# Patient Record
Sex: MALE | Race: WHITE | NOT HISPANIC OR LATINO | Employment: FULL TIME | URBAN - METROPOLITAN AREA
[De-identification: names, ages, dates, MRNs, and addresses within clinical notes are randomized per-mention and may not be internally consistent; named-entity substitution may affect disease eponyms.]

---

## 2023-11-20 ENCOUNTER — OFFICE VISIT (OUTPATIENT)
Dept: GASTROENTEROLOGY | Facility: HOSPITAL | Age: 58
End: 2023-11-20
Payer: COMMERCIAL

## 2023-11-20 VITALS
RESPIRATION RATE: 18 BRPM | OXYGEN SATURATION: 100 % | WEIGHT: 205 LBS | TEMPERATURE: 96.7 F | BODY MASS INDEX: 24.21 KG/M2 | HEIGHT: 77 IN | DIASTOLIC BLOOD PRESSURE: 79 MMHG | HEART RATE: 59 BPM | SYSTOLIC BLOOD PRESSURE: 125 MMHG

## 2023-11-20 DIAGNOSIS — G93.40 ACUTE ENCEPHALOPATHY: ICD-10-CM

## 2023-11-20 DIAGNOSIS — G62.89 OTHER POLYNEUROPATHY: ICD-10-CM

## 2023-11-20 DIAGNOSIS — E87.8 ELECTROLYTE ABNORMALITY: ICD-10-CM

## 2023-11-20 DIAGNOSIS — K74.60 CIRRHOSIS OF LIVER WITH ASCITES, UNSPECIFIED HEPATIC CIRRHOSIS TYPE (MULTI): Primary | ICD-10-CM

## 2023-11-20 DIAGNOSIS — R18.8 CIRRHOSIS OF LIVER WITH ASCITES, UNSPECIFIED HEPATIC CIRRHOSIS TYPE (MULTI): Primary | ICD-10-CM

## 2023-11-20 PROBLEM — D69.6 THROMBOCYTOPENIA (CMS-HCC): Status: ACTIVE | Noted: 2017-04-02

## 2023-11-20 PROBLEM — D50.0 ANEMIA DUE TO CHRONIC BLOOD LOSS: Status: ACTIVE | Noted: 2021-12-15

## 2023-11-20 PROBLEM — D64.9 ANEMIA: Status: ACTIVE | Noted: 2021-12-28

## 2023-11-20 PROBLEM — K76.6 PORTAL HYPERTENSION WITH ESOPHAGEAL VARICES (MULTI): Status: ACTIVE | Noted: 2021-12-15

## 2023-11-20 PROBLEM — F10.931 ALCOHOL WITHDRAWAL DELIRIUM (MULTI): Status: ACTIVE | Noted: 2017-04-03

## 2023-11-20 PROBLEM — M19.079 ARTHRITIS OF GREAT TOE AT METATARSOPHALANGEAL JOINT: Status: ACTIVE | Noted: 2020-11-17

## 2023-11-20 PROBLEM — R76.8 HELICOBACTER PYLORI AB+: Status: ACTIVE | Noted: 2021-12-17

## 2023-11-20 PROBLEM — E87.20 LACTIC ACIDOSIS: Status: ACTIVE | Noted: 2017-04-02

## 2023-11-20 PROBLEM — I85.10 SECONDARY ESOPHAGEAL VARICES WITHOUT BLEEDING (MULTI): Status: ACTIVE | Noted: 2021-12-17

## 2023-11-20 PROBLEM — I85.00 PORTAL HYPERTENSION WITH ESOPHAGEAL VARICES (MULTI): Status: ACTIVE | Noted: 2021-12-15

## 2023-11-20 PROBLEM — I10 HYPERTENSION: Chronic | Status: ACTIVE | Noted: 2023-11-20

## 2023-11-20 PROBLEM — G62.9 PERIPHERAL NEUROPATHY: Status: ACTIVE | Noted: 2020-11-17

## 2023-11-20 PROBLEM — R74.01 TRANSAMINITIS: Status: ACTIVE | Noted: 2017-04-03

## 2023-11-20 PROBLEM — M79.671 RIGHT FOOT PAIN: Status: ACTIVE | Noted: 2020-11-17

## 2023-11-20 PROBLEM — F07.81 CHRONIC TRAUMATIC ENCEPHALOPATHY: Status: ACTIVE | Noted: 2021-12-18

## 2023-11-20 PROBLEM — G89.29 CHRONIC PAIN: Chronic | Status: ACTIVE | Noted: 2023-11-20

## 2023-11-20 PROBLEM — K70.9 ALCOHOLIC LIVER DISEASE (MULTI): Chronic | Status: ACTIVE | Noted: 2023-11-20

## 2023-11-20 PROBLEM — R56.9: Status: ACTIVE | Noted: 2017-04-02

## 2023-11-20 PROBLEM — S91.109A OPEN WOUND OF TOE: Status: ACTIVE | Noted: 2020-11-17

## 2023-11-20 PROBLEM — E72.20 HYPERAMMONEMIA (MULTI): Status: ACTIVE | Noted: 2017-04-03

## 2023-11-20 PROCEDURE — 99214 OFFICE O/P EST MOD 30 MIN: CPT | Performed by: INTERNAL MEDICINE

## 2023-11-20 PROCEDURE — 3074F SYST BP LT 130 MM HG: CPT | Performed by: INTERNAL MEDICINE

## 2023-11-20 PROCEDURE — 3078F DIAST BP <80 MM HG: CPT | Performed by: INTERNAL MEDICINE

## 2023-11-20 PROCEDURE — 99204 OFFICE O/P NEW MOD 45 MIN: CPT | Performed by: INTERNAL MEDICINE

## 2023-11-20 PROCEDURE — 1036F TOBACCO NON-USER: CPT | Performed by: INTERNAL MEDICINE

## 2023-11-20 RX ORDER — SPIRONOLACTONE 50 MG/1
50 TABLET, FILM COATED ORAL
COMMUNITY
Start: 2023-10-26 | End: 2024-02-21 | Stop reason: SDUPTHER

## 2023-11-20 RX ORDER — FUROSEMIDE 20 MG/1
20 TABLET ORAL
COMMUNITY
Start: 2023-10-26 | End: 2024-02-21 | Stop reason: SDUPTHER

## 2023-11-20 RX ORDER — LACTULOSE 10 G/15ML
SOLUTION ORAL
COMMUNITY
Start: 2023-10-26 | End: 2024-02-27 | Stop reason: SDUPTHER

## 2023-11-20 ASSESSMENT — PAIN SCALES - GENERAL: PAINLEVEL: 0-NO PAIN

## 2023-11-20 NOTE — PATIENT INSTRUCTIONS
If you have any questions or need assistance, please don't hesitate to contact us.    Post: Office [phone] Fax: [phone]  Hepatology Nurse Coordinator: Soheila DUBON [phone]

## 2023-11-20 NOTE — PROGRESS NOTES
Subjective   Patient ID: Vance Silva is a 57 y.o. male who presents for New Patient Visit and Cirrhosis.  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  At that time had esophageal varices that were banded.   Echo in 6/2021 66% EF.  Covid in 6/2021.   Found recently to have ascites and 3.6 liters of fluid removed.   Cytology negative.   Protein in fluid 1.7 albumin 1.2 Serum albumin 4.4.   Labs done 9/15/2023 bili 0.8/0.3 alk phos 89 ast 33 alt 42 total protein 7.5.   CBC normal except for plts of 41,000.   No alcohol since 2018 prior 3 fifths a week.   Has well water and fracking at his property and has been told high arsinic levels in blood.   Now eats healthy diet with many vegetables and some meat.  Does alternative medicines including cryo, electromagnetic stimulations and stem cell injections.   Feels fairly well at this point.   No more ascites.  Minimal edema at times.   Last EGD 9/29/23 Grade 1 varices.     Review of Systems  Some cognitive deficit.   History of seizure disorder none for years.   Multiple bone fractures.      Objective   Physical Exam  Physical Exam  Constitutional:       Appearance: Normal appearance. Somewhat decreased muscle mass.    Eyes:      General: No scleral icterus.  Cardiovascular:      Rate and Rhythm: Normal rate and regular rhythm.      Heart sounds: No murmur heard.     No gallop.   Pulmonary:      Effort: Pulmonary effort is normal.      Breath sounds: Normal breath sounds.   Abdominal:      General: Abdomen is flat. Bowel sounds are normal. There is no distension.      Palpations: Abdomen is soft. There is no fluid wave, hepatomegaly or splenomegaly.      Tenderness: There is no abdominal tenderness.   Musculoskeletal:      Cervical back: Normal range of motion. No tenderness.      Right lower leg: No edema.      Left lower leg: No edema.    Lymphadenopathy:      Cervical: No cervical adenopathy.   Skin:     Coloration: Skin is not jaundiced.   Neurological:      General: No focal deficit present.      Mental Status: She is alert.       Assessment/Plan   Cirrhosis that is likely multifactorial.   Related to pain killers in the past, as well as excess alcohol possibly recent toxic arsenic levels.   Will get labs and an ultrasound and follow up in 1-2 months.   He needs to work on more protein in the diet  grams a day and 2 grams or less of sodium.   He should restart his lactulose and titrate to 2-5 soft stools a day.   May also want to add rifaximin.   Appears to be Anderson Knowles class A- at this point.   Will monitor.   Screen for liver cancer with ultrasound and AFP.   1-3 coffee's a day.

## 2024-01-29 ENCOUNTER — HOSPITAL ENCOUNTER (OUTPATIENT)
Dept: RADIOLOGY | Facility: HOSPITAL | Age: 59
Discharge: HOME | End: 2024-01-29
Payer: COMMERCIAL

## 2024-01-29 ENCOUNTER — APPOINTMENT (OUTPATIENT)
Dept: VASCULAR MEDICINE | Facility: HOSPITAL | Age: 59
End: 2024-01-29
Payer: COMMERCIAL

## 2024-01-29 DIAGNOSIS — K74.60 CIRRHOSIS OF LIVER WITH ASCITES, UNSPECIFIED HEPATIC CIRRHOSIS TYPE (MULTI): ICD-10-CM

## 2024-01-29 DIAGNOSIS — R18.8 CIRRHOSIS OF LIVER WITH ASCITES, UNSPECIFIED HEPATIC CIRRHOSIS TYPE (MULTI): ICD-10-CM

## 2024-01-29 PROCEDURE — 76705 ECHO EXAM OF ABDOMEN: CPT | Performed by: RADIOLOGY

## 2024-01-29 PROCEDURE — 93975 VASCULAR STUDY: CPT

## 2024-01-29 PROCEDURE — 93975 VASCULAR STUDY: CPT | Performed by: RADIOLOGY

## 2024-01-31 ENCOUNTER — OFFICE VISIT (OUTPATIENT)
Dept: GASTROENTEROLOGY | Facility: HOSPITAL | Age: 59
End: 2024-01-31
Payer: COMMERCIAL

## 2024-01-31 ENCOUNTER — LAB (OUTPATIENT)
Dept: LAB | Facility: LAB | Age: 59
End: 2024-01-31
Payer: COMMERCIAL

## 2024-01-31 VITALS
HEART RATE: 116 BPM | TEMPERATURE: 97 F | WEIGHT: 200 LBS | OXYGEN SATURATION: 99 % | RESPIRATION RATE: 18 BRPM | SYSTOLIC BLOOD PRESSURE: 124 MMHG | BODY MASS INDEX: 23.62 KG/M2 | HEIGHT: 77 IN | DIASTOLIC BLOOD PRESSURE: 89 MMHG

## 2024-01-31 DIAGNOSIS — K76.6 PORTAL HYPERTENSION WITH ESOPHAGEAL VARICES (MULTI): ICD-10-CM

## 2024-01-31 DIAGNOSIS — K70.31 ALCOHOLIC CIRRHOSIS OF LIVER WITH ASCITES (MULTI): Primary | ICD-10-CM

## 2024-01-31 DIAGNOSIS — R18.8 CIRRHOSIS OF LIVER WITH ASCITES, UNSPECIFIED HEPATIC CIRRHOSIS TYPE (MULTI): ICD-10-CM

## 2024-01-31 DIAGNOSIS — K76.82 HEPATIC ENCEPHALOPATHY (MULTI): ICD-10-CM

## 2024-01-31 DIAGNOSIS — K92.2 GASTROINTESTINAL HEMORRHAGE, UNSPECIFIED GASTROINTESTINAL HEMORRHAGE TYPE: ICD-10-CM

## 2024-01-31 DIAGNOSIS — I85.00 PORTAL HYPERTENSION WITH ESOPHAGEAL VARICES (MULTI): ICD-10-CM

## 2024-01-31 DIAGNOSIS — I85.00 PORTAL HYPERTENSION WITH ESOPHAGEAL VARICES (MULTI): Primary | ICD-10-CM

## 2024-01-31 DIAGNOSIS — K76.6 PORTAL HYPERTENSION WITH ESOPHAGEAL VARICES (MULTI): Primary | ICD-10-CM

## 2024-01-31 DIAGNOSIS — K74.60 CIRRHOSIS OF LIVER WITH ASCITES, UNSPECIFIED HEPATIC CIRRHOSIS TYPE (MULTI): ICD-10-CM

## 2024-01-31 LAB
AFP SERPL-MCNC: <4 NG/ML (ref 0–9)
ALBUMIN SERPL BCP-MCNC: 3.8 G/DL (ref 3.4–5)
ALP SERPL-CCNC: 97 U/L (ref 33–136)
ALT SERPL W P-5'-P-CCNC: 28 U/L (ref 10–52)
ANION GAP SERPL CALC-SCNC: 15 MMOL/L (ref 10–20)
AST SERPL W P-5'-P-CCNC: 26 U/L (ref 9–39)
BASOPHILS # BLD AUTO: 0.05 X10*3/UL (ref 0–0.1)
BASOPHILS NFR BLD AUTO: 0.3 %
BILIRUB DIRECT SERPL-MCNC: 0.1 MG/DL (ref 0–0.3)
BILIRUB SERPL-MCNC: 0.5 MG/DL (ref 0–1.2)
BUN SERPL-MCNC: 31 MG/DL (ref 6–23)
CALCIUM SERPL-MCNC: 8.9 MG/DL (ref 8.6–10.6)
CHLORIDE SERPL-SCNC: 103 MMOL/L (ref 98–107)
CO2 SERPL-SCNC: 20 MMOL/L (ref 21–32)
CREAT SERPL-MCNC: 1.15 MG/DL (ref 0.5–1.3)
EGFRCR SERPLBLD CKD-EPI 2021: 73 ML/MIN/1.73M*2
EOSINOPHIL # BLD AUTO: 0 X10*3/UL (ref 0–0.7)
EOSINOPHIL NFR BLD AUTO: 0 %
ERYTHROCYTE [DISTWIDTH] IN BLOOD BY AUTOMATED COUNT: 20.7 % (ref 11.5–14.5)
GLUCOSE SERPL-MCNC: 108 MG/DL (ref 74–99)
HAV AB SER QL IA: REACTIVE
HBV CORE AB SER QL: NONREACTIVE
HBV SURFACE AB SER-ACNC: <3.1 MIU/ML
HBV SURFACE AG SERPL QL IA: NONREACTIVE
HCT VFR BLD AUTO: 25 % (ref 41–52)
HCV AB SER QL: NONREACTIVE
HGB BLD-MCNC: 7.2 G/DL (ref 13.5–17.5)
HYPOCHROMIA BLD QL SMEAR: NORMAL
IMM GRANULOCYTES # BLD AUTO: 0.13 X10*3/UL (ref 0–0.7)
IMM GRANULOCYTES NFR BLD AUTO: 0.9 % (ref 0–0.9)
INR PPP: 1.2 (ref 0.9–1.1)
LYMPHOCYTES # BLD AUTO: 0.66 X10*3/UL (ref 1.2–4.8)
LYMPHOCYTES NFR BLD AUTO: 4.5 %
MCH RBC QN AUTO: 20.8 PG (ref 26–34)
MCHC RBC AUTO-ENTMCNC: 28.8 G/DL (ref 32–36)
MCV RBC AUTO: 72 FL (ref 80–100)
MONOCYTES # BLD AUTO: 0.99 X10*3/UL (ref 0.1–1)
MONOCYTES NFR BLD AUTO: 6.8 %
NEUTROPHILS # BLD AUTO: 12.68 X10*3/UL (ref 1.2–7.7)
NEUTROPHILS NFR BLD AUTO: 87.5 %
NRBC BLD-RTO: 0 /100 WBCS (ref 0–0)
OVALOCYTES BLD QL SMEAR: NORMAL
PLATELET # BLD AUTO: 148 X10*3/UL (ref 150–450)
POTASSIUM SERPL-SCNC: 4.3 MMOL/L (ref 3.5–5.3)
PROT SERPL-MCNC: 6.7 G/DL (ref 6.4–8.2)
PROTHROMBIN TIME: 13.6 SECONDS (ref 9.8–12.8)
RBC # BLD AUTO: 3.46 X10*6/UL (ref 4.5–5.9)
RBC MORPH BLD: NORMAL
SODIUM SERPL-SCNC: 134 MMOL/L (ref 136–145)
WBC # BLD AUTO: 14.5 X10*3/UL (ref 4.4–11.3)

## 2024-01-31 PROCEDURE — 99214 OFFICE O/P EST MOD 30 MIN: CPT | Performed by: INTERNAL MEDICINE

## 2024-01-31 PROCEDURE — 80321 ALCOHOLS BIOMARKERS 1OR 2: CPT

## 2024-01-31 PROCEDURE — 82248 BILIRUBIN DIRECT: CPT

## 2024-01-31 PROCEDURE — 82175 ASSAY OF ARSENIC: CPT

## 2024-01-31 PROCEDURE — 86708 HEPATITIS A ANTIBODY: CPT

## 2024-01-31 PROCEDURE — 36415 COLL VENOUS BLD VENIPUNCTURE: CPT

## 2024-01-31 PROCEDURE — 3074F SYST BP LT 130 MM HG: CPT | Performed by: INTERNAL MEDICINE

## 2024-01-31 PROCEDURE — 86706 HEP B SURFACE ANTIBODY: CPT

## 2024-01-31 PROCEDURE — 85610 PROTHROMBIN TIME: CPT

## 2024-01-31 PROCEDURE — 86704 HEP B CORE ANTIBODY TOTAL: CPT

## 2024-01-31 PROCEDURE — 80053 COMPREHEN METABOLIC PANEL: CPT

## 2024-01-31 PROCEDURE — 3079F DIAST BP 80-89 MM HG: CPT | Performed by: INTERNAL MEDICINE

## 2024-01-31 PROCEDURE — 87340 HEPATITIS B SURFACE AG IA: CPT

## 2024-01-31 PROCEDURE — 86803 HEPATITIS C AB TEST: CPT

## 2024-01-31 PROCEDURE — 82105 ALPHA-FETOPROTEIN SERUM: CPT

## 2024-01-31 PROCEDURE — 1036F TOBACCO NON-USER: CPT | Performed by: INTERNAL MEDICINE

## 2024-01-31 PROCEDURE — 85025 COMPLETE CBC W/AUTO DIFF WBC: CPT

## 2024-01-31 RX ORDER — CARVEDILOL 6.25 MG/1
6.25 TABLET ORAL ONCE
Status: DISCONTINUED | OUTPATIENT
Start: 2024-01-31 | End: 2024-02-02

## 2024-01-31 ASSESSMENT — COLUMBIA-SUICIDE SEVERITY RATING SCALE - C-SSRS
1. IN THE PAST MONTH, HAVE YOU WISHED YOU WERE DEAD OR WISHED YOU COULD GO TO SLEEP AND NOT WAKE UP?: NO
6. HAVE YOU EVER DONE ANYTHING, STARTED TO DO ANYTHING, OR PREPARED TO DO ANYTHING TO END YOUR LIFE?: NO
2. HAVE YOU ACTUALLY HAD ANY THOUGHTS OF KILLING YOURSELF?: NO

## 2024-01-31 ASSESSMENT — ENCOUNTER SYMPTOMS
OCCASIONAL FEELINGS OF UNSTEADINESS: 0
LOSS OF SENSATION IN FEET: 0
DEPRESSION: 0

## 2024-01-31 ASSESSMENT — PATIENT HEALTH QUESTIONNAIRE - PHQ9
1. LITTLE INTEREST OR PLEASURE IN DOING THINGS: NOT AT ALL
SUM OF ALL RESPONSES TO PHQ9 QUESTIONS 1 AND 2: 0
2. FEELING DOWN, DEPRESSED OR HOPELESS: NOT AT ALL

## 2024-01-31 ASSESSMENT — PAIN SCALES - GENERAL: PAINLEVEL: 2

## 2024-01-31 NOTE — PROGRESS NOTES
Subjective   Patient ID: Vance Silva is a 58 y.o. male who presents for Follow-up (Pt here today for follow up after testing).  HPI 57 yo man with cirrhosis from multifactorial liver diease has not drank alcohol for many years.   Recent GI bleed from varices status post banding of varices 1/5/2024.   Having paracentesis every few weeks at [facility].   Recent MELD 8.   He is very debilitated with ascites and muscle wasting, severe fatigue.  Has had significant variceal bleeding.   Copper negative.   Ascites without SBP.        Review of Systems  Fatigue, muscle weakness.   Loss of muscle mass.      Objective   Physical Exam  Physical Exam  Constitutional:       Appearance: Normal appearance. Muscle wasting.     Eyes:      General: No scleral icterus.  Cardiovascular:      Rate and Rhythm: Normal rate and regular rhythm.      Heart sounds: No murmur heard.     No gallop.   Pulmonary:      Effort: Pulmonary effort is normal.      Breath sounds: Normal breath sounds.   Abdominal:      General: Abdomen is flat. Bowel sounds are normal. There is no distension.      Palpations: Abdomen is soft. There is a fluid wave, hepatomegaly or splenomegaly.      Tenderness: There is no abdominal tenderness.   Musculoskeletal:      Cervical back: Normal range of motion. No tenderness.      Right lower leg: No edema.      Left lower leg: No edema.   Lymphadenopathy:      Cervical: No cervical adenopathy.   Skin:     Coloration: Skin is not jaundiced.   Neurological:      General: No focal deficit present.      Mental Status: She is alert.       Assessment/Plan        57 yo man with cirrhosis with low MELD but significant morbidity from his liver disease.   Suspect his MELD will rise significantly over the next year.   With HE concerned about placing a TIPS without having transplant as a backup.     Will get labs today, start carvedolol 6.25 mg every day.   Increase lasix and aldactone to 40 mg lasix and 100 mg aldactone bid.    Follow up in transplant clinic after evaluation.       Mu Norris MD 01/31/24 3:16 PM

## 2024-02-01 ENCOUNTER — TELEPHONE (OUTPATIENT)
Dept: TRANSPLANT | Facility: HOSPITAL | Age: 59
End: 2024-02-01
Payer: COMMERCIAL

## 2024-02-02 ENCOUNTER — TELEPHONE (OUTPATIENT)
Dept: TRANSPLANT | Facility: HOSPITAL | Age: 59
End: 2024-02-02
Payer: COMMERCIAL

## 2024-02-02 DIAGNOSIS — I85.00 PORTAL HYPERTENSION WITH ESOPHAGEAL VARICES (MULTI): ICD-10-CM

## 2024-02-02 DIAGNOSIS — K76.6 PORTAL HYPERTENSION WITH ESOPHAGEAL VARICES (MULTI): ICD-10-CM

## 2024-02-02 RX ORDER — CARVEDILOL 6.25 MG/1
6.25 TABLET ORAL NIGHTLY
Qty: 30 TABLET | Refills: 11 | Status: SHIPPED | OUTPATIENT
Start: 2024-02-02 | End: 2025-02-01

## 2024-02-02 NOTE — TELEPHONE ENCOUNTER
How did you hear about [facility]?   MD  Referral Source comments   MD  Have you ever contacted the [facility] before?   NO  Are you currently listed or being evaluated by any other transplant center?   NO  Where are you currently listed or being evaluated for transplant?   NO  Are you also seeking other organ transplant(s)?   NO  Are you a ?   NO  Do you have your own transportation?   YES  Do you have a 's license?   YES  Do you have a working vehicle?   YES  Do you have someone in your support system that can drive you to and from all your appointments as needed?   YES  Are you prepared to drive to [facility] in [place], [place] for all necessary appointments? If not, you may need to speak with a  prior to scheduling your appointments. We are unable to assist with transportation or parking.   YES  Transportation Comments   NO    GENERAL HEALTH STATUS LI  What is the primary cause of your organ disease?   ENVIR  Have you had a heart attack?   NO  When was your heart attack?   N/A  Where was your heart attack treated?   N/A  Have you had a stroke?   NO  When was your stroke?   N/A  Where was your stroke treated?   N/A     GENERAL HEALTH STATUS HT  Have you had any pregnancies?   N/A  If yes, how many pregnancies?   N/A  Have you had any transfusions? YES  If yes, how many transfusions?   3-4  Have you ever been on dialysis?   NO    DIABETES HISTORY LI  What type of diabetes do you have?   NO  Are you taking insulin for your diabetes?   N/A    CANCER HISTORY  Where is cancer located?   NO  When was the diagnosis made?   N/A  Who provided cancer treatment?   N/A     MENTAL HEALTH HISTORY LI  Are you currently or have you previously been seen by a mental health professional?   YES  Who is the treating doctor for your mental health issues?   ?  Did you receive a specific diagnosis?   COGNITIVE REASONS    SUBSTANCE USE HISTORY LI  Are you a current or  former tobacco user?   NO  Describe your tobacco use.   N/A  When did you quit using tobacco products?   N/A  Are you a current or former alcohol user?   FORMER  Describe your alcohol use.   WEEKEND, SOCIAL ENDING UP MOST DAYS  When did you quit drinking alcohol?   6 YEARS AGO  Have you ever received treatment for alcohol abuse?   NO  When and where did you receive treatment for alcohol abuse?   N/A  Are you a current or former marijuana user?   CURRENT, HAS A CARD  Describe your marijuana use.   WATER BASE SPRAY  When did you quit using marijuana?   N/A  Have you ever received treatment for marijuana abuse?   N/A  When and where did you receive treatment for marijuana abuse?   N/A  Are you a current or former user of illegal drugs such as heroin, cocaine, meth, crack, or any other recreational substance?   FORMER  Describe your illegal drug use.   COCAINE IN HIS 20'S  When did you quit using illegal drugs?   IN HIS 20'S  Have you ever received treatment for illegal drug abuse?   NO  When and where did you receive treatment for illegal drug abuse?   N/A    DIAGNOSTIC TESTING LI  Have you had a colonoscopy in the last 5 years?   YES  Where was the colonoscopy performed?   AT [facility]  Have you had a EGD (Endoscopy)?   YES  Where was the EGD(Endoscopy) Performed?   AT [facility]     COMMENTS  NO

## 2024-02-03 LAB
LABORATORY REPORT: NORMAL
PETH INTERPRETATION: NORMAL
PLPETH BLD-MCNC: <10 NG/ML
POPETH BLD-MCNC: <10 NG/ML

## 2024-02-05 ENCOUNTER — TELEPHONE (OUTPATIENT)
Dept: TRANSPLANT | Facility: HOSPITAL | Age: 59
End: 2024-02-05
Payer: COMMERCIAL

## 2024-02-05 ENCOUNTER — DOCUMENTATION (OUTPATIENT)
Dept: TRANSPLANT | Facility: HOSPITAL | Age: 59
End: 2024-02-05
Payer: COMMERCIAL

## 2024-02-05 ENCOUNTER — DOCUMENTATION (OUTPATIENT)
Dept: GASTROENTEROLOGY | Facility: HOSPITAL | Age: 59
End: 2024-02-05
Payer: COMMERCIAL

## 2024-02-05 DIAGNOSIS — Z01.818 ENCOUNTER FOR PRE-TRANSPLANT EVALUATION FOR LIVER TRANSPLANT: ICD-10-CM

## 2024-02-05 DIAGNOSIS — K70.31 ALCOHOLIC CIRRHOSIS OF LIVER WITH ASCITES (MULTI): ICD-10-CM

## 2024-02-05 DIAGNOSIS — D72.829 LEUKOCYTOSIS, UNSPECIFIED TYPE: ICD-10-CM

## 2024-02-05 DIAGNOSIS — R18.8 CIRRHOSIS OF LIVER WITH ASCITES, UNSPECIFIED HEPATIC CIRRHOSIS TYPE (MULTI): ICD-10-CM

## 2024-02-05 DIAGNOSIS — K74.60 CIRRHOSIS OF LIVER WITH ASCITES, UNSPECIFIED HEPATIC CIRRHOSIS TYPE (MULTI): ICD-10-CM

## 2024-02-05 DIAGNOSIS — K72.10 END STAGE LIVER DISEASE (MULTI): ICD-10-CM

## 2024-02-05 DIAGNOSIS — D72.829 LEUKOCYTOSIS, UNSPECIFIED TYPE: Primary | ICD-10-CM

## 2024-02-05 LAB
ARSENIC 24H UR-MRATE: NORMAL UG/D (ref 0–49.9)
ARSENIC UR-MCNC: 18.4 UG/L (ref 0–34.9)
ARSENIC/CREAT UR: 12.1 UG/G CRT (ref 0–29.9)
COLLECT DURATION TIME SPEC: NORMAL HR
CREAT 24H UR-MRATE: NORMAL MG/D (ref 800–2100)
CREAT UR-MCNC: 152 MG/DL
LEAD 24H UR-MRATE: NORMAL UG/D (ref 0–8.1)
LEAD UR-MCNC: <5 UG/L (ref 0–5)
LEAD/CREAT UR: NORMAL UG/G CRT (ref 0–5)
MERCURY 24H UR-MRATE: NORMAL UG/D (ref 0–20)
MERCURY UR-MCNC: <2.5 UG/L (ref 0–5)
MERCURY/CREAT UR: NORMAL UG/G CRT (ref 0–20)
SPECIMEN VOL ?TM UR: NORMAL ML

## 2024-02-05 NOTE — Clinical Note
Per email from [facility] ref# [id number] on file for liver txp eval eff 02/02/24.  Listing approval will be needed.

## 2024-02-05 NOTE — TELEPHONE ENCOUNTER
Client services called.  Patient had blood drawn for heavy metal on 01/31/2024, test was canceled due to either not being preserved or processed corectly

## 2024-02-05 NOTE — TELEPHONE ENCOUNTER
Spoke with Wanda, patient's mother, and requested she notify patient that a prescription for Cipro 500mg twice daily x7 days was sent to [facility] pharmacy in [place], [place] ([address]). Mom will notify patient.

## 2024-02-05 NOTE — TELEPHONE ENCOUNTER
Labs reviewed with  Post - advised repeat labs and blood cultures due to WBC 14.5 and drop in Hgb from 9.6 to 7.2.     Attempted to contact patient but call went to . Coordinator contacted primary support - patient's mother Wanda- states patient is on a flight to [place] and will not return until next week. Mom will contact patient to check if he is having any symptoms - coordinator reviewed concern for fever, chills, vomiting, diarrhea as well as any signs of bleeding (dark tarry stool) and advised if positive for any signs patient should be assessed at ED/urgent care in [place]. Wanda verbalized understanding and agreed with plan.

## 2024-02-05 NOTE — PROGRESS NOTES
Per email from [facility] ref# [id number] on file for liver txp eval eff 02/02/24.  Listing approval will be needed.

## 2024-02-06 ENCOUNTER — TELEPHONE (OUTPATIENT)
Dept: TRANSPLANT | Facility: HOSPITAL | Age: 59
End: 2024-02-06
Payer: COMMERCIAL

## 2024-02-06 NOTE — TELEPHONE ENCOUNTER
"On call coordinator received call from patient's mother, Wanda, who reports that patient forgot to bring all of his medications to [place] with him and he \"sounds horrible\" and c/o increased swelling. Dr. Norris notified and attempted to contact patient directly, but patient did not answer.  Coordinator notified Wanda that if she is able to get patient on the phone that Dr. Norris has advised patient go to ED immediately to be assessed for possible infection, hypervolemia and hepatic encephalopathy.     Plan:    Patient should call Dr. Norris directly if he has any questions.   Coordinator advised discussing hospital options with shimon wick.  Wanda will notify coordinator of patient's choice for hospital.   Coordinator will provide hospital ED attending with transfer center phone number.   Dr. Norris will provide medical history and concerns to ED attending via transfer center.     Wanda is aware and in agreement with the plan above. Wanda is aware of how to contact liver transplant coordinator.   "

## 2024-02-07 ENCOUNTER — DOCUMENTATION (OUTPATIENT)
Dept: TRANSPLANT | Facility: HOSPITAL | Age: 59
End: 2024-02-07
Payer: COMMERCIAL

## 2024-02-07 NOTE — TELEPHONE ENCOUNTER
On call coordinator received follow up call from patient's mother, Wanda, who reports patient is now very confused - instead of going to ED patient called. Wanda report that patient told her that he can't even get up to walk to the bathroom to vomit.     Coordinator advised patient's mom to contact the  again and have someone check on patient, as patient is encephalopathic in [place] without any known contact for mom to call.     Wanda notified coordinator that she spoke with TMAT who went to patient's room and spoke with patient. Patient agreed to go to ED and security is calling EMS.     Coordinator spoke with ED charge nurse, Ramírez, at [facility] in [place] and gave report on patient's current status. Transfer center number given and recommended that attending speak with Dr. Norris.

## 2024-02-07 NOTE — PROGRESS NOTES
Verified patient arrived to [place] ED in [facility]. Charge nurse Cris states patient will be admitted, but is just waiting on a private room. Transfer center number given for MD to MD discussion. Patient is noted to be hemodynamically stable at this time.     Patient's mom, Wanda, states patient told her that he is going to leave the ED in 2 hours if he's not in a room.  Post notified and will call patient directly and call ED attending directly.

## 2024-02-07 NOTE — TELEPHONE ENCOUNTER
Spoke with Ysabel JHAVERI RN at [facility] in [place], patient has fevers and hallucinating. Provided brief history and medications currently prescribed, including recent Rx for Cipro. Infectious Disease doctor was seeing patient at time of conversation and was transferred to Dr. Norris to discuss medical history and plan for treatment in [place].     Coordinator updated patient's mom Wanda.

## 2024-02-09 ENCOUNTER — DOCUMENTATION (OUTPATIENT)
Dept: TRANSPLANT | Facility: HOSPITAL | Age: 59
End: 2024-02-09
Payer: COMMERCIAL

## 2024-02-09 ENCOUNTER — TELEPHONE (OUTPATIENT)
Dept: TRANSPLANT | Facility: HOSPITAL | Age: 59
End: 2024-02-09
Payer: COMMERCIAL

## 2024-02-09 ENCOUNTER — HOSPITAL ENCOUNTER (INPATIENT)
Facility: HOSPITAL | Age: 59
LOS: 4 days | Discharge: HOME | DRG: 378 | End: 2024-02-14
Attending: STUDENT IN AN ORGANIZED HEALTH CARE EDUCATION/TRAINING PROGRAM | Admitting: INTERNAL MEDICINE
Payer: COMMERCIAL

## 2024-02-09 DIAGNOSIS — G89.29 OTHER CHRONIC PAIN: Chronic | ICD-10-CM

## 2024-02-09 DIAGNOSIS — N17.9 ACUTE KIDNEY INJURY (CMS-HCC): ICD-10-CM

## 2024-02-09 DIAGNOSIS — D50.0 ANEMIA DUE TO CHRONIC BLOOD LOSS: ICD-10-CM

## 2024-02-09 DIAGNOSIS — R18.8 OTHER ASCITES: ICD-10-CM

## 2024-02-09 DIAGNOSIS — K74.60 CIRRHOSIS OF LIVER WITH ASCITES, UNSPECIFIED HEPATIC CIRRHOSIS TYPE (MULTI): ICD-10-CM

## 2024-02-09 DIAGNOSIS — R76.8 HELICOBACTER PYLORI AB+: ICD-10-CM

## 2024-02-09 DIAGNOSIS — K65.2 SBP (SPONTANEOUS BACTERIAL PERITONITIS) (MULTI): ICD-10-CM

## 2024-02-09 DIAGNOSIS — R42 LIGHTHEADEDNESS: Primary | ICD-10-CM

## 2024-02-09 DIAGNOSIS — D64.9 ANEMIA, UNSPECIFIED TYPE: ICD-10-CM

## 2024-02-09 DIAGNOSIS — R18.8 CIRRHOSIS OF LIVER WITH ASCITES, UNSPECIFIED HEPATIC CIRRHOSIS TYPE (MULTI): ICD-10-CM

## 2024-02-09 PROCEDURE — 99285 EMERGENCY DEPT VISIT HI MDM: CPT | Performed by: STUDENT IN AN ORGANIZED HEALTH CARE EDUCATION/TRAINING PROGRAM

## 2024-02-09 PROCEDURE — 99285 EMERGENCY DEPT VISIT HI MDM: CPT | Mod: 25 | Performed by: STUDENT IN AN ORGANIZED HEALTH CARE EDUCATION/TRAINING PROGRAM

## 2024-02-09 PROCEDURE — 49083 ABD PARACENTESIS W/IMAGING: CPT | Performed by: STUDENT IN AN ORGANIZED HEALTH CARE EDUCATION/TRAINING PROGRAM

## 2024-02-09 PROCEDURE — 49082 ABD PARACENTESIS: CPT | Performed by: STUDENT IN AN ORGANIZED HEALTH CARE EDUCATION/TRAINING PROGRAM

## 2024-02-09 NOTE — TELEPHONE ENCOUNTER
VM at 11:39 from PT's mother stating PT is trying to get a flight as soon as possible, may not be until tomorrow afternoon. Also checking into possibility of someone he knows from [place] getting out there to help him back home.

## 2024-02-09 NOTE — PROGRESS NOTES
Patient of Dr. Mu Norris in evaluation for liver transplant is expected to arrive at [facility] around 10pm 2/9/24. [facility] has arranged EMS transport to bring patient directly to [facility] ED for assessment and possible admission to the [facility].    Patient is in evaluation for liver transplant and had traveled to [place] on 2/5. Patient forgot all of his home medications and on call transplant coordinator received call from patient's mom stating that patient was very sick and confused. Patient was taken to [facility] ED in [place] for assessment on 2/6/24. In the ER patient was found to have a heart rate of 108 bpm, low-grade fever 99.3, white blood cell count 16.15, hemoglobin 5.5, MCV of 71.4, creatinine of 2.170, BUN of 39, sodium 127. On 2/8/24 patient signed out of hospital AMA.    Per available records in Care Everywhere the [place] ED A/P noted the following:     #Dizziness  #Shortness of breath  #Anemia  #Melena, 2/2 upper GI bleed, considering variceal bleed  -2 units PRBC ordered, transfuse to maintain hemoglobin > 7-H&H  every 6 hours  -PPI twice daily  -Continue to monitor clinically and hemodynamically    #Urinary retention  #UTI, bacterial and fungal  #Sepsis  -Bladder scan every 6 hours, straight catheter more than 300 mL  -Zosyn every 8 hours  -Fluconazole 200 mg daily  -Urine culture sent, follow result  -Blood culture sent, follow result - Culture Blood NATAN Escherichia coli  Susceptibility to follow.    #Abdominal distention  #Liver cirrhosis, decompensated  -Ammonia WNL, lipase elevated  -Lactulose 20 mg 3 times daily  -Ultrasound paracentesis  -Fluid restriction  -IV Lasix 40 mg twice daily  -Continue Zosyn  -Strict RAFFAELE  -Pain meds on board  #Extremity numbness  #Generalized weakness  #Hypervolemic, hyponatremia  #PORFIRIO on CKD?  -Sodium 127  -Trend renal panel    Image  XR Chest 1 View  Frontal 02/06/2024 19:00 by  Travon Banda  FINDINGS: There is  mild  bronchial wall thickening. No  consolidation or effusion. No  pneumothorax . The mediastinal  contour is normal. There is no  hilar or mediastinal  lymphadenopathy. The visualized  thoracic spine and ribs are  normal. IMPRESSION: Mild  bronchial wall thickening  possibly related to  small/reactive airway disease. No  focal consolidation or effusion.    Patient was seen by GI, nephrology, ID as well as psych  Per psych patient and capacity  Patient refused any intervention with GI  Patient had positive cultures and ID was following and he was receiving IV antibiotics  However this morning patient decided to leave A  Electronically Signed By: Zo Conti MD  On: 02.08.2024 11:20 PST

## 2024-02-10 ENCOUNTER — APPOINTMENT (OUTPATIENT)
Dept: RADIOLOGY | Facility: HOSPITAL | Age: 59
DRG: 378 | End: 2024-02-10
Payer: COMMERCIAL

## 2024-02-10 ENCOUNTER — CLINICAL SUPPORT (OUTPATIENT)
Dept: EMERGENCY MEDICINE | Facility: HOSPITAL | Age: 59
DRG: 378 | End: 2024-02-10
Payer: COMMERCIAL

## 2024-02-10 ENCOUNTER — APPOINTMENT (OUTPATIENT)
Dept: CARDIOLOGY | Facility: HOSPITAL | Age: 59
DRG: 378 | End: 2024-02-10
Payer: COMMERCIAL

## 2024-02-10 PROBLEM — R42 LIGHTHEADEDNESS: Status: ACTIVE | Noted: 2024-02-10

## 2024-02-10 LAB
ABO GROUP (TYPE) IN BLOOD: NORMAL
ABO GROUP (TYPE) IN BLOOD: NORMAL
ALBUMIN FLD-MCNC: <0.5 G/DL
ALBUMIN SERPL BCP-MCNC: 3 G/DL (ref 3.4–5)
ALBUMIN SERPL BCP-MCNC: 3.4 G/DL (ref 3.4–5)
ALP SERPL-CCNC: 103 U/L (ref 33–136)
ALP SERPL-CCNC: 121 U/L (ref 33–136)
ALT SERPL W P-5'-P-CCNC: 41 U/L (ref 10–52)
ALT SERPL W P-5'-P-CCNC: 54 U/L (ref 10–52)
AMMONIA PLAS-SCNC: 16 UMOL/L (ref 16–53)
ANION GAP SERPL CALC-SCNC: 15 MMOL/L (ref 10–20)
ANION GAP SERPL CALC-SCNC: 16 MMOL/L (ref 10–20)
ANTIBODY SCREEN: NORMAL
ANTIBODY SCREEN: NORMAL
AORTIC VALVE PEAK VELOCITY: 1.3 M/S
APPEARANCE UR: CLEAR
APTT PPP: 23 SECONDS (ref 27–38)
AST SERPL W P-5'-P-CCNC: 34 U/L (ref 9–39)
AST SERPL W P-5'-P-CCNC: 37 U/L (ref 9–39)
AV PEAK GRADIENT: 6.8 MMHG
AVA (PEAK VEL): 4.28 CM2
BASOPHILS # BLD AUTO: 0.01 X10*3/UL (ref 0–0.1)
BASOPHILS # BLD MANUAL: 0 X10*3/UL (ref 0–0.1)
BASOPHILS NFR BLD AUTO: 0.2 %
BASOPHILS NFR BLD MANUAL: 0 %
BILIRUB DIRECT SERPL-MCNC: 0.2 MG/DL (ref 0–0.3)
BILIRUB SERPL-MCNC: 0.4 MG/DL (ref 0–1.2)
BILIRUB SERPL-MCNC: 0.5 MG/DL (ref 0–1.2)
BILIRUB UR STRIP.AUTO-MCNC: NEGATIVE MG/DL
BNP SERPL-MCNC: 56 PG/ML (ref 0–99)
BUN SERPL-MCNC: 32 MG/DL (ref 6–23)
BUN SERPL-MCNC: 38 MG/DL (ref 6–23)
BURR CELLS BLD QL SMEAR: NORMAL
CALCIUM SERPL-MCNC: 7.9 MG/DL (ref 8.6–10.6)
CALCIUM SERPL-MCNC: 8.7 MG/DL (ref 8.6–10.6)
CARDIAC TROPONIN I PNL SERPL HS: <3 NG/L (ref 0–53)
CHLORIDE SERPL-SCNC: 103 MMOL/L (ref 98–107)
CHLORIDE SERPL-SCNC: 105 MMOL/L (ref 98–107)
CO2 SERPL-SCNC: 15 MMOL/L (ref 21–32)
CO2 SERPL-SCNC: 18 MMOL/L (ref 21–32)
COLOR UR: ABNORMAL
CREAT SERPL-MCNC: 1.53 MG/DL (ref 0.5–1.3)
CREAT SERPL-MCNC: 1.62 MG/DL (ref 0.5–1.3)
EGFRCR SERPLBLD CKD-EPI 2021: 48 ML/MIN/1.73M*2
EGFRCR SERPLBLD CKD-EPI 2021: 52 ML/MIN/1.73M*2
EOSINOPHIL # BLD AUTO: 0.03 X10*3/UL (ref 0–0.7)
EOSINOPHIL # BLD MANUAL: 0 X10*3/UL (ref 0–0.7)
EOSINOPHIL NFR BLD AUTO: 0.5 %
EOSINOPHIL NFR BLD MANUAL: 0 %
ERYTHROCYTE [DISTWIDTH] IN BLOOD BY AUTOMATED COUNT: 20.2 % (ref 11.5–14.5)
ERYTHROCYTE [DISTWIDTH] IN BLOOD BY AUTOMATED COUNT: 21 % (ref 11.5–14.5)
FERRITIN SERPL-MCNC: 41 NG/ML (ref 20–300)
FOLATE SERPL-MCNC: 13.6 NG/ML
GLUCOSE FLD-MCNC: <10 MG/DL
GLUCOSE SERPL-MCNC: 129 MG/DL (ref 74–99)
GLUCOSE SERPL-MCNC: 97 MG/DL (ref 74–99)
GLUCOSE UR STRIP.AUTO-MCNC: NORMAL MG/DL
HAPTOGLOB SERPL-MCNC: 238 MG/DL (ref 37–246)
HCT VFR BLD AUTO: 22.3 % (ref 41–52)
HCT VFR BLD AUTO: 23.8 % (ref 41–52)
HGB BLD-MCNC: 6.2 G/DL (ref 13.5–17.5)
HGB BLD-MCNC: 7.6 G/DL (ref 13.5–17.5)
HGB RETIC QN: 19 PG (ref 28–38)
HOLD SPECIMEN: NORMAL
HYPOCHROMIA BLD QL SMEAR: ABNORMAL
HYPOCHROMIA BLD QL SMEAR: NORMAL
IMM GRANULOCYTES # BLD AUTO: 0.06 X10*3/UL (ref 0–0.7)
IMM GRANULOCYTES # BLD AUTO: 0.08 X10*3/UL (ref 0–0.7)
IMM GRANULOCYTES NFR BLD AUTO: 0.8 % (ref 0–0.9)
IMM GRANULOCYTES NFR BLD AUTO: 1 % (ref 0–0.9)
IMMATURE RETIC FRACTION: 36.9 %
INR PPP: 1.2 (ref 0.9–1.1)
INR PPP: 1.3 (ref 0.9–1.1)
IRON SATN MFR SERPL: 4 % (ref 25–45)
IRON SERPL-MCNC: 13 UG/DL (ref 35–150)
KETONES UR STRIP.AUTO-MCNC: NEGATIVE MG/DL
LACTATE SERPL-SCNC: 1.1 MMOL/L (ref 0.4–2)
LDH SERPL L TO P-CCNC: 110 U/L (ref 84–246)
LEFT ATRIUM VOLUME AREA LENGTH INDEX BSA: 27.9 ML/M2
LEFT VENTRICLE INTERNAL DIMENSION DIASTOLE: 4.7 CM (ref 3.5–6)
LEFT VENTRICULAR OUTFLOW TRACT DIAMETER: 2.3 CM
LEUKOCYTE ESTERASE UR QL STRIP.AUTO: ABNORMAL
LYMPHOCYTES # BLD AUTO: 0.5 X10*3/UL (ref 1.2–4.8)
LYMPHOCYTES # BLD MANUAL: 0.09 X10*3/UL (ref 1.2–4.8)
LYMPHOCYTES NFR BLD AUTO: 8.2 %
LYMPHOCYTES NFR BLD MANUAL: 0.9 %
MAGNESIUM SERPL-MCNC: 2.68 MG/DL (ref 1.6–2.4)
MCH RBC QN AUTO: 21.7 PG (ref 26–34)
MCH RBC QN AUTO: 22.3 PG (ref 26–34)
MCHC RBC AUTO-ENTMCNC: 27.8 G/DL (ref 32–36)
MCHC RBC AUTO-ENTMCNC: 31.9 G/DL (ref 32–36)
MCV RBC AUTO: 70 FL (ref 80–100)
MCV RBC AUTO: 78 FL (ref 80–100)
MITRAL VALVE E/A RATIO: 1.08
MITRAL VALVE E/E' RATIO: 5.91
MONOCYTES # BLD AUTO: 0.45 X10*3/UL (ref 0.1–1)
MONOCYTES # BLD MANUAL: 0.09 X10*3/UL (ref 0.1–1)
MONOCYTES NFR BLD AUTO: 7.3 %
MONOCYTES NFR BLD MANUAL: 0.9 %
MUCOUS THREADS #/AREA URNS AUTO: ABNORMAL /LPF
NEUTROPHILS # BLD AUTO: 5.08 X10*3/UL (ref 1.2–7.7)
NEUTROPHILS NFR BLD AUTO: 82.8 %
NEUTS SEG # BLD MANUAL: 9.33 X10*3/UL (ref 1.2–7)
NEUTS SEG NFR BLD MANUAL: 98.2 %
NITRITE UR QL STRIP.AUTO: NEGATIVE
NRBC BLD-RTO: 0 /100 WBCS (ref 0–0)
NRBC BLD-RTO: 0 /100 WBCS (ref 0–0)
OVALOCYTES BLD QL SMEAR: NORMAL
PH UR STRIP.AUTO: 6 [PH]
PLATELET # BLD AUTO: 120 X10*3/UL (ref 150–450)
PLATELET # BLD AUTO: 142 X10*3/UL (ref 150–450)
POLYCHROMASIA BLD QL SMEAR: NORMAL
POTASSIUM SERPL-SCNC: 4.4 MMOL/L (ref 3.5–5.3)
POTASSIUM SERPL-SCNC: 4.5 MMOL/L (ref 3.5–5.3)
PROT FLD-MCNC: 1.6 G/DL
PROT SERPL-MCNC: 6 G/DL (ref 6.4–8.2)
PROT SERPL-MCNC: 7.2 G/DL (ref 6.4–8.2)
PROT UR STRIP.AUTO-MCNC: ABNORMAL MG/DL
PROTHROMBIN TIME: 13.1 SECONDS (ref 9.8–12.8)
PROTHROMBIN TIME: 15 SECONDS (ref 9.8–12.8)
RBC # BLD AUTO: 2.86 X10*6/UL (ref 4.5–5.9)
RBC # BLD AUTO: 3.41 X10*6/UL (ref 4.5–5.9)
RBC # UR STRIP.AUTO: NEGATIVE /UL
RBC #/AREA URNS AUTO: ABNORMAL /HPF
RBC MORPH BLD: ABNORMAL
RBC MORPH BLD: NORMAL
RETICS #: 0.03 X10*6/UL (ref 0.02–0.12)
RETICS/RBC NFR AUTO: 0.7 % (ref 0.5–2)
RH FACTOR (ANTIGEN D): NORMAL
RH FACTOR (ANTIGEN D): NORMAL
RIGHT VENTRICLE FREE WALL PEAK S': 10.9 CM/S
SODIUM SERPL-SCNC: 131 MMOL/L (ref 136–145)
SODIUM SERPL-SCNC: 132 MMOL/L (ref 136–145)
SP GR UR STRIP.AUTO: 1.01
TIBC SERPL-MCNC: 339 UG/DL (ref 240–445)
TOTAL CELLS COUNTED BLD: 114
TRICUSPID ANNULAR PLANE SYSTOLIC EXCURSION: 2.1 CM
UIBC SERPL-MCNC: 326 UG/DL (ref 110–370)
UROBILINOGEN UR STRIP.AUTO-MCNC: NORMAL MG/DL
VIT B12 SERPL-MCNC: >2000 PG/ML (ref 211–911)
WBC # BLD AUTO: 6.1 X10*3/UL (ref 4.4–11.3)
WBC # BLD AUTO: 9.5 X10*3/UL (ref 4.4–11.3)
WBC #/AREA URNS AUTO: ABNORMAL /HPF

## 2024-02-10 PROCEDURE — 82042 OTHER SOURCE ALBUMIN QUAN EA: CPT | Performed by: STUDENT IN AN ORGANIZED HEALTH CARE EDUCATION/TRAINING PROGRAM

## 2024-02-10 PROCEDURE — 87086 URINE CULTURE/COLONY COUNT: CPT | Performed by: STUDENT IN AN ORGANIZED HEALTH CARE EDUCATION/TRAINING PROGRAM

## 2024-02-10 PROCEDURE — 83010 ASSAY OF HAPTOGLOBIN QUANT: CPT | Mod: WESLAB

## 2024-02-10 PROCEDURE — 83540 ASSAY OF IRON: CPT

## 2024-02-10 PROCEDURE — P9047 ALBUMIN (HUMAN), 25%, 50ML: HCPCS | Mod: JZ

## 2024-02-10 PROCEDURE — 82728 ASSAY OF FERRITIN: CPT

## 2024-02-10 PROCEDURE — 93306 TTE W/DOPPLER COMPLETE: CPT | Performed by: INTERNAL MEDICINE

## 2024-02-10 PROCEDURE — 83735 ASSAY OF MAGNESIUM: CPT | Performed by: STUDENT IN AN ORGANIZED HEALTH CARE EDUCATION/TRAINING PROGRAM

## 2024-02-10 PROCEDURE — 93005 ELECTROCARDIOGRAM TRACING: CPT

## 2024-02-10 PROCEDURE — 85610 PROTHROMBIN TIME: CPT | Performed by: STUDENT IN AN ORGANIZED HEALTH CARE EDUCATION/TRAINING PROGRAM

## 2024-02-10 PROCEDURE — 99233 SBSQ HOSP IP/OBS HIGH 50: CPT

## 2024-02-10 PROCEDURE — 1100000001 HC PRIVATE ROOM DAILY

## 2024-02-10 PROCEDURE — 85007 BL SMEAR W/DIFF WBC COUNT: CPT | Performed by: STUDENT IN AN ORGANIZED HEALTH CARE EDUCATION/TRAINING PROGRAM

## 2024-02-10 PROCEDURE — 83615 LACTATE (LD) (LDH) ENZYME: CPT

## 2024-02-10 PROCEDURE — 0W9G3ZZ DRAINAGE OF PERITONEAL CAVITY, PERCUTANEOUS APPROACH: ICD-10-PCS | Performed by: STUDENT IN AN ORGANIZED HEALTH CARE EDUCATION/TRAINING PROGRAM

## 2024-02-10 PROCEDURE — 2500000001 HC RX 250 WO HCPCS SELF ADMINISTERED DRUGS (ALT 637 FOR MEDICARE OP)

## 2024-02-10 PROCEDURE — 85045 AUTOMATED RETICULOCYTE COUNT: CPT

## 2024-02-10 PROCEDURE — 83880 ASSAY OF NATRIURETIC PEPTIDE: CPT

## 2024-02-10 PROCEDURE — 2500000002 HC RX 250 W HCPCS SELF ADMINISTERED DRUGS (ALT 637 FOR MEDICARE OP, ALT 636 FOR OP/ED)

## 2024-02-10 PROCEDURE — 85025 COMPLETE CBC W/AUTO DIFF WBC: CPT

## 2024-02-10 PROCEDURE — 82140 ASSAY OF AMMONIA: CPT | Performed by: STUDENT IN AN ORGANIZED HEALTH CARE EDUCATION/TRAINING PROGRAM

## 2024-02-10 PROCEDURE — 2500000004 HC RX 250 GENERAL PHARMACY W/ HCPCS (ALT 636 FOR OP/ED): Mod: JZ

## 2024-02-10 PROCEDURE — 36415 COLL VENOUS BLD VENIPUNCTURE: CPT | Performed by: STUDENT IN AN ORGANIZED HEALTH CARE EDUCATION/TRAINING PROGRAM

## 2024-02-10 PROCEDURE — 87040 BLOOD CULTURE FOR BACTERIA: CPT | Performed by: STUDENT IN AN ORGANIZED HEALTH CARE EDUCATION/TRAINING PROGRAM

## 2024-02-10 PROCEDURE — 82436 ASSAY OF URINE CHLORIDE: CPT

## 2024-02-10 PROCEDURE — 99223 1ST HOSP IP/OBS HIGH 75: CPT | Performed by: INTERNAL MEDICINE

## 2024-02-10 PROCEDURE — 2500000005 HC RX 250 GENERAL PHARMACY W/O HCPCS: Performed by: STUDENT IN AN ORGANIZED HEALTH CARE EDUCATION/TRAINING PROGRAM

## 2024-02-10 PROCEDURE — 84157 ASSAY OF PROTEIN OTHER: CPT | Performed by: STUDENT IN AN ORGANIZED HEALTH CARE EDUCATION/TRAINING PROGRAM

## 2024-02-10 PROCEDURE — 81001 URINALYSIS AUTO W/SCOPE: CPT | Performed by: STUDENT IN AN ORGANIZED HEALTH CARE EDUCATION/TRAINING PROGRAM

## 2024-02-10 PROCEDURE — 82746 ASSAY OF FOLIC ACID SERUM: CPT

## 2024-02-10 PROCEDURE — 76770 US EXAM ABDO BACK WALL COMP: CPT | Performed by: STUDENT IN AN ORGANIZED HEALTH CARE EDUCATION/TRAINING PROGRAM

## 2024-02-10 PROCEDURE — 83605 ASSAY OF LACTIC ACID: CPT | Performed by: STUDENT IN AN ORGANIZED HEALTH CARE EDUCATION/TRAINING PROGRAM

## 2024-02-10 PROCEDURE — 86920 COMPATIBILITY TEST SPIN: CPT

## 2024-02-10 PROCEDURE — 2500000004 HC RX 250 GENERAL PHARMACY W/ HCPCS (ALT 636 FOR OP/ED)

## 2024-02-10 PROCEDURE — 85730 THROMBOPLASTIN TIME PARTIAL: CPT

## 2024-02-10 PROCEDURE — 87070 CULTURE OTHR SPECIMN AEROBIC: CPT | Performed by: STUDENT IN AN ORGANIZED HEALTH CARE EDUCATION/TRAINING PROGRAM

## 2024-02-10 PROCEDURE — 2500000004 HC RX 250 GENERAL PHARMACY W/ HCPCS (ALT 636 FOR OP/ED): Performed by: STUDENT IN AN ORGANIZED HEALTH CARE EDUCATION/TRAINING PROGRAM

## 2024-02-10 PROCEDURE — 80053 COMPREHEN METABOLIC PANEL: CPT | Performed by: STUDENT IN AN ORGANIZED HEALTH CARE EDUCATION/TRAINING PROGRAM

## 2024-02-10 PROCEDURE — 85027 COMPLETE CBC AUTOMATED: CPT | Performed by: STUDENT IN AN ORGANIZED HEALTH CARE EDUCATION/TRAINING PROGRAM

## 2024-02-10 PROCEDURE — 86901 BLOOD TYPING SEROLOGIC RH(D): CPT | Performed by: STUDENT IN AN ORGANIZED HEALTH CARE EDUCATION/TRAINING PROGRAM

## 2024-02-10 PROCEDURE — 93306 TTE W/DOPPLER COMPLETE: CPT

## 2024-02-10 PROCEDURE — 76770 US EXAM ABDO BACK WALL COMP: CPT

## 2024-02-10 PROCEDURE — 80053 COMPREHEN METABOLIC PANEL: CPT

## 2024-02-10 PROCEDURE — 82607 VITAMIN B-12: CPT

## 2024-02-10 PROCEDURE — 82945 GLUCOSE OTHER FLUID: CPT | Performed by: STUDENT IN AN ORGANIZED HEALTH CARE EDUCATION/TRAINING PROGRAM

## 2024-02-10 PROCEDURE — 82248 BILIRUBIN DIRECT: CPT | Performed by: STUDENT IN AN ORGANIZED HEALTH CARE EDUCATION/TRAINING PROGRAM

## 2024-02-10 PROCEDURE — 86901 BLOOD TYPING SEROLOGIC RH(D): CPT

## 2024-02-10 PROCEDURE — 84484 ASSAY OF TROPONIN QUANT: CPT | Performed by: STUDENT IN AN ORGANIZED HEALTH CARE EDUCATION/TRAINING PROGRAM

## 2024-02-10 PROCEDURE — P9045 ALBUMIN (HUMAN), 5%, 250 ML: HCPCS | Mod: JZ

## 2024-02-10 RX ORDER — ALBUMIN HUMAN 250 G/1000ML
25 SOLUTION INTRAVENOUS 3 TIMES DAILY
Status: DISCONTINUED | OUTPATIENT
Start: 2024-02-10 | End: 2024-02-14 | Stop reason: HOSPADM

## 2024-02-10 RX ORDER — CIPROFLOXACIN 500 MG/1
500 TABLET ORAL EVERY 12 HOURS SCHEDULED
Status: DISCONTINUED | OUTPATIENT
Start: 2024-02-10 | End: 2024-02-10

## 2024-02-10 RX ORDER — LACTULOSE 10 G/15ML
10 SOLUTION ORAL DAILY
Status: DISCONTINUED | OUTPATIENT
Start: 2024-02-10 | End: 2024-02-11

## 2024-02-10 RX ORDER — PANTOPRAZOLE SODIUM 40 MG/1
40 TABLET, DELAYED RELEASE ORAL
Status: DISCONTINUED | OUTPATIENT
Start: 2024-02-10 | End: 2024-02-14 | Stop reason: HOSPADM

## 2024-02-10 RX ORDER — LIDOCAINE HYDROCHLORIDE AND EPINEPHRINE 10; 10 MG/ML; UG/ML
5 INJECTION, SOLUTION INFILTRATION; PERINEURAL ONCE
Status: COMPLETED | OUTPATIENT
Start: 2024-02-10 | End: 2024-02-10

## 2024-02-10 RX ORDER — CARVEDILOL 3.12 MG/1
3.12 TABLET ORAL 2 TIMES DAILY
Status: DISCONTINUED | OUTPATIENT
Start: 2024-02-10 | End: 2024-02-14 | Stop reason: HOSPADM

## 2024-02-10 RX ORDER — ALBUMIN HUMAN 50 G/1000ML
25 SOLUTION INTRAVENOUS ONCE
Status: COMPLETED | OUTPATIENT
Start: 2024-02-10 | End: 2024-02-10

## 2024-02-10 RX ORDER — TALC
3 POWDER (GRAM) TOPICAL NIGHTLY PRN
Status: DISCONTINUED | OUTPATIENT
Start: 2024-02-10 | End: 2024-02-14 | Stop reason: HOSPADM

## 2024-02-10 RX ADMIN — CARVEDILOL 3.12 MG: 3.12 TABLET, FILM COATED ORAL at 08:26

## 2024-02-10 RX ADMIN — ALBUMIN HUMAN 25 G: 0.05 INJECTION, SOLUTION INTRAVENOUS at 06:23

## 2024-02-10 RX ADMIN — ALBUMIN HUMAN 25 G: 0.25 SOLUTION INTRAVENOUS at 20:44

## 2024-02-10 RX ADMIN — PANTOPRAZOLE SODIUM 40 MG: 40 TABLET, DELAYED RELEASE ORAL at 06:22

## 2024-02-10 RX ADMIN — ALBUMIN HUMAN 25 G: 0.25 SOLUTION INTRAVENOUS at 12:14

## 2024-02-10 RX ADMIN — MELATONIN 3 MG: 3 TAB ORAL at 20:44

## 2024-02-10 RX ADMIN — SODIUM CHLORIDE 500 ML: 9 INJECTION, SOLUTION INTRAVENOUS at 01:55

## 2024-02-10 RX ADMIN — PIPERACILLIN SODIUM AND TAZOBACTAM SODIUM 4.5 G: 4; .5 INJECTION, SOLUTION INTRAVENOUS at 22:24

## 2024-02-10 RX ADMIN — LIDOCAINE HYDROCHLORIDE AND EPINEPHRINE 5 ML: 10; 10 INJECTION, SOLUTION INFILTRATION; PERINEURAL at 02:15

## 2024-02-10 RX ADMIN — LACTULOSE 10 G: 20 SOLUTION ORAL at 08:26

## 2024-02-10 RX ADMIN — CARVEDILOL 3.12 MG: 3.12 TABLET, FILM COATED ORAL at 20:44

## 2024-02-10 RX ADMIN — PERFLUTREN 2 ML OF DILUTION: 6.52 INJECTION, SUSPENSION INTRAVENOUS at 15:25

## 2024-02-10 RX ADMIN — PIPERACILLIN SODIUM AND TAZOBACTAM SODIUM 4.5 G: 4; .5 INJECTION, SOLUTION INTRAVENOUS at 16:04

## 2024-02-10 RX ADMIN — PIPERACILLIN SODIUM AND TAZOBACTAM SODIUM 4.5 G: 4; .5 INJECTION, SOLUTION INTRAVENOUS at 08:27

## 2024-02-10 RX ADMIN — PANTOPRAZOLE SODIUM 40 MG: 40 TABLET, DELAYED RELEASE ORAL at 16:05

## 2024-02-10 ASSESSMENT — ACTIVITIES OF DAILY LIVING (ADL)
HEARING - RIGHT EAR: FUNCTIONAL
BATHING: INDEPENDENT
FEEDING YOURSELF: INDEPENDENT
HEARING - LEFT EAR: FUNCTIONAL
GROOMING: INDEPENDENT
PATIENT'S MEMORY ADEQUATE TO SAFELY COMPLETE DAILY ACTIVITIES?: YES
WALKS IN HOME: INDEPENDENT
TOILETING: INDEPENDENT
DRESSING YOURSELF: INDEPENDENT
ADEQUATE_TO_COMPLETE_ADL: YES
JUDGMENT_ADEQUATE_SAFELY_COMPLETE_DAILY_ACTIVITIES: YES

## 2024-02-10 ASSESSMENT — COGNITIVE AND FUNCTIONAL STATUS - GENERAL
MOBILITY SCORE: 24
DAILY ACTIVITIY SCORE: 24
DAILY ACTIVITIY SCORE: 24
MOBILITY SCORE: 24
PATIENT BASELINE BEDBOUND: NO

## 2024-02-10 ASSESSMENT — COLUMBIA-SUICIDE SEVERITY RATING SCALE - C-SSRS
6. HAVE YOU EVER DONE ANYTHING, STARTED TO DO ANYTHING, OR PREPARED TO DO ANYTHING TO END YOUR LIFE?: NO
1. IN THE PAST MONTH, HAVE YOU WISHED YOU WERE DEAD OR WISHED YOU COULD GO TO SLEEP AND NOT WAKE UP?: NO
2. HAVE YOU ACTUALLY HAD ANY THOUGHTS OF KILLING YOURSELF?: NO

## 2024-02-10 ASSESSMENT — LIFESTYLE VARIABLES
SUBSTANCE_ABUSE_PAST_12_MONTHS: NO
PRESCIPTION_ABUSE_PAST_12_MONTHS: NO

## 2024-02-10 NOTE — PROGRESS NOTES
Vance Silva is a 58 y.o. male on day 0 of admission presenting with Lightheadedness.    Subjective/Overnight Events:   The patient was seen, he looks nontoxic. No more episode of hematomesis or bleeding. He denied any abdominal, nausea or vomiting.     Objective:       24 Hour Vitals  Temp:  [36.6 °C (97.9 °F)-36.8 °C (98.2 °F)] 36.6 °C (97.9 °F)  Heart Rate:  [75-88] 88  Resp:  [16-27] 18  BP: ()/(62-85) 95/62    Temp (24hrs), Av.7 °C (98 °F), Min:36.6 °C (97.9 °F), Max:36.8 °C (98.2 °F)     24 hour Intake/Output    Intake/Output Summary (Last 24 hours) at 2/10/2024 1148  Last data filed at 2/10/2024 1140  Gross per 24 hour   Intake 1460 ml   Output 0 ml   Net 1460 ml        Physical Exam   General: No acute distress.   Cardiovascular: Regular rate/rhythm, no murmurs, no leg edema.  Respiratory: Clear to auscultation bilaterally, no wheezes/rales/rhonchi, normal respiratory effort.  Abdominal: Soft, distended, non-tender to palpation, normal bowel sounds, no masses or organomegaly.  Neurologic: Alert & oriented x 3, no focal deficits. No asterixis.   Psychiatric: Cooperative, appropriate mood and affect.        Labs  CBC  Results from last 72 hours   Lab Units 02/10/24  0013   WBC AUTO x10*3/uL 9.5   HEMOGLOBIN g/dL 7.6*   HEMATOCRIT % 23.8*   PLATELETS AUTO x10*3/uL 142*        BMP  Results from last 72 hours   Lab Units 02/10/24  0957 02/10/24  0013   SODIUM mmol/L 131* 132*   POTASSIUM mmol/L 4.5 4.4   CHLORIDE mmol/L 105 103   BUN mg/dL 32* 38*   CREATININE mg/dL 1.53* 1.62*   MAGNESIUM mg/dL  --  2.68*       Medications   Scheduled Medications  albumin human, 25 g, intravenous, TID  carvedilol, 3.125 mg, oral, BID  lactulose, 10 g, oral, Daily  pantoprazole, 40 mg, oral, BID AC  piperacillin-tazobactam, 4.5 g, intravenous, q6h     Continuous Medications    PRN Medications       Assessment/Plan     Vance Silva is a 58-year-old man with a past medical history of decompensated cirrhosis complicated  by portal hypertension, esophageal varices s/p banding, PHG, ascites, history of GI bleed from duodenal ulcer 11/2020, traumatic head injury, multiple fractures, and arthritis who presented to  2/10 with weakness, lightheadedness, low-grade fevers. He was recently admitted to an OSH with significant anemia 2/2 suspected UGIB and a UTI, and was later found to have E. Coli bacteremia before leaving AMA. At present, he is non-toxic appearing and hemodynamically stable, with WBC WNL, no lactate elevation. Suspect his symptoms are due to dehydration in the setting of an underlying infection that is not completely resolved (also has PORFIRIO supporting this), admitted for possible ongoing GI bleeding for EGD and UTI management.        #Recent UGIB, history of esophageal varices  #Cirrhosis  ::MELD-Na: 18 (6% 3-month mortality)  ::S/p 500 ml NS bolus in ED, receiving 25 g albumin  - PO pantoprazole 40 mg BID  - Will continue carvedilol although at a lower dose (3.125 mg BID) given mild hypotension on admission)  - Will do CLD for now until repeat CBC is drawn this morning to ensure stability. Maintain active T&S.   - Follow-up repeat blood cultures, urine cultures, paracentesis labs for SBP.   - Continue home lactulose, hold further lasix/spironolactone for now.   - Daily MELD labs.   - Ordered ECHO, BNP.   - Start Albumin 25g TID   - EGD on Monday (2/12) NPO on 2/11 midnight      #UTI   #Prior UTI, E.coli bacteremia   ::2/6 blood culture: E. Coli (unknown sensitivities), likely urinary source  ::S/p zosyn 2/6-2/8, left AMA, resumed taking ciprofloxacin  -Continue on Zosyn for now until the sensitivity result is back  -Consider adding tamsulosin for the BPH (might be the cause if his UTI)    #PORFIRIO  - Follow-up urine electrolytes, renal ultrasound for better assessment of PORFIRIO.   - On Zosyn currently       #Anemia  #Thrombocytopenia  ::Hgb 7.6 on admission, MCV 70  ::Patient denies melena/bleeding  - Iron studies, ferritin added  on although unclear if it will be useful given recent blood administration. Follow-up B12, folate.  - Hemolysis labs: LDH, reticulocytes, haptoglobin added on.           F: S/p 500 ml bolus, receiving albumin  E: Replete PRN  N: CLD  A: PIV     DVT: Holding due to anemia/recent bleed (SCD)  Full code (confirmed on admission)  NOK: States his son Jose Guadalupe ([phone]) and daughter Cahnte ([phone]) are his HCPOA       Bowen Jeter MD  Neurology PGY-1

## 2024-02-10 NOTE — H&P
History Of Present Illness  Vance Silva is a 58-year-old man with a past medical history of decompensated cirrhosis complicated by portal hypertension, esophageal varices s/p banding, PHG, ascites, history of GI bleed from duodenal ulcer 11/2020, traumatic head injury, multiple fractures, and arthritis who presented to [facility] with weakness, lightheadedness, low-grade fevers. Per chart review, the patient was recently admitted at [facility] from 1/4 - 1/6 after presenting with chest tightness, dizziness and dark stools. He was found to have a Hgb of 5.6 (baseline ~ 13). He was given 2 units pRBC, started on IV PPI and octreotide, and eventually transferred to the MICU for an emergent EGD. The EGD was notable for large (> 5 mm) esophageal varices with stigmata of recent bleeding and portal hypertensive gastropathy. He was seen by Dr. Norris on 1/31, started carvedilol and increased Lasix and spironolactone, with plans to follow-up in transplant clinic after evaluation.     The patient then travelled to [place] on 2/5 and forgot to bring his medications, and the on-call transplant coordinator received a call from the patient's mom stating that the patient was very sick and confused. He was taken to a nearby hospital on 2/6 and found to have a HR of 108, temperature of 99.3 F, WBC of 16.15, Hgb 5.5, MCV of 71.4, Cr of 2.17, BUN of 39, sodium of 127. He was given 2u PRBC PPI, and treated for a bacterial and fungal UTI with zosyn and fluconazole. Per records blood culture was later positive for E. coli, susceptibility testing was pending. The patient then signed out of the hospital AMA.    On exam, the patient confirms the above history and states he's overall feeling better, although still reports considerable weakness. He states that he may have trouble walking to the bathroom in his room because of this. He denies any fevers, chills, abdominal pain, melena, hematochezia, cough, hemoptysis, shortness of breath, chest  pain, confusion. He endorses compliance with his medications. He does note that he's had some difficulty urinating in the past, although he's not currently experiencing this issue.     In the ED:  - Vitals:   T 36.7, HR 77, /80, RR 16, SpO2 100% on RA  - Labs:   CBC: WBC 9.5, Hgb 7.6, plt 142   BMP: Na 132, K 4.4, Cl 103, HCO3 18, BUN 38, Cr 1.62, glu 97   LFT: Ca 8.7, tprot 7.2, alb 3.4, alkphos 121, AST 37, ALT 54, tbili 0.5   Electrolytes: Mg 2.68   Heme: PT 15, INR 1.3   hs-TnI < 3, lactate 1.1   BCx x2 drawn  - Imaging:     None obtained.     ED COURSE:     In the ED, the above labs were obtained. Paracentesis was performed (awaiting procedure note to determine how much was removed), 500 cc NaCl bolus was given, 25g albumin ordered but not yet given (being given on arrival to the floor).     Most recent EGDs/Colonoscopies:    EGD 1/5/24:  Impression:            - Large (> 5 mm) esophageal varices with stigmata of recent bleeding. Incompletely eradicated. Banded.   - Portal hypertensive gastropathy.   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   - Erosive gastropathy with no stigmata of recent bleeding.   - Erythematous duodenopathy.   - No specimens collected.     EGD 9/29/23:  Impression:              - Z-line regular, 40 cm from the incisors.   - Grade I esophageal varices.   - Portal hypertensive gastropathy.   - Normal examined duodenum.   - No specimens collected.     Flex Sig 1/31/22:  Impression:             - Diverticulosis in the sigmoid colon.   - One 4 mm polyp in the sigmoid colon, removed with a cold biopsy forceps. Resected and retrieved. Clip (MR conditional) was placed.   - Possible rectal varices.     Past Medical History  Past Medical History:   Diagnosis Date    Seizure (CMS/HCC)      Surgical History  No past surgical history on file.     Social History  He reports that he has never smoked. He has never used smokeless tobacco. He reports that he does not currently use alcohol.  "He reports that he does not currently use drugs.    Family History  No family history on file.     Allergies  Patient has no known allergies.    Review of Systems:    12-point review of systems completed and negative except as stated above.     Home Medications:  Carvedilol 6.25 mg BID  Constulose 30 mL BID  Lasix 20 mg daily -> increased to 40 mg per 1/31 Dr. Norris note  Spironolactone 50 mg -> increased to 100 mg BID per 1/31 Dr. Norris note     Physical exam:   General: Appears stated age, normal hygiene, in no acute distress. Ill-appearing.   Head: Normocephalic, atraumatic.  Eyes: No icterus, EOMI.  ENT: No nasal discharge, moist mucus membranes.  Cardiovascular: Regular rate/rhythm, no murmurs, no leg edema.  Respiratory: Clear to auscultation bilaterally, no wheezes/rales/rhonchi, normal respiratory effort.  Abdominal: Soft, distended, non-tender to palpation, normal bowel sounds, no masses or organomegaly.  Skin: Warm and dry, no bruises or rashes noted.  Neurologic: Alert & oriented x 3, no focal deficits. No asterixis.   MSK: Normal ROM, no joint swelling noted.  Psychiatric: Cooperative, appropriate mood and affect.      Last Recorded Vitals  Blood pressure 111/71, pulse 78, temperature 36.8 °C (98.2 °F), temperature source Temporal, resp. rate 17, height 1.956 m (6' 5\"), weight 83.6 kg (184 lb 4.9 oz), SpO2 100 %.    Assessment/Plan   Principal Problem:    Lightheadedness    Vance Sliva is a 58-year-old man with a past medical history of decompensated cirrhosis complicated by portal hypertension, esophageal varices s/p banding, PHG, ascites, history of GI bleed from duodenal ulcer 11/2020, traumatic head injury, multiple fractures, and arthritis who presented to [facility] with weakness, lightheadedness, low-grade fevers. He was recently admitted to an OSH with significant anemia 2/2 suspected UGIB and a UTI, and was later found to have E. Coli bacteremia before leaving [place]. At present, he is non-toxic " appearing and hemodynamically stable, with WBC WNL, no lactate elevation. Suspect his symptoms are due to dehydration in the setting of an underlying infection that is not completely resolved (also has PORFIRIO supporting this), now s/p IVF and receiving albumin. S/p paracentesis in the ED. Other obvious etiology for weakness would be ongoing GI bleed although patient denies melena, Hgb is low but appears to be stable, > 7. Will follow-up infectious workup and assess symptoms following fluids.    #Cirrhosis  #Weakness, lightheadedness  #Prior UTI, E coli bacteremia  #PORFIRIO  ::MELD-Na: 18 (6% 3-month mortality)  ::2/6 blood culture: E. Coli (unknown sensitivities), likely urinary source  ::S/p zosyn 2/6-2/8, left AMA, resumed taking ciprofloxacin  ::S/p 500 ml NS bolus in ED, receiving 25 g albumin  - Could likely continue oral antibiotic treatment for gram negative bacteremia given his apparent improvement from a few days ago, however as we do not have sensitivities feel it is safest to continue IV zosyn for now. Called [facility] but they were unable to provide me with updated sensitivities citing HIPAA, and stated I should call back to speak with the manager in the morning. Would plan for 14 days total of treatment, will defer continuing fluconazole.  - The etiology of his UTI seems consistent with obstruction 2/2 BPH, could consider starting tamsulosin.  - Follow-up repeat blood cultures, urine cultures, paracentesis labs for SBP.   - Follow-up urine electrolytes, renal ultrasound for better assessment of PORFIRIO.   - Continue home lactulose, hold further lasix/spironolactone for now.   - Daily MELD labs.   - Ordered ECHO, BNP.     #Recent UGIB, history of esophageal varices  #Anemia  #Thrombocytopenia  ::Hgb 7.6 on admission, MCV 70  ::Patient denies melena/bleeding  - Iron studies, ferritin added on although unclear if it will be useful given recent blood administration. Follow-up B12, folate.  - Hemolysis  labs: LDH, reticulocytes, haptoglobin added on.  - PO pantoprazole 40 mg BID  - Will continue carvedilol although at a lower dose (3.125 mg BID) given mild hypotension on admission)  - Will do CLD for now until repeat CBC is drawn this morning to ensure stability. Maintain active T&S.     F: S/p 500 ml bolus, receiving albumin  E: Replete PRN  N: CLD  A: PIV    DVT: Holding due to anemia/recent bleed  Full code (confirmed on admission)  NOK: States his son Jose Guadalupe ([phone]) and daughter Chante ([phone]) are his HCPOA    To be formally staffed with attending physician this morning.     Radames Zamorano MD

## 2024-02-10 NOTE — ED PROVIDER NOTES
HPI   Chief Complaint   Patient presents with   • Dizziness       HPI: Patient is a 58-year-old male, he has known liver cirrhosis, this is secondary to multifactorial liver disease, previous GI bleeding from varices status post banding in January 2024, he is having paracenteses every few weeks at this [facility], last paracentesis was 1 week ago, he has been having increasing weakness, fatigue, and is presenting to the emergency department today for concern for dizziness and lightheadedness.  Patient was recently out in [facility], he had been on ciprofloxacin orally and in [facility] was noted to be increasingly weak, fatigued, becoming lightheaded.  At a [facility] he was being transfused blood and was on IV antibiotics, he left this hospital AGAINST MEDICAL ADVICE and flew back to [place] to come to our hospital to be evaluated.  The patient denies any current pain.  He reports that he was subjectively febrile last week without any documented fever but has not had any fevers in the past 24 to 48 hours.  He reports increasing weakness and increased lightheadedness particularly when he stands.  He has been feeling increasing debilitation.  He denies any chest pain or palpitations, denies any shortness of breath, does report some abdominal distention that is nonpainful.  He denies any bloody stools.  Denies any nausea or vomiting.  Denies any focality to his weakness.  Denies any bowel or bladder incontinence or retention.      ROS: Complete 12 point review of systems performed, otherwise negative except as noted in the history of present illness    PMH: Reviewed, documented below in note. Pertinents in HPI  PSH: Reviewed and documented below in note. Pertinents in HPI  SH: Former alcohol use, none current, no current illicit drugs.  Fam: Reviewed, noncontributory to patients current complaint  MEDS: Reviewed and documented below in note. Pertinents in HPI  ALLERGIES: Reviewed and documented below in  "note.        History provided by:  Patient, medical records and EMS personnel   used: No                          Sumner Coma Scale Score: 15                  Patient History   Past Medical History:   Diagnosis Date   • Seizure (CMS/HCC)      No past surgical history on file.  No family history on file.  Social History     Tobacco Use   • Smoking status: Never   • Smokeless tobacco: Never   Vaping Use   • Vaping Use: Never used   Substance Use Topics   • Alcohol use: Not Currently   • Drug use: Not Currently       Physical Exam   Visit Vitals  /80   Pulse 77   Temp 36.7 °C (98 °F)   Resp 16   Ht 1.956 m (6' 5\")   Wt 90.7 kg (200 lb)   SpO2 100%   BMI 23.72 kg/m²   Smoking Status Never   BSA 2.22 m²      Physical Exam  Vitals and nursing note reviewed.   Constitutional:       General: He is not in acute distress.     Appearance: Normal appearance. He is ill-appearing.   HENT:      Head: Normocephalic and atraumatic.      Mouth/Throat:      Mouth: Mucous membranes are moist.   Eyes:      Extraocular Movements: Extraocular movements intact.      Conjunctiva/sclera: Conjunctivae normal.      Pupils: Pupils are equal, round, and reactive to light.   Neck:      Vascular: No carotid bruit.   Cardiovascular:      Rate and Rhythm: Normal rate and regular rhythm.      Pulses: Normal pulses.      Heart sounds: Normal heart sounds.   Pulmonary:      Effort: Pulmonary effort is normal.      Breath sounds: Normal breath sounds. No wheezing or rhonchi.   Abdominal:      General: There is distension.      Palpations: Abdomen is soft.      Tenderness: There is no abdominal tenderness. There is no right CVA tenderness, left CVA tenderness, guarding or rebound.   Musculoskeletal:         General: No tenderness, deformity or signs of injury.      Cervical back: Normal range of motion. No rigidity.   Skin:     General: Skin is warm and dry.      Capillary Refill: Capillary refill takes less than 2 seconds.     "  Coloration: Skin is pale.   Neurological:      General: No focal deficit present.      Mental Status: He is alert and oriented to person, place, and time.      Sensory: No sensory deficit.      Motor: No weakness.   Psychiatric:         Mood and Affect: Mood normal.         Behavior: Behavior normal.     Point of Care Ultrasound    (Results Pending)       Labs Reviewed   BLOOD CULTURE   BLOOD CULTURE   CBC WITH AUTO DIFFERENTIAL   MAGNESIUM   BASIC METABOLIC PANEL   HEPATIC FUNCTION PANEL   LACTATE   AMMONIA   TROPONIN I, HIGH SENSITIVITY   PROTIME-INR   TYPE AND SCREEN         ED Course & MDM   Diagnoses as of 02/12/24 1359   Lightheadedness   Acute kidney injury (CMS/HCC)   Anemia, unspecified type   Other ascites           Medical Decision Making  All mentioned lab results, ECGs, and imaging were independently reviewed by myself  - Patient evaluated. Patient is presenting to the emergency department today for increasing weakness and fatigue.  Differential for the patient is broad at this time and includes but is not limited to decompensation of his liver cirrhosis, dehydration, electrolyte derangements including worsening hyponatremia, worsening infectious etiology although the patient based on his current vital signs does not meet any sepsis criteria, protein malnutrition from his liver disease versus potential hepatic renal syndrome causing nephrotic syndrome also considered, and anemia to name a few.  IV was established and basic labs were obtained on the patient. Orthostatic negative but does feel weak with standing. His labs demonstrate no leukocytosis but he does have a predominant left shift, hemoglobin is 7.6 which appears to be stable from 12 days ago.  Apart from a slightly elevated ALT his liver enzymes are otherwise unremarkable, troponin is undetectable, lactate is normal, he does have a mild hyponatremia of 132, bicarb is 18 and he does have evidence of an acute kidney injury with a creatinine of  1.62.  After his labs have resulted I did call and speak with the hepatologist on-call Dr. Jacobson and discussed the patient's history, examination, and workup thus far.  We will add on urine electrolytes to the patient's workup given his PORFIRIO and mild hyponatremia.  I will also start the patient on 500 cc of IV fluids and at Dr. Jacobson's request we will infuse albumin as well.  Bedside ultrasound does reveal evidence of ascites, mild to moderate volume.  I discussed with Dr. Jacobson pursuing the paracentesis here in the emergency department versus completion of it upon admission.  Based on my history, examination, vital signs, as well as his labs, my suspicion for SBP at this time as well.  After discussion with Dr. Jacobson, we will hold off on antibiotics at this time in the emergency department although when he is admitted they can be considered.  Dr. Jacobson recommends performing the paracentesis in the emergency department. Patient was consented and a paracentesis was performed by myself, total of 1.8 L was taken off of the patient.  This was sent for labs as well as cultures.  The patient was ultimately admitted to the hepatology and GI service management and workup of his acute kidney injury and fatigue in the setting of his known liver cirrhosis.    - Monitored for any changes in stability or symptomatology. Patient remained stable.   - Counseled regarding labs, imaging, diagnosis, and plan. Patient was agreeable. All questions were answered. The patient was receptive and agreeable to the plan of care.   -The patient was instructed to return to the emergency department if any symptoms recurred, worsened, or if there were any additional concerns.    *Disclaimer: This note was dictated by speech recognition. Minor errors in transcription may be present. Please call with questions.    Jose Carlos Escobar MD             Your medication list        ASK your doctor about these medications        Instructions Last Dose  Given Next Dose Due   carvedilol 6.25 mg tablet  Commonly known as: Coreg      Take 1 tablet (6.25 mg) by mouth once daily at bedtime.       Constulose 20 gram/30 mL oral solution  Generic drug: lactulose           furosemide 20 mg tablet  Commonly known as: Lasix           spironolactone 50 mg tablet  Commonly known as: Aldactone                    Procedure  Paracentesis    Performed by: Carlin Escobar MD  Authorized by: Carlin Escobar MD    Consent:     Consent obtained:  Verbal and written    Consent given by:  Patient    Risks, benefits, and alternatives were discussed: yes    Universal protocol:     Patient identity confirmed:  Verbally with patient and arm band  Pre-procedure details:     Procedure purpose:  Diagnostic    Preparation: Patient was prepped and draped in usual sterile fashion    Anesthesia:     Anesthesia method:  Local infiltration    Local anesthetic:  Lidocaine 1% WITH epi  Procedure details:     Needle gauge: 14.    Ultrasound guidance: yes      Puncture site:  R lower quadrant    Fluid removed amount:  1.8L    Fluid appearance:  Yellow    Dressing:  4x4 sterile gauze  Post-procedure details:     Procedure completion:  Tolerated well, no immediate complications       *This report was transcribed using voice recognition software.  Every effort was made to ensure accuracy; however, inadvertent computerized transcription errors may be present.*  Carlin Escobar MD  02/10/24         Carlin Escobar MD  02/12/24 6742

## 2024-02-11 LAB
ALBUMIN SERPL BCP-MCNC: 3.5 G/DL (ref 3.4–5)
ANION GAP SERPL CALC-SCNC: 14 MMOL/L (ref 10–20)
ATRIAL RATE: 83 BPM
BACTERIA UR CULT: NO GROWTH
BASOPHILS # BLD AUTO: 0.01 X10*3/UL (ref 0–0.1)
BASOPHILS NFR BLD AUTO: 0.2 %
BLOOD EXPIRATION DATE: NORMAL
BUN SERPL-MCNC: 25 MG/DL (ref 6–23)
CALCIUM SERPL-MCNC: 8.2 MG/DL (ref 8.6–10.6)
CHLORIDE SERPL-SCNC: 106 MMOL/L (ref 98–107)
CHLORIDE UR-SCNC: <15 MMOL/L
CHLORIDE UR-SCNC: <15 MMOL/L
CHLORIDE/CREATININE (MMOL/G) IN URINE: NORMAL
CHLORIDE/CREATININE (MMOL/G) IN URINE: NORMAL
CO2 SERPL-SCNC: 18 MMOL/L (ref 21–32)
CREAT SERPL-MCNC: 1.69 MG/DL (ref 0.5–1.3)
CREAT UR-MCNC: 75.3 MG/DL (ref 20–370)
CREAT UR-MCNC: 87.4 MG/DL (ref 20–370)
DISPENSE STATUS: NORMAL
EGFRCR SERPLBLD CKD-EPI 2021: 46 ML/MIN/1.73M*2
EOSINOPHIL # BLD AUTO: 0.03 X10*3/UL (ref 0–0.7)
EOSINOPHIL NFR BLD AUTO: 0.6 %
ERYTHROCYTE [DISTWIDTH] IN BLOOD BY AUTOMATED COUNT: 20.3 % (ref 11.5–14.5)
GLUCOSE SERPL-MCNC: 134 MG/DL (ref 74–99)
HCT VFR BLD AUTO: 21.8 % (ref 41–52)
HCT VFR BLD AUTO: 24.3 % (ref 41–52)
HGB BLD-MCNC: 6.6 G/DL (ref 13.5–17.5)
HGB BLD-MCNC: 7.2 G/DL (ref 13.5–17.5)
IMM GRANULOCYTES # BLD AUTO: 0.05 X10*3/UL (ref 0–0.7)
IMM GRANULOCYTES NFR BLD AUTO: 1 % (ref 0–0.9)
LYMPHOCYTES # BLD AUTO: 0.57 X10*3/UL (ref 1.2–4.8)
LYMPHOCYTES NFR BLD AUTO: 11.5 %
MAGNESIUM SERPL-MCNC: 2.44 MG/DL (ref 1.6–2.4)
MAGNESIUM SERPL-MCNC: 2.55 MG/DL (ref 1.6–2.4)
MCH RBC QN AUTO: 22.9 PG (ref 26–34)
MCHC RBC AUTO-ENTMCNC: 29.6 G/DL (ref 32–36)
MCV RBC AUTO: 77 FL (ref 80–100)
MONOCYTES # BLD AUTO: 0.28 X10*3/UL (ref 0.1–1)
MONOCYTES NFR BLD AUTO: 5.7 %
NEUTROPHILS # BLD AUTO: 4 X10*3/UL (ref 1.2–7.7)
NEUTROPHILS NFR BLD AUTO: 81 %
NRBC BLD-RTO: 0 /100 WBCS (ref 0–0)
OVALOCYTES BLD QL SMEAR: NORMAL
P AXIS: 34 DEGREES
P OFFSET: 190 MS
P ONSET: 133 MS
PHOSPHATE SERPL-MCNC: 3.5 MG/DL (ref 2.5–4.9)
PLATELET # BLD AUTO: 127 X10*3/UL (ref 150–450)
POTASSIUM SERPL-SCNC: 4.3 MMOL/L (ref 3.5–5.3)
POTASSIUM UR-SCNC: 19 MMOL/L
POTASSIUM UR-SCNC: 31 MMOL/L
POTASSIUM/CREAT UR-RTO: 25 MMOL/G CREAT
POTASSIUM/CREAT UR-RTO: 35 MMOL/G CREAT
PR INTERVAL: 164 MS
PRODUCT BLOOD TYPE: 9500
PRODUCT CODE: NORMAL
Q ONSET: 215 MS
QRS COUNT: 14 BEATS
QRS DURATION: 84 MS
QT INTERVAL: 372 MS
QTC CALCULATION(BAZETT): 437 MS
QTC FREDERICIA: 414 MS
R AXIS: -7 DEGREES
RBC # BLD AUTO: 3.14 X10*6/UL (ref 4.5–5.9)
RBC MORPH BLD: NORMAL
SODIUM SERPL-SCNC: 134 MMOL/L (ref 136–145)
SODIUM UR-SCNC: 20 MMOL/L
SODIUM UR-SCNC: 28 MMOL/L
SODIUM/CREAT UR-RTO: 27 MMOL/G CREAT
SODIUM/CREAT UR-RTO: 32 MMOL/G CREAT
T AXIS: 27 DEGREES
T OFFSET: 401 MS
UNIT ABO: NORMAL
UNIT NUMBER: NORMAL
UNIT RH: NORMAL
UNIT VOLUME: 282
VENTRICULAR RATE: 83 BPM
WBC # BLD AUTO: 4.9 X10*3/UL (ref 4.4–11.3)
XM INTEP: NORMAL

## 2024-02-11 PROCEDURE — 85014 HEMATOCRIT: CPT

## 2024-02-11 PROCEDURE — 2500000004 HC RX 250 GENERAL PHARMACY W/ HCPCS (ALT 636 FOR OP/ED)

## 2024-02-11 PROCEDURE — P9047 ALBUMIN (HUMAN), 25%, 50ML: HCPCS | Mod: JZ

## 2024-02-11 PROCEDURE — 36415 COLL VENOUS BLD VENIPUNCTURE: CPT

## 2024-02-11 PROCEDURE — 2500000002 HC RX 250 W HCPCS SELF ADMINISTERED DRUGS (ALT 637 FOR MEDICARE OP, ALT 636 FOR OP/ED)

## 2024-02-11 PROCEDURE — P9016 RBC LEUKOCYTES REDUCED: HCPCS

## 2024-02-11 PROCEDURE — 99233 SBSQ HOSP IP/OBS HIGH 50: CPT | Performed by: INTERNAL MEDICINE

## 2024-02-11 PROCEDURE — 80069 RENAL FUNCTION PANEL: CPT

## 2024-02-11 PROCEDURE — 2500000001 HC RX 250 WO HCPCS SELF ADMINISTERED DRUGS (ALT 637 FOR MEDICARE OP)

## 2024-02-11 PROCEDURE — 82436 ASSAY OF URINE CHLORIDE: CPT

## 2024-02-11 PROCEDURE — 83735 ASSAY OF MAGNESIUM: CPT

## 2024-02-11 PROCEDURE — 85025 COMPLETE CBC W/AUTO DIFF WBC: CPT

## 2024-02-11 PROCEDURE — 36430 TRANSFUSION BLD/BLD COMPNT: CPT

## 2024-02-11 PROCEDURE — 2500000004 HC RX 250 GENERAL PHARMACY W/ HCPCS (ALT 636 FOR OP/ED): Mod: JZ

## 2024-02-11 PROCEDURE — 1100000001 HC PRIVATE ROOM DAILY

## 2024-02-11 RX ORDER — LACTULOSE 10 G/15ML
10 SOLUTION ORAL 3 TIMES DAILY
Status: DISCONTINUED | OUTPATIENT
Start: 2024-02-11 | End: 2024-02-14 | Stop reason: HOSPADM

## 2024-02-11 RX ORDER — MEROPENEM 1 G/1
1 INJECTION, POWDER, FOR SOLUTION INTRAVENOUS EVERY 8 HOURS
Status: DISCONTINUED | OUTPATIENT
Start: 2024-02-11 | End: 2024-02-13

## 2024-02-11 RX ADMIN — PANTOPRAZOLE SODIUM 40 MG: 40 TABLET, DELAYED RELEASE ORAL at 06:44

## 2024-02-11 RX ADMIN — ALBUMIN HUMAN 25 G: 0.25 SOLUTION INTRAVENOUS at 21:31

## 2024-02-11 RX ADMIN — ALBUMIN HUMAN 25 G: 0.25 SOLUTION INTRAVENOUS at 15:17

## 2024-02-11 RX ADMIN — CARVEDILOL 3.12 MG: 3.12 TABLET, FILM COATED ORAL at 08:15

## 2024-02-11 RX ADMIN — ALBUMIN HUMAN 25 G: 0.25 SOLUTION INTRAVENOUS at 08:15

## 2024-02-11 RX ADMIN — PANTOPRAZOLE SODIUM 40 MG: 40 TABLET, DELAYED RELEASE ORAL at 15:17

## 2024-02-11 RX ADMIN — LACTULOSE 10 G: 20 SOLUTION ORAL at 08:15

## 2024-02-11 RX ADMIN — PIPERACILLIN SODIUM AND TAZOBACTAM SODIUM 4.5 G: 4; .5 INJECTION, SOLUTION INTRAVENOUS at 05:06

## 2024-02-11 RX ADMIN — Medication 1 G: at 16:51

## 2024-02-11 RX ADMIN — PIPERACILLIN SODIUM AND TAZOBACTAM SODIUM 4.5 G: 4; .5 INJECTION, SOLUTION INTRAVENOUS at 11:47

## 2024-02-11 ASSESSMENT — COGNITIVE AND FUNCTIONAL STATUS - GENERAL
MOBILITY SCORE: 24
DAILY ACTIVITIY SCORE: 24

## 2024-02-11 ASSESSMENT — PAIN - FUNCTIONAL ASSESSMENT: PAIN_FUNCTIONAL_ASSESSMENT: 0-10

## 2024-02-11 ASSESSMENT — PAIN SCALES - GENERAL
PAINLEVEL_OUTOF10: 2
PAINLEVEL_OUTOF10: 0 - NO PAIN

## 2024-02-11 NOTE — SIGNIFICANT EVENT
Talked with [facility] for microbiology data:   - blood culture 2/6: ESBL e coli, ceftriaxone resistant, zosyn susceptible, TMP-SMX susceptible, amikacin susceptible, ertapenem susceptible    They will fax the results.    See faxed culture data below:        Media Information      Document Information    Other: Other   OSH culture details   02/11/2024 17:11   Attached To:   Hospital Encounter on 2/9/24   Source Information    Lisbeth Fay MD  [facility]        Media Information      Document Information    Other: Other   OSH culture sensitivities   02/11/2024 17:11   Attached To:   Hospital Encounter on 2/9/24   Source Information    Lisbeth Fay MD  [facility]

## 2024-02-11 NOTE — PROGRESS NOTES
"Vance Silva is a 58 y.o. male on day 1 of admission presenting with Lightheadedness.    Subjective   Feels a bit stronger today than yesterday, energy mildly improved but still feeling fatigued. Appetite is good.         Objective     Physical Exam  Constitutional:       General: He is not in acute distress.     Appearance: Normal appearance. He is obese.   HENT:      Mouth/Throat:      Mouth: Mucous membranes are moist.   Eyes:      Extraocular Movements: Extraocular movements intact.   Cardiovascular:      Rate and Rhythm: Normal rate and regular rhythm.   Pulmonary:      Effort: No respiratory distress.   Abdominal:      General: There is no distension.      Tenderness: There is no abdominal tenderness.      Comments: + abdominal ascites, no asterixis   Musculoskeletal:         General: No swelling.      Right lower leg: No edema.      Left lower leg: No edema.   Skin:     Findings: No bruising or rash.   Neurological:      General: No focal deficit present.      Mental Status: He is alert. Mental status is at baseline.         Last Recorded Vitals  Blood pressure 107/71, pulse 72, temperature 36.8 °C (98.2 °F), temperature source Temporal, resp. rate 18, height 1.956 m (6' 5\"), weight 83.6 kg (184 lb 4.9 oz), SpO2 98 %.  Intake/Output last 3 Shifts:  I/O last 3 completed shifts:  In: 2073.3 (24.8 mL/kg) [P.O.:360; Blood:1013.3; IV Piggyback:700]  Out: 0 (0 mL/kg)   Weight: 83.6 kg     Relevant Results                       Assessment/Plan   Principal Problem:    Lightheadedness    57yo man hx cirrhosis cb portal htn with esophageal varices sp banding on 1/5, with significant ascites requiring frequent paracentesis, and HE, hx of TBI, OA who was recently admitted in an out of state hospital for weakness, fatigue and found to have UTI with ESBL+ E. Coli bacteremia and significant anemia requiring 2u pRBCs - he left that hospital in order to return to [facility] and is admitted after returning to his home state with " chief complaint of fatigue found to have hgb 6.2 requiring 1u pRBCs overnight on 2/10 though no signs of active bleed but suspected to be 2/2 slow bleeding from esophageal varices which were banded but not completely ablated just yet. Will plan for EGD on 2/12 to re-assess. Also on meropenem for E. Coli (+ESBL) bacteremia 2/2 UTI at OSH - he remains stable from an infection standpoint. Also has an PORFIRIO with prerenal FeNa 0.4% which could be prerenal in the setting of UTI/hypovolemia versus HRS - currently on albumin.     Updates 2/11:   -1u pRBCs transfused overnight 2/10 for hgb 6.2  - talked with OSH in [place] - his blood culture from 2/6 grew ESBL+ e coli  - stopped pip-tazo and started meropenem for ESBL+ bacteremia  - continue albumin for PORFIRIO  - unfortunately not enough sample to add on cell count and diff to paracentesis fluid  - NPO at midnight tonight for EGD in AM   - held home coreg    Plan:   #fatigue: likely multifactorial 2/2 infection and symptomatic anemia  #Hx Variceal bleed sp banding most recent 1/5  #Anemia - suspected upper GI bleed   - infectious management below  - maintain active T&s, consent in chart  - BID H&H  - sp 1u pRBCs on 2/10 overnight for hgb 6.2  - EGD 2/12, NPO at midnight tonight  - pantoprazole 40mg daily    #PORFIROI - prerenal FeNa, urine sodium 28 suggesting 2/2 UTI/hypovolemia, could be HRS  - continue albumin 25g IV TID   - fu repeat renal panel  - holding home coreg iso PORFIRIO   - holding home spironolactone 100mg BID, and lasix 40mg BID    #ESBL+ E coli bacteremia 2/2 UTI  - OSH blood culture 2/6 with ESBL+ E Coli in 3/4 vials, see significant event note from 2/11 for copies of susceptibilities  - Bcx 2/10 NGTD  - Ucx 2/10 NGTD  - pip-tazo 2/6-2/8 at OSH   - pip-tazo 2/10-2/11  -meropenem 2/11- present (switched because of ESBL+ culture data from OSH)  - ID consult in AM    #Cirrhosis cb variceal bleeds, ascites, and HE  - holding home coreg iso PORFIRIO   - holding home spironolactone  100mg BID, and lasix 40mg BID  - continue lactulose 10g TID titrate to 3 soft BM daily  - AM MELD score    F: S/p 500 ml bolus, receiving albumin  E: Replete PRN  N: regular, NPO at midnight for EGD  A: PIV  DVT: Holding due to anemia/recent bleed (SCD)  Full code (confirmed on admission)  NOK: States his son Jose Guadalupe ([phone]) and daughter Chante ([phone]) are his HCPOA           Lisbeth Fay MD

## 2024-02-11 NOTE — CARE PLAN
The patient's goals for the shift include      The clinical goals for the shift include increase hgb       Problem: Safety  Goal: Patient will be injury free during hospitalization  Outcome: Progressing  Goal: I will remain free of falls  Outcome: Progressing     Problem: Daily Care  Goal: Daily care needs are met  Outcome: Progressing     Problem: Psychosocial Needs  Goal: Demonstrates ability to cope with hospitalization/illness  Outcome: Progressing  Goal: Collaborate with me, my family, and caregiver to identify my specific goals  Outcome: Progressing     Problem: Discharge Barriers  Goal: My discharge needs are met  Outcome: Progressing

## 2024-02-12 ENCOUNTER — ANESTHESIA (OUTPATIENT)
Dept: GASTROENTEROLOGY | Facility: HOSPITAL | Age: 59
DRG: 378 | End: 2024-02-12
Payer: COMMERCIAL

## 2024-02-12 ENCOUNTER — TELEPHONE (OUTPATIENT)
Dept: TRANSPLANT | Facility: HOSPITAL | Age: 59
End: 2024-02-12
Payer: COMMERCIAL

## 2024-02-12 ENCOUNTER — APPOINTMENT (OUTPATIENT)
Dept: GASTROENTEROLOGY | Facility: HOSPITAL | Age: 59
DRG: 378 | End: 2024-02-12
Payer: COMMERCIAL

## 2024-02-12 ENCOUNTER — DOCUMENTATION (OUTPATIENT)
Dept: TRANSPLANT | Facility: HOSPITAL | Age: 59
End: 2024-02-12

## 2024-02-12 ENCOUNTER — ANESTHESIA EVENT (OUTPATIENT)
Dept: GASTROENTEROLOGY | Facility: HOSPITAL | Age: 59
DRG: 378 | End: 2024-02-12
Payer: COMMERCIAL

## 2024-02-12 LAB
ALBUMIN SERPL BCP-MCNC: 3.4 G/DL (ref 3.4–5)
ALP SERPL-CCNC: 75 U/L (ref 33–136)
ALT SERPL W P-5'-P-CCNC: 22 U/L (ref 10–52)
ANION GAP SERPL CALC-SCNC: 13 MMOL/L (ref 10–20)
APTT PPP: 28 SECONDS (ref 27–38)
AST SERPL W P-5'-P-CCNC: 13 U/L (ref 9–39)
BASOPHILS # BLD AUTO: 0.02 X10*3/UL (ref 0–0.1)
BASOPHILS NFR BLD AUTO: 0.5 %
BILIRUB DIRECT SERPL-MCNC: 0.2 MG/DL (ref 0–0.3)
BILIRUB SERPL-MCNC: 0.6 MG/DL (ref 0–1.2)
BLOOD EXPIRATION DATE: NORMAL
BUN SERPL-MCNC: 20 MG/DL (ref 6–23)
CALCIUM SERPL-MCNC: 8.4 MG/DL (ref 8.6–10.6)
CHLORIDE SERPL-SCNC: 111 MMOL/L (ref 98–107)
CO2 SERPL-SCNC: 19 MMOL/L (ref 21–32)
CREAT SERPL-MCNC: 1.29 MG/DL (ref 0.5–1.3)
DISPENSE STATUS: NORMAL
EGFRCR SERPLBLD CKD-EPI 2021: 63 ML/MIN/1.73M*2
EOSINOPHIL # BLD AUTO: 0.04 X10*3/UL (ref 0–0.7)
EOSINOPHIL NFR BLD AUTO: 1 %
ERYTHROCYTE [DISTWIDTH] IN BLOOD BY AUTOMATED COUNT: 19.6 % (ref 11.5–14.5)
GLUCOSE BLD MANUAL STRIP-MCNC: 87 MG/DL (ref 74–99)
GLUCOSE SERPL-MCNC: 86 MG/DL (ref 74–99)
HCT VFR BLD AUTO: 24.6 % (ref 41–52)
HGB BLD-MCNC: 7.5 G/DL (ref 13.5–17.5)
IMM GRANULOCYTES # BLD AUTO: 0.06 X10*3/UL (ref 0–0.7)
IMM GRANULOCYTES NFR BLD AUTO: 1.4 % (ref 0–0.9)
INR PPP: 1.2 (ref 0.9–1.1)
LYMPHOCYTES # BLD AUTO: 0.62 X10*3/UL (ref 1.2–4.8)
LYMPHOCYTES NFR BLD AUTO: 14.9 %
MAGNESIUM SERPL-MCNC: 2.51 MG/DL (ref 1.6–2.4)
MCH RBC QN AUTO: 23.4 PG (ref 26–34)
MCHC RBC AUTO-ENTMCNC: 30.5 G/DL (ref 32–36)
MCV RBC AUTO: 77 FL (ref 80–100)
MONOCYTES # BLD AUTO: 0.33 X10*3/UL (ref 0.1–1)
MONOCYTES NFR BLD AUTO: 7.9 %
NEUTROPHILS # BLD AUTO: 3.09 X10*3/UL (ref 1.2–7.7)
NEUTROPHILS NFR BLD AUTO: 74.3 %
NRBC BLD-RTO: 0 /100 WBCS (ref 0–0)
PHOSPHATE SERPL-MCNC: 3.6 MG/DL (ref 2.5–4.9)
PLATELET # BLD AUTO: 119 X10*3/UL (ref 150–450)
POTASSIUM SERPL-SCNC: 4.2 MMOL/L (ref 3.5–5.3)
PRODUCT BLOOD TYPE: 5100
PRODUCT CODE: NORMAL
PROT SERPL-MCNC: 6.2 G/DL (ref 6.4–8.2)
PROTHROMBIN TIME: 13.9 SECONDS (ref 9.8–12.8)
RBC # BLD AUTO: 3.21 X10*6/UL (ref 4.5–5.9)
SODIUM SERPL-SCNC: 139 MMOL/L (ref 136–145)
UNIT ABO: NORMAL
UNIT NUMBER: NORMAL
UNIT RH: NORMAL
UNIT VOLUME: 280
WBC # BLD AUTO: 4.2 X10*3/UL (ref 4.4–11.3)
XM INTEP: NORMAL

## 2024-02-12 PROCEDURE — 3700000001 HC GENERAL ANESTHESIA TIME - INITIAL BASE CHARGE: Performed by: INTERNAL MEDICINE

## 2024-02-12 PROCEDURE — 2500000001 HC RX 250 WO HCPCS SELF ADMINISTERED DRUGS (ALT 637 FOR MEDICARE OP)

## 2024-02-12 PROCEDURE — 82947 ASSAY GLUCOSE BLOOD QUANT: CPT

## 2024-02-12 PROCEDURE — 82248 BILIRUBIN DIRECT: CPT

## 2024-02-12 PROCEDURE — 7100000009 HC PHASE TWO TIME - INITIAL BASE CHARGE: Performed by: INTERNAL MEDICINE

## 2024-02-12 PROCEDURE — 2500000005 HC RX 250 GENERAL PHARMACY W/O HCPCS

## 2024-02-12 PROCEDURE — 84100 ASSAY OF PHOSPHORUS: CPT

## 2024-02-12 PROCEDURE — A43235 PR ESOPHAGOGASTRODUODENOSCOPY TRANSORAL DIAGNOSTIC: Performed by: ANESTHESIOLOGY

## 2024-02-12 PROCEDURE — 2500000004 HC RX 250 GENERAL PHARMACY W/ HCPCS (ALT 636 FOR OP/ED)

## 2024-02-12 PROCEDURE — 36415 COLL VENOUS BLD VENIPUNCTURE: CPT

## 2024-02-12 PROCEDURE — 1200000002 HC GENERAL ROOM WITH TELEMETRY DAILY

## 2024-02-12 PROCEDURE — P9045 ALBUMIN (HUMAN), 5%, 250 ML: HCPCS | Mod: JZ | Performed by: ANESTHESIOLOGY

## 2024-02-12 PROCEDURE — 85025 COMPLETE CBC W/AUTO DIFF WBC: CPT

## 2024-02-12 PROCEDURE — 3700000002 HC GENERAL ANESTHESIA TIME - EACH INCREMENTAL 1 MINUTE: Performed by: INTERNAL MEDICINE

## 2024-02-12 PROCEDURE — 36430 TRANSFUSION BLD/BLD COMPNT: CPT

## 2024-02-12 PROCEDURE — 99232 SBSQ HOSP IP/OBS MODERATE 35: CPT

## 2024-02-12 PROCEDURE — 2500000004 HC RX 250 GENERAL PHARMACY W/ HCPCS (ALT 636 FOR OP/ED): Mod: JZ | Performed by: ANESTHESIOLOGY

## 2024-02-12 PROCEDURE — 83735 ASSAY OF MAGNESIUM: CPT

## 2024-02-12 PROCEDURE — 2500000004 HC RX 250 GENERAL PHARMACY W/ HCPCS (ALT 636 FOR OP/ED): Mod: JZ

## 2024-02-12 PROCEDURE — 43235 EGD DIAGNOSTIC BRUSH WASH: CPT | Performed by: INTERNAL MEDICINE

## 2024-02-12 PROCEDURE — P9047 ALBUMIN (HUMAN), 25%, 50ML: HCPCS | Mod: JZ

## 2024-02-12 PROCEDURE — 7100000010 HC PHASE TWO TIME - EACH INCREMENTAL 1 MINUTE: Performed by: INTERNAL MEDICINE

## 2024-02-12 PROCEDURE — 0DJ08ZZ INSPECTION OF UPPER INTESTINAL TRACT, VIA NATURAL OR ARTIFICIAL OPENING ENDOSCOPIC: ICD-10-PCS | Performed by: INTERNAL MEDICINE

## 2024-02-12 PROCEDURE — 85610 PROTHROMBIN TIME: CPT

## 2024-02-12 RX ORDER — SODIUM CHLORIDE, SODIUM LACTATE, POTASSIUM CHLORIDE, CALCIUM CHLORIDE 600; 310; 30; 20 MG/100ML; MG/100ML; MG/100ML; MG/100ML
INJECTION, SOLUTION INTRAVENOUS CONTINUOUS PRN
Status: DISCONTINUED | OUTPATIENT
Start: 2024-02-12 | End: 2024-02-12

## 2024-02-12 RX ORDER — SODIUM CHLORIDE, SODIUM LACTATE, POTASSIUM CHLORIDE, CALCIUM CHLORIDE 600; 310; 30; 20 MG/100ML; MG/100ML; MG/100ML; MG/100ML
100 INJECTION, SOLUTION INTRAVENOUS CONTINUOUS
Status: CANCELLED | OUTPATIENT
Start: 2024-02-12

## 2024-02-12 RX ORDER — ALBUMIN HUMAN 250 G/1000ML
12.5 SOLUTION INTRAVENOUS ONCE
Status: COMPLETED | OUTPATIENT
Start: 2024-02-13 | End: 2024-02-12

## 2024-02-12 RX ORDER — PROPOFOL 10 MG/ML
INJECTION, EMULSION INTRAVENOUS CONTINUOUS PRN
Status: DISCONTINUED | OUTPATIENT
Start: 2024-02-12 | End: 2024-02-12

## 2024-02-12 RX ORDER — FENTANYL CITRATE 50 UG/ML
INJECTION, SOLUTION INTRAMUSCULAR; INTRAVENOUS AS NEEDED
Status: DISCONTINUED | OUTPATIENT
Start: 2024-02-12 | End: 2024-02-12

## 2024-02-12 RX ORDER — ALBUMIN HUMAN 50 G/1000ML
12.5 SOLUTION INTRAVENOUS ONCE
Status: COMPLETED | OUTPATIENT
Start: 2024-02-12 | End: 2024-02-12

## 2024-02-12 RX ORDER — ONDANSETRON HYDROCHLORIDE 2 MG/ML
4 INJECTION, SOLUTION INTRAVENOUS ONCE AS NEEDED
Status: CANCELLED | OUTPATIENT
Start: 2024-02-12

## 2024-02-12 RX ORDER — NORETHINDRONE AND ETHINYL ESTRADIOL 0.5-0.035
KIT ORAL AS NEEDED
Status: DISCONTINUED | OUTPATIENT
Start: 2024-02-12 | End: 2024-02-12

## 2024-02-12 RX ADMIN — ALBUMIN HUMAN 25 G: 0.25 SOLUTION INTRAVENOUS at 20:39

## 2024-02-12 RX ADMIN — PROPOFOL 200 MCG/KG/MIN: 10 INJECTION, EMULSION INTRAVENOUS at 15:27

## 2024-02-12 RX ADMIN — ALBUMIN HUMAN 25 G: 0.25 SOLUTION INTRAVENOUS at 08:42

## 2024-02-12 RX ADMIN — EPHEDRINE SULFATE 10 MG: 50 INJECTION, SOLUTION INTRAVENOUS at 15:51

## 2024-02-12 RX ADMIN — Medication 1 G: at 07:42

## 2024-02-12 RX ADMIN — ALBUMIN (HUMAN) 12.5 G: 12.5 SOLUTION INTRAVENOUS at 16:15

## 2024-02-12 RX ADMIN — FENTANYL CITRATE 25 MCG: 50 INJECTION, SOLUTION INTRAMUSCULAR; INTRAVENOUS at 15:28

## 2024-02-12 RX ADMIN — SODIUM CHLORIDE, POTASSIUM CHLORIDE, SODIUM LACTATE AND CALCIUM CHLORIDE: 600; 310; 30; 20 INJECTION, SOLUTION INTRAVENOUS at 15:18

## 2024-02-12 RX ADMIN — LACTULOSE 10 G: 20 SOLUTION ORAL at 20:38

## 2024-02-12 RX ADMIN — ALBUMIN HUMAN 12.5 G: 0.25 SOLUTION INTRAVENOUS at 19:26

## 2024-02-12 RX ADMIN — Medication 1 G: at 00:00

## 2024-02-12 RX ADMIN — Medication 1 G: at 17:56

## 2024-02-12 ASSESSMENT — PAIN - FUNCTIONAL ASSESSMENT
PAIN_FUNCTIONAL_ASSESSMENT: 0-10

## 2024-02-12 ASSESSMENT — COGNITIVE AND FUNCTIONAL STATUS - GENERAL
MOBILITY SCORE: 24
DAILY ACTIVITIY SCORE: 24
DAILY ACTIVITIY SCORE: 24
MOBILITY SCORE: 24

## 2024-02-12 ASSESSMENT — PAIN SCALES - GENERAL
PAINLEVEL_OUTOF10: 0 - NO PAIN

## 2024-02-12 ASSESSMENT — ANXIETY QUESTIONNAIRES
7. FEELING AFRAID AS IF SOMETHING AWFUL MIGHT HAPPEN: NEARLY EVERY DAY
6. BECOMING EASILY ANNOYED OR IRRITABLE: NEARLY EVERY DAY
GAD7 TOTAL SCORE: 20
4. TROUBLE RELAXING: NEARLY EVERY DAY
2. NOT BEING ABLE TO STOP OR CONTROL WORRYING: NEARLY EVERY DAY
5. BEING SO RESTLESS THAT IT IS HARD TO SIT STILL: MORE THAN HALF THE DAYS
3. WORRYING TOO MUCH ABOUT DIFFERENT THINGS: NEARLY EVERY DAY
IF YOU CHECKED OFF ANY PROBLEMS ON THIS QUESTIONNAIRE, HOW DIFFICULT HAVE THESE PROBLEMS MADE IT FOR YOU TO DO YOUR WORK, TAKE CARE OF THINGS AT HOME, OR GET ALONG WITH OTHER PEOPLE: EXTREMELY DIFFICULT
1. FEELING NERVOUS, ANXIOUS, OR ON EDGE: NEARLY EVERY DAY

## 2024-02-12 ASSESSMENT — PATIENT HEALTH QUESTIONNAIRE - PHQ9
6. FEELING BAD ABOUT YOURSELF - OR THAT YOU ARE A FAILURE OR HAVE LET YOURSELF OR YOUR FAMILY DOWN: NOT AT ALL
5. POOR APPETITE OR OVEREATING: MORE THAN HALF THE DAYS
10. IF YOU CHECKED OFF ANY PROBLEMS, HOW DIFFICULT HAVE THESE PROBLEMS MADE IT FOR YOU TO DO YOUR WORK, TAKE CARE OF THINGS AT HOME, OR GET ALONG WITH OTHER PEOPLE: EXTREMELY DIFFICULT
SUM OF ALL RESPONSES TO PHQ9 QUESTIONS 1 & 2: 6
1. LITTLE INTEREST OR PLEASURE IN DOING THINGS: NEARLY EVERY DAY
3. TROUBLE FALLING OR STAYING ASLEEP OR SLEEPING TOO MUCH: NEARLY EVERY DAY
7. TROUBLE CONCENTRATING ON THINGS, SUCH AS READING THE NEWSPAPER OR WATCHING TELEVISION: NEARLY EVERY DAY
9. THOUGHTS THAT YOU WOULD BE BETTER OFF DEAD, OR OF HURTING YOURSELF: NOT AT ALL
2. FEELING DOWN, DEPRESSED OR HOPELESS: NEARLY EVERY DAY
4. FEELING TIRED OR HAVING LITTLE ENERGY: NEARLY EVERY DAY
SUM OF ALL RESPONSES TO PHQ QUESTIONS 1-9: 20
8. MOVING OR SPEAKING SO SLOWLY THAT OTHER PEOPLE COULD HAVE NOTICED. OR THE OPPOSITE, BEING SO FIGETY OR RESTLESS THAT YOU HAVE BEEN MOVING AROUND A LOT MORE THAN USUAL: NEARLY EVERY DAY

## 2024-02-12 NOTE — PROGRESS NOTES
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 Pt shared he believes it is due to the environment he lives in and being around an oil fracking leak.     Knowledge of Transplant / VAD:  Yes Patient Is Able To Make An Informed Decision    Patient Understands the Risks of Transplant / VAD  Yes Rejection  Yes Infection Yes Complications  Yes Death  Pt had not yet received education at time of assessment. EPHRAIM reviewed the above risks of transplant with Pt.    Patient Understands Recovery and Follow-Up from Transplant / VAD  Yes Length Of State Yes Appointments  Yes Labs  Yes Rehabilitation  Pt had not yet received education at time of assessment. EPHRAIM reviewed the above recovery information with Pt.    Patient Has Identified Goals of Transplant / VAD Yes  Pt reported a goal of transplant is \"to get my ability to function back.\"     Any Potential Donors?     Overall Compliance  Fair     Pt reported he takes less than 5 medications a day. Pt shared he was taking more medications in the past but stopped after becoming a \"juicer.\"    Compliance With Medications Fair  Pt reported good compliance now but stated it is very hard for him to remember to take all his medications when there is a large amount.   Managed By Patient   Bottles    Understanding Of Medication  Fair  Compliance With Appointments Fair Pt reported he will miss appointments \"depending on what's going on.\" "     How Does Patient Handle Health Problems      Organ Liver    IOP:     Fluid Restriction:    [] Compliant     Medical And Clinic Appointment Compliant     Dialysis:  [] What Dialysis Center  [] Began       [] In Center [] Home Hemo [] Peritoneal       Attendance:  Treatment Attendance  Treatment Time     [] Shortened Treatments [] Rescheduled Treatments [] Missed Treatments       Fluids:     Does Patient Still Urinate  [] Fluid Restriction [] IDWG [] EDW  Achieved Dry Weight        Diet:   Patient is compliant with renal restrictions     Patient is compliant with low sodium diet      Labs:    [] Patient Reported [] Collateral       []  Potassium [] Albumin [] Phosphorus      # of Binders:  [] # of Binders per meal [] Meals per day      []  # of Binders per Snack [] Snacks per day [] Phosphorus     Pancreas:  [] Checks blood sugar      times/day [] A1c   Hypoglycemic Episodes  Unawareness  Outside Interventions    Liver:  Is Lactulose prescribed Yes Dose: Unsure of number but showed amount with fingers Timesper day: 2 times a day  Is patient compliant Yes      SOCIAL HISTORY  No       Education: SW education: Bachelors Degree Finance    Literate Yes   Computer literate Yes  Internet access Yes       Sources of Income: Independent business endorsements   Patient's Current Employment    [x] Full-Time [] Part-Time [] Unemployed [] Retired     []  None [] Not working by choice [] Not working disabled     [] Short Term Leave   [] Other   Employment History     Will patient have paid status from employment during recovery Yes    Spouse / SO Current Employment     Will spouse / SO have paid status from employment during recovery       Other Sources of Income Total Household Income $240,000 yearly      Does patient have financial concerns    Yes     Is patient able to meet current monthly expenses    Yes    Resources:    Patient was provided information on transplant fundraising       Insurance:  Primary Insurance  "Self Medical [employer]    Secondary Insurance     Prescription Coverage Copay cost per month Unsure but \"super low\"    Medicare coverage due to     Medicare Part A  Effective date    Medicare Part B  Effective date    Medicare Part C  Deductable  Out of Pocket Max    Medicare Part D  Extra Help?    Medicaid  Waiver  Redetermination Date    HMO       FAMILY SYSTEM    Single Yes    How long   Describe Relationship    How long   Describe Relationship    When    When  In a Relationship   How Long  Describe Relationship    Spouse / SO Name   Age   Health   Other Caregiver Responsibilities  Spouse / Significant others reaction to donation    Children:  Yes # Biological (3 daughters, 1 son)   # Adopted   No # Step Children        Child #1 Name Harleen  Age 32  Health \"Okay\"    Lives [place] Town [place]  How Much Contact Daily    Child #2 Name Chante  Age 30  Health \"Excellent\"    Lives [place] Town [place]  How Much Contact Daily    Child #3 Name Zoila Age 27 Health \"Very good\"    Lives [place] Town [place]  How Much Contact Daily    Child #4 Name Jose Guadalupe Age 24 Health \"Excellent\"    L[facility]  How Much Contact Daily    Parents:  Raised By Both Biological Parents    Did the patient have contact with the other parent     Mother  No Age   Cause of Death   Father  No Age   Cause of Death    Living Parent #1 Wanda, lives in [place], daily contact  Living Parent #2 Vance, lives in [place], daily contact    Additional Information    Siblings:  [x] # Biological (1 brother, 1 sister) [] # Half Siblings [] # Step Siblings     Sibling #1 Mejia, lives in [place], monthly contact  Sibling #2 Celi, lives in [place], monthly contact    Support & Recovery Plan:  No Adequate  Pt requested time to speak with individuals in his life privately before listing support.    Primary Support:  Name  Phone   Age   Relationship to Patient   If employed, can they " "take time off work    If so, is it paid time off    If not, will this impact your finances    Did they attend education classes    Do they have other caregiver responsibilities (child or eldercare)    Do they have their own conditions which may prevent them from providing care for you   (Medical, psychological, physical limitations)    Are they available on short notice    Are they reliable    Are they responsible    Are they able to understand and process new information    Do they have reliable transportation or will you allow them to use your vehicle    Are they currently involved in your care    Comments    Secondary Support:  Name  Phone  Age  Relationship to Patient   If employed, can they take time off work    If so, is it paid time off    If not, will this impact your finances    Did they attend education classes    Do they have other caregiver responsibilities (child or eldercare)    Do they have their own conditions which may prevent them from providing care for you   (Medical, psychological, physical limitations)    Are they available on short notice    Are they reliable    Are they responsible    Are they able to understand and process new information    Do they have reliable transportation or will you allow them to use your vehicle    Are they currently involved in your care    Comments    Alternate Support  Alternate Support    Housing:  Yes Adequate Owns home  Type of Home House - Farm   Distance to [facility] 45 minutes  Pets 2 dogs, 1 cat  Does Patient Feel Safe in Home Yes      Transportation:  Yes Adequate  # Licensed Drivers in the Home 2  Does Patient Drive Yes If not, why  # Reliable Vehicles \"Quite a few\"  Does Patient use Public Transportation No  Does Patient use Medical Transportation Yes  Comments    MENTAL HEALTH    Cognition:  Reported Impairment  Pt reported his memory is \"absolutely getting worse.\" Pt shared his worsening memory \"amps up my frustration, nervousness, and anxiety.\" Pt " "reported some days it takes him hours to get dressed due to forgetting what's clean or how to cloth himself. Pt denied any history of HE.     The patient reports their mood as \"terrible.\" Pt shared he is frustrated because he feels he cannot articulate, move, or respond quickly.    Reported Mental Health Diagnosis   Anxiety, depression  Family History of Mental Health Concerns   ASD, anxiety, depression  What are patient psychosocial stressors    Pt reported not being fully independent and being a public figure for a healthy lifestyle are current stressors.      Current Medications:  No  Mental Health Meds  Denied  Rx'd by   Sleep Meds  Denied  Rx'd by   Pain Meds  Denied  Rx'd by     OTC Meds   Water-soluble vitamins  Past Medications   Adderall, Vyvanse, Xanax, multiple MH medications (unsure of names)    Counseling Previously  Pt reported he has been in counseling on and off for 15-20 years. Pt shared his most recent counseling was 5 or 6 years ago. Pt reported he \"sometimes went for months, sometimes went for years.\" Pt shared he did not find therapy helpful. Pt declined MH resources at this time.     Has patient ever been hospitalized for mental health reasons No   Was the hospitalization voluntary  Duration   Where    When  Describe situation    Discharge Plan for Follow Up  Was Discharge Plan Completed   Referral to Transplant Psych   Yes  Mental Health Follow Up Required      Suicide Assessment:  History of Suicide Ideation Yes  [] Timeframe [] Frequency   Frequency \"I'm an overthinker.\"  Plan Created No  Intent to Follow Through No  Outcome  Pt reported he experienced SI \"a couple months ago.\" Pt shared this SI lasted a few weeks. Pt reported he is an \"overthinker\" when asked about frequency. Pt denied ever creating a plan or having intent to follow through. Pt identified his children and now having a plan with doctors as protective factors.     History of Suicide Attempt No     History of Suicidal Ideation " "in the past 3 months No   Intensity   Duration     Description of Plan    Plans thought of  Intent to Follow Through  Highest Level of Intent to Follow Through    Current Plan for Safety    Plan for Follow-Up    Patient's Reported Trauma History   Pt reported a history of trauma.    What are patient's coping behaviors   Pt reported he enjoys spending time with his children, hiking, playing on his phone, and watching the news.     Temple / Spirituality   Pt reported he is more spiritual than Yarsanism.     Attitude toward interviewer Cooperative and Appropriate    Eye Contact Patient maintained good eye contact throughout appointment    Appearance The patient was neatly groomed, appropriately dressed and adequately nourished    Affect Appropriate    Thought Process Appropriate    Substance Use /Abuse History:    Current Tobacco User No  Patient uses   Tobacco Frequency   For How Long  Is Patient Required to Quit     Former Tobacco User No  Describe past tobacco use and date quit    Current Alcohol User No  Type of Alcohol Used   Amount  Frequency   Pattern of Alcohol Use    Is Patient Required to Quit   Continued to use the substance despite being told the substance is affecting their health    History of problems at work, school or home due to substance use      Former Alcohol User Yes  Describe past alcohol use and date quit  Pt reported his last alcohol use was 6 years ago. Pt shared he would drink one bottle of vodka a week. Pt reported he would drink 5-7 days of the week depending on the week. Pt shared this pattern of alcohol use lasted from 2000 to 2018. Pt reported he was arrested for a DUI 10-12 years ago, but the charges were dropped. 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     Has patient ever gone to CD treatment Yes  If yes, When, Where and What type of Program See CD treatment information " "below.  Attends AA meetings    Sponsor  Do support people drink alcohol No  If yes, describe support people's use  Is alcohol kept in the home No  Does Patient need to sign a CD contract     Current Illegal / Unprescribed Drug User Yes  Type of Illegal Drug Used Marijuana  Frequency Daily  Pattern of Drug Use  Pt reported he uses water-soluble CBD drops daily. Pt shared he uses \"a few drops\" at night to help him sleep. Pt reported his last use was a few days ago.    Is Patient Required to Quit     Illegal / Unprescribed Drug #2  Type of Illegal Drug Used   Frequency  Pattern of Drug Use      Continue to use the substance despite being told the substance is affecting their health    History of problems at work, school or home due to substance use      Former Illegal / Unprescribed Drug User Yes  Describe past illegal drug use and date quit  Pt reported he last illicit drug use was in January 2018. Pt shared he was prescribed multiple pain and stimulant medications in the 1990s and became addicted. Pt reported he was taking 960 mg of Oxycodone a day at one point. Pt shared he smoked marijuana when he played football. Pt reported he did cocaine in college.     Has patient ever gone to CD treatment Yes  If yes, When, Where and What type of Program Pt reported he completed \"at least four\" CD treatment programs. Pt shared the most recent program was around 2010. Pt reported he found them helpful.   Attends AA/NA  meetings Few Pt reported he attended AA/NA meetings while in treatment programs. Pt shared he has not attended a meeting in 10 years.   Is patient on a Methadone / Suboxone regiment   Do support people use illegal drugs   If yes, describe support people's use  Are illegal drugs kept in the home  Does Patient need to sign a CD contract     Illegal / Unprescribed Drug #2  Type of Illegal Drug Used   Frequency  Pattern of Drug Use    Prescription Drug Abuse:  Yes Has patient experienced feelings of addiction  Yes " "Has patient experienced symptoms of withdrawal  Yes Has patient experienced any side effects? e.g.  hallucinations or delusions    Does Patient Meet the Criteria for Alcohol Use Disorder Yes Diagnosis Alcohol use disorder, severe, in sustained remission  Does Patient Meet OSOTC guidelines Yes  Does Patient Meet the Criteria for Illegal Drug Use Disorder Yes Diagnosis Opioid use disorder, severe, in sustained remission; Stimulant use disorder, unspecified  Does Patient Meet OSOTC guidelines Yes    [facility]OTC Substance Relapse Risk Factors   DSM-5 Severity Factors:     IOP     LEGAL ISSUES  Yes Arrests   Pt reported he was arrested for a DUI 10-12 years ago, but the charges were dropped.  No Currently probation or parole No   shelter No  When   How long   Where       Citizenship:  Yes [place] Citizen   Green Card  Visa    Advance Directives: Yes   Pt reported his son, Jose Guadalupe, is listed.  HCPOA     Guardian:    VIRGILIO  SW met with Pt alone for initial psychosocial assessment. Pt was pleasant and engaged. Pt reported he has [facility] [place] for insurance. Pt had not yet received education at time of assessment. Pt expressed having financial concerns at this time. Pt reported he is unsure of the cause of his liver disease. Pt reported a goal of transplant is \"to get my ability to function back.\" Pt reported fair compliance with medications and appointments. Pt reported good compliance with Lactulose. Pt did not list a primary or secondary support at this time. Pt requested time to speak with individuals in his life privately before listing support. Pt reported his mood as \"terrible.\" Pt reported he has been diagnosed with anxiety and depression. Pt denied any current medications or counseling but reported both in the past. Pt reported significant cognitive impairment. Pt reported he experienced SI \"a couple months ago\" which lasted a few weeks. Pt reported he is an \"overthinker\" when asked about frequency. Pt denied " "ever creating a plan or having intent to follow through. Pt scored a 20 on the PHQ-9, indicating severe clinical depression. Pt scored a 20 on the ROSALBA-7, indicating severe clinical anxiety. EPHRAIM referral to transplant psych. Pt denied any current or past tobacco use. Pt denied any current alcohol use. Pt reported his last alcohol use was 6 years ago. Pt shared he would drink one bottle of vodka a week, drinking 5-7 days of the week depending on the week. Pt shared this pattern of alcohol use lasted from 2000 to 2018. SW diagnosed Pt with Alcohol use disorder, severe, in sustained remission. Pt reported he uses water-soluble CBD drops daily, using \"a few drops\" at night to help him sleep. Pt reported his last use was a few days ago. Pt reported he last illicit drug use was in January 2018. Pt shared he was prescribed multiple pain and stimulant medications in the 1990s and became addicted. Pt shared he smoked marijuana when he played football. Pt reported he did cocaine in college. Pt reported he completed \"at least four\" CD treatment programs, with the most recent one being around 2010. SW diagnosed Pt with Opioid use disorder, severe, in sustained remission and Stimulant use disorder, unspecified. Pt scored a 55 on the SIPAT, indicating Pt is a poor candidate for transplant. Pt's SIPAT score will be within the minimally acceptable candidate range once primary and secondary support people are identified. SW would recommend deferral while Pt's identified risk factors are satisfactorily addressed. Pt's risk factors include Pt not yet receiving transplant education, Pt's fair compliance, Pt's inadequate support system, and Pt's past and current MH.    PLAN  Pt to meet with transplant psych. SW to await recommendations from transplant psych. Pt to receive transplant education. SW to meet with Pt in 1 month to identify primary and secondary support people and monitor MH. SW to meet with Pt every 6 months to monitor " compliance, MH, and substance use.

## 2024-02-12 NOTE — PROGRESS NOTES
Discharge Planning Note:      Vance Silva is a 58 y.o. male on day 2 of admission presenting with Lightheadedness.        Met with the patient at the bedside to confirm all information on the demographics page. Lives with friend for support.  MD  for PCP. [facility] if needed home care. No DME of homecare prior to hospital Has 2 dogs and cat at home. Has transport home when medically ready.          Chelsy Jaramillo RN [facility]

## 2024-02-12 NOTE — ANESTHESIA PREPROCEDURE EVALUATION
Patient: Vance Silva    Procedure Information       Date/Time: 02/12/24 1140    Scheduled providers: Felecia Frost MD; Cammie Nunez RN; Neftali Gould DO    Procedure: EGD    Location: [facility]            Relevant Problems   Cardiovascular   (+) Hypertension      Endocrine (within normal limits)      GI  Esophageal varices      /Renal   (+) Alcoholic liver disease (CMS/HCC)   (+) Cirrhosis of liver with ascites (CMS/HCC)      Neuro/Psych   (+) Alcohol related seizure (CMS/HCC)   (+) Chronic traumatic encephalopathy   (+) Lesion of ulnar nerve   (+) Peripheral neuropathy      GI/Hepatic   (+) Alcoholic liver disease (CMS/HCC)   (+) Cirrhosis of liver with ascites (CMS/HCC)      Hematology  S/p recent PRBC   (+) Anemia   (+) Anemia due to chronic blood loss   (+) Thrombocytopenia (CMS/HCC)      Musculoskeletal   (+) Herniated lumbar intervertebral disc      Other   (+) Acute tonsillitis   (+) Arthritis of great toe at metatarsophalangeal joint     Vitals:    02/12/24 1409   BP: 108/73   Pulse: 60   Resp: 20   Temp: 36.3 °C (97.4 °F)   SpO2: 98%       History reviewed. No pertinent surgical history.  Past Medical History:   Diagnosis Date    Seizure (CMS/HCC)     Sleep apnea        Current Facility-Administered Medications:     albumin human 25 % solution 25 g, 25 g, intravenous, TID, Steffany Franco MD, Stopped at 02/12/24 0942    [Held by provider] carvedilol (Coreg) tablet 3.125 mg, 3.125 mg, oral, BID, Radames Zamorano MD, 3.125 mg at 02/11/24 0815    lactulose 20 gram/30 mL oral solution 10 g, 10 g, oral, TID, Lisbteh Fay MD    melatonin tablet 3 mg, 3 mg, oral, Nightly PRN, Terry Arellano MD, 3 mg at 02/10/24 2044    meropenem (Merrem) in sodium chloride 0.9 % 100 mL IV 1 g, 1 g, intravenous, q8h, Lisbeth Fay MD, Stopped at 02/12/24 0812    pantoprazole (ProtoNix) EC tablet 40 mg, 40 mg, oral, BID AC, Radames Zamorano MD, 40 mg at 02/11/24 1517  Prior to  Admission medications    Medication Sig Start Date End Date Taking? Authorizing Provider   carvedilol (Coreg) 6.25 mg tablet Take 1 tablet (6.25 mg) by mouth once daily at bedtime. 2/2/24 2/1/25  Mu Norris MD   Constulose 10 gram/15 mL oral solution Take 30 mL by mouth two times a day. 10/26/23   Historical Provider, MD   furosemide (Lasix) 20 mg tablet Take 1 tablet (20 mg) by mouth once daily. 10/26/23   Historical Provider, MD   spironolactone (Aldactone) 50 mg tablet Take 1 tablet (50 mg) by mouth once daily. 10/26/23   Historical Provider, MD     No Known Allergies  Social History     Tobacco Use    Smoking status: Never    Smokeless tobacco: Never   Substance Use Topics    Alcohol use: Not Currently         Chemistry    Lab Results   Component Value Date/Time     02/12/2024 0550    K 4.2 02/12/2024 0550     (H) 02/12/2024 0550    CO2 19 (L) 02/12/2024 0550    BUN 20 02/12/2024 0550    CREATININE 1.29 02/12/2024 0550    Lab Results   Component Value Date/Time    CALCIUM 8.4 (L) 02/12/2024 0550    ALKPHOS 75 02/12/2024 0550    AST 13 02/12/2024 0550    ALT 22 02/12/2024 0550    BILITOT 0.6 02/12/2024 0550          Lab Results   Component Value Date/Time    WBC 4.2 (L) 02/12/2024 0550    HGB 7.5 (L) 02/12/2024 0550    HCT 24.6 (L) 02/12/2024 0550     (L) 02/12/2024 0550     Lab Results   Component Value Date/Time    PROTIME 13.9 (H) 02/12/2024 0550    INR 1.2 (H) 02/12/2024 0550     Encounter Date: 02/09/24   ECG 12 lead   Result Value    Ventricular Rate 83    Atrial Rate 83    IL Interval 164    QRS Duration 84    QT Interval 372    QTC Calculation(Bazett) 437    P Axis 34    R Axis -7    T Axis 27    QRS Count 14    Q Onset 215    P Onset 133    P Offset 190    T Offset 401    QTC Fredericia 414    Narrative    Normal sinus rhythm  Low voltage QRS  Possible Anterolateral infarct , age undetermined  Abnormal ECG  No previous ECGs available  See ED provider note for full interpretation  and clinical correlation  Confirmed by Mariella Calhoun ([id number]) on 2/11/2024 4:09:35 AM     Vitals:    02/12/24 1409   BP: 108/73   Pulse: 60   Resp: 20   Temp: 36.3 °C (97.4 °F)   SpO2: 98%       History reviewed. No pertinent surgical history.  Past Medical History:   Diagnosis Date    Seizure (CMS/HCC)     Sleep apnea        Current Facility-Administered Medications:     albumin human 25 % solution 25 g, 25 g, intravenous, TID, Steffany Franco MD, Stopped at 02/12/24 0942    [Held by provider] carvedilol (Coreg) tablet 3.125 mg, 3.125 mg, oral, BID, Radames Zamorano MD, 3.125 mg at 02/11/24 0815    lactulose 20 gram/30 mL oral solution 10 g, 10 g, oral, TID, Lisbeth Fay MD    melatonin tablet 3 mg, 3 mg, oral, Nightly PRN, Terry Arellano MD, 3 mg at 02/10/24 2044    meropenem (Merrem) in sodium chloride 0.9 % 100 mL IV 1 g, 1 g, intravenous, q8h, Lisbeth Fay MD, Stopped at 02/12/24 0812    pantoprazole (ProtoNix) EC tablet 40 mg, 40 mg, oral, BID AC, Radames Zamorano MD, 40 mg at 02/11/24 1517  Prior to Admission medications    Medication Sig Start Date End Date Taking? Authorizing Provider   carvedilol (Coreg) 6.25 mg tablet Take 1 tablet (6.25 mg) by mouth once daily at bedtime. 2/2/24 2/1/25  Mu Norris MD   Constulose 10 gram/15 mL oral solution Take 30 mL by mouth two times a day. 10/26/23   Historical Provider, MD   furosemide (Lasix) 20 mg tablet Take 1 tablet (20 mg) by mouth once daily. 10/26/23   Historical Provider, MD   spironolactone (Aldactone) 50 mg tablet Take 1 tablet (50 mg) by mouth once daily. 10/26/23   Historical Provider, MD     No Known Allergies  Social History     Tobacco Use    Smoking status: Never    Smokeless tobacco: Never   Substance Use Topics    Alcohol use: Not Currently         Chemistry    Lab Results   Component Value Date/Time     02/12/2024 0550    K 4.2 02/12/2024 0550     (H) 02/12/2024 0550    CO2 19 (L) 02/12/2024 0550    BUN 20 02/12/2024  0550    CREATININE 1.29 02/12/2024 0550    Lab Results   Component Value Date/Time    CALCIUM 8.4 (L) 02/12/2024 0550    ALKPHOS 75 02/12/2024 0550    AST 13 02/12/2024 0550    ALT 22 02/12/2024 0550    BILITOT 0.6 02/12/2024 0550          Lab Results   Component Value Date/Time    WBC 4.2 (L) 02/12/2024 0550    HGB 7.5 (L) 02/12/2024 0550    HCT 24.6 (L) 02/12/2024 0550     (L) 02/12/2024 0550     Lab Results   Component Value Date/Time    PROTIME 13.9 (H) 02/12/2024 0550    INR 1.2 (H) 02/12/2024 0550     Encounter Date: 02/09/24   ECG 12 lead   Result Value    Ventricular Rate 83    Atrial Rate 83    OK Interval 164    QRS Duration 84    QT Interval 372    QTC Calculation(Bazett) 437    P Axis 34    R Axis -7    T Axis 27    QRS Count 14    Q Onset 215    P Onset 133    P Offset 190    T Offset 401    QTC Fredericia 414    Narrative    Normal sinus rhythm  Low voltage QRS  Possible Anterolateral infarct , age undetermined  Abnormal ECG  No previous ECGs available  See ED provider note for full interpretation and clinical correlation  Confirmed by Mariella Calhoun ([id number]) on 2/11/2024 4:09:35 AM     Echo 2/10/2024  CONCLUSIONS:   1. Left ventricular systolic function is normal.   2. Poorly visualized anatomical structures due to suboptimal image quality.   3. Technically difficult despite the use of echoconrast. Overall Grossly normal LV systolic function without obvious regional wall motion abnormalities.   4. No prior echocardiogram available for comparison.      Clinical information reviewed:   Tobacco  Allergies  Meds   Med Hx  Surg Hx   Fam Hx  Soc Hx        NPO Detail:  NPO/Void Status  Date of Last Liquid: 02/11/24  Time of Last Liquid: 2200  Date of Last Solid: 02/11/24  Time of Last Solid: 2200         Physical Exam    Airway  Mallampati: III  TM distance: >3 FB  Neck ROM: full     Cardiovascular   Rhythm: regular     Dental    Pulmonary   Breath sounds clear to auscultation     Abdominal             Anesthesia Plan    History of general anesthesia?: yes  History of complications of general anesthesia?: no    ASA 3     MAC     Anesthetic plan and risks discussed with patient.  Use of blood products discussed with patient who consented to blood products.    Plan discussed with CAA.

## 2024-02-12 NOTE — CARE PLAN
The patient's goals for the shift include  to get sedated for paracentesis    The clinical goals for the shift include Patient will remain hemodynamically stable throughout shift.    Over the shift, the patient did not make progress toward the following goals. Barriers to progression include awaiting response from medical team. Recommendations to address these barriers include continue to monitor.    Problem: Safety  Goal: Patient will be injury free during hospitalization  Outcome: Progressing     Problem: Psychosocial Needs  Goal: Demonstrates ability to cope with hospitalization/illness  Outcome: Progressing     Problem: Discharge Barriers  Goal: My discharge needs are met  Outcome: Progressing

## 2024-02-12 NOTE — TELEPHONE ENCOUNTER
Spoke with patient's mom, Wanda, and scheduled education later today bedside at approximately 4pm. Will cancel outpatient education appointment.

## 2024-02-12 NOTE — ANESTHESIA POSTPROCEDURE EVALUATION
Patient: Vance Silva    Procedure Summary       Date: 02/12/24 Room / Location: [facility]    Anesthesia Start: 1520 Anesthesia Stop: 1556    Procedure: EGD Diagnosis:       Anemia, unspecified type      Anemia due to chronic blood loss    Scheduled Providers: Felecia Frost MD; Cammie Nunez RN; Neftali Gould DO Responsible Provider: Neftali Gould DO    Anesthesia Type: MAC ASA Status: 3            Anesthesia Type: MAC    Vitals Value Taken Time   BP 95/70 02/12/24 1610   Temp 36.4 °C (97.5 °F) 02/12/24 1553   Pulse 57 02/12/24 1610   Resp 17 02/12/24 1610   SpO2 97 % 02/12/24 1610       Anesthesia Post Evaluation    Patient location during evaluation: PACU  Patient participation: complete - patient participated  Level of consciousness: awake and alert  Pain management: satisfactory to patient  Airway patency: patent  Cardiovascular status: acceptable and blood pressure returned to baseline  Respiratory status: acceptable  Hydration status: acceptable  Postoperative Nausea and Vomiting: none        There were no known notable events for this encounter.

## 2024-02-12 NOTE — PROGRESS NOTES
"Vance Silva is a 58 y.o. male on day 2 of admission presenting with Lightheadedness.    Subjective   He feels better today, he wanted to have a paracentesis today, he thinks his  abdominal girth is increasing.  No events overnight         Objective     Physical Exam  Constitutional:       General: He is not in acute distress.     Appearance: Normal appearance.   HENT:      Mouth/Throat:      Mouth: Mucous membranes are moist.   Eyes:      Extraocular Movements: Extraocular movements intact.   Cardiovascular:      Rate and Rhythm: Normal rate and regular rhythm.   Pulmonary:      Effort: No respiratory distress.   Abdominal:      General: There is no distension.      Tenderness: There is no abdominal tenderness.      Comments: + abdominal ascites, no asterixis   Musculoskeletal:         General: No swelling.      Right lower leg: No edema.      Left lower leg: No edema.   Skin:     Findings: No bruising or rash.   Neurological:      General: No focal deficit present.      Mental Status: He is alert. Mental status is at baseline.         Last Recorded Vitals  Blood pressure 105/71, pulse 62, temperature 36.5 °C (97.7 °F), temperature source Temporal, resp. rate 18, height 1.956 m (6' 5\"), weight 83.6 kg (184 lb 4.9 oz), SpO2 99 %.  Intake/Output last 3 Shifts:  I/O last 3 completed shifts:  In: 846.8 (10.1 mL/kg) [Blood:846.8]  Out: 301 (3.6 mL/kg) [Urine:300 (0.1 mL/kg/hr); Stool:1]  Weight: 83.6 kg     Relevant Results    Results for orders placed or performed during the hospital encounter of 02/09/24 (from the past 24 hour(s))   Prepare RBC: 1 Units   Result Value Ref Range    PRODUCT CODE [id number]     Unit Number [id number]     Unit ABO O     Unit RH POS     XM INTEP COMP     Dispense Status TR     Blood Expiration Date March 01, 2024 23:59 EST     PRODUCT BLOOD TYPE 5100     UNIT VOLUME 280    Magnesium   Result Value Ref Range    Magnesium 2.51 (H) 1.60 - 2.40 mg/dL   CBC and Auto Differential   Result " Value Ref Range    WBC 4.2 (L) 4.4 - 11.3 x10*3/uL    nRBC 0.0 0.0 - 0.0 /100 WBCs    RBC 3.21 (L) 4.50 - 5.90 x10*6/uL    Hemoglobin 7.5 (L) 13.5 - 17.5 g/dL    Hematocrit 24.6 (L) 41.0 - 52.0 %    MCV 77 (L) 80 - 100 fL    MCH 23.4 (L) 26.0 - 34.0 pg    MCHC 30.5 (L) 32.0 - 36.0 g/dL    RDW 19.6 (H) 11.5 - 14.5 %    Platelets 119 (L) 150 - 450 x10*3/uL    Neutrophils % 74.3 40.0 - 80.0 %    Immature Granulocytes %, Automated 1.4 (H) 0.0 - 0.9 %    Lymphocytes % 14.9 13.0 - 44.0 %    Monocytes % 7.9 2.0 - 10.0 %    Eosinophils % 1.0 0.0 - 6.0 %    Basophils % 0.5 0.0 - 2.0 %    Neutrophils Absolute 3.09 1.20 - 7.70 x10*3/uL    Immature Granulocytes Absolute, Automated 0.06 0.00 - 0.70 x10*3/uL    Lymphocytes Absolute 0.62 (L) 1.20 - 4.80 x10*3/uL    Monocytes Absolute 0.33 0.10 - 1.00 x10*3/uL    Eosinophils Absolute 0.04 0.00 - 0.70 x10*3/uL    Basophils Absolute 0.02 0.00 - 0.10 x10*3/uL   Coagulation Screen   Result Value Ref Range    Protime 13.9 (H) 9.8 - 12.8 seconds    INR 1.2 (H) 0.9 - 1.1    aPTT 28 27 - 38 seconds   Hepatic Function Panel   Result Value Ref Range    Albumin 3.4 3.4 - 5.0 g/dL    Bilirubin, Total 0.6 0.0 - 1.2 mg/dL    Bilirubin, Direct 0.2 0.0 - 0.3 mg/dL    Alkaline Phosphatase 75 33 - 136 U/L    ALT 22 10 - 52 U/L    AST 13 9 - 39 U/L    Total Protein 6.2 (L) 6.4 - 8.2 g/dL   Phosphorus   Result Value Ref Range    Phosphorus 3.6 2.5 - 4.9 mg/dL   Basic Metabolic Panel   Result Value Ref Range    Glucose 86 74 - 99 mg/dL    Sodium 139 136 - 145 mmol/L    Potassium 4.2 3.5 - 5.3 mmol/L    Chloride 111 (H) 98 - 107 mmol/L    Bicarbonate 19 (L) 21 - 32 mmol/L    Anion Gap 13 10 - 20 mmol/L    Urea Nitrogen 20 6 - 23 mg/dL    Creatinine 1.29 0.50 - 1.30 mg/dL    eGFR 63 >60 mL/min/1.73m*2    Calcium 8.4 (L) 8.6 - 10.6 mg/dL   POCT GLUCOSE   Result Value Ref Range    POCT Glucose 87 74 - 99 mg/dL                   Assessment/Plan   Principal Problem:    Lightheadedness    59yo man hx  cirrhosis cb portal htn with esophageal varices sp banding on 1/5, with significant ascites requiring frequent paracentesis, and HE, hx of TBI, OA who was recently admitted in an out of state hospital for weakness, fatigue and found to have UTI with ESBL+ E. Coli bacteremia and significant anemia requiring 2u pRBCs - he left that hospital in order to return to [facility] and is admitted after returning to his home state with chief complaint of fatigue found to have hgb 6.2 requiring 1u pRBCs overnight on 2/10 though no signs of active bleed but suspected to be 2/2 slow bleeding from esophageal varices which were banded but not completely ablated just yet. Will plan for EGD on 2/12 to re-assess. Also on meropenem for E. Coli (+ESBL) bacteremia 2/2 UTI at OSH - he remains stable from an infection standpoint. Also has an PORFIRIO with prerenal FeNa 0.4% which could be prerenal in the setting of UTI/hypovolemia versus HRS - currently on albumin.     Updates 2/12:   -N.p.o. midnight for possible EGD tomorrow 2/13  -Possible paracentesis today sent for cell count with differential.  -Continue on meropenem IV (could change based on the paracentesis analysis)      Plan:   #fatigue: likely multifactorial 2/2 infection and symptomatic anemia  #Hx Variceal bleed sp banding most recent 1/5  #Anemia - suspected upper GI bleed   - infectious management below  - maintain active T&s, consent in chart  - BID H&H  - sp 1u pRBCs on 2/10 overnight for hgb 6.2  - EGD 2/13, NPO at midnight tonight  - pantoprazole 40mg daily    #PORFIRIO - prerenal FeNa, urine sodium 28 suggesting 2/2 UTI/hypovolemia, could be HRS  - continue albumin 25g IV TID   - fu repeat renal panel  - holding home coreg iso PORFIRIO   - holding home spironolactone 100mg BID, and lasix 40mg BID    #ESBL+ E coli bacteremia 2/2 UTI  - OSH blood culture 2/6 with ESBL+ E Coli in 3/4 vials, see significant event note from 2/11 for copies of susceptibilities  - Bcx 2/10 NGTD  - Ucx 2/10  NGTD  - pip-tazo 2/6-2/8 at OSH   - pip-tazo 2/10-2/11  -meropenem 2/11- present (switched because of ESBL+ culture data from OSH)      #Cirrhosis cb variceal bleeds, ascites, and HE  - holding home coreg iso PORFIRIO   - holding home spironolactone 100mg BID, and lasix 40mg BID  - continue lactulose 10g TID titrate to 3 soft BM daily  - AM MELD score    F: S/p 500 ml bolus, receiving albumin  E: Replete PRN  N: regular, NPO at midnight for EGD  A: PIV  DVT: Holding due to anemia/recent bleed (SCD)  Full code (confirmed on admission)  NOK: States his son Jose Guadalupe ([phone]) and daughter Chante ([phone]) are his HCPOA           Steffany Franco MD

## 2024-02-13 ENCOUNTER — APPOINTMENT (OUTPATIENT)
Dept: RADIOLOGY | Facility: HOSPITAL | Age: 59
DRG: 378 | End: 2024-02-13
Payer: COMMERCIAL

## 2024-02-13 ENCOUNTER — COMMITTEE REVIEW (OUTPATIENT)
Dept: TRANSPLANT | Facility: HOSPITAL | Age: 59
End: 2024-02-13

## 2024-02-13 ENCOUNTER — TELEPHONE (OUTPATIENT)
Dept: TRANSPLANT | Facility: HOSPITAL | Age: 59
End: 2024-02-13
Payer: COMMERCIAL

## 2024-02-13 LAB
ALBUMIN FLD-MCNC: 1.2 G/DL
ALBUMIN SERPL BCP-MCNC: 3.8 G/DL (ref 3.4–5)
ALP SERPL-CCNC: 76 U/L (ref 33–136)
ALT SERPL W P-5'-P-CCNC: 20 U/L (ref 10–52)
ANION GAP SERPL CALC-SCNC: 10 MMOL/L (ref 10–20)
APTT PPP: 29 SECONDS (ref 27–38)
AST SERPL W P-5'-P-CCNC: 13 U/L (ref 9–39)
BACTERIA FLD CULT: NORMAL
BASOPHILS # BLD AUTO: 0.02 X10*3/UL (ref 0–0.1)
BASOPHILS NFR BLD AUTO: 0.4 %
BASOPHILS NFR FLD MANUAL: 0 %
BILIRUB DIRECT SERPL-MCNC: 0.2 MG/DL (ref 0–0.3)
BILIRUB SERPL-MCNC: 0.4 MG/DL (ref 0–1.2)
BLASTS NFR FLD MANUAL: 0 %
BUN SERPL-MCNC: 20 MG/DL (ref 6–23)
CALCIUM SERPL-MCNC: 8.8 MG/DL (ref 8.6–10.6)
CHLORIDE SERPL-SCNC: 111 MMOL/L (ref 98–107)
CLARITY FLD: CLEAR
CO2 SERPL-SCNC: 21 MMOL/L (ref 21–32)
COLOR FLD: YELLOW
CREAT SERPL-MCNC: 1.07 MG/DL (ref 0.5–1.3)
EGFRCR SERPLBLD CKD-EPI 2021: 79 ML/MIN/1.73M*2
EOSINOPHIL # BLD AUTO: 0.03 X10*3/UL (ref 0–0.7)
EOSINOPHIL NFR BLD AUTO: 0.6 %
EOSINOPHIL NFR FLD MANUAL: 0 %
ERYTHROCYTE [DISTWIDTH] IN BLOOD BY AUTOMATED COUNT: 20.1 % (ref 11.5–14.5)
GLUCOSE FLD-MCNC: 109 MG/DL
GLUCOSE SERPL-MCNC: 92 MG/DL (ref 74–99)
GRAM STN SPEC: NORMAL
GRAM STN SPEC: NORMAL
HCT VFR BLD AUTO: 24.5 % (ref 41–52)
HCT VFR BLD AUTO: 28.4 % (ref 41–52)
HGB BLD-MCNC: 7.2 G/DL (ref 13.5–17.5)
HGB BLD-MCNC: 8.6 G/DL (ref 13.5–17.5)
HYPOCHROMIA BLD QL SMEAR: NORMAL
IMM GRANULOCYTES # BLD AUTO: 0.06 X10*3/UL (ref 0–0.7)
IMM GRANULOCYTES NFR BLD AUTO: 1.1 % (ref 0–0.9)
IMMATURE GRANULOCYTES IN FLUID: 0 %
INR PPP: 1.1 (ref 0.9–1.1)
LDH FLD L TO P-CCNC: 31 U/L
LYMPHOCYTES # BLD AUTO: 0.46 X10*3/UL (ref 1.2–4.8)
LYMPHOCYTES NFR BLD AUTO: 8.8 %
LYMPHOCYTES NFR FLD MANUAL: 28 %
MAGNESIUM SERPL-MCNC: 2.52 MG/DL (ref 1.6–2.4)
MCH RBC QN AUTO: 24.1 PG (ref 26–34)
MCHC RBC AUTO-ENTMCNC: 30.3 G/DL (ref 32–36)
MCV RBC AUTO: 80 FL (ref 80–100)
MONOCYTES # BLD AUTO: 0.29 X10*3/UL (ref 0.1–1)
MONOCYTES NFR BLD AUTO: 5.6 %
MONOS+MACROS NFR FLD MANUAL: 51 %
NEUTROPHILS # BLD AUTO: 4.36 X10*3/UL (ref 1.2–7.7)
NEUTROPHILS NFR BLD AUTO: 83.5 %
NEUTROPHILS NFR FLD MANUAL: 21 %
NRBC BLD-RTO: 0 /100 WBCS (ref 0–0)
OTHER CELLS NFR FLD MANUAL: 0 %
PHOSPHATE SERPL-MCNC: 2.9 MG/DL (ref 2.5–4.9)
PLASMA CELLS NFR FLD MANUAL: 0 %
PLATELET # BLD AUTO: 142 X10*3/UL (ref 150–450)
POLYCHROMASIA BLD QL SMEAR: NORMAL
POTASSIUM SERPL-SCNC: 4.4 MMOL/L (ref 3.5–5.3)
PROT FLD-MCNC: 1.8 G/DL
PROT SERPL-MCNC: 6.5 G/DL (ref 6.4–8.2)
PROTHROMBIN TIME: 12.6 SECONDS (ref 9.8–12.8)
RBC # BLD AUTO: 3.57 X10*6/UL (ref 4.5–5.9)
RBC # FLD AUTO: 40 /UL
RBC MORPH BLD: NORMAL
SODIUM SERPL-SCNC: 138 MMOL/L (ref 136–145)
TOTAL CELLS COUNTED FLD: 100
WBC # BLD AUTO: 5.2 X10*3/UL (ref 4.4–11.3)
WBC # FLD MANUAL: 108 /UL

## 2024-02-13 PROCEDURE — 82248 BILIRUBIN DIRECT: CPT

## 2024-02-13 PROCEDURE — 36415 COLL VENOUS BLD VENIPUNCTURE: CPT

## 2024-02-13 PROCEDURE — 89051 BODY FLUID CELL COUNT: CPT

## 2024-02-13 PROCEDURE — 2500000001 HC RX 250 WO HCPCS SELF ADMINISTERED DRUGS (ALT 637 FOR MEDICARE OP)

## 2024-02-13 PROCEDURE — 2500000002 HC RX 250 W HCPCS SELF ADMINISTERED DRUGS (ALT 637 FOR MEDICARE OP, ALT 636 FOR OP/ED)

## 2024-02-13 PROCEDURE — 2500000004 HC RX 250 GENERAL PHARMACY W/ HCPCS (ALT 636 FOR OP/ED): Mod: JZ

## 2024-02-13 PROCEDURE — 84157 ASSAY OF PROTEIN OTHER: CPT

## 2024-02-13 PROCEDURE — 85014 HEMATOCRIT: CPT

## 2024-02-13 PROCEDURE — 87070 CULTURE OTHR SPECIMN AEROBIC: CPT

## 2024-02-13 PROCEDURE — 84100 ASSAY OF PHOSPHORUS: CPT

## 2024-02-13 PROCEDURE — 82945 GLUCOSE OTHER FLUID: CPT

## 2024-02-13 PROCEDURE — 83735 ASSAY OF MAGNESIUM: CPT

## 2024-02-13 PROCEDURE — 82042 OTHER SOURCE ALBUMIN QUAN EA: CPT

## 2024-02-13 PROCEDURE — 99232 SBSQ HOSP IP/OBS MODERATE 35: CPT

## 2024-02-13 PROCEDURE — 80053 COMPREHEN METABOLIC PANEL: CPT

## 2024-02-13 PROCEDURE — 83615 LACTATE (LD) (LDH) ENZYME: CPT

## 2024-02-13 PROCEDURE — 49083 ABD PARACENTESIS W/IMAGING: CPT | Performed by: PHYSICIAN ASSISTANT

## 2024-02-13 PROCEDURE — P9047 ALBUMIN (HUMAN), 25%, 50ML: HCPCS | Mod: JZ

## 2024-02-13 PROCEDURE — 49083 ABD PARACENTESIS W/IMAGING: CPT

## 2024-02-13 PROCEDURE — 2500000004 HC RX 250 GENERAL PHARMACY W/ HCPCS (ALT 636 FOR OP/ED)

## 2024-02-13 PROCEDURE — 1200000002 HC GENERAL ROOM WITH TELEMETRY DAILY

## 2024-02-13 PROCEDURE — 85610 PROTHROMBIN TIME: CPT

## 2024-02-13 RX ORDER — SULFAMETHOXAZOLE AND TRIMETHOPRIM 800; 160 MG/1; MG/1
160 TABLET ORAL EVERY 12 HOURS SCHEDULED
Status: DISCONTINUED | OUTPATIENT
Start: 2024-02-13 | End: 2024-02-14 | Stop reason: HOSPADM

## 2024-02-13 RX ORDER — POLYETHYLENE GLYCOL 3350, SODIUM CHLORIDE, SODIUM BICARBONATE, POTASSIUM CHLORIDE 420; 11.2; 5.72; 1.48 G/4L; G/4L; G/4L; G/4L
4000 POWDER, FOR SOLUTION ORAL ONCE
Status: COMPLETED | OUTPATIENT
Start: 2024-02-13 | End: 2024-02-13

## 2024-02-13 RX ADMIN — Medication 1 G: at 00:00

## 2024-02-13 RX ADMIN — ALBUMIN HUMAN 25 G: 0.25 SOLUTION INTRAVENOUS at 21:52

## 2024-02-13 RX ADMIN — POLYETHYLENE GLYCOL 3350, SODIUM SULFATE ANHYDROUS, SODIUM BICARBONATE, SODIUM CHLORIDE, POTASSIUM CHLORIDE 4000 ML: 236; 22.74; 6.74; 5.86; 2.97 POWDER, FOR SOLUTION ORAL at 17:29

## 2024-02-13 RX ADMIN — LACTULOSE 10 G: 20 SOLUTION ORAL at 21:52

## 2024-02-13 RX ADMIN — PANTOPRAZOLE SODIUM 40 MG: 40 TABLET, DELAYED RELEASE ORAL at 06:25

## 2024-02-13 RX ADMIN — LACTULOSE 10 G: 20 SOLUTION ORAL at 14:56

## 2024-02-13 RX ADMIN — SULFAMETHOXAZOLE AND TRIMETHOPRIM 160 MG: 800; 160 TABLET ORAL at 12:43

## 2024-02-13 RX ADMIN — LACTULOSE 10 G: 20 SOLUTION ORAL at 08:43

## 2024-02-13 RX ADMIN — ALBUMIN HUMAN 25 G: 0.25 SOLUTION INTRAVENOUS at 08:40

## 2024-02-13 RX ADMIN — SULFAMETHOXAZOLE AND TRIMETHOPRIM 160 MG: 800; 160 TABLET ORAL at 21:53

## 2024-02-13 RX ADMIN — ALBUMIN HUMAN 25 G: 0.25 SOLUTION INTRAVENOUS at 14:56

## 2024-02-13 RX ADMIN — PANTOPRAZOLE SODIUM 40 MG: 40 TABLET, DELAYED RELEASE ORAL at 16:00

## 2024-02-13 ASSESSMENT — PAIN SCALES - GENERAL: PAINLEVEL_OUTOF10: 0 - NO PAIN

## 2024-02-13 NOTE — LETTER
February 13, 2024    Vance Silva  [address] [address]      Dear Mr. Sivla:    Our multi-disciplinary transplant team completed a review of your medical records on 2/12/2024.  ***    Our transplant program consists of surgeons and medical doctors who provide coverage 365 days a year, 24 hours a day.     If you have any questions or concerns regarding your insurance coverage or billing issues, a  is available to speak with you.     It is important to keep us updated of any major changes in your medical condition, contact information and health insurance coverage.     Please don't hesitate to contact us at Dept: [phone] with any questions or concerns. We look forward to working with you through this process.      Sincerely,      Jazmine Hammond RN          [facility] [email or web address] Toll-free Patient Services Line:  Your Resource for Organ Transplant Information    If you have a question regarding your own medical care, you always should call your transplant hospital first. However, for general organ transplant-related information, you should call the [employer] Organ [employer] (UNOS) toll-free patient services line at 1-330.203.8477.  Anyone, including potential transplant candidates, candidates, recipients, family members, friends, living donors, and donor family members, can call this number to:    Talk about organ donation, living donation, the transplant process, the donation process, and transplant policies.  Get a free patient information kit with helpful booklets, waiting list and transplant information, and a list of all transplant hospitals.  Ask questions about the Organ Procurement and Transplantation Network (OPTN) web site (http://optn.transplant.hrsa.gov/), the UNOS Web site (http://unos.org/), or the UNOS web site for living donors and transplant recipients. (http://www.transplantliving.org/).  Learn how UNOS and the OPTN can help you.  Talk about any concerns that you may have  with a transplant hospital.    [facility] is a not-for-profit organization that provides the administrative services for the national OPTN under federal contract to the Health Resources and Services Administration (HRSA), an agency under the U.S. Department of Health and Human Services (HHS).    [facility] and the OPTN are responsible for:    Providing educational material for patients, the public, and professionals.  Raising awareness of the need for donated organs and tissue.  Writing organ transplant policy with help from transplant professionals, transplant patients, transplant candidates, donor families, living donors, and the public.  Coordinating organ procurement, matching, and placement.  Collecting information about every organ transplant and donation that occurs in the [place].    Remember, you should contact your transplant hospital directly if you have questions or concerns about your own medical care including medical records, work-up progress, and test results.    [facility] is not your transplant hospital, and staff at [facility] will not be able to transfer you to your transplant hospital, so keep your transplant hospital’s phone number handy.    However, while you research your transplant needs and learn as much as you can about transplantation and donation, we welcome your call to our toll-free patient services line at [phone].      [facility] PIL Final Rev 1-

## 2024-02-13 NOTE — POST-PROCEDURE NOTE
INTERVENTIONAL RADIOLOGY ADVANCED PRACTICE PROCEDURE  [facility]    A time out was performed and Left Hemiabdomen was examined with US and appropriate entry point was confirmed and marked.   The patient was prepped and draped in a sterile manner, 1% lidocaine was used to anesthesize the skin and subcutaneous tissue.   A 5F Centesis needle was then introduced through the skin into the peritoneal space, the centesis catheter was then threaded without difficulty.   4800 ml of yellow fluid was removed without difficulty. The catheter was then removed.   No immediate complications were noted during and immediately following the procedure.

## 2024-02-13 NOTE — PROGRESS NOTES
Patient and supports - mom, dad and friend Aleena - received pre liver transplant education bedside on LT9. Patient was alert, oriented and actively engaged in education.  Verbal and printed education materials provided. Patient signed consents for transplant evaluation and authorization for release of medical information. Patient and family are aware they may contact coordinator with any questions or concerns.     PRE-TRANSPLANT EDUCATION  .Patient received education regarding the following topics as part of their pre-transplant evaluation:  .The evaluation process, including:  .Transplant team members and roles     Required consultations and testing  · Selection criteria and suitability for transplant   · Listing process and receiving an organ offer   · Psychosocial and financial considerations for a successful transplant   · Patient responsibilities, including the necessity of adhering to a strict medical regimen   An overview of the surgical procedure   Potential medical, surgical, and psychosocial risks to transplantation, including:   · Wound infection   · Pneumonia   · Blood clot formation   · Organ rejection, failure, and possibility of re-transplantation   · Lifetime immunosuppression therapy and associated risks   · Arrhythmias and cardiovascular collapse   · Multi-organ system failure   · Death   · Depression   · Post-Traumatic Stress Disorder   · Generalized anxiety, issues of dependence, and feelings of guilt   Available alternatives to transplantation   Donor risk factors that could affect the success of the transplant and the health of the patient, including:   · Donor age   · Donor medical and social history   · Condition of the organ   · Risk of andreia cancer, HIV, Hepatitis B, Hepatitis C, or malaria if the infection is not detectable at the time of donation   Patient's right to withdraw consent for transplantation at any time during the process   Transplants not performed in a Medicare-approved  transplant center could affect the patient's ability to have immunosuppression medication paid for under Medicare part B.   Multiple listing options.   Patient was given the opportunity to have questions answered. Patient was provided a copy of the signed informed consent for transplant evaluation.   Signed evaluation informed consent received? LIVER

## 2024-02-13 NOTE — PROGRESS NOTES
"Vance Silva is a 58 y.o. male on day 3 of admission presenting with Lightheadedness.    Subjective   He feels fine with no events overnight. He had a bowel movement this morning which was formed. No nausea or vomiting. He stated having a SOB in the setting of ascites          Objective     Physical Exam  Constitutional:       General: He is not in acute distress.     Appearance: Normal appearance.   HENT:      Mouth/Throat:      Mouth: Mucous membranes are moist.   Eyes:      Extraocular Movements: Extraocular movements intact.   Cardiovascular:      Rate and Rhythm: Normal rate and regular rhythm.   Pulmonary:      Effort: No respiratory distress.   Abdominal:      General: There is no distension.      Tenderness: There is no abdominal tenderness.      Comments: + abdominal ascites, no asterixis   Musculoskeletal:         General: No swelling.      Right lower leg: No edema.      Left lower leg: No edema.   Skin:     Findings: No bruising or rash.   Neurological:      General: No focal deficit present.      Mental Status: He is alert. Mental status is at baseline.       Last Recorded Vitals  Blood pressure 98/64, pulse 65, temperature 36.8 °C (98.2 °F), temperature source Temporal, resp. rate 18, height 1.956 m (6' 5\"), weight 87 kg (191 lb 12.8 oz), SpO2 99 %.  Intake/Output last 3 Shifts:  I/O last 3 completed shifts:  In: 1063.5 (12.7 mL/kg) [P.O.:480; I.V.:150 (1.8 mL/kg); Blood:433.5]  Out: 0 (0 mL/kg)   Weight: 83.5 kg     Relevant Results    Results for orders placed or performed during the hospital encounter of 02/09/24 (from the past 24 hour(s))   CBC and Auto Differential   Result Value Ref Range    WBC 5.2 4.4 - 11.3 x10*3/uL    nRBC 0.0 0.0 - 0.0 /100 WBCs    RBC 3.57 (L) 4.50 - 5.90 x10*6/uL    Hemoglobin 8.6 (L) 13.5 - 17.5 g/dL    Hematocrit 28.4 (L) 41.0 - 52.0 %    MCV 80 80 - 100 fL    MCH 24.1 (L) 26.0 - 34.0 pg    MCHC 30.3 (L) 32.0 - 36.0 g/dL    RDW 20.1 (H) 11.5 - 14.5 %    Platelets 142 " (L) 150 - 450 x10*3/uL    Neutrophils % 83.5 40.0 - 80.0 %    Immature Granulocytes %, Automated 1.1 (H) 0.0 - 0.9 %    Lymphocytes % 8.8 13.0 - 44.0 %    Monocytes % 5.6 2.0 - 10.0 %    Eosinophils % 0.6 0.0 - 6.0 %    Basophils % 0.4 0.0 - 2.0 %    Neutrophils Absolute 4.36 1.20 - 7.70 x10*3/uL    Immature Granulocytes Absolute, Automated 0.06 0.00 - 0.70 x10*3/uL    Lymphocytes Absolute 0.46 (L) 1.20 - 4.80 x10*3/uL    Monocytes Absolute 0.29 0.10 - 1.00 x10*3/uL    Eosinophils Absolute 0.03 0.00 - 0.70 x10*3/uL    Basophils Absolute 0.02 0.00 - 0.10 x10*3/uL   Morphology   Result Value Ref Range    RBC Morphology See Below     Polychromasia Mild     Hypochromia Mild    Magnesium   Result Value Ref Range    Magnesium 2.52 (H) 1.60 - 2.40 mg/dL   Hepatic Function Panel   Result Value Ref Range    Albumin 3.8 3.4 - 5.0 g/dL    Bilirubin, Total 0.4 0.0 - 1.2 mg/dL    Bilirubin, Direct 0.2 0.0 - 0.3 mg/dL    Alkaline Phosphatase 76 33 - 136 U/L    ALT 20 10 - 52 U/L    AST 13 9 - 39 U/L    Total Protein 6.5 6.4 - 8.2 g/dL   Phosphorus   Result Value Ref Range    Phosphorus 2.9 2.5 - 4.9 mg/dL   Basic Metabolic Panel   Result Value Ref Range    Glucose 92 74 - 99 mg/dL    Sodium 138 136 - 145 mmol/L    Potassium 4.4 3.5 - 5.3 mmol/L    Chloride 111 (H) 98 - 107 mmol/L    Bicarbonate 21 21 - 32 mmol/L    Anion Gap 10 10 - 20 mmol/L    Urea Nitrogen 20 6 - 23 mg/dL    Creatinine 1.07 0.50 - 1.30 mg/dL    eGFR 79 >60 mL/min/1.73m*2    Calcium 8.8 8.6 - 10.6 mg/dL   Albumin, Fluid   Result Value Ref Range    Albumin, Fluid 1.2 Not established g/dL   Body Fluid Cell Count   Result Value Ref Range    Color, Fluid Yellow Colorless, Straw, Yellow    Clarity, Fluid Clear Clear    WBC, Fluid 108 See Comment /uL    RBC, Fluid 40 0  /uL /uL   Body Fluid Differential   Result Value Ref Range    Neutrophils %, Manual, Fluid 21 <25 % %    Lymphocytes %, Manual, Fluid 28 <75 % %    Mono/Macrophages %, Manual, Fluid 51 <70 % %     Eosinophils %, Manual, Fluid 0 0 % %    Basophils %, Manual, Fluid 0 0 % %    Immature Granulocytes %, Manual, Fluid 0 0 % %    Blasts %, Manual, Fluid 0 0 % %    Unclassified Cells %, Manual, Fluid 0 0 % %    Plasma Cells %, Manual, Fluid 0 0 % %    Total Cells Counted, Fluid 100    Glucose, Fluid   Result Value Ref Range    Glucose, Fluid 109 Not established mg/dL   Lactate Dehydrogenase, Fluid   Result Value Ref Range    LD, Fluid 31 Not established. U/L   Protein, Total Fluid   Result Value Ref Range    Protein, Total Fluid 1.8 Not established g/dL   Coagulation Screen   Result Value Ref Range    Protime 12.6 9.8 - 12.8 seconds    INR 1.1 0.9 - 1.1    aPTT 29 27 - 38 seconds                   Assessment/Plan   Principal Problem:    Lightheadedness    57yo man hx cirrhosis cb portal htn with esophageal varices sp banding on 1/5, with significant ascites requiring frequent paracentesis, and HE, hx of TBI, OA who was recently admitted in an out of state hospital for weakness, fatigue and found to have UTI with ESBL+ E. Coli bacteremia and significant anemia requiring 2u pRBCs - he left that hospital in order to return to [facility] and is admitted after returning to his home state with chief complaint of fatigue found to have hgb 6.2 requiring 1u pRBCs overnight on 2/10 though no signs of active bleed but suspected to be 2/2 slow bleeding from esophageal varices which were banded but not completely ablated just yet. Will plan for EGD on 2/12 to re-assess. Also on meropenem for E. Coli (+ESBL) bacteremia 2/2 UTI at OSH - he remains stable from an infection standpoint. The EGD was normal with no evidence of bleeding. Paracentesis was done and showed no evidence of SBP. For colonoscopy 2/14.     Updates 2/13:   -Follow the paracentesis workup.   -Start bactrim 800-160 mg per tablet 160 mg  -Colonoscopy AM, NPO midnight.       #fatigue: likely multifactorial 2/2 infection and symptomatic anemia  #Hx Variceal bleed sp  banding most recent 1/5  #Anemia - suspected upper GI bleed   - infectious management below  - maintain active T&s, consent in chart  - BID H&H  - sp 1u pRBCs on 2/10 overnight for hgb 6.2  - EGD 2/12 showed:  Two small grade I varices in the lower third of the esophagus. No treatment necessary.   Moderate to severe portal hypertensive gastropathy. No gastric varices.  The examined part of the duodenum appeared normal.  No findings of active bleeding or stigmata of recent bleeding.   - pantoprazole 40mg daily    #PORFIRIO - prerenal FeNa, urine sodium 28 suggesting 2/2 UTI/hypovolemia, could be HRS  - continue albumin 25g IV TID   - fu repeat renal panel  - holding home coreg iso PORFIRIO   - holding home spironolactone 100mg BID, and lasix 40mg BID    #ESBL+ E coli bacteremia 2/2 UTI  - OSH blood culture 2/6 with ESBL+ E Coli in 3/4 vials, see significant event note from 2/11 for copies of susceptibilities  - Bcx 2/10 NGTD  - Ucx 2/10 NGTD  - pip-tazo 2/6-2/8 at OSH   - pip-tazo 2/10-2/11  -meropenem 2/11- 2/13present (switched because of ESBL+ culture data from OSH)  -Start bactrim 160 mg BID     #Cirrhosis cb variceal bleeds, ascites, and HE  - holding home coreg iso PORFIRIO   - holding home spironolactone 100mg BID, and lasix 40mg BID  - continue lactulose 10g TID titrate to 3 soft BM daily  - AM MELD score    F: S/p 500 ml bolus, receiving albumin  E: Replete PRN  N: regular, NPO at midnight for EGD  A: PIV  DVT: Holding due to anemia/recent bleed (SCD)  Full code (confirmed on admission)  NOK: States his son Jose Guadalupe ([phone]) and daughter Chante ([phone]) are his HCPOA           Steffany Franco MD

## 2024-02-13 NOTE — TELEPHONE ENCOUNTER
Call from Wanda to follow up on plan for patient's evaluation. Discussed the following:    Per rounds - plan is for discharge tomorrow and completion of evaluation testing as outpatient.   Home on oral antibiotics.     Mom's concerned that patient is planning to go out of town again on Friday ([place]) without definitive answer regarding source of bleed. Asking if colonoscopy can be done while patient is in hospital.     Patient should not be driving due to encephalopathy and confusion.    Dr. Norris notified of discussion.

## 2024-02-13 NOTE — LETTER
2024    Mu RAUSCH Post  [address]  Department of Medicine-Gastroenterology  [facility] [address]  Phone: [phone]  Fax: [phone]    RE: Vance Silva  : 1965    Dear Dr. Mu RAUSCH Post:     Our multi-disciplinary transplant team completed a review of your patient, Vance Silva, on 2024.  ***    Our transplant program consists of surgeons and medical doctors who provide coverage 365 days a year, 24 hours a day.      Please don't hesitate to contact us at Dept: [phone] with any questions or concerns.       Sincerely,      Jazmine Hammond RN            The [employer] Toll-free Patient Services Line:  Your Resource for Organ Transplant Information    If you have a question regarding your own medical care, you always should call your transplant hospital first. However, for general organ transplant-related information, you should call the [employer] for Organ Sharing (UNOS) toll-free patient services line at [phone].  Anyone, including potential transplant candidates, candidates, recipients, family members, friends, living donors, and donor family members, can call this number to:    Talk about organ donation, living donation, the transplant process, the donation process, and transplant policies.  Get a free patient information kit with helpful booklets, waiting list and transplant information, and a list of all transplant hospitals.  Ask questions about the Organ Procurement and Transplantation Network (OPTN) web site (http://optn.transplant.hrsa.gov/), the UNOS Web site (http://unos.org/), or the UNOS web site for living donors and transplant recipients. (http://www.transplantliving.org/).  Learn how UNOS and the OPTN can help you.  Talk about any concerns that you may have with a transplant hospital.    UNOS is a not-for-profit organization that provides the administrative services for the national OPTN under federal contract to the Health Resources and Services  Administration (HRSA), an agency under the U.S. Department of Health and Human Services (HHS).    [facility] and the OPTN are responsible for:    Providing educational material for patients, the public, and professionals.  Raising awareness of the need for donated organs and tissue.  Writing organ transplant policy with help from transplant professionals, transplant patients, transplant candidates, donor families, living donors, and the public.  Coordinating organ procurement, matching, and placement.  Collecting information about every organ transplant and donation that occurs in the [place].    Remember, you should contact your transplant hospital directly if you have questions or concerns about your own medical care including medical records, work-up progress, and test results.    [facility] is not your transplant hospital, and staff at [facility] will not be able to transfer you to your transplant hospital, so keep your transplant hospital’s phone number handy.    However, while you research your transplant needs and learn as much as you can about transplantation and donation, we welcome your call to our toll-free patient services line at [phone].      [facility] PIL Final Rev 1-

## 2024-02-14 ENCOUNTER — TELEPHONE (OUTPATIENT)
Dept: TRANSPLANT | Facility: HOSPITAL | Age: 59
End: 2024-02-14

## 2024-02-14 ENCOUNTER — PHARMACY VISIT (OUTPATIENT)
Dept: PHARMACY | Facility: CLINIC | Age: 59
End: 2024-02-14
Payer: COMMERCIAL

## 2024-02-14 ENCOUNTER — APPOINTMENT (OUTPATIENT)
Dept: GASTROENTEROLOGY | Facility: HOSPITAL | Age: 59
DRG: 378 | End: 2024-02-14
Payer: COMMERCIAL

## 2024-02-14 VITALS
DIASTOLIC BLOOD PRESSURE: 86 MMHG | OXYGEN SATURATION: 100 % | TEMPERATURE: 96.4 F | BODY MASS INDEX: 21.37 KG/M2 | HEART RATE: 66 BPM | SYSTOLIC BLOOD PRESSURE: 124 MMHG | WEIGHT: 181 LBS | RESPIRATION RATE: 14 BRPM | HEIGHT: 77 IN

## 2024-02-14 LAB
ALBUMIN SERPL BCP-MCNC: 3.8 G/DL (ref 3.4–5)
ALP SERPL-CCNC: 68 U/L (ref 33–136)
ALT SERPL W P-5'-P-CCNC: 21 U/L (ref 10–52)
ANION GAP SERPL CALC-SCNC: 13 MMOL/L (ref 10–20)
APTT PPP: 30 SECONDS (ref 27–38)
AST SERPL W P-5'-P-CCNC: 16 U/L (ref 9–39)
BACTERIA BLD CULT: NORMAL
BACTERIA BLD CULT: NORMAL
BASOPHILS # BLD AUTO: 0.01 X10*3/UL (ref 0–0.1)
BASOPHILS NFR BLD AUTO: 0.4 %
BILIRUB DIRECT SERPL-MCNC: 0.1 MG/DL (ref 0–0.3)
BILIRUB SERPL-MCNC: 0.4 MG/DL (ref 0–1.2)
BUN SERPL-MCNC: 15 MG/DL (ref 6–23)
CALCIUM SERPL-MCNC: 8.6 MG/DL (ref 8.6–10.6)
CHLORIDE SERPL-SCNC: 111 MMOL/L (ref 98–107)
CO2 SERPL-SCNC: 19 MMOL/L (ref 21–32)
CREAT SERPL-MCNC: 0.99 MG/DL (ref 0.5–1.3)
EGFRCR SERPLBLD CKD-EPI 2021: 87 ML/MIN/1.73M*2
EOSINOPHIL # BLD AUTO: 0.01 X10*3/UL (ref 0–0.7)
EOSINOPHIL NFR BLD AUTO: 0.4 %
ERYTHROCYTE [DISTWIDTH] IN BLOOD BY AUTOMATED COUNT: 20.3 % (ref 11.5–14.5)
GLUCOSE SERPL-MCNC: 81 MG/DL (ref 74–99)
HCT VFR BLD AUTO: 24.1 % (ref 41–52)
HGB BLD-MCNC: 7.2 G/DL (ref 13.5–17.5)
HYPOCHROMIA BLD QL SMEAR: NORMAL
IMM GRANULOCYTES # BLD AUTO: 0.02 X10*3/UL (ref 0–0.7)
IMM GRANULOCYTES NFR BLD AUTO: 0.8 % (ref 0–0.9)
INR PPP: 1.2 (ref 0.9–1.1)
LYMPHOCYTES # BLD AUTO: 0.45 X10*3/UL (ref 1.2–4.8)
LYMPHOCYTES NFR BLD AUTO: 17.6 %
MAGNESIUM SERPL-MCNC: 2.22 MG/DL (ref 1.6–2.4)
MCH RBC QN AUTO: 23.2 PG (ref 26–34)
MCHC RBC AUTO-ENTMCNC: 29.9 G/DL (ref 32–36)
MCV RBC AUTO: 78 FL (ref 80–100)
MONOCYTES # BLD AUTO: 0.15 X10*3/UL (ref 0.1–1)
MONOCYTES NFR BLD AUTO: 5.9 %
NEUTROPHILS # BLD AUTO: 1.91 X10*3/UL (ref 1.2–7.7)
NEUTROPHILS NFR BLD AUTO: 74.9 %
NRBC BLD-RTO: 0 /100 WBCS (ref 0–0)
PHOSPHATE SERPL-MCNC: 2.1 MG/DL (ref 2.5–4.9)
PLATELET # BLD AUTO: 113 X10*3/UL (ref 150–450)
POLYCHROMASIA BLD QL SMEAR: NORMAL
POTASSIUM SERPL-SCNC: 4.1 MMOL/L (ref 3.5–5.3)
PROT SERPL-MCNC: 6.2 G/DL (ref 6.4–8.2)
PROTHROMBIN TIME: 13.8 SECONDS (ref 9.8–12.8)
RBC # BLD AUTO: 3.1 X10*6/UL (ref 4.5–5.9)
RBC MORPH BLD: NORMAL
SODIUM SERPL-SCNC: 139 MMOL/L (ref 136–145)
WBC # BLD AUTO: 2.6 X10*3/UL (ref 4.4–11.3)

## 2024-02-14 PROCEDURE — 88305 TISSUE EXAM BY PATHOLOGIST: CPT | Performed by: PATHOLOGY

## 2024-02-14 PROCEDURE — 0DBM8ZX EXCISION OF DESCENDING COLON, VIA NATURAL OR ARTIFICIAL OPENING ENDOSCOPIC, DIAGNOSTIC: ICD-10-PCS | Performed by: STUDENT IN AN ORGANIZED HEALTH CARE EDUCATION/TRAINING PROGRAM

## 2024-02-14 PROCEDURE — 45380 COLONOSCOPY AND BIOPSY: CPT | Performed by: STUDENT IN AN ORGANIZED HEALTH CARE EDUCATION/TRAINING PROGRAM

## 2024-02-14 PROCEDURE — 88305 TISSUE EXAM BY PATHOLOGIST: CPT | Mod: TC,SUR | Performed by: INTERNAL MEDICINE

## 2024-02-14 PROCEDURE — 2500000002 HC RX 250 W HCPCS SELF ADMINISTERED DRUGS (ALT 637 FOR MEDICARE OP, ALT 636 FOR OP/ED)

## 2024-02-14 PROCEDURE — 99222 1ST HOSP IP/OBS MODERATE 55: CPT | Performed by: SURGERY

## 2024-02-14 PROCEDURE — 7100000010 HC PHASE TWO TIME - EACH INCREMENTAL 1 MINUTE: Performed by: STUDENT IN AN ORGANIZED HEALTH CARE EDUCATION/TRAINING PROGRAM

## 2024-02-14 PROCEDURE — 85610 PROTHROMBIN TIME: CPT

## 2024-02-14 PROCEDURE — 2500000004 HC RX 250 GENERAL PHARMACY W/ HCPCS (ALT 636 FOR OP/ED): Performed by: STUDENT IN AN ORGANIZED HEALTH CARE EDUCATION/TRAINING PROGRAM

## 2024-02-14 PROCEDURE — 36415 COLL VENOUS BLD VENIPUNCTURE: CPT

## 2024-02-14 PROCEDURE — 3700000012 HC SEDATION LEVEL 5+ TIME - INITIAL 15 MINUTES 5/> YEARS: Performed by: STUDENT IN AN ORGANIZED HEALTH CARE EDUCATION/TRAINING PROGRAM

## 2024-02-14 PROCEDURE — 7100000009 HC PHASE TWO TIME - INITIAL BASE CHARGE: Performed by: STUDENT IN AN ORGANIZED HEALTH CARE EDUCATION/TRAINING PROGRAM

## 2024-02-14 PROCEDURE — 3700000013 HC SEDATION LEVEL 5+ TIME - EACH ADDITIONAL 15 MINUTES: Performed by: STUDENT IN AN ORGANIZED HEALTH CARE EDUCATION/TRAINING PROGRAM

## 2024-02-14 PROCEDURE — 80053 COMPREHEN METABOLIC PANEL: CPT

## 2024-02-14 PROCEDURE — 84100 ASSAY OF PHOSPHORUS: CPT

## 2024-02-14 PROCEDURE — RXMED WILLOW AMBULATORY MEDICATION CHARGE

## 2024-02-14 PROCEDURE — 85025 COMPLETE CBC W/AUTO DIFF WBC: CPT

## 2024-02-14 PROCEDURE — 82248 BILIRUBIN DIRECT: CPT

## 2024-02-14 PROCEDURE — 99153 MOD SED SAME PHYS/QHP EA: CPT | Performed by: STUDENT IN AN ORGANIZED HEALTH CARE EDUCATION/TRAINING PROGRAM

## 2024-02-14 PROCEDURE — 83735 ASSAY OF MAGNESIUM: CPT

## 2024-02-14 PROCEDURE — 99152 MOD SED SAME PHYS/QHP 5/>YRS: CPT | Performed by: STUDENT IN AN ORGANIZED HEALTH CARE EDUCATION/TRAINING PROGRAM

## 2024-02-14 PROCEDURE — 99238 HOSP IP/OBS DSCHRG MGMT 30/<: CPT

## 2024-02-14 RX ORDER — SULFAMETHOXAZOLE AND TRIMETHOPRIM 800; 160 MG/1; MG/1
1 TABLET ORAL 2 TIMES DAILY
Qty: 18 TABLET | Refills: 0 | Status: SHIPPED | OUTPATIENT
Start: 2024-02-14 | End: 2024-02-23

## 2024-02-14 RX ORDER — MIDAZOLAM HYDROCHLORIDE 1 MG/ML
INJECTION, SOLUTION INTRAMUSCULAR; INTRAVENOUS AS NEEDED
Status: COMPLETED | OUTPATIENT
Start: 2024-02-14 | End: 2024-02-14

## 2024-02-14 RX ORDER — FENTANYL CITRATE 50 UG/ML
INJECTION, SOLUTION INTRAMUSCULAR; INTRAVENOUS AS NEEDED
Status: COMPLETED | OUTPATIENT
Start: 2024-02-14 | End: 2024-02-14

## 2024-02-14 RX ORDER — PANTOPRAZOLE SODIUM 40 MG/1
40 TABLET, DELAYED RELEASE ORAL
Qty: 60 TABLET | Refills: 0 | Status: SHIPPED | OUTPATIENT
Start: 2024-02-14 | End: 2024-02-14 | Stop reason: SDUPTHER

## 2024-02-14 RX ORDER — PANTOPRAZOLE SODIUM 40 MG/1
40 TABLET, DELAYED RELEASE ORAL
Qty: 30 TABLET | Refills: 3 | Status: SHIPPED | OUTPATIENT
Start: 2024-02-14 | End: 2024-02-27 | Stop reason: SDUPTHER

## 2024-02-14 RX ADMIN — MIDAZOLAM 1 MG: 1 INJECTION INTRAMUSCULAR; INTRAVENOUS at 13:08

## 2024-02-14 RX ADMIN — MIDAZOLAM 1 MG: 1 INJECTION INTRAMUSCULAR; INTRAVENOUS at 13:05

## 2024-02-14 RX ADMIN — FENTANYL CITRATE 50 MCG: 50 INJECTION, SOLUTION INTRAMUSCULAR; INTRAVENOUS at 12:52

## 2024-02-14 RX ADMIN — SULFAMETHOXAZOLE AND TRIMETHOPRIM 160 MG: 800; 160 TABLET ORAL at 09:07

## 2024-02-14 RX ADMIN — FENTANYL CITRATE 25 MCG: 50 INJECTION, SOLUTION INTRAMUSCULAR; INTRAVENOUS at 13:08

## 2024-02-14 RX ADMIN — FENTANYL CITRATE 25 MCG: 50 INJECTION, SOLUTION INTRAMUSCULAR; INTRAVENOUS at 13:05

## 2024-02-14 RX ADMIN — MIDAZOLAM 2 MG: 1 INJECTION INTRAMUSCULAR; INTRAVENOUS at 12:52

## 2024-02-14 SDOH — SOCIAL STABILITY: SOCIAL INSECURITY: ARE YOU OR HAVE YOU BEEN THREATENED OR ABUSED PHYSICALLY, EMOTIONALLY, OR SEXUALLY BY ANYONE?: NO

## 2024-02-14 SDOH — SOCIAL STABILITY: SOCIAL INSECURITY: DO YOU FEEL UNSAFE GOING BACK TO THE PLACE WHERE YOU ARE LIVING?: NO

## 2024-02-14 SDOH — SOCIAL STABILITY: SOCIAL INSECURITY: ABUSE: ADULT

## 2024-02-14 SDOH — SOCIAL STABILITY: SOCIAL INSECURITY: ARE THERE ANY APPARENT SIGNS OF INJURIES/BEHAVIORS THAT COULD BE RELATED TO ABUSE/NEGLECT?: NO

## 2024-02-14 SDOH — SOCIAL STABILITY: SOCIAL INSECURITY: HAS ANYONE EVER THREATENED TO HURT YOUR FAMILY OR YOUR PETS?: NO

## 2024-02-14 SDOH — SOCIAL STABILITY: SOCIAL INSECURITY: DO YOU FEEL ANYONE HAS EXPLOITED OR TAKEN ADVANTAGE OF YOU FINANCIALLY OR OF YOUR PERSONAL PROPERTY?: NO

## 2024-02-14 SDOH — SOCIAL STABILITY: SOCIAL INSECURITY: HAVE YOU HAD THOUGHTS OF HARMING ANYONE ELSE?: NO

## 2024-02-14 SDOH — SOCIAL STABILITY: SOCIAL INSECURITY: WERE YOU ABLE TO COMPLETE ALL THE BEHAVIORAL HEALTH SCREENINGS?: YES

## 2024-02-14 SDOH — SOCIAL STABILITY: SOCIAL INSECURITY: DOES ANYONE TRY TO KEEP YOU FROM HAVING/CONTACTING OTHER FRIENDS OR DOING THINGS OUTSIDE YOUR HOME?: NO

## 2024-02-14 ASSESSMENT — ACTIVITIES OF DAILY LIVING (ADL)
LACK_OF_TRANSPORTATION: PATIENT DECLINED
LACK_OF_TRANSPORTATION: PATIENT DECLINED
FEEDING YOURSELF: INDEPENDENT
HEARING - LEFT EAR: FUNCTIONAL
JUDGMENT_ADEQUATE_SAFELY_COMPLETE_DAILY_ACTIVITIES: YES
HEARING - RIGHT EAR: FUNCTIONAL
WALKS IN HOME: INDEPENDENT
BATHING: INDEPENDENT
GROOMING: INDEPENDENT
ADEQUATE_TO_COMPLETE_ADL: YES
PATIENT'S MEMORY ADEQUATE TO SAFELY COMPLETE DAILY ACTIVITIES?: YES
TOILETING: INDEPENDENT
DRESSING YOURSELF: INDEPENDENT

## 2024-02-14 ASSESSMENT — LIFESTYLE VARIABLES
HOW OFTEN DO YOU HAVE 6 OR MORE DRINKS ON ONE OCCASION: PATIENT DECLINED
SUBSTANCE_ABUSE_PAST_12_MONTHS: NO
HOW MANY STANDARD DRINKS CONTAINING ALCOHOL DO YOU HAVE ON A TYPICAL DAY: PATIENT DECLINED
AUDIT-C TOTAL SCORE: -1
AUDIT-C TOTAL SCORE: -1
HOW OFTEN DO YOU HAVE A DRINK CONTAINING ALCOHOL: PATIENT DECLINED
SKIP TO QUESTIONS 9-10: 0
PRESCIPTION_ABUSE_PAST_12_MONTHS: NO

## 2024-02-14 ASSESSMENT — PAIN SCALES - GENERAL
PAINLEVEL_OUTOF10: 0 - NO PAIN

## 2024-02-14 ASSESSMENT — COGNITIVE AND FUNCTIONAL STATUS - GENERAL
MOBILITY SCORE: 24
DAILY ACTIVITIY SCORE: 24

## 2024-02-14 ASSESSMENT — PAIN - FUNCTIONAL ASSESSMENT
PAIN_FUNCTIONAL_ASSESSMENT: 0-10
PAIN_FUNCTIONAL_ASSESSMENT: 0-10
PAIN_FUNCTIONAL_ASSESSMENT: UNABLE TO SELF-REPORT
PAIN_FUNCTIONAL_ASSESSMENT: 0-10
PAIN_FUNCTIONAL_ASSESSMENT: UNABLE TO SELF-REPORT
PAIN_FUNCTIONAL_ASSESSMENT: UNABLE TO SELF-REPORT
PAIN_FUNCTIONAL_ASSESSMENT: 0-10
PAIN_FUNCTIONAL_ASSESSMENT: UNABLE TO SELF-REPORT

## 2024-02-14 ASSESSMENT — PATIENT HEALTH QUESTIONNAIRE - PHQ9
SUM OF ALL RESPONSES TO PHQ9 QUESTIONS 1 & 2: 0
2. FEELING DOWN, DEPRESSED OR HOPELESS: NOT AT ALL
1. LITTLE INTEREST OR PLEASURE IN DOING THINGS: NOT AT ALL

## 2024-02-14 NOTE — PROGRESS NOTES
Occupational Therapy  Communication Note    Missed Visit: Yes  Missed Visit Reason: Patient refused      02/14/24 at 3:03 PM   Lisbeth Mortensen, OT   Rehab Office: [phone]

## 2024-02-14 NOTE — COMMITTEE REVIEW
Patient Discussion Note     Organ being evaluated for: Liver    Transplant Coordinator: Jazmine Hammond  Transplant Surgeon:       Referring Physician: Mu Norris    Committee Review Members:  Anesthesiology Phuc Riley MD   Dietitian COLBY LANE, JASMEETN, LD   Finance Radha Peace   Pharmacy Ysabel Mckinley, ShawnD    IAN LEAL, [facility]   Transplant Natalya Roca, JAGDISH, Jazlyn Watson, RN, Jazmine Hammond, RN, Anitha Baptiste, RN, Kuldeep Marie MD, Erika Sandoval RN   Transplant Hepatology Mu Norris MD, Stephen Thompson MD, Chip Lora MD, Hesham Jacobson MD, Alexis Sears MD   Transplant Surgery Rina Mosley MD       Additional Discussion Notes and Findings:   Discussed new referral patient at [facility] and updated committee on inpatient status.

## 2024-02-14 NOTE — DISCHARGE SUMMARY
Discharge Diagnosis  Lightheadedness    Issues Requiring Follow-Up  - Repeat colonoscopy in 5 years   - Hepatology- Please follow on colonoscopy biopsy result, resolution of SBP/Fluid Cx currently on antibiotics and the rest of his transplant work-up  - PCP-Please follow up with chronic medical issues after hospitalization    Test Results Pending At Discharge  Pending Labs       Order Current Status    Surgical Pathology Exam Collected (02/14/24 1314)    Sterile Fluid Culture/Smear Preliminary result            Hospital Course  Vance Silva is a 58-year-old man with a past medical history of decompensated cirrhosis complicated by portal hypertension, esophageal varices s/p banding, PHG, ascites, history of GI bleed from duodenal ulcer 11/2020, traumatic head injury, multiple fractures, and arthritis who presented to [facility] with weakness, lightheadedness, low-grade fevers. Per chart review, the patient was recently admitted at [facility] from 1/4 - 1/6 after presenting with chest tightness, dizziness and dark stools. He was found to have a Hgb of 5.6 (baseline ~ 13). He was given 2 units pRBC, started on IV PPI and octreotide, and eventually transferred to the MICU for an emergent EGD. The EGD was notable for large (> 5 mm) esophageal varices with stigmata of recent bleeding and portal hypertensive gastropathy. He was seen by Dr. Norris on 1/31, started carvedilol and increased Lasix and spironolactone, with plans to follow-up in transplant clinic after evaluation.     The patient Hgb was 7.6 in the ED and paracentesis was performed which was negative for SBP. However that could be also partially treated SBP. EGD on 2/12/24 showed Two small grade I varices (no red shyam sign) in the lower third of the esophagus; no bleeding was identified. Scarring noted from previous banding. Moderate to severe portal hypertensive gastropathy in the cardia, body of the stomach and pylorus; no bleeding was identified. The exam of the  stomach was otherwise normal. There were no gastric varices, ulcers or bleeding. Of note, no signs of GAVE at this time. The duodenal bulb, 1st part of the duodenum and 2nd part of the duodenum appeared normal.    Since there was no clear explanation of bleeding a colonoscopy was done and The perianal exam was normal. The terminal ileum appeared normal. One 3 mm sessile polyp in the descending colon; performed cold forceps biopsy with complete removal. Jar 1 Multiple small and medium, scattered diverticula of moderate severity in the ascending colon, descending colon and sigmoid colon. The exam was otherwise normal.    The patient is discharged on Bactrim for 9 more days (2/13 - 2/22) and PPI once daily.     Follow up:   - Repeat colonoscopy in 5 years   - Follow the colonoscopy biopsy result.   - Follow up with primary care doctor   - Follow up with Dr. Norris (hepatology follow up)     Pertinent Physical Exam At Time of Discharge  Physical Exam  Constitutional:       General: He is not in acute distress.     Appearance: He is normal weight. He is not ill-appearing.   Cardiovascular:      Rate and Rhythm: Normal rate and regular rhythm.   Pulmonary:      Effort: Pulmonary effort is normal.   Abdominal:      General: Bowel sounds are normal. There is distension.   Musculoskeletal:         General: Normal range of motion.   Skin:     General: Skin is warm.   Neurological:      General: No focal deficit present.      Mental Status: He is alert.   Psychiatric:         Mood and Affect: Mood normal.         Home Medications     Medication List      START taking these medications     pantoprazole 40 mg EC tablet; Commonly known as: ProtoNix; Take 1 tablet   (40 mg) by mouth once daily in the morning. Take before meals. Do not   crush, chew, or split.   sulfamethoxazole-trimethoprim 800-160 mg tablet; Commonly known as:   Bactrim DS; Take 1 tablet by mouth 2 times a day for 9 days.     CONTINUE taking these medications      carvedilol 6.25 mg tablet; Commonly known as: Coreg; Take 1 tablet (6.25   mg) by mouth once daily at bedtime.   Constulose 20 gram/30 mL oral solution; Generic drug: lactulose   furosemide 20 mg tablet; Commonly known as: Lasix   spironolactone 50 mg tablet; Commonly known as: Aldactone       Outpatient Follow-Up  Future Appointments   Date Time Provider Department Center   2/20/2024  9:00 AM Aleena Dudley LCSW [id number]   2/20/2024 10:00 AM TXP ABDOMINAL SURGEON [id number]       Steffany Franco MD

## 2024-02-14 NOTE — H&P
UPDATED HISTORY AND PHYSICAL EXAMINATION    SERVICE DATE:  2/14/2024     PHYSICAL EXAM MUST BE COMPLETED ON ADMISSION    The History and Physical (completed in the past 30 days) has been reviewed and the patient has been examined. The contents accurately reflect the patient's condition with the following additions or revisions since the H&P was completed.    Examination indicates no changes.  This H&P can be found in the Electronic Medical Record dated 2/10.    SIGNATURE: Natalie Ugalde MD PATIENT NAME: Vance Silva   DATE: February 14, 2024 MRN: [id number]   TIME: 12:29 PM CONTACT: [phone]

## 2024-02-14 NOTE — PERIOPERATIVE NURSING NOTE
"Patient in endoscopy for colonoscopy procedure. Patient was next to go back for procedure and nurse kept patient updated on when he would go back for procedure. Patient stated \"I'm suicidal, I hope I die on the table,\" and then stated \"get me on the table or I'll kill myself.\" RN notified charge nurse who spoke with patient and also notified MD Hdez. MD Hdez came and spoke with patient and determined no suicidal ideation.   "

## 2024-02-14 NOTE — PROGRESS NOTES
Physical Therapy                 Therapy Communication Note    Patient Name: Vance Silva  MRN: [id number]  Today's Date: 2/14/2024     Discipline: Physical Therapy    Missed Visit Reason: Missed Visit Reason:  (Pt off division; will reattempt as appropriate.)    Missed Time: Attempt 0950      02/14/24 at 10:05 AM - Caren Bettencourt, PT

## 2024-02-14 NOTE — DISCHARGE INSTRUCTIONS
Dear Mr. Silva ,     You were initially admitted to the hospital due to concern for an abdominal infection as well as blood stream infection. We did repeat blood work that showed your blood stream infection is improving. Your blood level was low and you were given blood and then your blood level improved. A paracentesis was done to rule out infections of your abdominal fluid. The results showed that your abdominal infection is improving. You underwent and endoscopy which did not show active bleeding, 2 small esophageal varices. A colonoscopy was done which showed 1 polyp in your descending colon that was biopsied and some outpouching's in your colon called diverticula which are fairly common in many individuals. Your next colonoscopy will be in 5 years,    Please follow up with your Hepatologist (Liver Doctor) and Primary Care Doctor    Please continue taking  Bactrim twice daily for 10 days (through 2/22) and pantoprazole once daily along with all of your other medications.

## 2024-02-14 NOTE — PROGRESS NOTES
Physical Therapy                 Therapy Communication Note    Patient Name: Vance Silva  MRN: [id number]  Today's Date: 2/14/2024     Discipline: Physical Therapy    Missed Visit Reason: Missed Visit Reason:  (Pt declining therapy at this time; will reattempt next date)    Missed Time: Attempt 1431 02/14/24 at 2:48 PM - Caren Bettencourt, PT

## 2024-02-14 NOTE — PROGRESS NOTES
Discharge Planning Note:      Vance Silva is a 58 y.o. male on day 4 of admission presenting with Lightheadedness.     Patient discharging home today with no needs. Called and patient in agreement.             Chelsy Jaramillo RN [facility]

## 2024-02-14 NOTE — TELEPHONE ENCOUNTER
VM left for Wanda to follow up on message concerning patient's discharge. Notified Wanda of discussion with Dr. Norris regarding source of bleed based on colonoscopy reports and plan for follow up,  will call to schedule additional testing as outpatient. Advised call back with any questions or concerns.

## 2024-02-14 NOTE — HOSPITAL COURSE
Vance Silva is a 58-year-old man with a past medical history of decompensated cirrhosis complicated by portal hypertension, esophageal varices s/p banding, PHG, ascites, history of GI bleed from duodenal ulcer 11/2020, traumatic head injury, multiple fractures, and arthritis who presented to [facility] with weakness, lightheadedness, low-grade fevers. Per chart review, the [facility] after presenting with chest tightness, dizziness and dark stools. He was found to have a Hgb of 5.6 (baseline ~ 13). He was given 2 units pRBC, started on IV PPI and octreotide, and eventually transferred to the MICU for an emergent EGD. The EGD was notable for large (> 5 mm) esophageal varices with stigmata of recent bleeding and portal hypertensive gastropathy. He was seen by Dr. Norris on 1/31, started carvedilol and increased Lasix and spironolactone, with plans to follow-up in transplant clinic after evaluation.     The patient Hgb was 7.6 in the ED and paracentesis was performed which was negative for SBP. However that could be also partially treated SBP. EGD on 2/12/24 showed Two small grade I varices (no red shyam sign) in the lower third of the esophagus; no bleeding was identified. Scarring noted from previous banding. Moderate to severe portal hypertensive gastropathy in the cardia, body of the stomach and pylorus; no bleeding was identified. The exam of the stomach was otherwise normal. There were no gastric varices, ulcers or bleeding. Of note, no signs of GAVE at this time. The duodenal bulb, 1st part of the duodenum and 2nd part of the duodenum appeared normal.    Since there was no clear explanation of bleeding a colonoscopy was done and The perianal exam was normal. The terminal ileum appeared normal. One 3 mm sessile polyp in the descending colon; performed cold forceps biopsy with complete removal. Jar 1 Multiple small and medium, scattered diverticula of moderate severity in  the ascending colon, descending colon and sigmoid colon. The exam was otherwise normal.    The patient is discharged on Bactrim for 9 more days (2/13 - 2/22) and PPI once daily.     Follow up:   - Repeat colonoscopy in 5 years   - Follow the colonoscopy biopsy result.   - Follow up with primary care doctor   - Follow up with Dr. Norris (hepatology follow up)

## 2024-02-15 ENCOUNTER — TELEPHONE (OUTPATIENT)
Dept: TRANSPLANT | Facility: HOSPITAL | Age: 59
End: 2024-02-15
Payer: COMMERCIAL

## 2024-02-15 NOTE — TELEPHONE ENCOUNTER
PT's mom called asking for Jazmine to call her back - she says the med list Jazmine gave her doesn't match what Dr. Rigo Mosley told them. [phone]. Dr. Johnathan Mosley told him not to take the Constelos, and there's another med that she'snot sure about.

## 2024-02-15 NOTE — TELEPHONE ENCOUNTER
Spoke with patient's mom, Wanda, to follow up on questions after patient's discharge. Reviewed medications - patient should continue lactulose and other medications per discharge summary. Samira will call Wanda to schedule patient's remaining evaluation testing.     Coordinator reviewed testing plan with Dr. Johnathan Mosley and Dr. Norris.

## 2024-02-16 LAB
BACTERIA FLD CULT: NORMAL
GRAM STN SPEC: NORMAL
GRAM STN SPEC: NORMAL

## 2024-02-19 DIAGNOSIS — K76.82 HEPATIC ENCEPHALOPATHY (MULTI): ICD-10-CM

## 2024-02-19 DIAGNOSIS — R18.8 CIRRHOSIS OF LIVER WITH ASCITES, UNSPECIFIED HEPATIC CIRRHOSIS TYPE (MULTI): ICD-10-CM

## 2024-02-19 DIAGNOSIS — Z01.818 ENCOUNTER FOR PRE-TRANSPLANT EVALUATION FOR LIVER TRANSPLANT: ICD-10-CM

## 2024-02-19 DIAGNOSIS — Z01.818 ENCOUNTER FOR PRE-TRANSPLANT EVALUATION FOR LIVER TRANSPLANT: Primary | ICD-10-CM

## 2024-02-19 DIAGNOSIS — K74.60 CIRRHOSIS OF LIVER WITH ASCITES, UNSPECIFIED HEPATIC CIRRHOSIS TYPE (MULTI): ICD-10-CM

## 2024-02-19 DIAGNOSIS — I85.01 BLEEDING ESOPHAGEAL VARICES, UNSPECIFIED ESOPHAGEAL VARICES TYPE (MULTI): ICD-10-CM

## 2024-02-19 NOTE — PROGRESS NOTES
Reason for Nutrition Visit:  Pt is a 58 y.o. male referred for initial MNT. Pt is being evaluated for liver transplant.     Past Medical Hx:  Patient Active Problem List   Diagnosis    Acute encephalopathy    Transaminitis    Thrombocytopenia (CMS/HCC)    Secondary esophageal varices without bleeding (CMS/HCC)    Portal hypertension with esophageal varices (CMS/HCC)    Peripheral neuropathy    Hypertension    Hyperammonemia (CMS/HCC)    Herniated lumbar intervertebral disc    Helicobacter pylori ab+    Cirrhosis of liver with ascites (CMS/HCC)    Chronic traumatic encephalopathy    Electrolyte abnormality    Chronic pain    Back pain    Lactic acidosis    Lesion of ulnar nerve    Open wound of toe    Right foot pain    Arthritis of great toe at metatarsophalangeal joint    Anemia due to chronic blood loss    Anemia    Alcoholic liver disease (CMS/HCC)    Alcohol withdrawal delirium (CMS/HCC)    Alcohol related seizure (CMS/HCC)    Acute tonsillitis    Lightheadedness        Food and Nutrition Hx:  Pt reports good appetite. He reports he tries to follow intermittent fasting. He likes to juice. He reports he tries to eat lots of fruit and vegetables. 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     24 Diet Recall:  Meal 1: 8:30 black coffee, celery carrot derek smoothie   Meal 2: 10:00 am avocado with fruit, gluten free toast   Meal 3: 1:00pm,  he is usually out and about for lunch, vegan bean based lentils, spinach, brussels sprouts, peanut butter and banana sandwich, cups of fruit, chicken sandwich or burger  Meal: 4: chicken, ground beef,  broccoli, asparagus, salad, vegetables, quinoa rice  Snacks: peanut butter and banana sandwich, fruit   Beverages: water, coffee, ice tea    WEIGHT HISTORY  CW: 181# (82.1 kg), Pt reports his normal weight prior to getting sick  in November was between 205-210#. Pt reports holding onto to fluid and when he gets tapped about 5L come off.  Currently fluctuates between 181-203#.   BMI: 21.46 kg/m   Weight change:  Yes due to holding onto fluid     Lab Results   Component Value Date    HGBA1C 4.9 12/15/2021    BUN 15 02/14/2024    CREATININEU Not Applicable 01/31/2024    PHOS 2.1 (L) 02/14/2024    K 4.1 02/14/2024        Food Preparation: Partner/Spouse  Cooking Skills/Barriers: None reported  Grocery Shopping: Partner/Spouse      Allergies: None, avoids milk/lactose/eggs   Intolerance: None  Appetite: Good  Intake: 50-75%  GI Symptoms : diarrhea and bloating   Swallowing Difficulty: can be an issue if socializing with friends/family, feels he needs to focus on eating.   Dentition : own    Eating Out Type: Denies/Unknown   Convenience Foods: Denies     Types of Activities: Mostly Sedentary    Sleep duration/quality : Insomnia and cramping, back hurts due to fluid issues in his belly    Supplements: black coffee with vitamin d, chewable vitamin c, digestive enzymes  daily      Nutrition Focused Physical Exam:    Performed/Deferred: Deferred per pt request    Muscle Wasting: per pt has lost a lot of muscle and fat, ascites belly    Malnutrition present: unable to determine due to not being able to perform assessment but more then likely yes.     Estimated Energy Needs:    Weight Gain Needs: 30 kcal/kg/day and 1.2-1.5 g/pro/kg/day    Estimated Needs: 2463 protein and  kcal for weight gain    Nutrition Diagnosis:    Diagnosis Statement 1:  Diagnosis Status: New  Diagnosis : Food and nutrition related knowledge deficit related to lack of or limited prior nutrition-related education as evidenced by  diet recall, need for high protein diet    Diagnosis Statement 2:  Diagnosis Status: New  Diagnosis : Inadequate protein-energy intake  related to  increased metabolic rate with current dz state  as evidenced by  diet recall, pt stating he has lost weight and ascites belly, need for high protein diet       Nutrition Interventions:  We discussed the  importance of increased calories and protein with current disease state.    Plan to eat 3 meals and 3 snacks daily.  Hornick with timing meals to find out when you have a larger appetite.  Appetite may be greatest in the morning after not eating all night so you may prefer to eat your larger meals and snacksin the morning and at lunch.  Breakfast-type foods are often better tolerated so eat foods such as eggs, pancakes, waffles and cereal for any meal or snack.  Carry snacks with you so you are prepared to eat every 2 to 3 hours.  Determine what works best for you if your body's cues for feeling hungry or full are not working.  Eat a small meal or snack even if you don't feel hungry.  Set a timer to remind you when it is time to eat.  Take a walk before you eat (with health care provider's approval).  Light or moderate physical activity can help you maintain muscle and increase your appetite.  Adding calories  Drink milk, chocolate milk, soy milk, or smoothies instead of low-calorie beverages such as diet drinks or water.  Cook with milk or soy milk instead of water when making dishes such as hot cereal, cocoa, or pudding.  Add jelly, jam, honey, butter or margarine to bread and crackers. Add jam or fruit to ice cream and as a topping over cake.  Mix dried fruit, nuts, granola, honey, or dry cereal with yogurt or hot cereals.  Enjoy snacks such as milkshakes, smoothies, pudding, ice cream, or custard.  Blend a fruit smoothie of a banana, frozen berries, milk or soy milk, and 1 tablespoon nonfat powdered milk or protein powder.    Adding Protein  Add ¼ cup nonfat dry milk powder or protein powder to make a high-protein milk to drink or to use in recipes that call for milk.  Vanilla or peppermint extract or unsweetened cocoa powder could help to boost the flavor.  Add hard-cooked eggs, leftover meat, grated cheese, canned beans or tofu to noodles, rice, salads, sandwiches, soups,  casseroles, pasta, tuna and other  mixed dishes.  Add powdered milk or protein powder to hot cereals, meatloaf, casseroles, scrambled eggs, sauces, cream soups, and shakes.  Add beans and lentils to salads, soups, casseroles, and vegetable dishes.  Eat cottage cheese or yogurt, especially Greek yogurt, with fruit as a snack or dessert.   We reviewed the importance and compliance with a 2 gm sodium diet. Recommend decreasing high sodium foods such as soups, gravies, regular deli meats, condiments, prepackaged foods, crackers, chips.  One teaspoon of salt contain more than 2000 mg of sodium.  When reviewing a food label, choose foods than have less than 20% of the daily value of sodium     Nutrition Goals:  Nutrition Goals: Consistent meal/snack pattern  Maintain stable weight  Meat/Protein Foods: Increase  Compliance with 2 gm sodium   Eat 3 meals consistently  Eat breakfast consistently    Nutrition Recommendations:  1) None at this time    Educational Handouts: High protein/High calorie, Two richard sodium, Nutrition in cirrhosis

## 2024-02-20 ENCOUNTER — APPOINTMENT (OUTPATIENT)
Dept: TRANSPLANT | Facility: HOSPITAL | Age: 59
End: 2024-02-20
Payer: COMMERCIAL

## 2024-02-21 ENCOUNTER — TELEPHONE (OUTPATIENT)
Dept: TRANSPLANT | Facility: HOSPITAL | Age: 59
End: 2024-02-21
Payer: COMMERCIAL

## 2024-02-21 DIAGNOSIS — R18.8 REFRACTORY ASCITES: Primary | ICD-10-CM

## 2024-02-21 DIAGNOSIS — Z01.818 ENCOUNTER FOR PRE-TRANSPLANT EVALUATION FOR LIVER TRANSPLANT: ICD-10-CM

## 2024-02-21 DIAGNOSIS — Z76.82 PRE-LIVER TRANSPLANT, LISTED: ICD-10-CM

## 2024-02-21 DIAGNOSIS — Z94.4 LIVER REPLACED BY TRANSPLANT (MULTI): ICD-10-CM

## 2024-02-21 NOTE — TELEPHONE ENCOUNTER
Patient's mother called.  Karyn thinks he is bleedings, is experiencing fatigue and feeling generally unwell.  No signs of blood in stool, no hematemesis.  Patient had doubled diuretics on own before she had spoken to Jazmine, though was unsure when he made that change.  Explained to mother that he could increase his lactulose which might help with fatigue.  Will put in lab orders tomorrow since he had already increased his diuretics, explained to mother it could harm kidneys if he is on higher doses unmonitored.  Explained that if he really thinks he is bleeding or having blood in his stool he is to report to the ED.

## 2024-02-21 NOTE — TELEPHONE ENCOUNTER
Call received from patient's mom, Wanda, with concerns about patient gaining approximately 20lbs (per patient report to his dad) since discharge on 2/14. Dr. Norris notified - advised increasing diuretics to 40mg Furosemide daily and 100mg Spironolactone daily. Lab check on 2/27 when patient is here for testing. Wanda notified and states she will notify patient.

## 2024-02-22 ENCOUNTER — HOSPITAL ENCOUNTER (OUTPATIENT)
Dept: RADIOLOGY | Facility: CLINIC | Age: 59
Discharge: HOME | End: 2024-02-22
Payer: COMMERCIAL

## 2024-02-22 DIAGNOSIS — I85.01 BLEEDING ESOPHAGEAL VARICES, UNSPECIFIED ESOPHAGEAL VARICES TYPE (MULTI): ICD-10-CM

## 2024-02-22 DIAGNOSIS — R18.8 CIRRHOSIS OF LIVER WITH ASCITES, UNSPECIFIED HEPATIC CIRRHOSIS TYPE (MULTI): ICD-10-CM

## 2024-02-22 DIAGNOSIS — K76.82 HEPATIC ENCEPHALOPATHY (MULTI): ICD-10-CM

## 2024-02-22 DIAGNOSIS — K74.60 CIRRHOSIS OF LIVER WITH ASCITES, UNSPECIFIED HEPATIC CIRRHOSIS TYPE (MULTI): ICD-10-CM

## 2024-02-22 DIAGNOSIS — Z01.818 ENCOUNTER FOR PRE-TRANSPLANT EVALUATION FOR LIVER TRANSPLANT: ICD-10-CM

## 2024-02-22 PROCEDURE — 75571 CT HRT W/O DYE W/CA TEST: CPT

## 2024-02-22 RX ORDER — SPIRONOLACTONE 50 MG/1
100 TABLET, FILM COATED ORAL
Qty: 60 TABLET | Refills: 11 | Status: SHIPPED | OUTPATIENT
Start: 2024-02-22 | End: 2024-03-08 | Stop reason: SDUPTHER

## 2024-02-22 RX ORDER — FUROSEMIDE 20 MG/1
40 TABLET ORAL
Qty: 60 TABLET | Refills: 11 | Status: SHIPPED | OUTPATIENT
Start: 2024-02-22 | End: 2024-03-08 | Stop reason: SDUPTHER

## 2024-02-23 ENCOUNTER — LAB (OUTPATIENT)
Dept: LAB | Facility: LAB | Age: 59
End: 2024-02-23
Payer: COMMERCIAL

## 2024-02-23 ENCOUNTER — TELEPHONE (OUTPATIENT)
Dept: TRANSPLANT | Facility: HOSPITAL | Age: 59
End: 2024-02-23

## 2024-02-23 DIAGNOSIS — Z01.818 ENCOUNTER FOR PRE-TRANSPLANT EVALUATION FOR LIVER TRANSPLANT: ICD-10-CM

## 2024-02-23 DIAGNOSIS — R18.8 CIRRHOSIS OF LIVER WITH ASCITES, UNSPECIFIED HEPATIC CIRRHOSIS TYPE (MULTI): ICD-10-CM

## 2024-02-23 DIAGNOSIS — K74.60 CIRRHOSIS OF LIVER WITH ASCITES, UNSPECIFIED HEPATIC CIRRHOSIS TYPE (MULTI): ICD-10-CM

## 2024-02-23 LAB
ABO GROUP (TYPE) IN BLOOD: NORMAL
AFP SERPL-MCNC: <4 NG/ML (ref 0–9)
ALBUMIN SERPL BCP-MCNC: 4.4 G/DL (ref 3.4–5)
ALP SERPL-CCNC: 117 U/L (ref 33–136)
ALT SERPL W P-5'-P-CCNC: 27 U/L (ref 10–52)
AMPHETAMINES UR QL SCN: ABNORMAL
ANION GAP SERPL CALC-SCNC: 14 MMOL/L (ref 10–20)
ANTIBODY SCREEN: NORMAL
AST SERPL W P-5'-P-CCNC: 21 U/L (ref 9–39)
BARBITURATES UR QL SCN: ABNORMAL
BASOPHILS # BLD MANUAL: 0.06 X10*3/UL (ref 0–0.1)
BASOPHILS NFR BLD MANUAL: 0.9 %
BENZODIAZ UR QL SCN: ABNORMAL
BILIRUB DIRECT SERPL-MCNC: 0.1 MG/DL (ref 0–0.3)
BILIRUB SERPL-MCNC: 0.5 MG/DL (ref 0–1.2)
BUN SERPL-MCNC: 19 MG/DL (ref 6–23)
BURR CELLS BLD QL SMEAR: ABNORMAL
BZE UR QL SCN: ABNORMAL
CALCIUM SERPL-MCNC: 9.7 MG/DL (ref 8.6–10.6)
CANCER AG19-9 SERPL-ACNC: <4 U/ML
CANNABINOIDS UR QL SCN: ABNORMAL
CEA SERPL-MCNC: 3.5 UG/L
CENTROMERE B AB SER-ACNC: <0.2 AI
CERULOPLASMIN SERPL-MCNC: 35.1 MG/DL (ref 20–60)
CHLORIDE SERPL-SCNC: 108 MMOL/L (ref 98–107)
CHROMATIN AB SERPL-ACNC: <0.2 AI
CMV IGG AVIDITY SERPL IA-RTO: NONREACTIVE %
CO2 SERPL-SCNC: 22 MMOL/L (ref 21–32)
CREAT SERPL-MCNC: 1.23 MG/DL (ref 0.5–1.3)
DSDNA AB SER-ACNC: 1 IU/ML
EBV EA IGG SER QL: NEGATIVE
EBV NA AB SER QL: POSITIVE
EBV VCA IGG SER IA-ACNC: POSITIVE
EBV VCA IGM SER IA-ACNC: ABNORMAL
EGFRCR SERPLBLD CKD-EPI 2021: 67 ML/MIN/1.73M*2
ENA JO1 AB SER QL IA: <0.2 AI
ENA RNP AB SER IA-ACNC: 0.3 AI
ENA SCL70 AB SER QL IA: 0.2 AI
ENA SM AB SER IA-ACNC: <0.2 AI
ENA SM+RNP AB SER QL IA: <0.2 AI
ENA SS-A AB SER IA-ACNC: <0.2 AI
ENA SS-B AB SER IA-ACNC: <0.2 AI
EOSINOPHIL # BLD MANUAL: 0 X10*3/UL (ref 0–0.7)
EOSINOPHIL NFR BLD MANUAL: 0 %
ERYTHROCYTE [DISTWIDTH] IN BLOOD BY AUTOMATED COUNT: 21.9 % (ref 11.5–14.5)
EST. AVERAGE GLUCOSE BLD GHB EST-MCNC: 100 MG/DL
ETHANOL ?TM UR-MCNC: <10 MG/DL
FENTANYL+NORFENTANYL UR QL SCN: ABNORMAL
GLUCOSE SERPL-MCNC: 95 MG/DL (ref 74–99)
HAV AB SER QL IA: REACTIVE
HBA1C MFR BLD: 5.1 %
HBV CORE AB SER QL: NONREACTIVE
HBV CORE IGM SER QL: NONREACTIVE
HCT VFR BLD AUTO: 34.8 % (ref 41–52)
HERPES SIMPLEX VIRUS 1 IGG: 5.5 INDEX
HERPES SIMPLEX VIRUS 2 IGG: <0.2 INDEX
HGB BLD-MCNC: 10.1 G/DL (ref 13.5–17.5)
HIV 1+2 AB+HIV1 P24 AG SERPL QL IA: NONREACTIVE
HYPOCHROMIA BLD QL SMEAR: ABNORMAL
IMM GRANULOCYTES # BLD AUTO: 0.02 X10*3/UL (ref 0–0.7)
IMM GRANULOCYTES NFR BLD AUTO: 0.3 % (ref 0–0.9)
INR PPP: 1.1 (ref 0.9–1.1)
IRON SATN MFR SERPL: 7 % (ref 25–45)
IRON SERPL-MCNC: 33 UG/DL (ref 35–150)
LABORATORY COMMENT REPORT: NORMAL
LYMPHOCYTES # BLD MANUAL: 0.48 X10*3/UL (ref 1.2–4.8)
LYMPHOCYTES NFR BLD MANUAL: 7.8 %
MCH RBC QN AUTO: 22.9 PG (ref 26–34)
MCHC RBC AUTO-ENTMCNC: 29 G/DL (ref 32–36)
MCV RBC AUTO: 79 FL (ref 80–100)
MONOCYTES # BLD MANUAL: 0.11 X10*3/UL (ref 0.1–1)
MONOCYTES NFR BLD MANUAL: 1.7 %
NEUTS SEG # BLD MANUAL: 5.56 X10*3/UL (ref 1.2–7)
NEUTS SEG NFR BLD MANUAL: 89.6 %
NRBC BLD-RTO: 0 /100 WBCS (ref 0–0)
OPIATES UR QL SCN: ABNORMAL
OVALOCYTES BLD QL SMEAR: ABNORMAL
OXYCODONE+OXYMORPHONE UR QL SCN: ABNORMAL
PATH REPORT.FINAL DX SPEC: NORMAL
PATH REPORT.GROSS SPEC: NORMAL
PATH REPORT.TOTAL CANCER: NORMAL
PCP UR QL SCN: ABNORMAL
PLATELET # BLD AUTO: 91 X10*3/UL (ref 150–450)
POTASSIUM SERPL-SCNC: 5 MMOL/L (ref 3.5–5.3)
PROT SERPL-MCNC: 7.4 G/DL (ref 6.4–8.2)
PROTHROMBIN TIME: 12.9 SECONDS (ref 9.8–12.8)
RBC # BLD AUTO: 4.42 X10*6/UL (ref 4.5–5.9)
RBC MORPH BLD: ABNORMAL
RH FACTOR (ANTIGEN D): NORMAL
RIBOSOMAL P AB SER-ACNC: <0.2 AI
SODIUM SERPL-SCNC: 139 MMOL/L (ref 136–145)
TIBC SERPL-MCNC: 456 UG/DL (ref 240–445)
TOTAL CELLS COUNTED BLD: 115
TREPONEMA PALLIDUM IGG+IGM AB [PRESENCE] IN SERUM OR PLASMA BY IMMUNOASSAY: NONREACTIVE
TSH SERPL-ACNC: 3.45 MIU/L (ref 0.44–3.98)
UIBC SERPL-MCNC: 423 UG/DL (ref 110–370)
VARICELLA ZOSTER IGG INDEX: 7.4 IA
VZV IGG SER QL IA: POSITIVE
WBC # BLD AUTO: 6.2 X10*3/UL (ref 4.4–11.3)

## 2024-02-23 PROCEDURE — 86787 VARICELLA-ZOSTER ANTIBODY: CPT

## 2024-02-23 PROCEDURE — 86663 EPSTEIN-BARR ANTIBODY: CPT

## 2024-02-23 PROCEDURE — 82390 ASSAY OF CERULOPLASMIN: CPT

## 2024-02-23 PROCEDURE — 86665 EPSTEIN-BARR CAPSID VCA: CPT

## 2024-02-23 PROCEDURE — 80053 COMPREHEN METABOLIC PANEL: CPT

## 2024-02-23 PROCEDURE — 86695 HERPES SIMPLEX TYPE 1 TEST: CPT

## 2024-02-23 PROCEDURE — 80365 DRUG SCREENING OXYCODONE: CPT

## 2024-02-23 PROCEDURE — 86235 NUCLEAR ANTIGEN ANTIBODY: CPT

## 2024-02-23 PROCEDURE — 82378 CARCINOEMBRYONIC ANTIGEN: CPT

## 2024-02-23 PROCEDURE — 82104 ALPHA-1-ANTITRYPSIN PHENO: CPT

## 2024-02-23 PROCEDURE — 86301 IMMUNOASSAY TUMOR CA 19-9: CPT

## 2024-02-23 PROCEDURE — 86225 DNA ANTIBODY NATIVE: CPT

## 2024-02-23 PROCEDURE — 83540 ASSAY OF IRON: CPT

## 2024-02-23 PROCEDURE — 86664 EPSTEIN-BARR NUCLEAR ANTIGEN: CPT

## 2024-02-23 PROCEDURE — 80307 DRUG TEST PRSMV CHEM ANLYZR: CPT

## 2024-02-23 PROCEDURE — 86704 HEP B CORE ANTIBODY TOTAL: CPT

## 2024-02-23 PROCEDURE — 86900 BLOOD TYPING SEROLOGIC ABO: CPT

## 2024-02-23 PROCEDURE — 85610 PROTHROMBIN TIME: CPT

## 2024-02-23 PROCEDURE — 84154 ASSAY OF PSA FREE: CPT

## 2024-02-23 PROCEDURE — 86376 MICROSOMAL ANTIBODY EACH: CPT

## 2024-02-23 PROCEDURE — 84153 ASSAY OF PSA TOTAL: CPT

## 2024-02-23 PROCEDURE — 86038 ANTINUCLEAR ANTIBODIES: CPT

## 2024-02-23 PROCEDURE — 80349 CANNABINOIDS NATURAL: CPT

## 2024-02-23 PROCEDURE — 84443 ASSAY THYROID STIM HORMONE: CPT

## 2024-02-23 PROCEDURE — 86705 HEP B CORE ANTIBODY IGM: CPT

## 2024-02-23 PROCEDURE — 83550 IRON BINDING TEST: CPT

## 2024-02-23 PROCEDURE — 85027 COMPLETE CBC AUTOMATED: CPT

## 2024-02-23 PROCEDURE — 86696 HERPES SIMPLEX TYPE 2 TEST: CPT

## 2024-02-23 PROCEDURE — 87389 HIV-1 AG W/HIV-1&-2 AB AG IA: CPT

## 2024-02-23 PROCEDURE — 36415 COLL VENOUS BLD VENIPUNCTURE: CPT

## 2024-02-23 PROCEDURE — 86901 BLOOD TYPING SEROLOGIC RH(D): CPT

## 2024-02-23 PROCEDURE — 86780 TREPONEMA PALLIDUM: CPT

## 2024-02-23 PROCEDURE — 86381 MITOCHONDRIAL ANTIBODY EACH: CPT

## 2024-02-23 PROCEDURE — 80361 OPIATES 1 OR MORE: CPT

## 2024-02-23 PROCEDURE — 86850 RBC ANTIBODY SCREEN: CPT

## 2024-02-23 PROCEDURE — 86644 CMV ANTIBODY: CPT

## 2024-02-23 PROCEDURE — 83036 HEMOGLOBIN GLYCOSYLATED A1C: CPT

## 2024-02-23 PROCEDURE — 86708 HEPATITIS A ANTIBODY: CPT

## 2024-02-23 PROCEDURE — 82248 BILIRUBIN DIRECT: CPT

## 2024-02-23 PROCEDURE — 87350 HEPATITIS BE AG IA: CPT

## 2024-02-23 PROCEDURE — 86645 CMV ANTIBODY IGM: CPT

## 2024-02-23 PROCEDURE — 80321 ALCOHOLS BIOMARKERS 1OR 2: CPT

## 2024-02-23 PROCEDURE — 86481 TB AG RESPONSE T-CELL SUSP: CPT

## 2024-02-23 PROCEDURE — 82105 ALPHA-FETOPROTEIN SERUM: CPT

## 2024-02-23 PROCEDURE — 85007 BL SMEAR W/DIFF WBC COUNT: CPT

## 2024-02-23 PROCEDURE — 86707 HEPATITIS BE ANTIBODY: CPT

## 2024-02-23 PROCEDURE — 82652 VIT D 1 25-DIHYDROXY: CPT

## 2024-02-23 NOTE — TELEPHONE ENCOUNTER
Spoke with Wanda who called to follow up on diuretics and notify team that she spent time with patient and Aleena reviewing all meds currently taking and when he should take them. Discussed need for additional education regarding medications with patient and support team. Plan for 30 minute nurse visit prior to SW visit on 2/27/24 to review tips on medication management, what each medication is prescribed for and appropriate administration.     Per Wanda, Karyn attempted to get labs the other but arrived after lab closed. Advised Wanda that Karyn should go today so we can assess hemoglobin and electrolytes. Reviewed options for labs in their area. Wanda will notify patient and Aleena.     Wanda is aware that she can contact coordinator with any additional questions or concerns and has contact information for on call coordinator in case of emergency.

## 2024-02-24 ENCOUNTER — EPISODE CHANGES (OUTPATIENT)
Dept: TRANSPLANT | Facility: HOSPITAL | Age: 59
End: 2024-02-24

## 2024-02-24 DIAGNOSIS — Z01.818 ENCOUNTER FOR PRE-TRANSPLANT EVALUATION FOR LIVER TRANSPLANT: Primary | ICD-10-CM

## 2024-02-24 DIAGNOSIS — D50.0 ANEMIA DUE TO CHRONIC BLOOD LOSS: ICD-10-CM

## 2024-02-24 DIAGNOSIS — K76.6 PORTAL HYPERTENSION WITH ESOPHAGEAL VARICES (MULTI): ICD-10-CM

## 2024-02-24 DIAGNOSIS — I71.21 ANEURYSM OF ASCENDING AORTA WITHOUT RUPTURE (CMS-HCC): ICD-10-CM

## 2024-02-24 DIAGNOSIS — I85.00 PORTAL HYPERTENSION WITH ESOPHAGEAL VARICES (MULTI): ICD-10-CM

## 2024-02-25 LAB
1,25(OH)2D3 SERPL-MCNC: 40.1 PG/ML (ref 19.9–79.3)
CMV IGM SERPL-ACNC: <8 AU/ML
EBV VCA IGM SER-ACNC: <10 U/ML (ref 0–43.9)
ETHYL GLUCURONIDE UR QL SCN: NEGATIVE NG/ML
HBV E AB SERPL QL IA: NEGATIVE
HBV E AG SERPL QL IA: NEGATIVE
LKM AB TITR SER IF: NORMAL {TITER}
NIL(NEG) CONTROL SPOT COUNT: NORMAL
PANEL A SPOT COUNT: 0
PANEL B SPOT COUNT: 1
POS CONTROL SPOT COUNT: NORMAL
PSA FREE MFR SERPL: 15 %
PSA FREE SERPL-MCNC: 1 NG/ML
PSA SERPL IA-MCNC: 6.5 NG/ML (ref 0–4)
T-SPOT. TB INTERPRETATION: NEGATIVE

## 2024-02-26 LAB
LABORATORY REPORT: NORMAL
MITOCHONDRIA AB SER QL IF: NEGATIVE
PETH INTERPRETATION: NORMAL
PLPETH BLD-MCNC: <10 NG/ML
POPETH BLD-MCNC: <10 NG/ML

## 2024-02-27 ENCOUNTER — NURSE ONLY (OUTPATIENT)
Dept: TRANSPLANT | Facility: HOSPITAL | Age: 59
End: 2024-02-27
Payer: COMMERCIAL

## 2024-02-27 ENCOUNTER — NUTRITION (OUTPATIENT)
Dept: TRANSPLANT | Facility: HOSPITAL | Age: 59
End: 2024-02-27
Payer: COMMERCIAL

## 2024-02-27 ENCOUNTER — HOSPITAL ENCOUNTER (OUTPATIENT)
Dept: RESPIRATORY THERAPY | Facility: HOSPITAL | Age: 59
Discharge: HOME | End: 2024-02-27
Payer: COMMERCIAL

## 2024-02-27 ENCOUNTER — SOCIAL WORK (OUTPATIENT)
Dept: TRANSPLANT | Facility: HOSPITAL | Age: 59
End: 2024-02-27
Payer: COMMERCIAL

## 2024-02-27 VITALS — HEIGHT: 77 IN | WEIGHT: 181 LBS | BODY MASS INDEX: 21.37 KG/M2

## 2024-02-27 DIAGNOSIS — R18.8 CIRRHOSIS OF LIVER WITH ASCITES, UNSPECIFIED HEPATIC CIRRHOSIS TYPE (MULTI): ICD-10-CM

## 2024-02-27 DIAGNOSIS — Z01.818 ENCOUNTER FOR PRE-TRANSPLANT EVALUATION FOR LIVER TRANSPLANT: ICD-10-CM

## 2024-02-27 DIAGNOSIS — K74.60 CIRRHOSIS OF LIVER WITH ASCITES, UNSPECIFIED HEPATIC CIRRHOSIS TYPE (MULTI): ICD-10-CM

## 2024-02-27 DIAGNOSIS — K76.82 HEPATIC ENCEPHALOPATHY (MULTI): ICD-10-CM

## 2024-02-27 DIAGNOSIS — I85.01 BLEEDING ESOPHAGEAL VARICES, UNSPECIFIED ESOPHAGEAL VARICES TYPE (MULTI): ICD-10-CM

## 2024-02-27 DIAGNOSIS — Z01.818 ENCOUNTER FOR PRE-TRANSPLANT EVALUATION FOR LIVER TRANSPLANT: Primary | ICD-10-CM

## 2024-02-27 DIAGNOSIS — R97.20 ELEVATED PSA, LESS THAN 10 NG/ML: ICD-10-CM

## 2024-02-27 DIAGNOSIS — K21.00 GASTROESOPHAGEAL REFLUX DISEASE WITH ESOPHAGITIS WITHOUT HEMORRHAGE: ICD-10-CM

## 2024-02-27 LAB
ANA PATTERN: ABNORMAL
ANA SER QL HEP2 SUBST: POSITIVE
ANA TITR SER IF: ABNORMAL {TITER}
BASE EXCESS BLDA CALC-SCNC: -5.8 MMOL/L (ref -2–3)
BODY TEMPERATURE: 37 DEGREES CELSIUS
COHGB MFR BLDA: 1.3 %
DO-HGB MFR BLDA: 0.7 % (ref 0–5)
HCO3 BLDA-SCNC: 16.7 MMOL/L (ref 22–26)
HGB BLDA-MCNC: 9 G/DL (ref 13.5–17.5)
METHGB MFR BLDA: 0.5 % (ref 0–1.5)
MGC ASCENT PFT - FEV1 - PRE: 4.09
MGC ASCENT PFT - FEV1 - PREDICTED: 4.17
MGC ASCENT PFT - FVC - PRE: 5.78
MGC ASCENT PFT - FVC - PREDICTED: 5.49
OXYHGB MFR BLDA: 97.4 % (ref 94–98)
PCO2 BLDA: 23 MM HG (ref 38–42)
PH BLDA: 7.47 PH (ref 7.38–7.42)
PO2 BLDA: 109 MM HG (ref 85–95)
SAO2 % BLDA: 99 % (ref 94–100)

## 2024-02-27 PROCEDURE — 36600 WITHDRAWAL OF ARTERIAL BLOOD: CPT

## 2024-02-27 PROCEDURE — 94729 DIFFUSING CAPACITY: CPT | Performed by: INTERNAL MEDICINE

## 2024-02-27 PROCEDURE — 94726 PLETHYSMOGRAPHY LUNG VOLUMES: CPT

## 2024-02-27 PROCEDURE — 94010 BREATHING CAPACITY TEST: CPT | Performed by: INTERNAL MEDICINE

## 2024-02-27 PROCEDURE — 94726 PLETHYSMOGRAPHY LUNG VOLUMES: CPT | Performed by: INTERNAL MEDICINE

## 2024-02-27 PROCEDURE — 82375 ASSAY CARBOXYHB QUANT: CPT

## 2024-02-27 RX ORDER — LACTULOSE 10 G/15ML
10 SOLUTION ORAL 2 TIMES DAILY
Qty: 2700 ML | Refills: 3 | Status: SHIPPED | OUTPATIENT
Start: 2024-02-27 | End: 2024-03-08 | Stop reason: SDUPTHER

## 2024-02-27 RX ORDER — PANTOPRAZOLE SODIUM 40 MG/1
40 TABLET, DELAYED RELEASE ORAL
Qty: 30 TABLET | Refills: 3 | Status: SHIPPED | OUTPATIENT
Start: 2024-02-27 | End: 2024-05-10 | Stop reason: SDUPTHER

## 2024-02-27 ASSESSMENT — PATIENT HEALTH QUESTIONNAIRE - PHQ9
5. POOR APPETITE OR OVEREATING: MORE THAN HALF THE DAYS
9. THOUGHTS THAT YOU WOULD BE BETTER OFF DEAD, OR OF HURTING YOURSELF: SEVERAL DAYS
3. TROUBLE FALLING OR STAYING ASLEEP OR SLEEPING TOO MUCH: NEARLY EVERY DAY
2. FEELING DOWN, DEPRESSED OR HOPELESS: SEVERAL DAYS
4. FEELING TIRED OR HAVING LITTLE ENERGY: NEARLY EVERY DAY
1. LITTLE INTEREST OR PLEASURE IN DOING THINGS: NEARLY EVERY DAY
7. TROUBLE CONCENTRATING ON THINGS, SUCH AS READING THE NEWSPAPER OR WATCHING TELEVISION: MORE THAN HALF THE DAYS
8. MOVING OR SPEAKING SO SLOWLY THAT OTHER PEOPLE COULD HAVE NOTICED. OR THE OPPOSITE, BEING SO FIGETY OR RESTLESS THAT YOU HAVE BEEN MOVING AROUND A LOT MORE THAN USUAL: MORE THAN HALF THE DAYS
SUM OF ALL RESPONSES TO PHQ9 QUESTIONS 1 & 2: 4
SUM OF ALL RESPONSES TO PHQ QUESTIONS 1-9: 18
6. FEELING BAD ABOUT YOURSELF - OR THAT YOU ARE A FAILURE OR HAVE LET YOURSELF OR YOUR FAMILY DOWN: SEVERAL DAYS

## 2024-02-27 ASSESSMENT — ANXIETY QUESTIONNAIRES
GAD7 TOTAL SCORE: 13
6. BECOMING EASILY ANNOYED OR IRRITABLE: MORE THAN HALF THE DAYS
4. TROUBLE RELAXING: NEARLY EVERY DAY
2. NOT BEING ABLE TO STOP OR CONTROL WORRYING: SEVERAL DAYS
3. WORRYING TOO MUCH ABOUT DIFFERENT THINGS: MORE THAN HALF THE DAYS
IF YOU CHECKED OFF ANY PROBLEMS ON THIS QUESTIONNAIRE, HOW DIFFICULT HAVE THESE PROBLEMS MADE IT FOR YOU TO DO YOUR WORK, TAKE CARE OF THINGS AT HOME, OR GET ALONG WITH OTHER PEOPLE: EXTREMELY DIFFICULT
1. FEELING NERVOUS, ANXIOUS, OR ON EDGE: MORE THAN HALF THE DAYS
5. BEING SO RESTLESS THAT IT IS HARD TO SIT STILL: MORE THAN HALF THE DAYS
7. FEELING AFRAID AS IF SOMETHING AWFUL MIGHT HAPPEN: SEVERAL DAYS

## 2024-02-27 NOTE — PROGRESS NOTES
"SW met with Pt, Pt's girlfriend, Aleena, Pt's mother, Wanda, and Pt's father, Karyn, for psychosocial update. Pt was pleasant and engaged. Pt has [facility] for insurance. Pt denied any recent hospitalizations or emergency department visits. Pt reported fair compliance with medications and good compliance with appointments. Pt shared \"the better I feel the better it is\" when asked about medication compliance. Pt reported his mother helps assist him with his medications but stated he can manage them on his own if needed. Pt listed his mother, Wanda, as primary support, and his girlfriend, Aleena, as secondary support. Pt also listed his father, Karyn, as an alternate support. Pt's primary and secondary supports reported they are working and would be able to take time off. Pt's girlfriend shared she works from home. Pt's supports denied any other caregiver responsibilities. Pt's supports denied any medical, psychological, or physical limitations. Pt's mother shared she cannot do any heavy lifting. Pt's supports reported they drive and have a working vehicle. SW reviewed and signed the Transplant Care Agreement with Pt and Pt's mother. Pt described his mood as \"terrible.\" Pt expressed frustration about being dependent on others for help with daily tasks, such as getting dressed. Pt shared he has anxiety about coming to appointments due to needing help and expressed he \"does not want to come to meetings just to talk.\" EPHRAIM explained the importance of SW visits and informed Pt we will not have visits unless necessary. Pt denied having an interest in MH counseling at this time. EPHRAIM explained MH counseling is Pt's choice and provided Pt with MH resources in case Pt wants to explore them at a later time. Pt scored an 18 on the PHQ-9, indicating severe clinical depression. Pt scored a 1 on the question \"Thoughts that you would be better off dead or of hurting yourself in some way.\" EPHRAIM inquired further about this, and Pt stated " "when he was in [place] he felt hopeless and \"wouldn't mind\" if a doctor made a mistake and he . Pt denied any feelings like this since returning home but again stated \"if you made a mistake I wouldn't hold it against you.\" Pt denied creating a plan or having intent to follow through. Pt reported he is feeling more hopeful now. Pt stated \"I'm not just saying this either.\" SW and Pt discussed Pt reaching out to on-call liver coordinator or visiting the ED if he has thoughts of harming himself in the future. Pt was agreeable. Pt scored a 13 on the ROSALBA-7, indicating moderate clinical anxiety. Pt denied any recent tobacco or alcohol use. Pt reported he is currently using CBD drops daily. Pt denied any other illicit drug use. Pt is high psychosocial risk due to compliance and MH.     Plan: Pt to meet with transplant psych. SW to await recommendations from transplant psych. SW will follow up with Pt in 3-6 months to monitor compliance, MH, and substance use.    "

## 2024-02-27 NOTE — PATIENT INSTRUCTIONS
Please get a blood pressure cuff for home use. If you are experiencing dizziness, shortness of breath, increased fatigue or weakness, please check your blood pressure and notify your transplant coordinator.     If your systolic blood pressure (top number) is less than 100 please contact your transplant coordinator.     Refills on your Lactulose and Protonix will be sent to your pharmacy once Dr. Norris signs the order.     If you have any questions or concerns please contact your coordinator via [facility] [employer] message, or by calling the liver transplant office at [phone].

## 2024-02-27 NOTE — PROGRESS NOTES
Patient and supports, mom, dad and Aleena are in clinic for education on medication management.  Reviewed each medication and why the medication is prescribed, when to take, potential side effects to be aware of when on medications.     Patient provided pill box, printouts of patient education for each prescribed medication, and updated medication list.     Patient requested refill on Lactulose and Protonix. Rx sent to Dr. Norris for signature.     Patient advised to get a blood pressure cuff for home use in order to check when feeling dizzy, sob, or experiencing worsening fatigue.     Patient and family are aware they may contact me or the liver transplant office with questions and concerns.

## 2024-02-28 ENCOUNTER — HOSPITAL ENCOUNTER (OUTPATIENT)
Dept: RADIOLOGY | Facility: HOSPITAL | Age: 59
Discharge: HOME | End: 2024-02-28
Payer: COMMERCIAL

## 2024-02-28 LAB
A1AT PHENOTYP SERPL-IMP: NORMAL
A1AT SERPL-MCNC: 196 MG/DL (ref 90–200)
CARBOXYTHC UR-MCNC: 121 NG/ML

## 2024-02-29 ENCOUNTER — OFFICE VISIT (OUTPATIENT)
Dept: UROLOGY | Facility: HOSPITAL | Age: 59
End: 2024-02-29
Payer: COMMERCIAL

## 2024-02-29 DIAGNOSIS — Z01.818 ENCOUNTER FOR PRE-TRANSPLANT EVALUATION FOR LIVER TRANSPLANT: ICD-10-CM

## 2024-02-29 DIAGNOSIS — R97.20 ELEVATED PSA, LESS THAN 10 NG/ML: Primary | ICD-10-CM

## 2024-02-29 LAB
6MAM UR CFM-MCNC: <25 NG/ML
CODEINE UR CFM-MCNC: <50 NG/ML
HYDROCODONE CTO UR CFM-MCNC: <25 NG/ML
HYDROMORPHONE UR CFM-MCNC: <25 NG/ML
MORPHINE UR CFM-MCNC: >2500 NG/ML
NORHYDROCODONE UR CFM-MCNC: <25 NG/ML
NOROXYCODONE UR CFM-MCNC: <25 NG/ML
OXYCODONE UR CFM-MCNC: <25 NG/ML
OXYMORPHONE UR CFM-MCNC: <25 NG/ML
POC APPEARANCE, URINE: CLEAR
POC BILIRUBIN, URINE: NEGATIVE
POC BLOOD, URINE: NEGATIVE
POC COLOR, URINE: YELLOW
POC GLUCOSE, URINE: NEGATIVE MG/DL
POC KETONES, URINE: NEGATIVE MG/DL
POC LEUKOCYTES, URINE: NEGATIVE
POC NITRITE,URINE: NEGATIVE
POC PH, URINE: 6 PH
POC PROTEIN, URINE: NEGATIVE MG/DL
POC SPECIFIC GRAVITY, URINE: 1.01
POC UROBILINOGEN, URINE: 0.2 EU/DL

## 2024-02-29 PROCEDURE — 99214 OFFICE O/P EST MOD 30 MIN: CPT | Performed by: UROLOGY

## 2024-02-29 PROCEDURE — 81003 URINALYSIS AUTO W/O SCOPE: CPT | Performed by: UROLOGY

## 2024-02-29 PROCEDURE — 99204 OFFICE O/P NEW MOD 45 MIN: CPT | Performed by: UROLOGY

## 2024-02-29 PROCEDURE — 1036F TOBACCO NON-USER: CPT | Performed by: UROLOGY

## 2024-02-29 ASSESSMENT — PAIN SCALES - GENERAL: PAINLEVEL: 0-NO PAIN

## 2024-02-29 NOTE — H&P (VIEW-ONLY)
NAME:Vance Silva  DATE: 2024               Subjective:   Chief complaint: Elevated PSA    HPI:  58 y.o. male presenting for evaluation of elevated PSA     Pt with abdominal distension, significant ascites.      No urinary straining, no dysuria or hematuria.      ANTONY on 2/10/24 - no hydro, significant ascites  PSA 6.5 (15%) free  UA no signs of blood or infection    Recently dealing with Liver failure.  Working on getting transplant.      Past Medical History:   Diagnosis Date    Acute kidney injury (CMS/HCC)     Anemia     Ascites     Chronic kidney disease     Cirrhosis (CMS/HCC)     Hepatitis C     Liver disease     Seizure (CMS/HCC)     Sleep apnea     Traumatic brain injury (CMS/HCC)      No past surgical history on file.  Social History     Tobacco Use    Smoking status: Never    Smokeless tobacco: Never   Substance Use Topics    Alcohol use: Not Currently     No family history on file.    Current Outpatient Medications on File Prior to Visit   Medication Sig Dispense Refill    carvedilol (Coreg) 6.25 mg tablet Take 1 tablet (6.25 mg) by mouth once daily at bedtime. 30 tablet 11    Constulose 10 gram/15 mL oral solution Take 15 mL (10 g) by mouth 2 times a day. Titrate to achieve goal 2-3 bowel movements daily 2700 mL 3    furosemide (Lasix) 20 mg tablet Take 2 tablets (40 mg) by mouth once daily. 60 tablet 11    pantoprazole (ProtoNix) 40 mg EC tablet Take 1 tablet (40 mg) by mouth once daily in the morning. Take before meals. Do not crush, chew, or split. 30 tablet 3    spironolactone (Aldactone) 50 mg tablet Take 2 tablets (100 mg) by mouth once daily. 60 tablet 11    [] sulfamethoxazole-trimethoprim (Bactrim DS) 800-160 mg tablet Take 1 tablet by mouth 2 times a day for 9 days. 18 tablet 0    [DISCONTINUED] Constulose 10 gram/15 mL oral solution Take 30 mL by mouth two times a day.      [DISCONTINUED] pantoprazole (ProtoNix) 40 mg EC tablet Take 1 tablet (40 mg) by mouth once daily in the  morning. Take before meals. Do not crush, chew, or split. 30 tablet 3     No current facility-administered medications on file prior to visit.     Vance has No Known Allergies.     Review of Systems    14 point ROS reviewed and discussed with the patient. Pertinent positives/negatives discussed in the History of Present Illness (HPI).    FH:  Prostate CA: No  Bladder CA: No  Kidney CA: No    Objective:     Physical Exam   1. Constitutional: NAD, Well-developed, Well-nourished  2. Respiratory: Unlabored breathing, no audible wheezes, no use of accessory muscles     Labs  Lab Results   Component Value Date    PSA 6.5 (H) 02/23/2024     Lab Results   Component Value Date    CREATININE 1.23 02/23/2024     Lab Results   Component Value Date    HGBA1C 5.1 02/23/2024     Lab Results   Component Value Date    HCT 34.8 (L) 02/23/2024       Imaging    Procedures:     PVR: >1200    Assessment/Plan:   Vance Silva presents with     Elevated PSA  Patient presents today for the evaluation of elevated PSA (Prostate Specific Antigen).  We discussed Prostate cancer screening, and that it is recommended for men aged 55-69, but can also start early in high risk patients ( and patients with family history of prostate cancer) and go longer in active, healthy men.  We discussed the screening process involves a digital rectal exam (STEPHANIE) and blood test (PSA).      We discussed the role of trans-rectal ultrasound guided prostate biopsy.  I highlighted that it is an office based procedure.  He will be given a dose of antibiotics prior to the procedure.  He was also instructed to give himself an enema the morning of the biopsy.  Risks of the biopsy including pain, blood in the urine, stool and ejaculate which can last a few weeks.  The risk of post prostate biopsy sepsis was discussed and that if signs of infection, such as fevers, chills, lethargy require a prompt evaluation.      I highlighted the role of MRI Prostate in  identifying high-risk prostate cancer and images can be used to target those areas with the biopsy.      Plan to proceed with MRI prostate     No bothersome urinary complaints, Bladder scan not accurate in setting of ascites    Discussed with transplant coordinators - Samira [phone]

## 2024-02-29 NOTE — PROGRESS NOTES
NAME:Vance Silva  DATE: 2024               Subjective:   Chief complaint: Elevated PSA    HPI:  58 y.o. male presenting for evaluation of elevated PSA     Pt with abdominal distension, significant ascites.      No urinary straining, no dysuria or hematuria.      ANTONY on 2/10/24 - no hydro, significant ascites  PSA 6.5 (15%) free  UA no signs of blood or infection    Recently dealing with Liver failure.  Working on getting transplant.      Past Medical History:   Diagnosis Date    Acute kidney injury (CMS/HCC)     Anemia     Ascites     Chronic kidney disease     Cirrhosis (CMS/HCC)     Hepatitis C     Liver disease     Seizure (CMS/HCC)     Sleep apnea     Traumatic brain injury (CMS/HCC)      No past surgical history on file.  Social History     Tobacco Use    Smoking status: Never    Smokeless tobacco: Never   Substance Use Topics    Alcohol use: Not Currently     No family history on file.    Current Outpatient Medications on File Prior to Visit   Medication Sig Dispense Refill    carvedilol (Coreg) 6.25 mg tablet Take 1 tablet (6.25 mg) by mouth once daily at bedtime. 30 tablet 11    Constulose 10 gram/15 mL oral solution Take 15 mL (10 g) by mouth 2 times a day. Titrate to achieve goal 2-3 bowel movements daily 2700 mL 3    furosemide (Lasix) 20 mg tablet Take 2 tablets (40 mg) by mouth once daily. 60 tablet 11    pantoprazole (ProtoNix) 40 mg EC tablet Take 1 tablet (40 mg) by mouth once daily in the morning. Take before meals. Do not crush, chew, or split. 30 tablet 3    spironolactone (Aldactone) 50 mg tablet Take 2 tablets (100 mg) by mouth once daily. 60 tablet 11    [] sulfamethoxazole-trimethoprim (Bactrim DS) 800-160 mg tablet Take 1 tablet by mouth 2 times a day for 9 days. 18 tablet 0    [DISCONTINUED] Constulose 10 gram/15 mL oral solution Take 30 mL by mouth two times a day.      [DISCONTINUED] pantoprazole (ProtoNix) 40 mg EC tablet Take 1 tablet (40 mg) by mouth once daily in the  morning. Take before meals. Do not crush, chew, or split. 30 tablet 3     No current facility-administered medications on file prior to visit.     Vance has No Known Allergies.     Review of Systems    14 point ROS reviewed and discussed with the patient. Pertinent positives/negatives discussed in the History of Present Illness (HPI).    FH:  Prostate CA: No  Bladder CA: No  Kidney CA: No    Objective:     Physical Exam   1. Constitutional: NAD, Well-developed, Well-nourished  2. Respiratory: Unlabored breathing, no audible wheezes, no use of accessory muscles     Labs  Lab Results   Component Value Date    PSA 6.5 (H) 02/23/2024     Lab Results   Component Value Date    CREATININE 1.23 02/23/2024     Lab Results   Component Value Date    HGBA1C 5.1 02/23/2024     Lab Results   Component Value Date    HCT 34.8 (L) 02/23/2024       Imaging    Procedures:     PVR: >1200    Assessment/Plan:   Vance Silva presents with     Elevated PSA  Patient presents today for the evaluation of elevated PSA (Prostate Specific Antigen).  We discussed Prostate cancer screening, and that it is recommended for men aged 55-69, but can also start early in high risk patients ( and patients with family history of prostate cancer) and go longer in active, healthy men.  We discussed the screening process involves a digital rectal exam (STEPHANIE) and blood test (PSA).      We discussed the role of trans-rectal ultrasound guided prostate biopsy.  I highlighted that it is an office based procedure.  He will be given a dose of antibiotics prior to the procedure.  He was also instructed to give himself an enema the morning of the biopsy.  Risks of the biopsy including pain, blood in the urine, stool and ejaculate which can last a few weeks.  The risk of post prostate biopsy sepsis was discussed and that if signs of infection, such as fevers, chills, lethargy require a prompt evaluation.      I highlighted the role of MRI Prostate in  identifying high-risk prostate cancer and images can be used to target those areas with the biopsy.      Plan to proceed with MRI prostate     No bothersome urinary complaints, Bladder scan not accurate in setting of ascites    Discussed with transplant coordinators - Samira [phone]

## 2024-03-01 ENCOUNTER — OFFICE VISIT (OUTPATIENT)
Dept: CARDIAC SURGERY | Facility: HOSPITAL | Age: 59
End: 2024-03-01
Payer: COMMERCIAL

## 2024-03-01 ENCOUNTER — TELEMEDICINE (OUTPATIENT)
Dept: TRANSPLANT | Facility: HOSPITAL | Age: 59
End: 2024-03-01
Payer: COMMERCIAL

## 2024-03-01 VITALS
HEART RATE: 89 BPM | HEIGHT: 77 IN | BODY MASS INDEX: 21.42 KG/M2 | OXYGEN SATURATION: 100 % | SYSTOLIC BLOOD PRESSURE: 100 MMHG | DIASTOLIC BLOOD PRESSURE: 62 MMHG | WEIGHT: 181.4 LBS

## 2024-03-01 DIAGNOSIS — F10.21 ALCOHOL USE DISORDER, SEVERE, IN SUSTAINED REMISSION (MULTI): ICD-10-CM

## 2024-03-01 DIAGNOSIS — F41.9 ANXIETY AND DEPRESSION: Primary | ICD-10-CM

## 2024-03-01 DIAGNOSIS — Z01.818 ENCOUNTER FOR PRE-TRANSPLANT EVALUATION FOR LIVER TRANSPLANT: ICD-10-CM

## 2024-03-01 DIAGNOSIS — F32.A ANXIETY AND DEPRESSION: Primary | ICD-10-CM

## 2024-03-01 DIAGNOSIS — F11.21 OPIOID USE DISORDER, SEVERE, IN SUSTAINED REMISSION (MULTI): ICD-10-CM

## 2024-03-01 DIAGNOSIS — I71.21 ANEURYSM OF ASCENDING AORTA WITHOUT RUPTURE (CMS-HCC): Primary | ICD-10-CM

## 2024-03-01 PROCEDURE — 90791 PSYCH DIAGNOSTIC EVALUATION: CPT | Mod: 95 | Performed by: PSYCHOLOGIST

## 2024-03-01 PROCEDURE — 3074F SYST BP LT 130 MM HG: CPT | Performed by: THORACIC SURGERY (CARDIOTHORACIC VASCULAR SURGERY)

## 2024-03-01 PROCEDURE — 99204 OFFICE O/P NEW MOD 45 MIN: CPT | Performed by: THORACIC SURGERY (CARDIOTHORACIC VASCULAR SURGERY)

## 2024-03-01 PROCEDURE — 1036F TOBACCO NON-USER: CPT | Performed by: THORACIC SURGERY (CARDIOTHORACIC VASCULAR SURGERY)

## 2024-03-01 PROCEDURE — 99214 OFFICE O/P EST MOD 30 MIN: CPT | Performed by: THORACIC SURGERY (CARDIOTHORACIC VASCULAR SURGERY)

## 2024-03-01 PROCEDURE — 1036F TOBACCO NON-USER: CPT | Performed by: PSYCHOLOGIST

## 2024-03-01 PROCEDURE — 90791 PSYCH DIAGNOSTIC EVALUATION: CPT | Performed by: PSYCHOLOGIST

## 2024-03-01 PROCEDURE — 3078F DIAST BP <80 MM HG: CPT | Performed by: THORACIC SURGERY (CARDIOTHORACIC VASCULAR SURGERY)

## 2024-03-01 ASSESSMENT — PAIN SCALES - GENERAL: PAINLEVEL: 0-NO PAIN

## 2024-03-01 NOTE — PROGRESS NOTES
"Pre-Transplant Psychological Evaluation    Start Time: 1400  End Time: 1500    Reason for Referral  Vance Silva \"Christine" is a 58 y.o. male with a past medical history of ESLD secondary to cirrhosis complicated by variceal bleeds, ascites, and hepatic encephalopathy.  He is referred for a pre-transplant psychological evaluation and is seen virtually today alone.     Knowledge of Surgery  Will complete at next encounter    Medical History  Past Medical History:   Diagnosis Date    Acute kidney injury (CMS/HCC)     Anemia     Ascites     Chronic kidney disease     Cirrhosis (CMS/HCC)     Hepatitis C     Liver disease     Seizure (CMS/HCC)     Sleep apnea     Traumatic brain injury (CMS/HCC)        Surgical History  No past surgical history on file.    Current Medications  Current Outpatient Medications   Medication Instructions    carvedilol (COREG) 6.25 mg, oral, Nightly    Constulose 10 g, oral, 2 times daily, Titrate to achieve goal 2-3 bowel movements daily    furosemide (LASIX) 40 mg, oral, Daily RT    pantoprazole (PROTONIX) 40 mg, oral, Daily before breakfast, Do not crush, chew, or split.    spironolactone (ALDACTONE) 100 mg, oral, Daily RT        Medical Adherence   Will complete at next encounter    Psychosocial History  Will complete at next encounter    Support System for Surgery  Will complete at next encounter    Psychiatry History  Previous diagnoses: anxiety, depression, ADHD, TBI  Previous psychiatric treatment and medication trials: Ritalin, Vyvanse, Xanax, others he doesn't remember   Previous psychotherapy: off and on for 20 years, last time 10-20 years ago. Some of this was part of TBI rehab in [place], [place]. Pt not currently amenable to engaging in psychotherapy given the amount of upcoming appointments he has. He is amenable to future psychotherapy.   Previous self-injurious, non suicidal behavior: no   Previous psychiatric hospitalizations: no  Previous suicide attempts: no  Cognitive: 14-15 " "seizures, hx of TBI, \"hundreds of concussions\"    Substance Use History  Reports heavy substance use for many years (alcohol, opiates, benzos, stimulants). Reports a hx of multiple inpatient drug and alcohol treatment programs. States he quit all substances in 2018.     Current Psychiatric Symptoms   Anxiety: yes, reports anxiety related to self-image/insecurity given apparent cognitive decline, especially in the context of his role as a public/  Depression: yes, reports depressive sx currently. ADAMANTLY denies any active SI, though reports some recent passive SI without plan or intent. Reports protective factors include his kids and thinking about former teammates who have  by suicide.   Psychosis: negative  Tenisha: states there have been times he has gone several days without sleep, was just up thinking through \"great ideas.\" Unclear whether or not these sx occurred in the context of ongoing drug use, which would then exclude a diagnosis of bipolar disorder  Sleep problems: Since getting sober, pt states he went about 7 years sleeping well with use of CBD. Reports last 7 months, sleep has been terrible (maybe an hour at a time) related to frequent bathroom trips and ascites.   Pain: reports pain is fairly managed now with diet and supplements.     Stressors: health, financial, work  Coping strategies: being outside when he's able     Mental Status Exam  General Appearance: well groomed, appropriate eye contact  Attitude/Behavior: generally cooperative, though guarded at times  Motor: no psychomotor agitation or retardation, no tremor or other abnormal movements  Speech: normal rate, volume, prosody  Gait/Station:  not observed  Mood: anxious   Affect: constricted  Thought Process: quite tangential   Thought Associations: no loosening of associations  Thought Content:  no SI, appropriate to situation  Perception: no perceptual abnormalities noted  Sensorium: alert and oriented to person, " "place, time and situation  Insight: fair  Judgment: limited  Cognition:  impaired      Assessment  Vance Silva is a 58 y.o. male with a past medical history of ESLD secondary to cirrhosis complicated by variceal bleeds, ascites, and hepatic encephalopathy. He is referred for a pre-transplant psychological evaluation.     Evaluation to be completed at next encounter.      Impression  Anxiety  Depression  Cognitive Disorder  Alcohol Use Disorder, in sustained remission  Opiate Use Disorder, in sustained remission  Stimulant Use Disorder, in sustained remission    Plan  Pt to return to complete evaluation.         ______________________________________________________  Zuleyma Hernandez, PhD  ______________________________________________________  An interactive audio and video telecommunication system which permits real time communications between the patient (at the originating site) and provider (at the distant site) was utilized to provide this telehealth service.      Verbal consent was requested and obtained from Vance Silva \"Karyn\" on this date, 3/1/2024, for a telehealth visit.     I have confirmed the patient's identity via the following (minimum of three) acceptable identifiers as per  Policy PH-9: address, , N.         "

## 2024-03-02 ENCOUNTER — HOSPITAL ENCOUNTER (OUTPATIENT)
Dept: RADIOLOGY | Facility: HOSPITAL | Age: 59
Discharge: HOME | End: 2024-03-02
Payer: COMMERCIAL

## 2024-03-02 DIAGNOSIS — R97.20 ELEVATED PSA, LESS THAN 10 NG/ML: ICD-10-CM

## 2024-03-02 PROCEDURE — 6100000002 MR PROSTATE SCREENING SELF PAY EXAM

## 2024-03-04 PROBLEM — I71.21 ANEURYSM OF ASCENDING AORTA WITHOUT RUPTURE (CMS-HCC): Status: ACTIVE | Noted: 2024-03-04

## 2024-03-04 NOTE — PROGRESS NOTES
Patient was referred by Liver Tx team ( Dr. Norris and Dr. Rebolledo) for evaluation of the incidentally found ascending aortic aneurysm. He underwent calcium scoring CT and was found to have an ascending aortic aneurysm of 4.7 cm. No previous imaging available. There is some calcification of the LAD with  calcium score of ~250.  TTE shows preserved LV function and no valvular abnormalities.    A/P: Patient has a dilated ascending aorta - 4.7 cm in largest diameter. Patient is quite tall, so aortic Index is 8.84 ( 10 being an indicator for a higher risk of complications such dissection and rupture). I do believe that at this time the patient is safe to proceed with completion of his evaluation for Liver Tx. We will order a  Chest CT scan in 6 mo to follow up on the ascending aortic aneurysm. Patient will have to continue close BP monitoring to prevent HTN. I would also recommend cardiology consult to evaluate for coronary angiography.  I had a long and detailed discussion with the patient and his family, they all appear to understand  and willing to proceed with this plan. I have also discussed the plan with Dr. Norris and Dr. Johnathan Glass.

## 2024-03-06 ENCOUNTER — TELEMEDICINE (OUTPATIENT)
Dept: TRANSPLANT | Facility: HOSPITAL | Age: 59
End: 2024-03-06
Payer: COMMERCIAL

## 2024-03-06 DIAGNOSIS — F10.21 ALCOHOL USE DISORDER, SEVERE, IN SUSTAINED REMISSION (MULTI): ICD-10-CM

## 2024-03-06 DIAGNOSIS — F11.21 OPIOID USE DISORDER, SEVERE, IN SUSTAINED REMISSION (MULTI): ICD-10-CM

## 2024-03-06 DIAGNOSIS — F32.A ANXIETY AND DEPRESSION: Primary | ICD-10-CM

## 2024-03-06 DIAGNOSIS — F09 COGNITIVE DISORDER: ICD-10-CM

## 2024-03-06 DIAGNOSIS — Z01.818 ENCOUNTER FOR PRE-TRANSPLANT EVALUATION FOR LIVER TRANSPLANT: ICD-10-CM

## 2024-03-06 DIAGNOSIS — F41.9 ANXIETY AND DEPRESSION: Primary | ICD-10-CM

## 2024-03-06 PROCEDURE — 90837 PSYTX W PT 60 MINUTES: CPT | Mod: 95 | Performed by: PSYCHOLOGIST

## 2024-03-06 PROCEDURE — 1036F TOBACCO NON-USER: CPT | Performed by: PSYCHOLOGIST

## 2024-03-06 PROCEDURE — 90837 PSYTX W PT 60 MINUTES: CPT | Performed by: PSYCHOLOGIST
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
## 2024-03-08 ENCOUNTER — DOCUMENTATION (OUTPATIENT)
Dept: TRANSPLANT | Facility: HOSPITAL | Age: 59
End: 2024-03-08
Payer: COMMERCIAL

## 2024-03-08 ENCOUNTER — TELEPHONE (OUTPATIENT)
Dept: TRANSPLANT | Facility: HOSPITAL | Age: 59
End: 2024-03-08
Payer: COMMERCIAL

## 2024-03-08 DIAGNOSIS — R18.8 CIRRHOSIS OF LIVER WITH ASCITES, UNSPECIFIED HEPATIC CIRRHOSIS TYPE (MULTI): ICD-10-CM

## 2024-03-08 DIAGNOSIS — R18.8 REFRACTORY ASCITES: ICD-10-CM

## 2024-03-08 DIAGNOSIS — K74.60 CIRRHOSIS OF LIVER WITH ASCITES, UNSPECIFIED HEPATIC CIRRHOSIS TYPE (MULTI): ICD-10-CM

## 2024-03-08 DIAGNOSIS — D64.9 ANEMIA, UNSPECIFIED TYPE: ICD-10-CM

## 2024-03-08 DIAGNOSIS — Z01.818 ENCOUNTER FOR PRE-TRANSPLANT EVALUATION FOR LIVER TRANSPLANT: ICD-10-CM

## 2024-03-08 DIAGNOSIS — K76.82 HEPATIC ENCEPHALOPATHY (MULTI): ICD-10-CM

## 2024-03-08 DIAGNOSIS — N17.9 AKI (ACUTE KIDNEY INJURY) (CMS-HCC): ICD-10-CM

## 2024-03-08 PROBLEM — R41.3 MEMORY CHANGES: Status: ACTIVE | Noted: 2024-03-08

## 2024-03-08 PROBLEM — K92.2 UGIB (UPPER GASTROINTESTINAL BLEED): Status: ACTIVE | Noted: 2024-01-05

## 2024-03-08 PROBLEM — K92.1 GASTROINTESTINAL HEMORRHAGE WITH MELENA: Status: ACTIVE | Noted: 2024-01-05

## 2024-03-08 PROBLEM — R97.20 HIGH PROSTATE SPECIFIC ANTIGEN (PSA): Status: ACTIVE | Noted: 2024-03-08

## 2024-03-08 PROBLEM — K65.9: Status: ACTIVE | Noted: 2024-02-10

## 2024-03-08 RX ORDER — CIPROFLOXACIN 500 MG/1
500 TABLET ORAL 2 TIMES DAILY
COMMUNITY
Start: 2024-02-05 | End: 2024-02-12

## 2024-03-08 RX ORDER — SPIRONOLACTONE 50 MG/1
150 TABLET, FILM COATED ORAL
Qty: 90 TABLET | Refills: 11 | Status: SHIPPED | OUTPATIENT
Start: 2024-03-08 | End: 2025-03-08

## 2024-03-08 RX ORDER — FUROSEMIDE 20 MG/1
40 TABLET ORAL 2 TIMES DAILY
Qty: 120 TABLET | Refills: 11 | Status: SHIPPED | OUTPATIENT
Start: 2024-03-08 | End: 2025-03-08

## 2024-03-08 RX ORDER — TALC
POWDER (GRAM) TOPICAL DAILY PRN
COMMUNITY
Start: 2024-02-10 | End: 2024-03-11 | Stop reason: ALTCHOICE

## 2024-03-08 RX ORDER — LACTULOSE 10 G/15ML
20 SOLUTION ORAL 3 TIMES DAILY
Qty: 8100 ML | Refills: 3 | Status: SHIPPED | OUTPATIENT
Start: 2024-03-08 | End: 2024-03-11 | Stop reason: SDUPTHER

## 2024-03-08 NOTE — PROGRESS NOTES
Received request for letter stating patient is currently in evaluation for liver transplant. Letter sent to requesting party and scanned to media.

## 2024-03-08 NOTE — TELEPHONE ENCOUNTER
Spoke with Aleena, support/friend, and reviewed patient's medications and provided education on what each medication is used for and reviewed time of day to be taken. Aleena requested refill on Lactulose and said that patient is somewhat constipated. Aleena advised to give patient additional dose (up to 4/day) to reach goal number of bowel movements.     Also discussed need for letter for [employer] stating patient is in evaluation for liver transplant - Aleena will email coordinator with information on where to send letter.     Patient is scheduled for fuv with Dr. Norris on 3/13. He's scheduled for paracentesis on 3/11 prior to seeing cardiology. Email sent to Dr. Norris to see if diuretics can be increased due to edema in lower extremities and reported worsening abdominal distention. Awaiting response.

## 2024-03-10 DIAGNOSIS — F07.81 CHRONIC TRAUMATIC ENCEPHALOPATHY: ICD-10-CM

## 2024-03-10 DIAGNOSIS — K70.31 ALCOHOLIC CIRRHOSIS OF LIVER WITH ASCITES (MULTI): ICD-10-CM

## 2024-03-10 DIAGNOSIS — K76.82 HEPATIC ENCEPHALOPATHY (MULTI): ICD-10-CM

## 2024-03-10 DIAGNOSIS — R41.3 MEMORY CHANGES: ICD-10-CM

## 2024-03-10 DIAGNOSIS — G20.A1 PARKINSON'S DISEASE WITHOUT DYSKINESIA OR FLUCTUATING MANIFESTATIONS (MULTI): ICD-10-CM

## 2024-03-10 DIAGNOSIS — Z01.818 ENCOUNTER FOR PRE-TRANSPLANT EVALUATION FOR LIVER TRANSPLANT: Primary | ICD-10-CM

## 2024-03-11 ENCOUNTER — HOSPITAL ENCOUNTER (OUTPATIENT)
Dept: RADIOLOGY | Facility: HOSPITAL | Age: 59
Discharge: HOME | End: 2024-03-11
Payer: COMMERCIAL

## 2024-03-11 ENCOUNTER — HOSPITAL ENCOUNTER (OUTPATIENT)
Dept: RADIOLOGY | Facility: HOSPITAL | Age: 59
Discharge: HOME | DRG: 812 | End: 2024-03-11
Payer: COMMERCIAL

## 2024-03-11 ENCOUNTER — TELEPHONE (OUTPATIENT)
Dept: TRANSPLANT | Facility: HOSPITAL | Age: 59
End: 2024-03-11
Payer: COMMERCIAL

## 2024-03-11 ENCOUNTER — TELEPHONE (OUTPATIENT)
Dept: TRANSPLANT | Facility: HOSPITAL | Age: 59
End: 2024-03-11

## 2024-03-11 ENCOUNTER — HOSPITAL ENCOUNTER (OUTPATIENT)
Dept: RADIOLOGY | Facility: HOSPITAL | Age: 59
End: 2024-03-11

## 2024-03-11 ENCOUNTER — APPOINTMENT (OUTPATIENT)
Dept: CARDIOLOGY | Facility: HOSPITAL | Age: 59
DRG: 812 | End: 2024-03-11
Payer: COMMERCIAL

## 2024-03-11 ENCOUNTER — CONSULT (OUTPATIENT)
Dept: CARDIOLOGY | Facility: CLINIC | Age: 59
End: 2024-03-11
Payer: COMMERCIAL

## 2024-03-11 ENCOUNTER — LAB (OUTPATIENT)
Dept: LAB | Facility: LAB | Age: 59
End: 2024-03-11
Payer: COMMERCIAL

## 2024-03-11 ENCOUNTER — HOSPITAL ENCOUNTER (INPATIENT)
Facility: HOSPITAL | Age: 59
LOS: 1 days | Discharge: ANOTHER HOSPITAL | DRG: 812 | End: 2024-03-12
Attending: STUDENT IN AN ORGANIZED HEALTH CARE EDUCATION/TRAINING PROGRAM | Admitting: INTERNAL MEDICINE
Payer: COMMERCIAL

## 2024-03-11 VITALS
OXYGEN SATURATION: 100 % | DIASTOLIC BLOOD PRESSURE: 59 MMHG | SYSTOLIC BLOOD PRESSURE: 106 MMHG | HEART RATE: 90 BPM | TEMPERATURE: 98 F | RESPIRATION RATE: 16 BRPM

## 2024-03-11 VITALS
SYSTOLIC BLOOD PRESSURE: 101 MMHG | OXYGEN SATURATION: 96 % | HEIGHT: 77 IN | BODY MASS INDEX: 23.7 KG/M2 | DIASTOLIC BLOOD PRESSURE: 64 MMHG | HEART RATE: 94 BPM | WEIGHT: 200.7 LBS

## 2024-03-11 DIAGNOSIS — K74.60 CIRRHOSIS OF LIVER WITH ASCITES, UNSPECIFIED HEPATIC CIRRHOSIS TYPE (MULTI): ICD-10-CM

## 2024-03-11 DIAGNOSIS — Z01.818 ENCOUNTER FOR PRE-TRANSPLANT EVALUATION FOR LIVER TRANSPLANT: ICD-10-CM

## 2024-03-11 DIAGNOSIS — D64.9 ANEMIA, UNSPECIFIED TYPE: ICD-10-CM

## 2024-03-11 DIAGNOSIS — K76.82 HEPATIC ENCEPHALOPATHY (MULTI): ICD-10-CM

## 2024-03-11 DIAGNOSIS — K92.2 GASTROINTESTINAL HEMORRHAGE, UNSPECIFIED GASTROINTESTINAL HEMORRHAGE TYPE: ICD-10-CM

## 2024-03-11 DIAGNOSIS — R18.8 CIRRHOSIS OF LIVER WITH ASCITES, UNSPECIFIED HEPATIC CIRRHOSIS TYPE (MULTI): ICD-10-CM

## 2024-03-11 DIAGNOSIS — R60.0 EDEMA OF BOTH LOWER EXTREMITIES: ICD-10-CM

## 2024-03-11 DIAGNOSIS — R18.8 REFRACTORY ASCITES: ICD-10-CM

## 2024-03-11 DIAGNOSIS — I25.84 CORONARY ARTERY CALCIFICATION OF NATIVE ARTERY: Primary | ICD-10-CM

## 2024-03-11 DIAGNOSIS — K74.69 OTHER CIRRHOSIS OF LIVER (MULTI): ICD-10-CM

## 2024-03-11 DIAGNOSIS — I25.10 CORONARY ARTERY CALCIFICATION OF NATIVE ARTERY: Primary | ICD-10-CM

## 2024-03-11 DIAGNOSIS — I85.01 BLEEDING ESOPHAGEAL VARICES, UNSPECIFIED ESOPHAGEAL VARICES TYPE (MULTI): ICD-10-CM

## 2024-03-11 DIAGNOSIS — D64.9 ANEMIA, UNSPECIFIED TYPE: Primary | ICD-10-CM

## 2024-03-11 DIAGNOSIS — I77.810 ASCENDING AORTA DILATION (CMS-HCC): ICD-10-CM

## 2024-03-11 DIAGNOSIS — N17.9 AKI (ACUTE KIDNEY INJURY) (CMS-HCC): ICD-10-CM

## 2024-03-11 LAB
ABO GROUP (TYPE) IN BLOOD: NORMAL
ABO GROUP (TYPE) IN BLOOD: NORMAL
ALBUMIN SERPL BCP-MCNC: 4.4 G/DL (ref 3.4–5)
ALP SERPL-CCNC: 74 U/L (ref 33–136)
ALT SERPL W P-5'-P-CCNC: 11 U/L (ref 10–52)
ANION GAP SERPL CALC-SCNC: 13 MMOL/L (ref 10–20)
ANTIBODY SCREEN: NORMAL
AST SERPL W P-5'-P-CCNC: 12 U/L (ref 9–39)
BASOPHILS # BLD AUTO: 0.03 X10*3/UL (ref 0–0.1)
BASOPHILS NFR BLD AUTO: 0.5 %
BASOPHILS NFR FLD MANUAL: 0 %
BILIRUB SERPL-MCNC: 0.4 MG/DL (ref 0–1.2)
BLASTS NFR FLD MANUAL: 0 %
BUN SERPL-MCNC: 18 MG/DL (ref 6–23)
CALCIUM SERPL-MCNC: 8.2 MG/DL (ref 8.6–10.3)
CHLORIDE SERPL-SCNC: 107 MMOL/L (ref 98–107)
CLARITY FLD: CLEAR
CO2 SERPL-SCNC: 23 MMOL/L (ref 21–32)
COLOR FLD: YELLOW
CREAT SERPL-MCNC: 1.33 MG/DL (ref 0.5–1.3)
EGFRCR SERPLBLD CKD-EPI 2021: 61 ML/MIN/1.73M*2
EOSINOPHIL # BLD AUTO: 0.01 X10*3/UL (ref 0–0.7)
EOSINOPHIL NFR BLD AUTO: 0.2 %
EOSINOPHIL NFR FLD MANUAL: 0 %
ERYTHROCYTE [DISTWIDTH] IN BLOOD BY AUTOMATED COUNT: 22.7 % (ref 11.5–14.5)
GLUCOSE SERPL-MCNC: 96 MG/DL (ref 74–99)
HCT VFR BLD AUTO: 17 % (ref 41–52)
HGB BLD-MCNC: 4.8 G/DL (ref 13.5–17.5)
HYPOCHROMIA BLD QL SMEAR: NORMAL
IMM GRANULOCYTES # BLD AUTO: 0.04 X10*3/UL (ref 0–0.7)
IMM GRANULOCYTES NFR BLD AUTO: 0.7 % (ref 0–0.9)
IMMATURE GRANULOCYTES IN FLUID: 0 %
INR PPP: 1.3 (ref 0.9–1.1)
LYMPHOCYTES # BLD AUTO: 0.41 X10*3/UL (ref 1.2–4.8)
LYMPHOCYTES NFR BLD AUTO: 7.3 %
LYMPHOCYTES NFR FLD MANUAL: 26 %
MCH RBC QN AUTO: 22.4 PG (ref 26–34)
MCHC RBC AUTO-ENTMCNC: 28.2 G/DL (ref 32–36)
MCV RBC AUTO: 79 FL (ref 80–100)
MONOCYTES # BLD AUTO: 0.42 X10*3/UL (ref 0.1–1)
MONOCYTES NFR BLD AUTO: 7.4 %
MONOS+MACROS NFR FLD MANUAL: 55 %
NEUTROPHILS # BLD AUTO: 4.74 X10*3/UL (ref 1.2–7.7)
NEUTROPHILS NFR BLD AUTO: 83.9 %
NEUTROPHILS NFR FLD MANUAL: 19 %
NRBC BLD-RTO: 0 /100 WBCS (ref 0–0)
OTHER CELLS NFR FLD MANUAL: 0 %
OVALOCYTES BLD QL SMEAR: NORMAL
PLASMA CELLS NFR FLD MANUAL: 0 %
PLATELET # BLD AUTO: 130 X10*3/UL (ref 150–450)
POLYCHROMASIA BLD QL SMEAR: NORMAL
POTASSIUM SERPL-SCNC: 4.2 MMOL/L (ref 3.5–5.3)
PROT SERPL-MCNC: 6.7 G/DL (ref 6.4–8.2)
PROTHROMBIN TIME: 15.1 SECONDS (ref 9.8–12.8)
RBC # BLD AUTO: 2.14 X10*6/UL (ref 4.5–5.9)
RBC # FLD AUTO: 118 /UL
RBC MORPH BLD: NORMAL
RH FACTOR (ANTIGEN D): NORMAL
RH FACTOR (ANTIGEN D): NORMAL
SODIUM SERPL-SCNC: 139 MMOL/L (ref 136–145)
TOTAL CELLS COUNTED FLD: 100
WBC # BLD AUTO: 5.7 X10*3/UL (ref 4.4–11.3)
WBC # FLD AUTO: 104 /UL

## 2024-03-11 PROCEDURE — 2720000007 HC OR 272 NO HCPCS

## 2024-03-11 PROCEDURE — 99285 EMERGENCY DEPT VISIT HI MDM: CPT | Mod: 25

## 2024-03-11 PROCEDURE — 49083 ABD PARACENTESIS W/IMAGING: CPT

## 2024-03-11 PROCEDURE — 80321 ALCOHOLS BIOMARKERS 1OR 2: CPT

## 2024-03-11 PROCEDURE — 99222 1ST HOSP IP/OBS MODERATE 55: CPT | Performed by: INTERNAL MEDICINE

## 2024-03-11 PROCEDURE — P9047 ALBUMIN (HUMAN), 25%, 50ML: HCPCS

## 2024-03-11 PROCEDURE — 86901 BLOOD TYPING SEROLOGIC RH(D): CPT | Performed by: STUDENT IN AN ORGANIZED HEALTH CARE EDUCATION/TRAINING PROGRAM

## 2024-03-11 PROCEDURE — 89051 BODY FLUID CELL COUNT: CPT | Performed by: INTERNAL MEDICINE

## 2024-03-11 PROCEDURE — 2500000004 HC RX 250 GENERAL PHARMACY W/ HCPCS (ALT 636 FOR OP/ED): Performed by: STUDENT IN AN ORGANIZED HEALTH CARE EDUCATION/TRAINING PROGRAM

## 2024-03-11 PROCEDURE — C1729 CATH, DRAINAGE: HCPCS

## 2024-03-11 PROCEDURE — P9016 RBC LEUKOCYTES REDUCED: HCPCS

## 2024-03-11 PROCEDURE — 2500000005 HC RX 250 GENERAL PHARMACY W/O HCPCS

## 2024-03-11 PROCEDURE — C9113 INJ PANTOPRAZOLE SODIUM, VIA: HCPCS | Performed by: STUDENT IN AN ORGANIZED HEALTH CARE EDUCATION/TRAINING PROGRAM

## 2024-03-11 PROCEDURE — 36415 COLL VENOUS BLD VENIPUNCTURE: CPT | Performed by: STUDENT IN AN ORGANIZED HEALTH CARE EDUCATION/TRAINING PROGRAM

## 2024-03-11 PROCEDURE — 99215 OFFICE O/P EST HI 40 MIN: CPT | Performed by: STUDENT IN AN ORGANIZED HEALTH CARE EDUCATION/TRAINING PROGRAM

## 2024-03-11 PROCEDURE — 2500000001 HC RX 250 WO HCPCS SELF ADMINISTERED DRUGS (ALT 637 FOR MEDICARE OP): Performed by: INTERNAL MEDICINE

## 2024-03-11 PROCEDURE — 74183 MRI ABD W/O CNTR FLWD CNTR: CPT

## 2024-03-11 PROCEDURE — 93005 ELECTROCARDIOGRAM TRACING: CPT

## 2024-03-11 PROCEDURE — 86920 COMPATIBILITY TEST SPIN: CPT

## 2024-03-11 PROCEDURE — A9575 INJ GADOTERATE MEGLUMI 0.1ML: HCPCS | Performed by: INTERNAL MEDICINE

## 2024-03-11 PROCEDURE — 80053 COMPREHEN METABOLIC PANEL: CPT

## 2024-03-11 PROCEDURE — 2500000004 HC RX 250 GENERAL PHARMACY W/ HCPCS (ALT 636 FOR OP/ED): Performed by: INTERNAL MEDICINE

## 2024-03-11 PROCEDURE — 85610 PROTHROMBIN TIME: CPT

## 2024-03-11 PROCEDURE — 85025 COMPLETE CBC W/AUTO DIFF WBC: CPT

## 2024-03-11 PROCEDURE — 36430 TRANSFUSION BLD/BLD COMPNT: CPT

## 2024-03-11 PROCEDURE — 49083 ABD PARACENTESIS W/IMAGING: CPT | Mod: ZK

## 2024-03-11 PROCEDURE — 2550000001 HC RX 255 CONTRASTS: Performed by: INTERNAL MEDICINE

## 2024-03-11 PROCEDURE — 2500000004 HC RX 250 GENERAL PHARMACY W/ HCPCS (ALT 636 FOR OP/ED)

## 2024-03-11 PROCEDURE — 1200000002 HC GENERAL ROOM WITH TELEMETRY DAILY

## 2024-03-11 RX ORDER — ONDANSETRON 4 MG/1
4 TABLET, FILM COATED ORAL EVERY 8 HOURS PRN
Status: DISCONTINUED | OUTPATIENT
Start: 2024-03-11 | End: 2024-03-12 | Stop reason: HOSPADM

## 2024-03-11 RX ORDER — ACETAMINOPHEN 160 MG/5ML
650 SOLUTION ORAL EVERY 4 HOURS PRN
Status: DISCONTINUED | OUTPATIENT
Start: 2024-03-11 | End: 2024-03-12 | Stop reason: HOSPADM

## 2024-03-11 RX ORDER — LACTULOSE 10 G/15ML
20 SOLUTION ORAL 3 TIMES DAILY
Qty: 8100 ML | Refills: 3 | Status: SHIPPED | OUTPATIENT
Start: 2024-03-11 | End: 2025-03-11

## 2024-03-11 RX ORDER — ACETAMINOPHEN 650 MG/1
650 SUPPOSITORY RECTAL EVERY 4 HOURS PRN
Status: DISCONTINUED | OUTPATIENT
Start: 2024-03-11 | End: 2024-03-12 | Stop reason: HOSPADM

## 2024-03-11 RX ORDER — PANTOPRAZOLE SODIUM 40 MG/1
40 TABLET, DELAYED RELEASE ORAL
Status: DISCONTINUED | OUTPATIENT
Start: 2024-03-12 | End: 2024-03-11

## 2024-03-11 RX ORDER — SPIRONOLACTONE 25 MG/1
150 TABLET ORAL DAILY
Status: DISCONTINUED | OUTPATIENT
Start: 2024-03-12 | End: 2024-03-12 | Stop reason: HOSPADM

## 2024-03-11 RX ORDER — PANTOPRAZOLE SODIUM 40 MG/10ML
80 INJECTION, POWDER, LYOPHILIZED, FOR SOLUTION INTRAVENOUS ONCE
Status: COMPLETED | OUTPATIENT
Start: 2024-03-11 | End: 2024-03-11

## 2024-03-11 RX ORDER — FUROSEMIDE 10 MG/ML
40 INJECTION INTRAMUSCULAR; INTRAVENOUS 2 TIMES DAILY
Status: DISCONTINUED | OUTPATIENT
Start: 2024-03-11 | End: 2024-03-12 | Stop reason: HOSPADM

## 2024-03-11 RX ORDER — LIDOCAINE HYDROCHLORIDE 10 MG/ML
INJECTION, SOLUTION EPIDURAL; INFILTRATION; INTRACAUDAL; PERINEURAL
Status: COMPLETED | OUTPATIENT
Start: 2024-03-11 | End: 2024-03-11

## 2024-03-11 RX ORDER — ONDANSETRON HYDROCHLORIDE 2 MG/ML
4 INJECTION, SOLUTION INTRAVENOUS EVERY 8 HOURS PRN
Status: DISCONTINUED | OUTPATIENT
Start: 2024-03-11 | End: 2024-03-12 | Stop reason: HOSPADM

## 2024-03-11 RX ORDER — CARVEDILOL 6.25 MG/1
6.25 TABLET ORAL NIGHTLY
Status: DISCONTINUED | OUTPATIENT
Start: 2024-03-11 | End: 2024-03-12 | Stop reason: HOSPADM

## 2024-03-11 RX ORDER — ACETAMINOPHEN 325 MG/1
650 TABLET ORAL EVERY 4 HOURS PRN
Status: DISCONTINUED | OUTPATIENT
Start: 2024-03-11 | End: 2024-03-12 | Stop reason: HOSPADM

## 2024-03-11 RX ORDER — GADOTERATE MEGLUMINE 376.9 MG/ML
18 INJECTION INTRAVENOUS
Status: COMPLETED | OUTPATIENT
Start: 2024-03-11 | End: 2024-03-11

## 2024-03-11 RX ORDER — PANTOPRAZOLE SODIUM 40 MG/10ML
40 INJECTION, POWDER, LYOPHILIZED, FOR SOLUTION INTRAVENOUS DAILY
Status: DISCONTINUED | OUTPATIENT
Start: 2024-03-11 | End: 2024-03-11

## 2024-03-11 RX ORDER — ALBUMIN HUMAN 250 G/1000ML
SOLUTION INTRAVENOUS CONTINUOUS PRN
Status: COMPLETED | OUTPATIENT
Start: 2024-03-11 | End: 2024-03-11

## 2024-03-11 RX ORDER — DOCUSATE SODIUM 50 MG/5ML
100 LIQUID ORAL 2 TIMES DAILY
Status: DISCONTINUED | OUTPATIENT
Start: 2024-03-11 | End: 2024-03-12 | Stop reason: HOSPADM

## 2024-03-11 RX ORDER — PANTOPRAZOLE SODIUM 40 MG/10ML
40 INJECTION, POWDER, LYOPHILIZED, FOR SOLUTION INTRAVENOUS DAILY
Status: DISCONTINUED | OUTPATIENT
Start: 2024-03-12 | End: 2024-03-12 | Stop reason: HOSPADM

## 2024-03-11 RX ORDER — LACTULOSE 10 G/15ML
20 SOLUTION ORAL 3 TIMES DAILY
Status: DISCONTINUED | OUTPATIENT
Start: 2024-03-11 | End: 2024-03-12 | Stop reason: HOSPADM

## 2024-03-11 RX ADMIN — ALBUMIN (HUMAN) 12.5 G: 25 SOLUTION INTRAVENOUS at 12:15

## 2024-03-11 RX ADMIN — LIDOCAINE HYDROCHLORIDE 10 ML: 10 INJECTION, SOLUTION EPIDURAL; INFILTRATION; INTRACAUDAL; PERINEURAL at 11:25

## 2024-03-11 RX ADMIN — GADOTERATE MEGLUMINE 18 ML: 376.9 INJECTION INTRAVENOUS at 15:29

## 2024-03-11 RX ADMIN — ALBUMIN (HUMAN) 25 G: 25 SOLUTION INTRAVENOUS at 11:47

## 2024-03-11 RX ADMIN — CARVEDILOL 6.25 MG: 6.25 TABLET, FILM COATED ORAL at 23:57

## 2024-03-11 RX ADMIN — ALBUMIN (HUMAN) 25 G: 25 SOLUTION INTRAVENOUS at 12:01

## 2024-03-11 RX ADMIN — PANTOPRAZOLE SODIUM 80 MG: 40 INJECTION, POWDER, FOR SOLUTION INTRAVENOUS at 16:05

## 2024-03-11 RX ADMIN — FUROSEMIDE 40 MG: 10 INJECTION, SOLUTION INTRAVENOUS at 23:57

## 2024-03-11 ASSESSMENT — ENCOUNTER SYMPTOMS
LOSS OF SENSATION IN FEET: 1
OCCASIONAL FEELINGS OF UNSTEADINESS: 1

## 2024-03-11 ASSESSMENT — COLUMBIA-SUICIDE SEVERITY RATING SCALE - C-SSRS
2. HAVE YOU ACTUALLY HAD ANY THOUGHTS OF KILLING YOURSELF?: NO
6. HAVE YOU EVER DONE ANYTHING, STARTED TO DO ANYTHING, OR PREPARED TO DO ANYTHING TO END YOUR LIFE?: NO
6. HAVE YOU EVER DONE ANYTHING, STARTED TO DO ANYTHING, OR PREPARED TO DO ANYTHING TO END YOUR LIFE?: NO
2. HAVE YOU ACTUALLY HAD ANY THOUGHTS OF KILLING YOURSELF?: NO
1. IN THE PAST MONTH, HAVE YOU WISHED YOU WERE DEAD OR WISHED YOU COULD GO TO SLEEP AND NOT WAKE UP?: NO
1. IN THE PAST MONTH, HAVE YOU WISHED YOU WERE DEAD OR WISHED YOU COULD GO TO SLEEP AND NOT WAKE UP?: NO

## 2024-03-11 ASSESSMENT — PAIN - FUNCTIONAL ASSESSMENT
PAIN_FUNCTIONAL_ASSESSMENT: 0-10

## 2024-03-11 ASSESSMENT — PAIN SCALES - GENERAL
PAINLEVEL: 6
PAINLEVEL_OUTOF10: 0 - NO PAIN
PAINLEVEL_OUTOF10: 5 - MODERATE PAIN
PAINLEVEL_OUTOF10: 0 - NO PAIN
PAINLEVEL_OUTOF10: 0 - NO PAIN

## 2024-03-11 NOTE — POST-PROCEDURE NOTE
Interventional Radiology Brief Postprocedure Note    Attending: Seven Ewing CNP    Assistant: none    Diagnosis: ascites, cirrhosis    Description of procedure: Ultrasound guided paracentesis was preformed in the RUQ. A total of 6.7 L of straw-yellow was drained. 62.5gm of albumin administered.     Anesthesia:  Local    Complications: None    Estimated Blood Loss: none    Medications (Filter: Administrations occurring from 1111 to 1232 on 03/11/24) As of 03/11/24 1232      albumin human 25 % solution (mL/hr) Total volume:  Not documented*   *Total volume has not been documented. View each administration to see the amount administered.     Date/Time Rate/Dose/Volume Action       03/11/24  1147 25 g - 400 mL/hr New Bag      1201 25 g - 400 mL/hr New Bag      1215 12.5 g - 400 mL/hr New Bag               lidocaine PF (Xylocaine) 10 mg/mL (1 %) injection (mL) Total volume:  10 mL      Date/Time Rate/Dose/Volume Action       03/11/24  1125 10 mL Given                   Specimens collected      See detailed result report with images in PACS.    The patient tolerated the procedure well without incident or complication and is in stable condition.

## 2024-03-11 NOTE — TELEPHONE ENCOUNTER
RADHA from PT's mom - is there any way the MRI can be scheduled earlier in the day when he gets paracentesis? Also, he told her that he needs an appointment to get labs, Please call her at [phone]

## 2024-03-11 NOTE — PROGRESS NOTES
"[place] [facility] [place] [address] [place] [address]   Type of Visit: New    Chief Complaint:  Patient was referred to Cardiology by Abdominal Transplant Team for pretransplant evaluation.    History Of Present Illness:    Vance Silva \"Christine" is a 58 y.o. 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  He reports dyspnea on exertion, orthopnea, lower extremity edema, and nocturia.   Patient denies chest pain, palpitations, dizziness, lightheadedness, syncope, or claudication.    Blood pressure today: 101/64 mmHg    Last ECG from 2/9/2024 shows sinus rhythm at 83 bpm, normal AV conduction, possible low voltage, and normal ventricular repolarization.    Contrast allergy: no    Calcium Score:  259 Agatston Units, Date 2/22/2024, with also moderate dilation of the ascending aorta    Stress MRI: pending.    Transthoracic echocardiogram 02/10/2024: Technically difficult despite the use of echo contrast, LVEF appears normal, RV appears at least mildly dilated, function not well visualized but probably normal. RVSP measurement was not available. Mild AV thickening and calcification, bicuspid anatomy cannot be ruled out.    Past Medical History:  He has a past medical history of Acute kidney injury (CMS/HCC), HTN, anemia, thrombocytopenia, end stage liver disease (MELD) due to alcohol cirrhosis MELD 8, portal hypertension, esophageal varices, duodenal ulcers related to Hp+, SBP, BRANDT, traumatic brain injury.    Past Surgical History:  He has no past surgical history on file.    Social History:  He reports that he has never smoked. He has never used smokeless tobacco. He reports that he does not currently use alcohol. He reports that he does not " "currently use drugs.    Family History:  Father severe AS (tricuspid) with moderate dilation of ascending aorta, post op 2020 at [facility].    Allergies:  Patient has no known allergies.    Outpatient Medications:  Current Outpatient Medications   Medication Instructions    carvedilol (COREG) 6.25 mg, oral, Nightly    Constulose 20 g, oral, 3 times daily, Titrate to achieve goal 2-3 bowel movements daily    furosemide (LASIX) 40 mg, oral, 2 times daily    melatonin 3 mg tablet oral, Daily PRN    pantoprazole (PROTONIX) 40 mg, oral, Daily before breakfast, Do not crush, chew, or split.    spironolactone (ALDACTONE) 150 mg, oral, Daily RT     Last Recorded Vitals:  There were no vitals filed for this visit.    Physical Exam:      3/11/2024    12:24 PM 3/11/2024    12:08 PM 3/11/2024    11:53 AM 3/11/2024    11:30 AM 3/11/2024    11:10 AM 3/1/2024    10:07 AM   Vitals   Systolic 106 158 130 116 123 100   Diastolic 59 81 77 83 96 62   Heart Rate 90 96 100 94 100 89   Temp     36.7 °C (98 °F)    Resp 16 16 16 18 18    Height (in)      1.956 m (6' 5\")   Weight (lb)      181.4   BMI      21.51 kg/m2   BSA (m2)      2.11 m2   Visit Report      Report     Wt Readings from Last 5 Encounters:   03/01/24 82.3 kg (181 lb 6.4 oz)   02/27/24 82.1 kg (181 lb)   02/14/24 82.1 kg (181 lb)   01/31/24 90.7 kg (200 lb)   11/20/23 93 kg (205 lb)     General: Sitting up comfortably in chair; in no apparent distress. Pale.  HEENT: Normocephalic; atraumatic. Well hydrated.  Eyes: Anicteric sclera. Extraocular movement intact.  Neck: Supple; no thyromegaly; normal jugular venous pressure, no bruits.  Respiratory: Decreased air entry at base of left lung. No wheezing.  Cardiovascular: Normal S1, S2; no murmurs auscultated.  Abdomen: Distended (sitting); nontender. (+) bowel sounds.  Extremities: 2+ peripheral edema present. Pulses 2+ diffusely.  Neurological: Oriented to time, place, and person; nonfocal.  Psychiatric: Normal affect.     Last Labs " Reviewed:  CBC -  Recent Labs     02/23/24  1302 02/14/24  0509 02/13/24  1735 02/12/24  2047 02/12/24  0550 02/11/24  1712 02/11/24  0538   WBC 6.2 2.6*  --  5.2 4.2*  --  4.9   HGB 10.1* 7.2* 7.2* 8.6* 7.5*   < > 7.2*   HCT 34.8* 24.1* 24.5* 28.4* 24.6*   < > 24.3*   PLT 91* 113*  --  142* 119*  --  127*   MCV 79* 78*  --  80 77*  --  77*    < > = values in this interval not displayed.     CMP -  Recent Labs     02/23/24  1302 02/14/24  0509 02/13/24  0625 02/12/24  0550 02/11/24  1004 02/11/24  0538    139 138 139  --  134*   K 5.0 4.1 4.4 4.2  --  4.3   * 111* 111* 111*  --  106   CO2 22 19* 21 19*  --  18*   ANIONGAP 14 13 10 13  --  14   BUN 19 15 20 20  --  25*   CREATININE 1.23 0.99 1.07 1.29  --  1.69*   EGFR 67 87 79 63  --  46*   MG  --  2.22 2.52* 2.51* 2.55* 2.44*     Recent Labs     02/23/24  1302 02/14/24  0509 02/13/24  0625 02/12/24  0550 02/11/24  0538 02/10/24  0957   ALBUMIN 4.4 3.8 3.8 3.4 3.5 3.0*   ALKPHOS 117 68 76 75  --  103   ALT 27 21 20 22  --  41   AST 21 16 13 13  --  34   BILITOT 0.5 0.4 0.4 0.6  --  0.4     COAGULATION PANEL -  Recent Labs     02/23/24  1302 02/14/24  0510 02/13/24  1252 02/10/24  0957 02/10/24  0013   INR 1.1 1.2* 1.1   < > 1.3*   HAPTOGLOBIN  --   --   --   --  238    < > = values in this interval not displayed.     HEME/ENDO -  Recent Labs     02/23/24  1302 02/10/24  0013 12/15/21  2222 11/16/20  1613   FERRITIN  --  41  --   --    IRONSAT 7* 4*  --   --    TSH 3.45  --   --   --    HGBA1C 5.1  --  4.9 5.3      CARDIOVASCULAR  Recent Labs     02/10/24  0013      TROPHS <3   BNP 56     Last Cardiology/Imaging Tests Personally Reviewed (if images available) and Interpreted:  ECG:  ECG 12 lead 02/10/2024  Sinus rhythm at 83 bpm, normal AV conduction, possible low voltage, and normal ventricular repolarization.    Echocardiogram:  Transthoracic Echo (TTE) Complete 02/10/2024  Technically difficult despite the use of echo contrast.   LVEF appears  normal.  RV appears at least mildly dilated, function not well visualized but probably normal.  RVSP measurement was not available.   Mild AV thickening and calcification, bicuspid anatomy cannot be ruled out.    Cath:  No results found.    Stress Test:  No results found.    Cardiac CT/MRI:  CT cardiac scoring wo IV contrast 02/22/2024  FINDINGS:  The score and distribution of calcium in the coronary arteries is as follows:    LM 0,  .09,  LCx 0,  RCA 8.85,  Total 258.94    The visualized mid/lower ascending thoracic aorta measures 4.7 cm in diameter. The heart is normal in size. No pericardial effusion is present.  No gross evidence of mediastinal or hilar lymphadenopathy or masses is identified. The visualized segments of the lungs are normally expanded.  Limited evaluation of the upper abdomen demonstrates cirrhotic liver. Ascites present.    Impression  1. Coronary artery calcium score of 258.94 AU.  2. Aneurysmal dilation of the ascending aorta at 4.7 cm    CV RISK FACTORS:   # Hypertension: Last BP:  .  # Hyperlipidemia: Last Tchol No results found for requested labs within last 365 days. / LDL No results found for requested labs within last 365 days. / HDL No results found for requested labs within last 365 days. / TRIG No results found for requested labs within last 365 days. (No results in last year.).  # Type II Diabetes Mellitus: Last A1c 5.1 (2/23/2024:  1:02 PM).  # Obesity: Last BMI:  .  # CKD: Last BUN/Cr (GFR): 19/1.23 (67), 2/23/2024:  1:02 PM.    ASCV RISK:  The ASCVD Risk score (Fermín DK, et al., 2019) failed to calculate for the following reasons:    Cannot find a previous HDL lab    Cannot find a previous total cholesterol lab    Assessment/Plan   58 y.o. 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 He has significant coronary artery calcifications and decreased exercise capacity. Hi RV size and function is unclear but has significant lower extremity edema with normal albumin. Also, CT showed a moderate ascending aorta dilation and it is unclear from the transthoracic echocardiogram if the AV is tricuspid.    Recommendations:  - Pre transplant evaluation: Cardiac stress MRI to assess RV size and function, ischemia, and possibly anatomy of AV  - Lower extremity edema: his spironolactone dose was recently increased  - I will contact the Transplant team once the results are available to define if the cardiac clearance is completed or patient will need a follow-up appointment.  - Hepatic encephalopathy: his Constulose order was renewed  - I spent 100 minutes assessing the case between pre-charting, face-to-face patient interaction, and documentation    Luis F Walton MD

## 2024-03-11 NOTE — TELEPHONE ENCOUNTER
Dr. Norris spoke with patient after receiving critical lab alert for Hgb 4.8 and advised he go directly to [facility] ED since he was completing testing in [place]. Dr. Norris called [facility] ED and notified attending of patient's hx and concern for GI bleed. If necessary, patient should be [place] [facility] [place] team. Dr. Norris is on hospital service and will continue to monitor patient's status and need for transfer.

## 2024-03-11 NOTE — ED PROVIDER NOTES
EMERGENCY MEDICINE EVALUATION NOTE    History of Present Illness     Chief Complaint:   Chief Complaint   Patient presents with    Abrnomal Lab       HPI: Vance Silva is a 58 y.o. male with past medical history of decompensated cirrhosis complicated by portal hypertension, esophageal varices status post banding, ascites, history of GI bleed with duodenal ulcer in 11/20, traumatic injury, arthritis who presents with complaint of abnormal lab value.  Patient states earlier today he did receive a paracentesis with a removed around 6 L of fluid.  He states he had outpatient lab work and was told that his hemoglobin was 4.8 and to come to the emergency room for blood transfusion.  He does admit some generalized abdominal pain and discomfort although states this is normal post paracentesis.  Does admit generalized weakness and fatigue as well as some shortness of breath which has been occurring since several months now.  He states when he did have the duodenal ulcers he did have some bright red and dark stool although states during this episode now he does not have any change in his stool color and denies any current active bleeding.  Denies any dysuria hematuria.  Denies any chest pain, fever, chills, anticoagulant surge.  He is currently on a transplant list for his liver.  He did also have recent admission in February of this year and was noted to have esophageal varices with stigmata of recent bleeding. His hepatologist Dr. Norris sent him here.    Previous History     Past Medical History:   Diagnosis Date    Acute kidney injury (CMS/HCC)     Anemia     Ascites     Chronic kidney disease     Cirrhosis (CMS/HCC)     Hepatitis C     Liver disease     Seizure (CMS/HCC)     Sleep apnea     Traumatic brain injury (CMS/HCC)      No past surgical history on file.  Social History     Tobacco Use    Smoking status: Never    Smokeless tobacco: Never   Vaping Use    Vaping Use: Never used   Substance Use Topics    Alcohol use:  Not Currently    Drug use: Not Currently     No family history on file.  No Known Allergies  Current Outpatient Medications   Medication Instructions    carvedilol (COREG) 6.25 mg, oral, Nightly    Constulose 20 g, oral, 3 times daily, Titrate to achieve goal 2-3 bowel movements daily    furosemide (LASIX) 40 mg, oral, 2 times daily    pantoprazole (PROTONIX) 40 mg, oral, Daily before breakfast, Do not crush, chew, or split.    spironolactone (ALDACTONE) 150 mg, oral, Daily RT       Physical Exam     Appearance: Alert, oriented , cooperative,  in acute distress. Well nourished & well hydrated.     Skin: Intact,  dry skin, no lesions, rash, petechiae or purpura.  Jaundiced throughout.     Eyes: PERRLA, EOMs intact,  Conjunctiva pink with no redness or exudates. Cornea & anterior chamber are clear, Eyelids without lesions.  Bilateral scleral icterus noted.     ENT: Hearing grossly intact. External auditory canals patent, tympanic membranes intact with visible landmarks. Nares patent, mucus membranes moist. Dentition without lesions. Pharynx clear, uvula midline.      Neck: Supple, without meningismus. Thyroid not palpable. Trachea at midline. No lymphadenopathy.     Pulmonary: Clear bilaterally with good chest wall excursion. No rales, rhonchi or wheezing. No accessory muscle use or stridor.     Cardiac: Normal S1, S2 without murmur, rub, gallop or extrasystole. No JVD, Carotids without bruits.     Abdomen: Soft, moderate generalized abdominal tenderness and distention with positive fluid wave, active bowel sounds.  No palpable organomegaly.  No rebound or guarding.  No CVA tenderness.     Genitourinary: Exam deferred.     Musculoskeletal: Full range of motion. no pain, edema, or deformity. Pulses full and equal. No cyanosis or clubbing.      Neurological:  Cranial nerves II through XII are grossly intact, finger-nose touch is normal, normal sensation, no weakness, no focal findings identified.     Psychiatric:  "Appropriate mood and affect.      Results     Labs Reviewed   TYPE AND SCREEN   ABORH   PREPARE RBC     No orders to display         ED Course & Medical Decision Making     Medications   pantoprazole (ProtoNix) injection 80 mg (80 mg intravenous Given 3/11/24 1605)     Diagnoses as of 03/11/24 1748   Anemia, unspecified type   Other cirrhosis of liver (CMS/HCC)     Heart Rate:  []   Temperature:  [36.7 °C (98 °F)-36.9 °C (98.4 °F)]   Respirations:  [14-18]   BP: ()/()   Height:  [195.6 cm (6' 5\")]   Weight:  [89.4 kg (197 lb)-91 kg (200 lb 11.2 oz)]   Pulse Ox:  [96 %-100 %]      Vance Silva is a 58 y.o. male with past medical history of decompensated cirrhosis complicated by portal hypertension, esophageal varices status post banding, ascites, history of GI bleed with duodenal ulcer in 11/20, traumatic injury, arthritis who presents with complaint of abnormal lab value.  Patient was initially well-appearing.  He is able to ambulate any difficulty.  He is hemodynamically stable and afebrile.  Patient does have extensive history of esophageal varices and did receive banding in 11/20 and did have a recent admission from 1 month ago due to anemia at that time with esophageal bleeding that was reported although no banding at that time.  Patient most likely is having gastrointestinal hemorrhage likely from esophageal varices although no active reported bleeding from the patient at this time.  He did also receive recent paracentesis.  No significant abdominal pain or concern for SBP although this was considered.  EKG with a normal sinus rhythm rate of 87 bpm with low voltage QRS and no other signs of acute ischemic change or arrhythmia.  Patient was sent here as his hemoglobin was 4.7 as an outpatient.  He also did have mildly elevated creatinine 1.33.  Does appear to be somewhat near his baseline.  Patient's prior hemoglobin was 10.1 around 3 weeks ago.  No leukocytosis noted.  LFTs normal.  Bilirubin " normal.  I did order a type and screen and patient was informed these findings.  I did order 3 units of packed red blood cells as well as IV Protonix for initial treatment.  Gastroenterology was consulted and will see the patient while he is admitted to the hospital for further evaluation and possible need for EGD.  Patient himself agreeable and will be admitted to the hospital for further blood transfusion and GI consultation.    Procedures   Procedures    Diagnosis     1. Anemia, unspecified type    2. Other cirrhosis of liver (CMS/HCC)        Disposition     Admitted to hospitalist team, discussed differential and findings with patient as well as any family members at bedside.      ED Prescriptions    None            Jose Yan DO  03/11/24 1740

## 2024-03-12 ENCOUNTER — HOSPITAL ENCOUNTER (OUTPATIENT)
Facility: HOSPITAL | Age: 59
Setting detail: OBSERVATION
LOS: 1 days | Discharge: HOME | End: 2024-03-15
Attending: INTERNAL MEDICINE | Admitting: INTERNAL MEDICINE
Payer: COMMERCIAL

## 2024-03-12 ENCOUNTER — ANESTHESIA EVENT (OUTPATIENT)
Dept: GASTROENTEROLOGY | Facility: HOSPITAL | Age: 59
DRG: 812 | End: 2024-03-12
Payer: COMMERCIAL

## 2024-03-12 ENCOUNTER — ANESTHESIA (OUTPATIENT)
Dept: GASTROENTEROLOGY | Facility: HOSPITAL | Age: 59
DRG: 812 | End: 2024-03-12
Payer: COMMERCIAL

## 2024-03-12 ENCOUNTER — APPOINTMENT (OUTPATIENT)
Dept: GASTROENTEROLOGY | Facility: HOSPITAL | Age: 59
DRG: 812 | End: 2024-03-12
Payer: COMMERCIAL

## 2024-03-12 VITALS
HEART RATE: 92 BPM | DIASTOLIC BLOOD PRESSURE: 93 MMHG | TEMPERATURE: 98.6 F | RESPIRATION RATE: 18 BRPM | WEIGHT: 197.09 LBS | OXYGEN SATURATION: 98 % | SYSTOLIC BLOOD PRESSURE: 126 MMHG | BODY MASS INDEX: 23.27 KG/M2 | HEIGHT: 77 IN

## 2024-03-12 DIAGNOSIS — K72.90 DECOMPENSATED HEPATIC CIRRHOSIS (MULTI): Primary | ICD-10-CM

## 2024-03-12 DIAGNOSIS — K74.60 DECOMPENSATED HEPATIC CIRRHOSIS (MULTI): Primary | ICD-10-CM

## 2024-03-12 LAB
ABO GROUP (TYPE) IN BLOOD: NORMAL
ALBUMIN SERPL BCP-MCNC: 3.7 G/DL (ref 3.4–5)
ALBUMIN SERPL BCP-MCNC: 4 G/DL (ref 3.4–5)
ALP SERPL-CCNC: 59 U/L (ref 33–136)
ALP SERPL-CCNC: 68 U/L (ref 33–136)
ALT SERPL W P-5'-P-CCNC: 11 U/L (ref 10–52)
ALT SERPL W P-5'-P-CCNC: 9 U/L (ref 10–52)
ANION GAP SERPL CALC-SCNC: 12 MMOL/L (ref 10–20)
ANION GAP SERPL CALC-SCNC: 14 MMOL/L (ref 10–20)
ANTIBODY SCREEN: NORMAL
AST SERPL W P-5'-P-CCNC: 11 U/L (ref 9–39)
AST SERPL W P-5'-P-CCNC: 13 U/L (ref 9–39)
ATRIAL RATE: 87 BPM
BILIRUB SERPL-MCNC: 0.4 MG/DL (ref 0–1.2)
BILIRUB SERPL-MCNC: 0.6 MG/DL (ref 0–1.2)
BLOOD EXPIRATION DATE: NORMAL
BUN SERPL-MCNC: 19 MG/DL (ref 6–23)
BUN SERPL-MCNC: 20 MG/DL (ref 6–23)
CA-I BLD-SCNC: 1.15 MMOL/L (ref 1.1–1.33)
CALCIUM SERPL-MCNC: 7.8 MG/DL (ref 8.6–10.3)
CALCIUM SERPL-MCNC: 7.9 MG/DL (ref 8.6–10.3)
CHLORIDE SERPL-SCNC: 105 MMOL/L (ref 98–107)
CHLORIDE SERPL-SCNC: 106 MMOL/L (ref 98–107)
CO2 SERPL-SCNC: 22 MMOL/L (ref 21–32)
CO2 SERPL-SCNC: 23 MMOL/L (ref 21–32)
CREAT SERPL-MCNC: 1.32 MG/DL (ref 0.5–1.3)
CREAT SERPL-MCNC: 1.45 MG/DL (ref 0.5–1.3)
DISPENSE STATUS: NORMAL
EGFRCR SERPLBLD CKD-EPI 2021: 55 ML/MIN/1.73M*2
EGFRCR SERPLBLD CKD-EPI 2021: 62 ML/MIN/1.73M*2
ERYTHROCYTE [DISTWIDTH] IN BLOOD BY AUTOMATED COUNT: 20.4 % (ref 11.5–14.5)
GLUCOSE SERPL-MCNC: 100 MG/DL (ref 74–99)
GLUCOSE SERPL-MCNC: 85 MG/DL (ref 74–99)
HCT VFR BLD AUTO: 22.8 % (ref 41–52)
HGB BLD-MCNC: 6.7 G/DL (ref 13.5–17.5)
MAGNESIUM SERPL-MCNC: 1.9 MG/DL (ref 1.6–2.4)
MCH RBC QN AUTO: 23.9 PG (ref 26–34)
MCHC RBC AUTO-ENTMCNC: 29.4 G/DL (ref 32–36)
MCV RBC AUTO: 81 FL (ref 80–100)
NRBC BLD-RTO: 0 /100 WBCS (ref 0–0)
P AXIS: 54 DEGREES
P OFFSET: 188 MS
P ONSET: 128 MS
PLATELET # BLD AUTO: 97 X10*3/UL (ref 150–450)
POTASSIUM SERPL-SCNC: 3.9 MMOL/L (ref 3.5–5.3)
POTASSIUM SERPL-SCNC: 4.3 MMOL/L (ref 3.5–5.3)
PR INTERVAL: 168 MS
PRODUCT BLOOD TYPE: 5100
PRODUCT CODE: NORMAL
PROT SERPL-MCNC: 6.1 G/DL (ref 6.4–8.2)
PROT SERPL-MCNC: 6.5 G/DL (ref 6.4–8.2)
Q ONSET: 212 MS
QRS COUNT: 14 BEATS
QRS DURATION: 88 MS
QT INTERVAL: 380 MS
QTC CALCULATION(BAZETT): 457 MS
QTC FREDERICIA: 430 MS
R AXIS: 12 DEGREES
RBC # BLD AUTO: 2.8 X10*6/UL (ref 4.5–5.9)
RH FACTOR (ANTIGEN D): NORMAL
SODIUM SERPL-SCNC: 137 MMOL/L (ref 136–145)
SODIUM SERPL-SCNC: 137 MMOL/L (ref 136–145)
T AXIS: 34 DEGREES
T OFFSET: 402 MS
UNIT ABO: NORMAL
UNIT NUMBER: NORMAL
UNIT RH: NORMAL
UNIT VOLUME: 280
UNIT VOLUME: 350
UNIT VOLUME: 350
VENTRICULAR RATE: 87 BPM
WBC # BLD AUTO: 2.9 X10*3/UL (ref 4.4–11.3)
XM INTEP: NORMAL

## 2024-03-12 PROCEDURE — 2500000004 HC RX 250 GENERAL PHARMACY W/ HCPCS (ALT 636 FOR OP/ED): Performed by: ANESTHESIOLOGY

## 2024-03-12 PROCEDURE — 99222 1ST HOSP IP/OBS MODERATE 55: CPT

## 2024-03-12 PROCEDURE — 2500000001 HC RX 250 WO HCPCS SELF ADMINISTERED DRUGS (ALT 637 FOR MEDICARE OP): Performed by: HOSPITALIST

## 2024-03-12 PROCEDURE — 2500000004 HC RX 250 GENERAL PHARMACY W/ HCPCS (ALT 636 FOR OP/ED)

## 2024-03-12 PROCEDURE — 86920 COMPATIBILITY TEST SPIN: CPT

## 2024-03-12 PROCEDURE — 7100000010 HC PHASE TWO TIME - EACH INCREMENTAL 1 MINUTE

## 2024-03-12 PROCEDURE — 36415 COLL VENOUS BLD VENIPUNCTURE: CPT | Performed by: INTERNAL MEDICINE

## 2024-03-12 PROCEDURE — 3700000002 HC GENERAL ANESTHESIA TIME - EACH INCREMENTAL 1 MINUTE

## 2024-03-12 PROCEDURE — 99222 1ST HOSP IP/OBS MODERATE 55: CPT | Performed by: INTERNAL MEDICINE

## 2024-03-12 PROCEDURE — 1100000001 HC PRIVATE ROOM DAILY

## 2024-03-12 PROCEDURE — 2500000004 HC RX 250 GENERAL PHARMACY W/ HCPCS (ALT 636 FOR OP/ED): Performed by: NURSE ANESTHETIST, CERTIFIED REGISTERED

## 2024-03-12 PROCEDURE — 82330 ASSAY OF CALCIUM: CPT | Performed by: HOSPITALIST

## 2024-03-12 PROCEDURE — P9016 RBC LEUKOCYTES REDUCED: HCPCS

## 2024-03-12 PROCEDURE — 44360 SMALL BOWEL ENDOSCOPY: CPT

## 2024-03-12 PROCEDURE — 7100000009 HC PHASE TWO TIME - INITIAL BASE CHARGE

## 2024-03-12 PROCEDURE — 2500000002 HC RX 250 W HCPCS SELF ADMINISTERED DRUGS (ALT 637 FOR MEDICARE OP, ALT 636 FOR OP/ED): Performed by: INTERNAL MEDICINE

## 2024-03-12 PROCEDURE — 99239 HOSP IP/OBS DSCHRG MGMT >30: CPT | Performed by: INTERNAL MEDICINE

## 2024-03-12 PROCEDURE — 2500000001 HC RX 250 WO HCPCS SELF ADMINISTERED DRUGS (ALT 637 FOR MEDICARE OP): Performed by: INTERNAL MEDICINE

## 2024-03-12 PROCEDURE — C9113 INJ PANTOPRAZOLE SODIUM, VIA: HCPCS

## 2024-03-12 PROCEDURE — 36430 TRANSFUSION BLD/BLD COMPNT: CPT

## 2024-03-12 PROCEDURE — A44360 PR SMALL BOWEL ENDOSCOPY,PAST 2ND DUOD: Performed by: NURSE ANESTHETIST, CERTIFIED REGISTERED

## 2024-03-12 PROCEDURE — 86901 BLOOD TYPING SEROLOGIC RH(D): CPT

## 2024-03-12 PROCEDURE — 83735 ASSAY OF MAGNESIUM: CPT | Performed by: HOSPITALIST

## 2024-03-12 PROCEDURE — 2500000001 HC RX 250 WO HCPCS SELF ADMINISTERED DRUGS (ALT 637 FOR MEDICARE OP)

## 2024-03-12 PROCEDURE — 0DJ08ZZ INSPECTION OF UPPER INTESTINAL TRACT, VIA NATURAL OR ARTIFICIAL OPENING ENDOSCOPIC: ICD-10-PCS | Performed by: INTERNAL MEDICINE

## 2024-03-12 PROCEDURE — 80053 COMPREHEN METABOLIC PANEL: CPT | Performed by: INTERNAL MEDICINE

## 2024-03-12 PROCEDURE — 44360 SMALL BOWEL ENDOSCOPY: CPT | Performed by: INTERNAL MEDICINE

## 2024-03-12 PROCEDURE — 2500000004 HC RX 250 GENERAL PHARMACY W/ HCPCS (ALT 636 FOR OP/ED): Performed by: INTERNAL MEDICINE

## 2024-03-12 PROCEDURE — C9113 INJ PANTOPRAZOLE SODIUM, VIA: HCPCS | Performed by: INTERNAL MEDICINE

## 2024-03-12 PROCEDURE — 85027 COMPLETE CBC AUTOMATED: CPT | Performed by: INTERNAL MEDICINE

## 2024-03-12 PROCEDURE — A44360 PR SMALL BOWEL ENDOSCOPY,PAST 2ND DUOD: Performed by: ANESTHESIOLOGY

## 2024-03-12 PROCEDURE — 3700000001 HC GENERAL ANESTHESIA TIME - INITIAL BASE CHARGE

## 2024-03-12 RX ORDER — ONDANSETRON HYDROCHLORIDE 2 MG/ML
4 INJECTION, SOLUTION INTRAVENOUS EVERY 8 HOURS PRN
Status: DISCONTINUED | OUTPATIENT
Start: 2024-03-12 | End: 2024-03-15 | Stop reason: HOSPADM

## 2024-03-12 RX ORDER — SODIUM CHLORIDE, SODIUM LACTATE, POTASSIUM CHLORIDE, CALCIUM CHLORIDE 600; 310; 30; 20 MG/100ML; MG/100ML; MG/100ML; MG/100ML
INJECTION, SOLUTION INTRAVENOUS CONTINUOUS PRN
Status: DISCONTINUED | OUTPATIENT
Start: 2024-03-12 | End: 2024-03-12

## 2024-03-12 RX ORDER — MIDAZOLAM HYDROCHLORIDE 1 MG/ML
INJECTION, SOLUTION INTRAMUSCULAR; INTRAVENOUS AS NEEDED
Status: DISCONTINUED | OUTPATIENT
Start: 2024-03-12 | End: 2024-03-12

## 2024-03-12 RX ORDER — DOCUSATE SODIUM 50 MG/5ML
100 LIQUID ORAL 2 TIMES DAILY
Status: DISCONTINUED | OUTPATIENT
Start: 2024-03-12 | End: 2024-03-15 | Stop reason: HOSPADM

## 2024-03-12 RX ORDER — PHENYLEPHRINE HYDROCHLORIDE 10 MG/ML
INJECTION INTRAVENOUS AS NEEDED
Status: DISCONTINUED | OUTPATIENT
Start: 2024-03-12 | End: 2024-03-12

## 2024-03-12 RX ORDER — ONDANSETRON HYDROCHLORIDE 2 MG/ML
4 INJECTION, SOLUTION INTRAVENOUS ONCE AS NEEDED
Status: DISCONTINUED | OUTPATIENT
Start: 2024-03-12 | End: 2024-03-12 | Stop reason: HOSPADM

## 2024-03-12 RX ORDER — SODIUM CHLORIDE, SODIUM LACTATE, POTASSIUM CHLORIDE, CALCIUM CHLORIDE 600; 310; 30; 20 MG/100ML; MG/100ML; MG/100ML; MG/100ML
100 INJECTION, SOLUTION INTRAVENOUS CONTINUOUS
Status: DISCONTINUED | OUTPATIENT
Start: 2024-03-12 | End: 2024-03-12 | Stop reason: HOSPADM

## 2024-03-12 RX ORDER — PROPOFOL 10 MG/ML
INJECTION, EMULSION INTRAVENOUS CONTINUOUS PRN
Status: DISCONTINUED | OUTPATIENT
Start: 2024-03-12 | End: 2024-03-12

## 2024-03-12 RX ORDER — CARVEDILOL 12.5 MG/1
6.25 TABLET ORAL NIGHTLY
Status: DISCONTINUED | OUTPATIENT
Start: 2024-03-12 | End: 2024-03-15 | Stop reason: HOSPADM

## 2024-03-12 RX ORDER — CEFTRIAXONE 1 G/50ML
1 INJECTION, SOLUTION INTRAVENOUS EVERY 24 HOURS
Status: DISCONTINUED | OUTPATIENT
Start: 2024-03-12 | End: 2024-03-13

## 2024-03-12 RX ORDER — PANTOPRAZOLE SODIUM 40 MG/10ML
40 INJECTION, POWDER, LYOPHILIZED, FOR SOLUTION INTRAVENOUS 2 TIMES DAILY
Status: DISCONTINUED | OUTPATIENT
Start: 2024-03-12 | End: 2024-03-13

## 2024-03-12 RX ORDER — FENTANYL CITRATE 50 UG/ML
INJECTION, SOLUTION INTRAMUSCULAR; INTRAVENOUS AS NEEDED
Status: DISCONTINUED | OUTPATIENT
Start: 2024-03-12 | End: 2024-03-12

## 2024-03-12 RX ORDER — ONDANSETRON 4 MG/1
4 TABLET, FILM COATED ORAL EVERY 8 HOURS PRN
Status: DISCONTINUED | OUTPATIENT
Start: 2024-03-12 | End: 2024-03-15 | Stop reason: HOSPADM

## 2024-03-12 RX ORDER — LORAZEPAM 1 MG/1
1 TABLET ORAL ONCE
Status: COMPLETED | OUTPATIENT
Start: 2024-03-12 | End: 2024-03-12

## 2024-03-12 RX ORDER — LACTULOSE 10 G/15ML
20 SOLUTION ORAL 3 TIMES DAILY
Status: DISCONTINUED | OUTPATIENT
Start: 2024-03-12 | End: 2024-03-15 | Stop reason: HOSPADM

## 2024-03-12 RX ORDER — ZOLPIDEM TARTRATE 5 MG/1
5 TABLET ORAL ONCE
Status: DISCONTINUED | OUTPATIENT
Start: 2024-03-12 | End: 2024-03-12

## 2024-03-12 RX ORDER — PROPOFOL 10 MG/ML
INJECTION, EMULSION INTRAVENOUS AS NEEDED
Status: DISCONTINUED | OUTPATIENT
Start: 2024-03-12 | End: 2024-03-12

## 2024-03-12 RX ORDER — TRAZODONE HYDROCHLORIDE 50 MG/1
25 TABLET ORAL ONCE
Status: COMPLETED | OUTPATIENT
Start: 2024-03-12 | End: 2024-03-12

## 2024-03-12 RX ADMIN — CARVEDILOL 6.25 MG: 12.5 TABLET, FILM COATED ORAL at 20:23

## 2024-03-12 RX ADMIN — SODIUM CHLORIDE, POTASSIUM CHLORIDE, SODIUM LACTATE AND CALCIUM CHLORIDE 100 ML/HR: 600; 310; 30; 20 INJECTION, SOLUTION INTRAVENOUS at 10:50

## 2024-03-12 RX ADMIN — PHENYLEPHRINE HYDROCHLORIDE 40 MCG: 10 INJECTION, SOLUTION INTRAMUSCULAR; INTRAVENOUS; SUBCUTANEOUS at 09:39

## 2024-03-12 RX ADMIN — PROPOFOL 20 MG: 10 INJECTION, EMULSION INTRAVENOUS at 09:42

## 2024-03-12 RX ADMIN — LORAZEPAM 1 MG: 1 TABLET ORAL at 01:53

## 2024-03-12 RX ADMIN — TRAZODONE HYDROCHLORIDE 25 MG: 50 TABLET ORAL at 21:53

## 2024-03-12 RX ADMIN — PROPOFOL 40 MG: 10 INJECTION, EMULSION INTRAVENOUS at 09:34

## 2024-03-12 RX ADMIN — LACTULOSE 20 G: 20 SOLUTION ORAL at 10:50

## 2024-03-12 RX ADMIN — IRON SUCROSE 100 MG: 20 INJECTION, SOLUTION INTRAVENOUS at 11:05

## 2024-03-12 RX ADMIN — LACTULOSE 20 G: 10 SOLUTION ORAL at 17:45

## 2024-03-12 RX ADMIN — FENTANYL CITRATE 50 MCG: 50 INJECTION, SOLUTION INTRAMUSCULAR; INTRAVENOUS at 09:34

## 2024-03-12 RX ADMIN — PANTOPRAZOLE SODIUM 40 MG: 40 INJECTION, POWDER, FOR SOLUTION INTRAVENOUS at 11:04

## 2024-03-12 RX ADMIN — LACTULOSE 20 G: 10 SOLUTION ORAL at 20:23

## 2024-03-12 RX ADMIN — DOCUSATE SODIUM LIQUID 100 MG: 100 LIQUID ORAL at 10:50

## 2024-03-12 RX ADMIN — FENTANYL CITRATE 50 MCG: 50 INJECTION, SOLUTION INTRAMUSCULAR; INTRAVENOUS at 09:27

## 2024-03-12 RX ADMIN — CEFTRIAXONE SODIUM 1 G: 1 INJECTION, SOLUTION INTRAVENOUS at 21:53

## 2024-03-12 RX ADMIN — FUROSEMIDE 40 MG: 10 INJECTION, SOLUTION INTRAVENOUS at 11:05

## 2024-03-12 RX ADMIN — SPIRONOLACTONE 150 MG: 25 TABLET ORAL at 10:50

## 2024-03-12 RX ADMIN — SODIUM CHLORIDE, SODIUM LACTATE, POTASSIUM CHLORIDE, AND CALCIUM CHLORIDE: .6; .31; .03; .02 INJECTION, SOLUTION INTRAVENOUS at 09:26

## 2024-03-12 RX ADMIN — MIDAZOLAM HYDROCHLORIDE 2 MG: 1 INJECTION INTRAMUSCULAR; INTRAVENOUS at 09:27

## 2024-03-12 RX ADMIN — PANTOPRAZOLE SODIUM 40 MG: 40 INJECTION, POWDER, FOR SOLUTION INTRAVENOUS at 20:23

## 2024-03-12 RX ADMIN — PROPOFOL 100 MCG/KG/MIN: 10 INJECTION, EMULSION INTRAVENOUS at 09:34

## 2024-03-12 SDOH — SOCIAL STABILITY: SOCIAL INSECURITY: DOES ANYONE TRY TO KEEP YOU FROM HAVING/CONTACTING OTHER FRIENDS OR DOING THINGS OUTSIDE YOUR HOME?: NO

## 2024-03-12 SDOH — SOCIAL STABILITY: SOCIAL INSECURITY: ABUSE: ADULT

## 2024-03-12 SDOH — SOCIAL STABILITY: SOCIAL INSECURITY: HAVE YOU HAD THOUGHTS OF HARMING ANYONE ELSE?: NO

## 2024-03-12 SDOH — SOCIAL STABILITY: SOCIAL INSECURITY: DO YOU FEEL ANYONE HAS EXPLOITED OR TAKEN ADVANTAGE OF YOU FINANCIALLY OR OF YOUR PERSONAL PROPERTY?: NO

## 2024-03-12 SDOH — SOCIAL STABILITY: SOCIAL INSECURITY: DO YOU FEEL UNSAFE GOING BACK TO THE PLACE WHERE YOU ARE LIVING?: NO

## 2024-03-12 SDOH — SOCIAL STABILITY: SOCIAL INSECURITY: HAS ANYONE EVER THREATENED TO HURT YOUR FAMILY OR YOUR PETS?: NO

## 2024-03-12 SDOH — SOCIAL STABILITY: SOCIAL INSECURITY: WERE YOU ABLE TO COMPLETE ALL THE BEHAVIORAL HEALTH SCREENINGS?: YES

## 2024-03-12 SDOH — SOCIAL STABILITY: SOCIAL INSECURITY: ARE THERE ANY APPARENT SIGNS OF INJURIES/BEHAVIORS THAT COULD BE RELATED TO ABUSE/NEGLECT?: NO

## 2024-03-12 SDOH — SOCIAL STABILITY: SOCIAL INSECURITY: ARE YOU OR HAVE YOU BEEN THREATENED OR ABUSED PHYSICALLY, EMOTIONALLY, OR SEXUALLY BY ANYONE?: NO

## 2024-03-12 ASSESSMENT — COGNITIVE AND FUNCTIONAL STATUS - GENERAL
DAILY ACTIVITIY SCORE: 23
PATIENT BASELINE BEDBOUND: YES
MOBILITY SCORE: 22
WALKING IN HOSPITAL ROOM: A LITTLE
PATIENT BASELINE BEDBOUND: NO
DRESSING REGULAR LOWER BODY CLOTHING: A LITTLE
TOILETING: A LITTLE
CLIMB 3 TO 5 STEPS WITH RAILING: A LITTLE
MOBILITY SCORE: 24
DAILY ACTIVITIY SCORE: 22
DAILY ACTIVITIY SCORE: 23
PATIENT BASELINE BEDBOUND: NO
MOBILITY SCORE: 24

## 2024-03-12 ASSESSMENT — ACTIVITIES OF DAILY LIVING (ADL)
TOILETING: INDEPENDENT
TOILETING: INDEPENDENT
FEEDING YOURSELF: INDEPENDENT
HEARING - RIGHT EAR: FUNCTIONAL
JUDGMENT_ADEQUATE_SAFELY_COMPLETE_DAILY_ACTIVITIES: YES
DRESSING YOURSELF: INDEPENDENT
WALKS IN HOME: INDEPENDENT
BATHING: INDEPENDENT
FEEDING YOURSELF: INDEPENDENT
DRESSING YOURSELF: INDEPENDENT
HEARING - RIGHT EAR: FUNCTIONAL
PATIENT'S MEMORY ADEQUATE TO SAFELY COMPLETE DAILY ACTIVITIES?: YES
FEEDING YOURSELF: INDEPENDENT
DRESSING YOURSELF: INDEPENDENT
WALKS IN HOME: INDEPENDENT
GROOMING: INDEPENDENT
ADEQUATE_TO_COMPLETE_ADL: YES
BATHING: INDEPENDENT
GROOMING: INDEPENDENT
JUDGMENT_ADEQUATE_SAFELY_COMPLETE_DAILY_ACTIVITIES: YES
ADEQUATE_TO_COMPLETE_ADL: YES
HEARING - LEFT EAR: FUNCTIONAL
HEARING - LEFT EAR: FUNCTIONAL
WALKS IN HOME: INDEPENDENT
PATIENT'S MEMORY ADEQUATE TO SAFELY COMPLETE DAILY ACTIVITIES?: YES
HEARING - RIGHT EAR: FUNCTIONAL
GROOMING: INDEPENDENT
JUDGMENT_ADEQUATE_SAFELY_COMPLETE_DAILY_ACTIVITIES: YES
BATHING: INDEPENDENT
PATIENT'S MEMORY ADEQUATE TO SAFELY COMPLETE DAILY ACTIVITIES?: YES
HEARING - LEFT EAR: FUNCTIONAL
LACK_OF_TRANSPORTATION: NO
ADEQUATE_TO_COMPLETE_ADL: YES
LACK_OF_TRANSPORTATION: NO
TOILETING: INDEPENDENT

## 2024-03-12 ASSESSMENT — COLUMBIA-SUICIDE SEVERITY RATING SCALE - C-SSRS
6. HAVE YOU EVER DONE ANYTHING, STARTED TO DO ANYTHING, OR PREPARED TO DO ANYTHING TO END YOUR LIFE?: NO
6. HAVE YOU EVER DONE ANYTHING, STARTED TO DO ANYTHING, OR PREPARED TO DO ANYTHING TO END YOUR LIFE?: NO
2. HAVE YOU ACTUALLY HAD ANY THOUGHTS OF KILLING YOURSELF?: NO
1. IN THE PAST MONTH, HAVE YOU WISHED YOU WERE DEAD OR WISHED YOU COULD GO TO SLEEP AND NOT WAKE UP?: NO
1. IN THE PAST MONTH, HAVE YOU WISHED YOU WERE DEAD OR WISHED YOU COULD GO TO SLEEP AND NOT WAKE UP?: NO
2. HAVE YOU ACTUALLY HAD ANY THOUGHTS OF KILLING YOURSELF?: NO

## 2024-03-12 ASSESSMENT — PAIN - FUNCTIONAL ASSESSMENT
PAIN_FUNCTIONAL_ASSESSMENT: 0-10

## 2024-03-12 ASSESSMENT — LIFESTYLE VARIABLES
AUDIT-C TOTAL SCORE: 0
HOW OFTEN DO YOU HAVE 6 OR MORE DRINKS ON ONE OCCASION: NEVER
HOW OFTEN DO YOU HAVE A DRINK CONTAINING ALCOHOL: NEVER
HOW OFTEN DO YOU HAVE A DRINK CONTAINING ALCOHOL: NEVER
AUDIT-C TOTAL SCORE: 0
HOW OFTEN DO YOU HAVE 6 OR MORE DRINKS ON ONE OCCASION: NEVER
HOW MANY STANDARD DRINKS CONTAINING ALCOHOL DO YOU HAVE ON A TYPICAL DAY: PATIENT DOES NOT DRINK
SKIP TO QUESTIONS 9-10: 1
HOW MANY STANDARD DRINKS CONTAINING ALCOHOL DO YOU HAVE ON A TYPICAL DAY: PATIENT DOES NOT DRINK
AUDIT-C TOTAL SCORE: 0
SKIP TO QUESTIONS 9-10: 1
AUDIT-C TOTAL SCORE: 0

## 2024-03-12 ASSESSMENT — PATIENT HEALTH QUESTIONNAIRE - PHQ9
2. FEELING DOWN, DEPRESSED OR HOPELESS: SEVERAL DAYS
SUM OF ALL RESPONSES TO PHQ9 QUESTIONS 1 & 2: 2
1. LITTLE INTEREST OR PLEASURE IN DOING THINGS: SEVERAL DAYS
SUM OF ALL RESPONSES TO PHQ9 QUESTIONS 1 & 2: 2
2. FEELING DOWN, DEPRESSED OR HOPELESS: SEVERAL DAYS
1. LITTLE INTEREST OR PLEASURE IN DOING THINGS: SEVERAL DAYS

## 2024-03-12 ASSESSMENT — PAIN SCALES - GENERAL
PAINLEVEL_OUTOF10: 0 - NO PAIN

## 2024-03-12 NOTE — H&P
"    HPI:  Vance Silva \"Christine" is a 58 y.o. male with PMHx of cirrhosis c/b portal htn with esophageal varices sp banding on 1/5, with significant ascites requiring frequent paracentesis, hepatic encephalopathy, hx of TBI, OA transferred from [facility] for acute anemia with Hgb 4.8. Patient presented for paracentesis and MRI of the liver 3/11 at [facility], and as a routine procedure blood work was done and revealed extremely low hemoglobin level, prompting recommendation to go to the ED. Patient was admitted for further management, he received 2 units of packed RBC transfusion ordered in the ED, with resulted with increment of his H&H to 6.7/22.8. Gastroenterology service was consulted, and patient underwent EGD this morning which was negative for acute bleeding.  Upon consulting with his hepatologist at [facility], gastroenterology services agreed to transfer patient to [facility] for further management in light of workup for liver transplant.     Most recently in February while traveling to [facility] in [place], he became ill on flight, and was directly transferred to hospital where he underwent blood transfusion, and was told that he was bacteremic and started on IV antibiotic before transitioning to Bactrim p.o.  Patient stated that he was transferred back to [place] and admitted at [facility] for further management and additional blood transfusion.  Stated that he undergo routine EGDs for his esophageal varices and denies hematemesis, blood per rectum, melena, and hemoptysis.  Reports some shortness of breath with exertion that has somewhat improved following paracentesis yesterday.    GI history:  Enteroscopy 3/12/2024:  Two small and minimal grade I varices in the esophagus  Mild abnormal mucosa in the stomach  The duodenum, major papilla and proximal jejunum appeared normal.    EGD 2/12/2024:  Grade B esophagitis in the GE junction  Two small grade I varices in the lower third of the esophagus. No treatment " necessary.   Moderate to severe portal hypertensive gastropathy. No gastric varices.  The examined part of the duodenum appeared normal.  No findings of active bleeding or stigmata of recent bleeding.     Colonoscopy 2/14/2024:  The perianal exam was normal.  The terminal ileum appeared normal.  One 3 mm sessile polyp in the descending colon; performed cold forceps biopsy with complete removal. Jar 1  Multiple small and medium, scattered diverticula of moderate severity in the ascending colon, descending colon and sigmoid colon  The exam was otherwise normal.    Found to have cirrhosis in 11/2020 when he presented with GI bleeding from duodenal ulcers. Cirrhosis believed to be multifactorial potentially related to painkiller/supplement use, has not drank alcohol in many years. At some point had high arsenic levels in blood.    Medications prior to admission:  Medication Documentation Review Audit       Reviewed by Mireya Zhang RN (Registered Nurse) on 03/12/24 at 0217      Medication Order Taking? Sig Documenting Provider Last Dose Status   carvedilol (Coreg) 6.25 mg tablet [id number] No Take 1 tablet (6.25 mg) by mouth once daily at bedtime. Mu Norris MD 3/11/2024 2350 Active   Constulose 10 gram/15 mL oral solution [id number] No Take 30 mL (20 g) by mouth 3 times a day. Titrate to achieve goal 2-3 bowel movements daily Luis F Walton MD 3/11/2024 0800 Active   furosemide (Lasix) 20 mg tablet [id number] No Take 2 tablets (40 mg) by mouth 2 times a day. Mu Norris MD 3/11/2024 0800 Active   pantoprazole (ProtoNix) 40 mg EC tablet [id number] No Take 1 tablet (40 mg) by mouth once daily in the morning. Take before meals. Do not crush, chew, or split. Mu Norris MD 3/11/2024 0800 Active   spironolactone (Aldactone) 50 mg tablet [id number] No Take 3 tablets (150 mg) by mouth once daily. Mu Norris MD 0800 Active                    Allergies:  No Known Allergies    Past medical history:  Past Medical  History:   Diagnosis Date    Acute kidney injury (CMS/HCC)     Anemia     Ascites     Chronic kidney disease     Cirrhosis (CMS/HCC)     Hepatitis C     Liver disease     Parkinson's disease     dx 2/16/2024    Seizure (CMS/HCC)     Sleep apnea     Traumatic brain injury (CMS/HCC)        Surgical history:  Past Surgical History:   Procedure Laterality Date    ESOPHAGOGASTRODUODENOSCOPY      OTHER SURGICAL HISTORY      Multiple orthopedic surgeries    UMBILICAL HERNIA REPAIR  2021       Family history:  No family history on file.    Social history:   reports that he has never smoked. He has never used smokeless tobacco. He reports that he does not currently use alcohol. He reports that he does not currently use drugs.    Review of systems:  As per HPI, otherwise negative    Vitals:  There were no vitals filed for this visit.    Physical exam:  Constitutional: Well-developed male in no acute distress. Sitting on couch in room.  HEENT: Normocephalic, atraumatic. EOMI. No scleral icterus.  Respiratory: CTA bilaterally. No wheezes, rales, or rhonchi. Normal respiratory effort.  Cardiovascular: RRR. No murmurs, gallops, or rubs.   Abdominal: Soft, distended, nontender to palpation. Bowel sounds present.  Neuro: Alert and oriented to person, place, and time. No focal deficit  MSK: Mild LE edema bilaterally. No UE edema.  Skin: Warm, dry. No rashes or wounds.  Psych: Appropriate mood and affect.    Labs:  Results for orders placed or performed during the hospital encounter of 03/11/24 (from the past 24 hour(s))   Prepare RBC: 3 Units   Result Value Ref Range    PRODUCT CODE E7012T86     Unit Number [id number]     Unit ABO O     Unit RH POS     XM INTEP COMP     Dispense Status TR     Blood Expiration Date April 03, 2024 23:59 EDT     PRODUCT BLOOD TYPE 5100     UNIT VOLUME 280     PRODUCT CODE E3980M76     Unit Number [id number]     Unit ABO O     Unit RH POS     XM INTEP COMP     Dispense Status TR     Blood  Expiration Date April 03, 2024 23:59 EDT     PRODUCT BLOOD TYPE 5100     UNIT VOLUME 350     PRODUCT CODE E4156E56     Unit Number [id number]     Unit ABO O     Unit RH POS     XM INTEP COMP     Dispense Status TR     Blood Expiration Date April 03, 2024 23:59 EDT     PRODUCT BLOOD TYPE 5100     UNIT VOLUME 350    Type and Screen   Result Value Ref Range    ABO TYPE O     Rh TYPE POS     ANTIBODY SCREEN NEG    ABO/Rh   Result Value Ref Range    ABO TYPE O     Rh TYPE POS    ECG 12 lead   Result Value Ref Range    Ventricular Rate 87 BPM    Atrial Rate 87 BPM    NV Interval 168 ms    QRS Duration 88 ms    QT Interval 380 ms    QTC Calculation(Bazett) 457 ms    P Axis 54 degrees    R Axis 12 degrees    T Axis 34 degrees    QRS Count 14 beats    Q Onset 212 ms    P Onset 128 ms    P Offset 188 ms    T Offset 402 ms    QTC Fredericia 430 ms   Comprehensive metabolic panel   Result Value Ref Range    Glucose 100 (H) 74 - 99 mg/dL    Sodium 137 136 - 145 mmol/L    Potassium 4.3 3.5 - 5.3 mmol/L    Chloride 105 98 - 107 mmol/L    Bicarbonate 22 21 - 32 mmol/L    Anion Gap 14 10 - 20 mmol/L    Urea Nitrogen 19 6 - 23 mg/dL    Creatinine 1.45 (H) 0.50 - 1.30 mg/dL    eGFR 55 (L) >60 mL/min/1.73m*2    Calcium 7.8 (L) 8.6 - 10.3 mg/dL    Albumin 4.0 3.4 - 5.0 g/dL    Alkaline Phosphatase 68 33 - 136 U/L    Total Protein 6.5 6.4 - 8.2 g/dL    AST 13 9 - 39 U/L    Bilirubin, Total 0.4 0.0 - 1.2 mg/dL    ALT 11 10 - 52 U/L   Magnesium   Result Value Ref Range    Magnesium 1.90 1.60 - 2.40 mg/dL   CBC   Result Value Ref Range    WBC 2.9 (L) 4.4 - 11.3 x10*3/uL    nRBC 0.0 0.0 - 0.0 /100 WBCs    RBC 2.80 (L) 4.50 - 5.90 x10*6/uL    Hemoglobin 6.7 (L) 13.5 - 17.5 g/dL    Hematocrit 22.8 (L) 41.0 - 52.0 %    MCV 81 80 - 100 fL    MCH 23.9 (L) 26.0 - 34.0 pg    MCHC 29.4 (L) 32.0 - 36.0 g/dL    RDW 20.4 (H) 11.5 - 14.5 %    Platelets 97 (L) 150 - 450 x10*3/uL   Comprehensive metabolic panel   Result Value Ref Range    Glucose 85  74 - 99 mg/dL    Sodium 137 136 - 145 mmol/L    Potassium 3.9 3.5 - 5.3 mmol/L    Chloride 106 98 - 107 mmol/L    Bicarbonate 23 21 - 32 mmol/L    Anion Gap 12 10 - 20 mmol/L    Urea Nitrogen 20 6 - 23 mg/dL    Creatinine 1.32 (H) 0.50 - 1.30 mg/dL    eGFR 62 >60 mL/min/1.73m*2    Calcium 7.9 (L) 8.6 - 10.3 mg/dL    Albumin 3.7 3.4 - 5.0 g/dL    Alkaline Phosphatase 59 33 - 136 U/L    Total Protein 6.1 (L) 6.4 - 8.2 g/dL    AST 11 9 - 39 U/L    Bilirubin, Total 0.6 0.0 - 1.2 mg/dL    ALT 9 (L) 10 - 52 U/L   Calcium, ionized   Result Value Ref Range    POCT Calcium, Ionized 1.15 1.1 - 1.33 mmol/L   Prepare RBC: 2 Units   Result Value Ref Range    PRODUCT CODE K0730R13     Unit Number [id number]     Unit ABO O     Unit RH POS     XM INTEP COMP     Dispense Status XM     Blood Expiration Date March 16, 2024 23:59 EDT     PRODUCT BLOOD TYPE 5100     UNIT VOLUME 350     PRODUCT CODE N8060J67     Unit Number [id number]     Unit ABO O     Unit RH POS     XM INTEP COMP     Dispense Status XM     Blood Expiration Date April 03, 2024 23:59 EDT     PRODUCT BLOOD TYPE 5100     UNIT VOLUME 286        Imaging:  ECG 12 lead    Result Date: 3/12/2024  Normal sinus rhythm Low voltage QRS Borderline ECG When compared with ECG of 09-FEB-2024 23:57, No significant change was found See ED provider note for full interpretation and clinical correlation Confirmed by Leonard Alamo ([id number]) on 3/12/2024 9:55:07 AM    Enteroscopy    Result Date: 3/12/2024  Table formatting from the original result was not included. Impression Two small and minimal grade I varices in the esophagus Mild abnormal mucosa in the stomach The duodenum, major papilla and proximal jejunum appeared normal. Findings Two small and minimal grade I varices (no red shyam sign) in the esophagus; no bleeding was identified. Extend from 30 -40 cm Mild, generalized abnormal mucosa in the stomach. Portal hypertensive gastropathy and no gastric varices or bleeding The  duodenum, major papilla and proximal jejunum appeared normal. Recommendation  Follow up with primary gastroenterologist  Follow up with Ernie Norris, hepatology Continue meds Can go home today  Indication Other cirrhosis of liver (CMS/HCC), Anemia, unspecified type, Gastrointestinal hemorrhage, unspecified gastrointestinal hemorrhage type Staff Staff Role Herve Samson MD Proceduralist Medications See Anesthesia Record. Preprocedure A history and physical has been performed, and patient medication allergies have been reviewed. The patient's tolerance of previous anesthesia has been reviewed. The risks and benefits of the procedure and the sedation options and risks were discussed with the patient. All questions were answered and informed consent obtained. Details of the Procedure The patient underwent monitored anesthesia care, which was administered by an anesthesia professional. The patient's blood pressure, heart rate, level of consciousness, oxygen, respirations, ECG and ETCO2 were monitored throughout the procedure. The scope was introduced through the mouth and advanced to the proximal part of the jejunum. Tattoo was not placed to renard the distal extent of the exam. The procedure was performed without an overtube. Fluoroscopy was not used. Retroflexion was performed in the cardia. The patient experienced no blood loss. The procedure was not difficult. The patient tolerated the procedure well. There were no apparent adverse events. Events Procedure Events Event Event Time ENDO SCOPE IN TIME 3/12/2024  9:35 AM ENDO SCOPE OUT TIME 3/12/2024  9:50 AM Specimens No specimens collected Procedure Location [facility] [address] [facility] [address] [phone] Referring Provider No referring provider defined for this encounter. Procedure Provider No name on file      liver w and wo IV contrast    Result Date: 3/11/2024  Interpreted By:  Yuan Hauser, STUDY:   LIVER W AND WO IV CONTRAST;  3/11/2024 3:26 pm   INDICATION: Signs/Symptoms:PRE LIVER TRANSPLANT EVALUATION.   COMPARISON: None.   ACCESSION NUMBER(S): [id number]   ORDERING CLINICIAN: GABY GILLIAM   TECHNIQUE: MRI LIVER; Multiplanar magnetic resonance images of the abdomen were obtained including the following sequences; T2-weighted SSFSE with and without fat saturation, T1-weighted GRE in/opposed phase, DWI, fat saturated 3D-T1w GRE pre and dynamically post contrast.  18 ml of Gadolinium contrast agent Dotarem were administered intravenously without immediate complication.   FINDINGS: Exam is limited due to motion artifact and dielectric effect relating to ascites.   LIVER: No signal changes of steatosis. Cirrhotic including diffuse surface nodularity with marked hypertrophy of the left lateral segment and caudate lobe. No focal lesions identified. Please note evaluation is slightly limited as the 1st post-contrast scan is in the portal venous phase. No true arterial phase images were obtained.   BILE DUCTS: No dilatation.   GALLBLADDER: Mild wall thickening which could be reactive to ascites/liver disease.   PANCREAS: Grossly unremarkable. No main ductal dilatation or mass identified.   SPLEEN: Markedly enlarged measuring nearly 24 cm in length.   ADRENAL GLANDS: Unremarkable.   KIDNEYS: Small cyst in the left kidney.  Bilateral striated nephrograms are noted. Otherwise unremarkable kidneys without hydronephrosis.   LYMPH NODES: No lymphadenopathy identified.   ABDOMINAL VESSELS: Abdominal aorta is patent without aneurysm. Major visceral arterial branches are patent. IVC and visualized major branches are patent. Major portal venous branches are patent. There is a recanalized umbilical vein as well as tiny paraesophageal varices which indicate portal hypertension.   BOWEL: No dilated bowel is visualized.   PERITONEUM/RETROPERITONEUM: Partially imaged moderate to large volume ascites.   BONES AND LOWER THORAX: No  focal abnormal marrow signal. Mild bibasilar atelectasis.         1.  Findings of cirrhosis and portal hypertension. No focal hepatic lesions identified. Please note the study lacks a true arterial phase post-contrast scan. 2. Moderate to large volume ascites. 3. Nonspecific striated nephrograms bilaterally. Diagnostic considerations may include pyelonephritis, acute tubular necrosis, or hypotension.     MACRO: None   Signed by: Yuan Hauser 3/11/2024 3:55 PM Dictation workstation:   [id number]    US guided abdominal paracentesis    Result Date: 3/11/2024  Interpreted By:  Seven Ewing, STUDY: US GUIDED ABDOMINAL PARACENTESIS; 3/11/946626:38 pm   INDICATION: Signs/Symptoms:refractory ascites - pre liver txp evaluation patient. Ascites, Cirrhosis   COMPARISON: US guided paracentesis 2/13/24   ACCESSION NUMBER(S): [id number]   ORDERING CLINICIAN: GABY GILLIAM   TECHNIQUE: INTERVENTIONALIST(S): Seven Ewing   CONSENT: The patient/patient's POA/next of kin was informed of the nature of the proposed procedure. The purposes, alternatives, risks, and benefits were explained and discussed. All questions were answered and consent was obtained.   SEDATION: None   MEDICATION/CONTRAST: 1% lidocaine was used to anesthetize subcutaneously 62.5 Grams albumin IV was ordered following procedure.   TIME OUT: A time out was performed immediately prior to procedure start with the interventional team, correctly identifying the patient name, date of birth, MRN, procedure, anatomy (including marking of site and side), patient position, procedure consent form, relevant laboratory and imaging test results, antibiotic administration, safety precautions, and procedure-specific equipment needs.   FINDINGS: The patient was placed in the supine position.   The abdominal space was examined with grey scale ultrasound, and the most accessible fluid identified and marked for paracentesis.   The skin was prepped and draped in usual manner. Local  "anesthesia with Lidocaine was administered and a right-sided paracentesis was performed.  A 5 [place] One-Step paracentesis needle/catheter was then placed where marked. Approximately 6.7 L of yellowish colored fluid was removed.  The needle/catheter was then withdrawn.   The patient tolerated the procedure well and there were no immediate complications. Specimen(s) sent to the laboratory for further evaluation, per the requesting team.       Uneventful paracentesis, as detailed above. Right Hemiabdomen, 6.7 L   I personally performed and/or directly supervised this study and was present for the entire procedure.   Performed and dictated at [facility].   Signed by: Seven Ewing 3/11/2024 12:40 PM Dictation workstation:   [id number]       Assessment and Plan:  Vance Silva \"Christine" is a 58 y.o. male with PMHx of cirrhosis c/b portal htn with esophageal varices sp banding on 1/5, with significant ascites requiring frequent paracentesis, hepatic encephalopathy, hx of TBI, OA transferred from [facility] for acute anemia with Hgb 4.8.    #Cirrhosis cb variceal bleeds, ascites, and HE  #Hx Variceal bleed sp banding most recent 1/5  #Anemia  - cirrhosis etiology multifactorial including previous painkiller, heavy EtOH, and supplement use  - Acute anemia with Hgb 4.8 from 10 two weeks prior to presentation  - iron studies on 2/27 c/w DEANA, received 100 mg IV iron 3/12 AM at [facility]  - no observed bleeding by patient, push enteroscopy at [facility] negative  - sp 2u pRBCs on 3/11  - 3/11 paracentesis negative for SBP  - Hepatitis stamp w/up was negative on 2/23/24  MELD 3.0: 11 at 3/12/2024  6:24 AM  MELD-Na: 12 at 3/12/2024  6:24 AM  PLAN:  - CTX 1 g daily for SBP prophylaxis iso GIB  - pantoprazole 40 mg BID  - continue aldactone 150 mg daily, holding lasix  - continue lactulose 20g TID titrate to 3 soft BM daily  - maintain active T&s, consent in chart  - BID H&H  - NPO at MN for potential capsule " endoscopy  - 1 unit PRBCs upon arrival to [facility]    #PORFIRIO   - Cr 1.32; peaked at 1.45; baseline 0.9-1.1  - likely prerenal iso hypovolemia  - will hold lasix tonight   - trend Cr in AM      E: Replete PRN  N: sodium restricted diet, otherwise NPO after MN  A: PIV  DVT: Holding due to anemia/recent bleed (SCD)  Full code (confirmed on admission)  NOK: States his son Jose Guadalupe ([phone]) and daughter Chante ([phone]) are his HCPOA    Patient and plan discussed with senior resident, will be formally staffed with attending in AM.    Eri Jenkins MD  PGY-1 Internal Medicine

## 2024-03-12 NOTE — SIGNIFICANT EVENT
SEE EXCELLENT INTERN H&P FOR FURTHER DETAILS    Chief complaint: anemia     History Of Present Illness  Karyn Silva is a 58 y.o. male with past medical history significant for decompensated cirrhosis (currently on liver transplant list) c/b Grade I esophageal varices (3/12/2024) s/p banding (1/5/2024) PHG; hypertension, hyperlipidemia, who presents as a transfer from [facility] ED with concerns for anemia.     Initially presented for paracentesis and MRI of the liver on 3/11 at [facility], and as a routine procedure blood work was done and revealed extremely low hemoglobin level of 4.8 from 10.1 two weeks prior. Prompted recommendation to go to the ED for further management. Patient was admitted for further management, he received 2 units of packed RBC transfusion ordered in the ED, with resulted with increment of his H&H to 6.7/22.8.  Gastroenterology service was consulted, and patient underwent enteroscopy this morning which was negative for acute bleeding, showed grade I EVs. Upon consulting with his hepatologist at [facility], gastroenterology services agreed to transfer patient to [facility] for further management in light of workup for liver transplant.    Of note, has hx of anemia with initial admission for Hg of 2.8 at [facility] 18 months ago.  Pt undergoes routine EGDs for his esophageal varices. Pt was last admitted on the [facility] service from 2/10-2/14 for weakness; Hg 7.6 - EGD shows 2 grade 1 EVs in lower third of the esophagis, no bleeding seen, moderate to severe PHG. Colonoscopy also negative. Discharged on Bactrim for SBP and PPI daily. Also admitted on 01/2024 for weakness and anemia, found to have a Hg of 5.6 - given 2 units at the time and transferred to MICU for emergent EGD - found large EV with stigmata of bleeding and PHG; was started in carvedilol.     12 point ROS unless stated above.    On arrival to [facility]:   T: 986  BP: 126/93  HR: 92  RR: 18  SPO2: 98% on RA     Labs:  Results for  orders placed or performed during the hospital encounter of 03/11/24 (from the past 24 hour(s))   Type and Screen   Result Value Ref Range    ABO TYPE O     Rh TYPE POS     ANTIBODY SCREEN NEG    ABO/Rh   Result Value Ref Range    ABO TYPE O     Rh TYPE POS    ECG 12 lead   Result Value Ref Range    Ventricular Rate 87 BPM    Atrial Rate 87 BPM    RI Interval 168 ms    QRS Duration 88 ms    QT Interval 380 ms    QTC Calculation(Bazett) 457 ms    P Axis 54 degrees    R Axis 12 degrees    T Axis 34 degrees    QRS Count 14 beats    Q Onset 212 ms    P Onset 128 ms    P Offset 188 ms    T Offset 402 ms    QTC Fredericia 430 ms   Comprehensive metabolic panel   Result Value Ref Range    Glucose 100 (H) 74 - 99 mg/dL    Sodium 137 136 - 145 mmol/L    Potassium 4.3 3.5 - 5.3 mmol/L    Chloride 105 98 - 107 mmol/L    Bicarbonate 22 21 - 32 mmol/L    Anion Gap 14 10 - 20 mmol/L    Urea Nitrogen 19 6 - 23 mg/dL    Creatinine 1.45 (H) 0.50 - 1.30 mg/dL    eGFR 55 (L) >60 mL/min/1.73m*2    Calcium 7.8 (L) 8.6 - 10.3 mg/dL    Albumin 4.0 3.4 - 5.0 g/dL    Alkaline Phosphatase 68 33 - 136 U/L    Total Protein 6.5 6.4 - 8.2 g/dL    AST 13 9 - 39 U/L    Bilirubin, Total 0.4 0.0 - 1.2 mg/dL    ALT 11 10 - 52 U/L   Magnesium   Result Value Ref Range    Magnesium 1.90 1.60 - 2.40 mg/dL   CBC   Result Value Ref Range    WBC 2.9 (L) 4.4 - 11.3 x10*3/uL    nRBC 0.0 0.0 - 0.0 /100 WBCs    RBC 2.80 (L) 4.50 - 5.90 x10*6/uL    Hemoglobin 6.7 (L) 13.5 - 17.5 g/dL    Hematocrit 22.8 (L) 41.0 - 52.0 %    MCV 81 80 - 100 fL    MCH 23.9 (L) 26.0 - 34.0 pg    MCHC 29.4 (L) 32.0 - 36.0 g/dL    RDW 20.4 (H) 11.5 - 14.5 %    Platelets 97 (L) 150 - 450 x10*3/uL   Comprehensive metabolic panel   Result Value Ref Range    Glucose 85 74 - 99 mg/dL    Sodium 137 136 - 145 mmol/L    Potassium 3.9 3.5 - 5.3 mmol/L    Chloride 106 98 - 107 mmol/L    Bicarbonate 23 21 - 32 mmol/L    Anion Gap 12 10 - 20 mmol/L    Urea Nitrogen 20 6 - 23 mg/dL    Creatinine  1.32 (H) 0.50 - 1.30 mg/dL    eGFR 62 >60 mL/min/1.73m*2    Calcium 7.9 (L) 8.6 - 10.3 mg/dL    Albumin 3.7 3.4 - 5.0 g/dL    Alkaline Phosphatase 59 33 - 136 U/L    Total Protein 6.1 (L) 6.4 - 8.2 g/dL    AST 11 9 - 39 U/L    Bilirubin, Total 0.6 0.0 - 1.2 mg/dL    ALT 9 (L) 10 - 52 U/L   Calcium, ionized   Result Value Ref Range    POCT Calcium, Ionized 1.15 1.1 - 1.33 mmol/L   Prepare RBC: 2 Units   Result Value Ref Range    PRODUCT CODE M9735G17     Unit Number [id number]     Unit ABO O     Unit RH POS     XM INTEP COMP     Dispense Status XM     Blood Expiration Date March 16, 2024 23:59 EDT     PRODUCT BLOOD TYPE 5100     UNIT VOLUME 350     PRODUCT CODE G4874E75     Unit Number [id number]     Unit ABO O     Unit RH POS     XM INTEP COMP     Dispense Status XM     Blood Expiration Date April 03, 2024 23:59 EDT     PRODUCT BLOOD TYPE 5100     UNIT VOLUME 286     }   GI history:   Enteroscopy (3/12/24):   -Two small and minimal grade I varices in the esophagus  -Mild abnormal mucosa in the stomach  -The duodenum, major papilla and proximal jejunum appeared normal.    Colonoscopy (2/14/24):  -The perianal exam was normal.  -The terminal ileum appeared normal.  -One 3 mm sessile polyp in the descending colon; performed cold forceps biopsy with complete removal. Jar 1  -Multiple small and medium, scattered diverticula of moderate severity in the ascending colon, descending colon and sigmoid colon  -The exam was otherwise normal.    Colonoscopy (2/12/2024):   -Grade B esophagitis in the GE junction  -Two small grade I varices in the lower third of the esophagus. No treatment necessary.   -Moderate to severe portal hypertensive gastropathy. No gastric varices.  -The examined part of the duodenum appeared normal.  -No findings of active bleeding or stigmata of recent bleeding       Physical Exam  General: awake, alert, conversant, NAD, no asterixis   Eyes: EOMI, no scleral icterus or conjunctival  pallor  Cardiac: RRR, normal S1, S2, no M/R/G  Pulm: CTAB, normal respiratory effort, no inc WOB  Abdomen: soft, distended, NT, no involuntary guarding or rebound tenderness  EXT: 1+ pitting edema bilaterally in LE, no asymmetry noted  MSK: no focal joint swelling noted  Neuro: AOx3, able to follow commands, no gross FND  Psych: coherent thought process, appropriate mood and affect     Assessment and Plan:   Assessment/Plan   Active Problems:  There are no active Hospital Problems.    Assessment and Plan:   Karyn Silva is a 58 y.o. male with past medical history significant for decompensated cirrhosis (currently on liver transplant list) c/b Grade I esophageal varices (3/12/2024) s/p banding (1/5/2024) PHG; hypertension, hyperlipidemia, who presents as a transfer from [facility] ED with concerns for anemia. Unsure at this time possible source of the bleed given negative recent EGD and colonoscopy, given recurrent anemia with no overt signs of bleeding, will plan for capsule endoscopy in AM for further evaluation. Workup negative for VB12/folate deficiency and hemolytic anemia.     #Anemia   #DEANA   ::Hg 4.8 on admission from 10.2 two weeks earlier; baseline 7-8   ::2 units of pRBC with incrementation to 6.7   ::s/p enteroscopy 3/12: which showed grade I EVs  ::iron studies consistent with DEANA   ::s/p iron sucrose 100 mg x1 on 3/12   ::colonoscopy 2/2024: 3mm polyp, scattered diverticula, no evidence of bleding   ::haptoglobin wnl, LDH wnl, normal bili - hemolysis less likely   ::B12 and folate wnl  -will order 1 unit of pRBC for goal Hg >7   -active T+S   -CTX 1g daily from SBP ppx   -PPI 40 BID   -NPO at [facility] for capsule endoscopy in AM     #Decompensated Cirrhosis  ::MELD 3.0 - 11  ::likely multifactorial in etiology - suspected to be from NSAIDS, arsenic and excess EtOH per hepatology notes   ::currently on liver transplant list   ::hepatologist:  Post    ::s/p para 3/12 - 6L removed; negative for  SBP  -c/w lactulose 20 g TID   -c/w coreg 6.25 daily  -c/w aldactone 150 daily   -hold lasix given PORFIRIO, will reevaluate in the AM     #PORFIRIO   ::Cr 1.31; peaked at 1.45; baseline 0.9-1.1  -will hold lasix tonight   -trend Cr in AM     F: PRN   E: PRN  N: 2g Na, 2L fluid restriction   GI ppx: none   DVT ppx: none     Code status: Full code   NOK: Chante Silva (daughter)- [phone]    Sparkle Torrse MD

## 2024-03-12 NOTE — H&P
History Of Present Illness  Karyn Silva is a 58 y.o. male with past medical history of liver disease, esophageal varices hypertension, hyperlipidemia, who presented to [facility] ED with with complaint of low hemoglobin level from recent lab.  Patient states that he is on a transplant list for liver transplant due to advanced liver disease, and has been doing pretty transplant workup since late last year with coordination of Dr. Norris, who is his hepatologist.  Stated that he presented for paracentesis and MRI of the liver today at [facility], and as a routine procedure blood work was done and revealed extremely low hemoglobin level, prompting recommendation to go to the ED for further management.  Stated that he had a history of low hemoglobin count, first episode was 18 months ago with a hemoglobin of 2.8, was admitted at [facility] and underwent blood transfusion.  Patient stated that since then he must have gotten about okay for admission resulting with transfusion of multiple blood units.  Most recently in February while traveling to [facility] in [place], he became ill on flight, and was directly transferred to hospital where he underwent blood transfusion, and was told that he was bacteremic and started on IV antibiotic before transitioning to Bactrim p.o.  Patient stated that he was transferred back to [place] and admitted at [facility] for further management and additional blood transfusion.  Stated that he undergo routine EGDs for his esophageal varices, and find any reason for current lower level of hemoglobin, as he denies hematemesis, blood per rectum, melena, and hemoptysis.    Review of systems, patient admits to lightheadedness, exertional shortness of breath, and denied nausea, vomiting, abdominal pain, blood per rectum melena or any other symptoms at this time.     Past Medical History  He has a past medical history of Acute kidney injury (CMS/HCC), Anemia, Ascites, Chronic kidney  disease, Cirrhosis (CMS/HCC), Hepatitis C, Liver disease, Seizure (CMS/HCC), Sleep apnea, and Traumatic brain injury (CMS/HCC).    Surgical History  He has no past surgical history on file.     Social History  He reports that he has never smoked. He has never used smokeless tobacco. He reports that he does not currently use alcohol. He reports that he does not currently use drugs.    Family History  No family history on file.     Allergies  Patient has no known allergies.    Review of Systems  A 10 points review of systems was conducted, with patient admitting to symptoms as described in HPI above and denied having any other symptoms at this time.  Physical Exam   Constitutional: Very pleasant gentleman, alert active, cooperative not in acute distress  Eyes: PERRLA, clear sclera  ENMT: Moist mucosal membranes, no exudate  Head / Neck: Atraumatic, normocephalic, supple neck, JVP not visualized  Lungs: Patent airways, CTABL  Heart: RRR, S1S2, no murmurs appreciated, palpable pulses in all extremities  GI: Soft, NT, moderately distended and tympanic, bowel sounds present in all quadrants  MSK: Moves all extremities freely, no restriction  of ROM, no joint edema  Extremities: Bilateral lower extremity +2 peripheral edema  : No Cohen catheter inserted  Breast: Deferred  Neurological: AAO x 3 to person, place and date, facial muscles symmetrical, sensation intact, strength 4/4, no acute focal neurological deficits appreciated  Psychological: Appropriate mood and behavior    Last Recorded Vitals  /74   Pulse 87   Temp 36.3 °C (97.3 °F) (Temporal)   Resp 20   Wt 89.4 kg (197 lb)   SpO2 100%     Relevant Results      Scheduled medications  carvedilol, 6.25 mg, oral, Nightly  docusate sodium, 100 mg, oral, BID  furosemide, 40 mg, intravenous, BID  lactulose, 20 g, oral, TID  [START ON 3/12/2024] pantoprazole, 40 mg, intravenous, Daily  [START ON 3/12/2024] spironolactone, 150 mg, oral, Daily      Continuous  medications     PRN medications  PRN medications: acetaminophen **OR** acetaminophen **OR** acetaminophen, ondansetron **OR** ondansetron  MR liver w and wo IV contrast    Result Date: 3/11/2024  Interpreted By:  Yuan Hauser, STUDY: MR LIVER W AND WO IV CONTRAST;  3/11/2024 3:26 pm   INDICATION: Signs/Symptoms:PRE LIVER TRANSPLANT EVALUATION.   COMPARISON: None.   ACCESSION NUMBER(S): [id number]   ORDERING CLINICIAN: GABY GILLIAM   TECHNIQUE: MRI LIVER; Multiplanar magnetic resonance images of the abdomen were obtained including the following sequences; T2-weighted SSFSE with and without fat saturation, T1-weighted GRE in/opposed phase, DWI, fat saturated 3D-T1w GRE pre and dynamically post contrast.  18 ml of Gadolinium contrast agent Dotarem were administered intravenously without immediate complication.   FINDINGS: Exam is limited due to motion artifact and dielectric effect relating to ascites.   LIVER: No signal changes of steatosis. Cirrhotic including diffuse surface nodularity with marked hypertrophy of the left lateral segment and caudate lobe. No focal lesions identified. Please note evaluation is slightly limited as the 1st post-contrast scan is in the portal venous phase. No true arterial phase images were obtained.   BILE DUCTS: No dilatation.   GALLBLADDER: Mild wall thickening which could be reactive to ascites/liver disease.   PANCREAS: Grossly unremarkable. No main ductal dilatation or mass identified.   SPLEEN: Markedly enlarged measuring nearly 24 cm in length.   ADRENAL GLANDS: Unremarkable.   KIDNEYS: Small cyst in the left kidney.  Bilateral striated nephrograms are noted. Otherwise unremarkable kidneys without hydronephrosis.   LYMPH NODES: No lymphadenopathy identified.   ABDOMINAL VESSELS: Abdominal aorta is patent without aneurysm. Major visceral arterial branches are patent. IVC and visualized major branches are patent. Major portal venous branches are patent. There is a recanalized  umbilical vein as well as tiny paraesophageal varices which indicate portal hypertension.   BOWEL: No dilated bowel is visualized.   PERITONEUM/RETROPERITONEUM: Partially imaged moderate to large volume ascites.   BONES AND LOWER THORAX: No focal abnormal marrow signal. Mild bibasilar atelectasis.         1.  Findings of cirrhosis and portal hypertension. No focal hepatic lesions identified. Please note the study lacks a true arterial phase post-contrast scan. 2. Moderate to large volume ascites. 3. Nonspecific striated nephrograms bilaterally. Diagnostic considerations may include pyelonephritis, acute tubular necrosis, or hypotension.     MACRO: None   Signed by: Yuan Hauser 3/11/2024 3:55 PM Dictation workstation:   [id number]    US guided abdominal paracentesis    Result Date: 3/11/2024  Interpreted By:  Seven Ewing, STUDY: US GUIDED ABDOMINAL PARACENTESIS; 3/11/545495:38 pm   INDICATION: Signs/Symptoms:refractory ascites - pre liver txp evaluation patient. Ascites, Cirrhosis   COMPARISON: US guided paracentesis 2/13/24   ACCESSION NUMBER(S): [id number]   ORDERING CLINICIAN: GABY GILLIAM   TECHNIQUE: INTERVENTIONALIST(S): Seven Ewing   CONSENT: The patient/patient's POA/next of kin was informed of the nature of the proposed procedure. The purposes, alternatives, risks, and benefits were explained and discussed. All questions were answered and consent was obtained.   SEDATION: None   MEDICATION/CONTRAST: 1% lidocaine was used to anesthetize subcutaneously 62.5 Grams albumin IV was ordered following procedure.   TIME OUT: A time out was performed immediately prior to procedure start with the interventional team, correctly identifying the patient name, date of birth, MRN, procedure, anatomy (including marking of site and side), patient position, procedure consent form, relevant laboratory and imaging test results, antibiotic administration, safety precautions, and procedure-specific equipment needs.    FINDINGS: The patient was placed in the supine position.   The abdominal space was examined with grey scale ultrasound, and the most accessible fluid identified and marked for paracentesis.   The skin was prepped and draped in usual manner. Local anesthesia with Lidocaine was administered and a right-sided paracentesis was performed.  A 5 [place]kinan One-Step paracentesis needle/catheter was then placed where marked. Approximately 6.7 L of yellowish colored fluid was removed.  The needle/catheter was then withdrawn.   The patient tolerated the procedure well and there were no immediate complications. Specimen(s) sent to the laboratory for further evaluation, per the requesting team.       Uneventful paracentesis, as detailed above. Right Hemiabdomen, 6.7 L   I personally performed and/or directly supervised this study and was present for the entire procedure.   Performed and dictated at [facility].   Signed by: Seven Ewing 3/11/2024 12:40 PM Dictation workstation:   [id number]    MR prostate screening self pay exam    Result Date: 3/3/2024  Interpreted By:  Ollie Bhakta, STUDY: MR PROSTATE SCREENING SELF PAY EXAM;  3/2/2024 9:01 am   INDICATION: Signs/Symptoms:elevated psa. PSA is 6.5 on 02/23/2024   COMPARISON: None.   ACCESSION NUMBER(S): [id number]   ORDERING CLINICIAN: REGINA MILLARD   TECHNIQUE: Multiplanar MRI of the pelvis was obtained focused on the prostate gland and following a screening protocol including axial, sagittal and coronal T2 weighted SSFSE, axial T2 FSE and axial DWI. No intravenous contrast was administered.   FINDINGS: PROSTATE VOLUME: The prostate measures 4.8 cm x 3.2 cm  x 3.6 cm in right-to-left, anterior-posterior and craniocaudal dimension.   PROSTATE PARENCHYMA: There is heterogeneous enlargement of the transition zone, consistent with benign prostatic hyperplasia. There is an indeterminate ill-defined T2 slightly hypointense area extending from the  anterior transition zone of the apex of the prostate to the midgland, measuring up to 2.3 cm. This area shows slightly hypointense signal on the ADC and hyperintense signal on the DWI.   EXTRACAPSULAR EXTENSION: None.   SEMINAL VESICLES: Within normal limits.   PELVIC LYMPH NODES: No abnormally enlarged pelvic lymph nodes are identified.   PERITONEUM: Partially visualized large abdominopelvic ascites.   OTHER ORGANS: Within normal limits.   BONES: No focal lesions are noted in the bone.   Exam Quality: Is T2WI weighted imaging of diagnostic quality:  Yes. T2WI assessment:  Adequate. Is DWI of diagnostic quality:  No. DWI assessment:  Inadequate. Is DCE of diagnostic quality:  N/A. DCE assessment:  N/A. PI-QUAL score:  At least two sequences taken together are of diagnostic quality Comments:  Large abdominopelvic ascites.       1. PI-RADS 3 lesion in the transition zone of the apex of the prostate extending to the midgland. No extraprostatic extension. 2. No pelvic lymphadenopathy. 3. Partially visualized large abdominopelvic ascites.   3D post-processing was performed using [employer] on an independent workstation, for the purpose of enabling fusion with ultrasound, and provided for review.   I personally reviewed the images/study and I agree with the findings as stated. This study was interpreted at [place], [place].   MACRO: None   Signed by: Ollie Bhakta 3/3/2024 7:04 PM Dictation workstation:   [id number]    Complete Pulmonary Function Test (Spirometry/DLCO/Lung Volumes)    Result Date: 2/27/2024  Normal spirometry. Lung volumes are normal. The DLCO corrected for hemoglobin is normal.    CT cardiac scoring wo IV contrast    Result Date: 2/23/2024  Interpreted By:  Antony Love, STUDY: CT CARDIAC SCORING WO IV CONTRAST;  2/22/2024 4:02 pm   INDICATION: Signs/Symptoms:PRE LIVER TXP EVALUATION. Signs/Symptoms:PRE LIVER TXP EVALUATION. d   COMPARISON: None.   ACCESSION  "NUMBER(S): [id number]   ORDERING CLINICIAN: GABY GILLIAM   TECHNIQUE: Using prospective ECG gating, CT scan of the coronary arteries was performed without intravenous contrast. Coronary calcium scoring  was performed according to the method of Agatston.   FINDINGS: The score and distribution of calcium in the coronary arteries is as follows:   LM 0, .09, LCx 0, RCA 8.85,   Total 258.94   The visualized mid/lower ascending thoracic aorta measures 4.7 cm in diameter. The heart is normal in size. No pericardial effusion is present.   No gross evidence of mediastinal or hilar lymphadenopathy or masses is identified. The visualized segments of the lungs are normally expanded.   Limited evaluation of the upper abdomen demonstrates cirrhotic liver. Ascites present.       1. Coronary artery calcium score of 258.94*. 2. Aneurysmal dilation of the ascending aorta at 4.7 cm. Recommend baseline elective thoracic surgery evaluation.   *Coronary artery calcium scoring may be helpful in predicting the risk for future coronary heart disease events.  According to the American College of Cardiology Foundation Clinical Expert Consensus Task Force, such testing provides important prognostic information in patients with more than one coronary heart disease risk factor. The coronary artery calcium score correlates with the annual risk of a non-fatal myocardial infarction or coronary heart disease death.   Coronary artery score            Annual Risk   0-99                             0.4% 100-399                        1.3% >400                            2.4%   These three \"breakpoints\" correspond to lower, intermediate and high risk states for future coronary events.  Such information should be used, along with appropriate clinical judgment, to make decisions regarding the intensity of risk factor management strategies to treat blood lipids and to modify other non-lipid coronary risk factors.   Reference: Belle HILL et al. " Circulation.  2007; 115:402-426   MACRO: None   Signed by: Antony Love 2/23/2024 7:04 AM Dictation workstation:   [id number]    Colonoscopy Diagnostic    Result Date: 2/14/2024  Table formatting from the original result was not included. Impression The perianal exam was normal. The terminal ileum appeared normal. One 3 mm sessile polyp in the descending colon; performed cold forceps biopsy with complete removal. Jar 1 Multiple small and medium, scattered diverticula of moderate severity in the ascending colon, descending colon and sigmoid colon The exam was otherwise normal. Findings The perianal exam was normal. The terminal ileum appeared normal. One 3 mm sessile polyp in the descending colon; performed cold forceps biopsy with complete removal. Jar 1 Multiple small and medium, scattered diverticula of moderate severity in the ascending colon, descending colon and sigmoid colon The exam was otherwise normal. Recommendation  Await pathology results  Repeat colonoscopy in 5 years  Follow up with primary [facility] team Cachorro Norris and Ketty  - Return to floor. - Resume previous diet. - Continue present medications. - The findings and recommendations were discussed with the patient. Indication Anemia, unspecified type Last colonoscopy 2020 notable for two polyps, tubular adenomas. Has also has two flexible sigmoidoscopes in 2021 and 2022 respectively.  Staff Staff Role No Staff Documented Medications fentaNYL PF (Sublimaze) injection 100 mcg midazolam (Versed) injection 4 mg (Totals for administrations occurring from 1245 to 1326 on 02/14/24) Preprocedure A history and physical has been performed, and patient medication allergies have been reviewed. The patient's tolerance of previous anesthesia has been reviewed. The risks and benefits of the procedure and the sedation options and risks were discussed with the patient. All questions were answered and informed consent obtained. Details of the Procedure The patient  underwent moderate sedation, which was administered by the procedural nurse. The patient's blood pressure, ECG, ETCO2, heart rate, level of consciousness, oxygen and respirations were monitored throughout the procedure. A digital rectal exam was performed. A perianal exam was performed. The scope was introduced through the anus and advanced to the terminal ileum. Retroflexion was performed in the rectum. The quality of bowel preparation was evaluated using the [place] Bowel Preparation Scale with scores of: right colon = 2, transverse colon = 3, left colon = 3. The total BBPS score was 8. Bowel prep was adequate. The patient experienced no blood loss. The procedure was not difficult. The patient tolerated the procedure well. There were no apparent adverse events. Events Procedure Events Event Event Time ENDO CECUM REACHED 2/14/2024  1:01 PM ENDO SCOPE OUT TIME 2/14/2024  1:24 PM Specimens ID Type Source Tests Collected by Time 1 : Cold Bx polyp Tissue COLON - DESCENDING POLYP SURGICAL PATHOLOGY EXAM Natalie Ugalde MD 2/14/2024 1314 Procedure Location [facility] [address] [address] [facility] [address] [phone] Referring Provider No referring provider defined for this encounter. Procedure Provider No name on file     US guided abdominal paracentesis    Result Date: 2/13/2024  Interpreted By:  Adriana Darling, STUDY: US GUIDED ABDOMINAL PARACENTESIS;  2/13/2024 12:40 pm   INDICATION: Signs/Symptoms:For paracentesis.   COMPARISON: None.   ACCESSION NUMBER(S): [id number]   ORDERING CLINICIAN: MATTHEW NGO   TECHNIQUE: INTERVENTIONALIST(S): Adriana Darling PA-C   CONSENT: The patient/patient's POA/next of kin was informed of the nature of the proposed procedure. The purposes, alternatives, risks, and benefits were explained and discussed. All questions were answered and consent was obtained.   SEDATION: None   MEDICATION/CONTRAST: No additional    TIME OUT: A time out was performed immediately prior to procedure start with the interventional team, correctly identifying the patient name, date of birth, MRN, procedure, anatomy (including marking of site and side), patient position, procedure consent form, relevant laboratory and imaging test results, antibiotic administration, safety precautions, and procedure-specific equipment needs.   COMPLICATIONS: No immediate adverse events identified.   FINDINGS: The patient was placed in the supine position.   Screening ultrasound of the abdomen demonstrated a  large amount of free fluid.  The skin was marked in the region of the  left hemiabdomen.  Next, the skin was prepped and draped in the usual sterile fashion.  Following this, one percent lidocaine was used to anesthetize the skin and subcutaneous tissues.  Finally, a 5 German Yueh catheter was placed into the peritoneal cavity without difficulty. Approximately 4800 mL of yellowish colored fluid was collected.  The catheter was removed.   Patient tolerated the procedure with no immediate complications. Specimen(s) sent to the laboratory and pathology for further evaluation, per the requesting team.       Uneventful ultrasound guided paracentesis, as detailed above.   I was present for and/or performed the critical portions of the procedure and immediately available throughout the entire procedure.   I personally reviewed the image(s) / study and interpretation. I agree with the findings as stated.   Performed and dictated at [facility].   MACRO: None   Signed by: Adriana Darling 2/13/2024 3:03 PM Dictation workstation:   [id number]    EGD w Banding    Result Date: 2/12/2024  Table formatting from the original result was not included. Impression Grade B esophagitis in the GE junction Two small grade I varices in the lower third of the esophagus. No treatment necessary. Moderate to severe portal hypertensive gastropathy. No  gastric varices. The examined part of the duodenum appeared normal. No findings of active bleeding or stigmata of recent bleeding. Findings Hypopharynx appeared normal. Grade B esophagitis with mucosal breaks measuring 5 mm or more not continuous between folds, covering less than 75% of the circumference in the GE junction Two small grade I varices (no red shyam sign) in the lower third of the esophagus; no bleeding was identified. Scarring noted from previous banding. The esophagus was otherwise normal. Regular Z-line 38 cm from the incisors Moderate to severe portal hypertensive gastropathy in the cardia, body of the stomach and pylorus; no bleeding was identified The exam of the stomach was otherwise normal. There were no gastric varices, ulcers or bleeding. Of note, no signs of GAVE at this time. The duodenal bulb, 1st part of the duodenum and 2nd part of the duodenum appeared normal. Recommendation  Follow up with primary gastroenterologist ---------------------------- - The patient will be observed post-procedure, until all discharge criteria are met. - [facility] to [facility] for [facility] care. - Defer resumption of diet and medications to the patient's primary team. No restrictions from a post-procedural standpoint. - Observe patient's clinical course. No findings to definitely explain a GI source for his anemia, other than known PHG. - The findings and recommendations were discussed with the referring physicians. - The signs and symptoms of potential delayed complications were discussed with the patient/team. - Patient has a contact number available for emergencies. - Follow up with Dr. Norris and Dr. Hdez for ongoing inpatient Hepatology consultation. Indication Anemia due to chronic blood loss, Anemia, unspecified type --------------- Vance Silva is a 58 y.o. M with hx cirrhosis c/b portal htn with esophageal varices s/p banding on 1/5/24, with significant ascites requiring frequent paracentesis,  and HE, hx of TBI, and OA who was recently admitted at an OSH for weakness, fatigue and found to have UTI with ESBL+ E. Coli bacteremia and significant anemia requiring 2u pRBCs. He left that hospital in order to return to [facility] and is admitted with chief complaint of fatigue with hgb 6.2. Plan for EGD. Staff Staff Role No Staff Documented Medications See Anesthesia Record. Preprocedure A history and physical has been performed, and patient medication allergies have been reviewed. The patient's tolerance of previous anesthesia has been reviewed. The risks and benefits of the procedure and the sedation options and risks were discussed with the patient. All questions were answered and informed consent obtained. Details of the Procedure The patient underwent monitored anesthesia care, which was administered by an anesthesia professional. The patient's blood pressure, ECG, ETCO2, heart rate, level of consciousness, oxygen and respirations were monitored throughout the procedure. The scope was introduced through the mouth and advanced to the second part of the duodenum. Retroflexion was performed in the cardia. The patient experienced no blood loss. The procedure was not difficult. The patient tolerated the procedure well. There were no apparent adverse events. Events Procedure Events Event Event Time ENDO SCOPE IN TIME 2/12/2024  3:29 PM ENDO SCOPE OUT TIME 2/12/2024  3:33 PM Specimens No specimens collected Procedure [facility] [address] [facility] [address] [phone] [place] [address] [address] Department Of Medicine-[place] [address] MD Valentin Amin MD (fellow) Procedure Provider MD Valentin Zeng MD (fellow)         Assessment/Plan   Principal Problem:    Anemia, unspecified type      58 y.o. male with past medical history of liver disease, esophageal varices hypertension,  hyperlipidemia, who presented to [facility] ED with with complaint of low hemoglobin level from recent lab    Acute on chronic anemia: Possibly undiagnosed GI losses  -H&H on presentation 4.8/17.0, history of multiple admission for transfusion, last 1 in February  -History of esophageal varices, but no reported hemoptysis, melena or hematochezia  -3 units packed RBC ordered in the ED for transfusion  -Pantoprazole 40 mg IV push daily (received 80 mg IV push x 1 in the ED)  -N.p.o. after midnight  -GI consulted: Appreciate management commendations    Thrombocytopenia  -Improved platelet count  -No sign of spontaneous bleeding noted    Cirrhosis of the liver  -Continue lactulose regimen  -Furosemide 40 mg IV push twice daily for lower extremity edema  -Spironolactone 150 mg daily    Hypertension: Stable  -Carvedilol 6.25 mg at bedtime     Diet  -Low-salt  -N.p.o. after midnight    DVT prophylaxis  -Omission of heparin anticoagulation due to severe anemia    Disposition: Presenting with severe anemia without overt GI losses, need further management, discharge pending clinical improvement    Colton Ladd DO

## 2024-03-12 NOTE — CONSULTS
Reason For Consult  Anemia  History of cirrhosis and portal hypertension  History of ascites    History Of Present Illness  Karyn Silva is a 58 y.o. male presenting with more recent diagnosis of cirrhosis secondary to possibly alcohol versus other toxin who presents with significant anemia.  He has a history of portal hypertension, history of esophageal varices, per the chart duodenal ulcer, and recent 6 L paracentesis.  He has had extensive evaluation by MRI, Doppler to rule out a portal vein thrombosis and Budd-Chiari, colonoscopy in February 2024 showing diverticulosis and a small polyp, and upper endoscopy showing grade 1 esophageal varices with portal hypertensive gastropathy.  He states that he is very diligent in a low-salt diet and is on diuretics.  He is also on beta-blocker and lactulose.  He has a history of CTE, seizures, anxiety, depression and sleep apnea.  He is seen in the emergency room and given 3 units of blood and is now here for further evaluation.    His case is discussed by myself and the hepatologist last evening and we agreed to pursue an enteroscopy and potential cautery of any bleeding lesions.  Also looking for other etiologies such as GAVE.    This morning he has no complaints and no abdominal pain, confusion or bleeding.  He firmly denies any bright red blood per rectum or black stool.    His family history, social history, and medications are all reviewed by myself..     Past Medical History  He has a past medical history of Acute kidney injury (CMS/HCC), Anemia, Ascites, Chronic kidney disease, Cirrhosis (CMS/HCC), Hepatitis C, Liver disease, Seizure (CMS/HCC), Sleep apnea, and Traumatic brain injury (CMS/HCC).    Surgical History  He has no past surgical history on file.     Social History  He reports that he has never smoked. He has never used smokeless tobacco. He reports that he does not currently use alcohol. He reports that he does not currently use drugs.    Family History  No  "family history on file.     Allergies  Patient has no known allergies.    Review of Systems  Otherwise unremarkable other than the history of present illness     Physical Exam  Pleasant man in no apparent distress  No jaundice  Mental status appears normal  No asterixis  Lungs clear  Heart regular rate and rhythm  Abdomen is somewhat distended with definite fluid in the abdomen consistent with his ascites.     Last Recorded Vitals  Blood pressure 126/77, pulse 69, temperature 36.9 °C (98.5 °F), temperature source Temporal, resp. rate 16, height 1.956 m (6' 5\"), weight 89.4 kg (197 lb), SpO2 100 %.    Relevant Results  Results for orders placed or performed during the hospital encounter of 03/11/24 (from the past 24 hour(s))   Prepare RBC: 3 Units   Result Value Ref Range    PRODUCT CODE G8758P18     Unit Number [id number]     Unit ABO O     Unit RH POS     XM INTEP COMP     Dispense Status TR     Blood Expiration Date April 03, 2024 23:59 EDT     PRODUCT BLOOD TYPE 5100     UNIT VOLUME 280     PRODUCT CODE F3714M01     Unit Number [id number]     Unit ABO O     Unit RH POS     XM INTEP COMP     Dispense Status TR     Blood Expiration Date April 03, 2024 23:59 EDT     PRODUCT BLOOD TYPE 5100     UNIT VOLUME 350     PRODUCT CODE G6303G23     Unit Number [id number]     Unit ABO O     Unit RH POS     XM INTEP COMP     Dispense Status TR     Blood Expiration Date April 03, 2024 23:59 EDT     PRODUCT BLOOD TYPE 5100     UNIT VOLUME 350    Type and Screen   Result Value Ref Range    ABO TYPE O     Rh TYPE POS     ANTIBODY SCREEN NEG    ABO/Rh   Result Value Ref Range    ABO TYPE O     Rh TYPE POS    ECG 12 lead   Result Value Ref Range    Ventricular Rate 87 BPM    Atrial Rate 87 BPM    IA Interval 168 ms    QRS Duration 88 ms    QT Interval 380 ms    QTC Calculation(Bazett) 457 ms    P Axis 54 degrees    R Axis 12 degrees    T Axis 34 degrees    QRS Count 14 beats    Q Onset 212 ms    P Onset 128 ms    P " Offset 188 ms    T Offset 402 ms    QTC Fredericia 430 ms   Comprehensive metabolic panel   Result Value Ref Range    Glucose 100 (H) 74 - 99 mg/dL    Sodium 137 136 - 145 mmol/L    Potassium 4.3 3.5 - 5.3 mmol/L    Chloride 105 98 - 107 mmol/L    Bicarbonate 22 21 - 32 mmol/L    Anion Gap 14 10 - 20 mmol/L    Urea Nitrogen 19 6 - 23 mg/dL    Creatinine 1.45 (H) 0.50 - 1.30 mg/dL    eGFR 55 (L) >60 mL/min/1.73m*2    Calcium 7.8 (L) 8.6 - 10.3 mg/dL    Albumin 4.0 3.4 - 5.0 g/dL    Alkaline Phosphatase 68 33 - 136 U/L    Total Protein 6.5 6.4 - 8.2 g/dL    AST 13 9 - 39 U/L    Bilirubin, Total 0.4 0.0 - 1.2 mg/dL    ALT 11 10 - 52 U/L   Magnesium   Result Value Ref Range    Magnesium 1.90 1.60 - 2.40 mg/dL   Comprehensive metabolic panel   Result Value Ref Range    Glucose 85 74 - 99 mg/dL    Sodium 137 136 - 145 mmol/L    Potassium 3.9 3.5 - 5.3 mmol/L    Chloride 106 98 - 107 mmol/L    Bicarbonate 23 21 - 32 mmol/L    Anion Gap 12 10 - 20 mmol/L    Urea Nitrogen 20 6 - 23 mg/dL    Creatinine 1.32 (H) 0.50 - 1.30 mg/dL    eGFR 62 >60 mL/min/1.73m*2    Calcium 7.9 (L) 8.6 - 10.3 mg/dL    Albumin 3.7 3.4 - 5.0 g/dL    Alkaline Phosphatase 59 33 - 136 U/L    Total Protein 6.1 (L) 6.4 - 8.2 g/dL    AST 11 9 - 39 U/L    Bilirubin, Total 0.6 0.0 - 1.2 mg/dL    ALT 9 (L) 10 - 52 U/L   Calcium, ionized   Result Value Ref Range    POCT Calcium, Ionized 1.15 1.1 - 1.33 mmol/L     ECG 12 lead    Result Date: 3/11/2024  Normal sinus rhythm Low voltage QRS Borderline ECG When compared with ECG of 09-FEB-2024 23:57, No significant change was found    MR liver w and wo IV contrast    Result Date: 3/11/2024  Interpreted By:  Yuan Hauser, STUDY: MR LIVER W AND WO IV CONTRAST;  3/11/2024 3:26 pm   INDICATION: Signs/Symptoms:PRE LIVER TRANSPLANT EVALUATION.   COMPARISON: None.   ACCESSION NUMBER(S): [id number]   ORDERING CLINICIAN: GABY GILLIAM   TECHNIQUE: MRI LIVER; Multiplanar magnetic resonance images of the abdomen were  obtained including the following sequences; T2-weighted SSFSE with and without fat saturation, T1-weighted GRE in/opposed phase, DWI, fat saturated 3D-T1w GRE pre and dynamically post contrast.  18 ml of Gadolinium contrast agent Dotarem were administered intravenously without immediate complication.   FINDINGS: Exam is limited due to motion artifact and dielectric effect relating to ascites.   LIVER: No signal changes of steatosis. Cirrhotic including diffuse surface nodularity with marked hypertrophy of the left lateral segment and caudate lobe. No focal lesions identified. Please note evaluation is slightly limited as the 1st post-contrast scan is in the portal venous phase. No true arterial phase images were obtained.   BILE DUCTS: No dilatation.   GALLBLADDER: Mild wall thickening which could be reactive to ascites/liver disease.   PANCREAS: Grossly unremarkable. No main ductal dilatation or mass identified.   SPLEEN: Markedly enlarged measuring nearly 24 cm in length.   ADRENAL GLANDS: Unremarkable.   KIDNEYS: Small cyst in the left kidney.  Bilateral striated nephrograms are noted. Otherwise unremarkable kidneys without hydronephrosis.   LYMPH NODES: No lymphadenopathy identified.   ABDOMINAL VESSELS: Abdominal aorta is patent without aneurysm. Major visceral arterial branches are patent. IVC and visualized major branches are patent. Major portal venous branches are patent. There is a recanalized umbilical vein as well as tiny paraesophageal varices which indicate portal hypertension.   BOWEL: No dilated bowel is visualized.   PERITONEUM/RETROPERITONEUM: Partially imaged moderate to large volume ascites.   BONES AND LOWER THORAX: No focal abnormal marrow signal. Mild bibasilar atelectasis.         1.  Findings of cirrhosis and portal hypertension. No focal hepatic lesions identified. Please note the study lacks a true arterial phase post-contrast scan. 2. Moderate to large volume ascites. 3. Nonspecific  striated nephrograms bilaterally. Diagnostic considerations may include pyelonephritis, acute tubular necrosis, or hypotension.     MACRO: None   Signed by: Yuan Hauser 3/11/2024 3:55 PM Dictation workstation:   [id number]    US guided abdominal paracentesis    Result Date: 3/11/2024  Interpreted By:  Seven Ewing, STUDY: US GUIDED ABDOMINAL PARACENTESIS; 3/11/660177:38 pm   INDICATION: Signs/Symptoms:refractory ascites - pre liver txp evaluation patient. Ascites, Cirrhosis   COMPARISON: US guided paracentesis 2/13/24   ACCESSION NUMBER(S): [id number]   ORDERING CLINICIAN: GABY GILLIAM   TECHNIQUE: INTERVENTIONALIST(S): Seven Ewing   CONSENT: The patient/patient's POA/next of kin was informed of the nature of the proposed procedure. The purposes, alternatives, risks, and benefits were explained and discussed. All questions were answered and consent was obtained.   SEDATION: None   MEDICATION/CONTRAST: 1% lidocaine was used to anesthetize subcutaneously 62.5 Grams albumin IV was ordered following procedure.   TIME OUT: A time out was performed immediately prior to procedure start with the interventional team, correctly identifying the patient name, date of birth, MRN, procedure, anatomy (including marking of site and side), patient position, procedure consent form, relevant laboratory and imaging test results, antibiotic administration, safety precautions, and procedure-specific equipment needs.   FINDINGS: The patient was placed in the supine position.   The abdominal space was examined with grey scale ultrasound, and the most accessible fluid identified and marked for paracentesis.   The skin was prepped and draped in usual manner. Local anesthesia with Lidocaine was administered and a right-sided paracentesis was performed.  A 5 Micronesian One-Step paracentesis needle/catheter was then placed where marked. Approximately 6.7 L of yellowish colored fluid was removed.  The needle/catheter was then withdrawn.   The  patient tolerated the procedure well and there were no immediate complications. Specimen(s) sent to the laboratory for further evaluation, per the requesting team.       Uneventful paracentesis, as detailed above. Right Hemiabdomen, 6.7 L   I personally performed and/or directly supervised this study and was present for the entire procedure.   Performed and dictated at [facility].   Signed by: Seven Ewing 3/11/2024 12:40 PM Dictation workstation:   [id number]    MR prostate screening self pay exam    Result Date: 3/3/2024  Interpreted By:  Ollie Bhakta, STUDY: MR PROSTATE SCREENING SELF PAY EXAM;  3/2/2024 9:01 am   INDICATION: Signs/Symptoms:elevated psa. PSA is 6.5 on 02/23/2024   COMPARISON: None.   ACCESSION NUMBER(S): [id number]   ORDERING CLINICIAN: REGINA MILLARD   TECHNIQUE: Multiplanar MRI of the pelvis was obtained focused on the prostate gland and following a screening protocol including axial, sagittal and coronal T2 weighted SSFSE, axial T2 FSE and axial DWI. No intravenous contrast was administered.   FINDINGS: PROSTATE VOLUME: The prostate measures 4.8 cm x 3.2 cm  x 3.6 cm in right-to-left, anterior-posterior and craniocaudal dimension.   PROSTATE PARENCHYMA: There is heterogeneous enlargement of the transition zone, consistent with benign prostatic hyperplasia. There is an indeterminate ill-defined T2 slightly hypointense area extending from the anterior transition zone of the apex of the prostate to the midgland, measuring up to 2.3 cm. This area shows slightly hypointense signal on the ADC and hyperintense signal on the DWI.   EXTRACAPSULAR EXTENSION: None.   SEMINAL VESICLES: Within normal limits.   PELVIC LYMPH NODES: No abnormally enlarged pelvic lymph nodes are identified.   PERITONEUM: Partially visualized large abdominopelvic ascites.   OTHER ORGANS: Within normal limits.   BONES: No focal lesions are noted in the bone.   Exam Quality: Is T2WI weighted  imaging of diagnostic quality:  Yes. T2WI assessment:  Adequate. Is DWI of diagnostic quality:  No. DWI assessment:  Inadequate. Is DCE of diagnostic quality:  N/A. DCE assessment:  N/A. PI-QUAL score:  At least two sequences taken together are of diagnostic quality Comments:  Large abdominopelvic ascites.       1. PI-RADS 3 lesion in the transition zone of the apex of the prostate extending to the midgland. No extraprostatic extension. 2. No pelvic lymphadenopathy. 3. Partially visualized large abdominopelvic ascites.   3D post-processing was performed using AddressReport on an independent workstation, for the purpose of enabling fusion with ultrasound, and provided for review.   I personally reviewed the images/study and I agree with the findings as stated. This study was interpreted at [place], [place].   MACRO: None   Signed by: Ollie Bhakta 3/3/2024 7:04 PM Dictation workstation:   [id number]    Complete Pulmonary Function Test (Spirometry/DLCO/Lung Volumes)    Result Date: 2/27/2024  Normal spirometry. Lung volumes are normal. The DLCO corrected for hemoglobin is normal.    CT cardiac scoring wo IV contrast    Result Date: 2/23/2024  Interpreted By:  Antony Love, STUDY: CT CARDIAC SCORING WO IV CONTRAST;  2/22/2024 4:02 pm   INDICATION: Signs/Symptoms:PRE LIVER TXP EVALUATION. Signs/Symptoms:PRE LIVER TXP EVALUATION. d   COMPARISON: None.   ACCESSION NUMBER(S): [id number]   ORDERING CLINICIAN: GABY GILLIAM   TECHNIQUE: Using prospective ECG gating, CT scan of the coronary arteries was performed without intravenous contrast. Coronary calcium scoring  was performed according to the method of Agatston.   FINDINGS: The score and distribution of calcium in the coronary arteries is as follows:   LM 0, .09, LCx 0, RCA 8.85,   Total 258.94   The visualized mid/lower ascending thoracic aorta measures 4.7 cm in diameter. The heart is normal in size. No pericardial  "effusion is present.   No gross evidence of mediastinal or hilar lymphadenopathy or masses is identified. The visualized segments of the lungs are normally expanded.   Limited evaluation of the upper abdomen demonstrates cirrhotic liver. Ascites present.       1. Coronary artery calcium score of 258.94*. 2. Aneurysmal dilation of the ascending aorta at 4.7 cm. Recommend baseline elective thoracic surgery evaluation.   *Coronary artery calcium scoring may be helpful in predicting the risk for future coronary heart disease events.  According to the American College of Cardiology Foundation Clinical Expert Consensus Task Force, such testing provides important prognostic information in patients with more than one coronary heart disease risk factor. The coronary artery calcium score correlates with the annual risk of a non-fatal myocardial infarction or coronary heart disease death.   Coronary artery score            Annual Risk   0-99                             0.4% 100-399                        1.3% >400                            2.4%   These three \"breakpoints\" correspond to lower, intermediate and high risk states for future coronary events.  Such information should be used, along with appropriate clinical judgment, to make decisions regarding the intensity of risk factor management strategies to treat blood lipids and to modify other non-lipid coronary risk factors.   Reference: [place] P et al. Circulation.  2007; 115:402-426   MACRO: None   Signed by: Antony Love 2/23/2024 7:04 AM Dictation workstation:   [id number]    Colonoscopy Diagnostic    Result Date: 2/14/2024  Table formatting from the original result was not included. Impression The perianal exam was normal. The terminal ileum appeared normal. One 3 mm sessile polyp in the descending colon; performed cold forceps biopsy with complete removal. Jar 1 Multiple small and medium, scattered diverticula of moderate severity in the ascending colon, " descending colon and sigmoid colon The exam was otherwise normal. Findings The perianal exam was normal. The terminal ileum appeared normal. One 3 mm sessile polyp in the descending colon; performed cold forceps biopsy with complete removal. Jar 1 Multiple small and medium, scattered diverticula of moderate severity in the ascending colon, descending colon and sigmoid colon The exam was otherwise normal. Recommendation  Await pathology results  Repeat colonoscopy in 5 years  Follow up with primary [facility] team Cachorro Norris and Ketty  - Return to floor. - Resume previous diet. - Continue present medications. - The findings and recommendations were discussed with the patient. Indication Anemia, unspecified type Last colonoscopy 2020 notable for two polyps, tubular adenomas. Has also has two flexible sigmoidoscopes in 2021 and 2022 respectively.  Staff Staff Role No Staff Documented Medications fentaNYL PF (Sublimaze) injection 100 mcg midazolam (Versed) injection 4 mg (Totals for administrations occurring from 1245 to 1326 on 02/14/24) Preprocedure A history and physical has been performed, and patient medication allergies have been reviewed. The patient's tolerance of previous anesthesia has been reviewed. The risks and benefits of the procedure and the sedation options and risks were discussed with the patient. All questions were answered and informed consent obtained. Details of the Procedure The patient underwent moderate sedation, which was administered by the procedural nurse. The patient's blood pressure, ECG, ETCO2, heart rate, level of consciousness, oxygen and respirations were monitored throughout the procedure. A digital rectal exam was performed. A perianal exam was performed. The scope was introduced through the anus and advanced to the terminal ileum. Retroflexion was performed in the rectum. The quality of bowel preparation was evaluated using the [place] Bowel Preparation Scale with scores of: right colon  = 2, transverse colon = 3, left colon = 3. The total BBPS score was 8. Bowel prep was adequate. The patient experienced no blood loss. The procedure was not difficult. The patient tolerated the procedure well. There were no apparent adverse events. Events Procedure Events Event Event Time ENDO CECUM REACHED 2/14/2024  1:01 PM ENDO SCOPE OUT TIME 2/14/2024  1:24 PM Specimens ID Type Source Tests Collected by Time 1 : Cold Bx polyp Tissue COLON - DESCENDING POLYP SURGICAL PATHOLOGY EXAM Natalie Ugalde MD 2/14/2024 1314 Procedure Location [facility] [address] [id number] [facility] [address] [phone] Referring Provider No referring provider defined for this encounter. Procedure Provider No name on file     US guided abdominal paracentesis    Result Date: 2/13/2024  Interpreted By:  Adriana Darling, STUDY: US GUIDED ABDOMINAL PARACENTESIS;  2/13/2024 12:40 pm   INDICATION: Signs/Symptoms:For paracentesis.   COMPARISON: None.   ACCESSION NUMBER(S): [id number]   ORDERING CLINICIAN: MATTHEW NGO   TECHNIQUE: INTERVENTIONALIST(S): Adriana Darling PA-C   CONSENT: The patient/patient's POA/next of kin was informed of the nature of the proposed procedure. The purposes, alternatives, risks, and benefits were explained and discussed. All questions were answered and consent was obtained.   SEDATION: None   MEDICATION/CONTRAST: No additional   TIME OUT: A time out was performed immediately prior to procedure start with the interventional team, correctly identifying the patient name, date of birth, MRN, procedure, anatomy (including marking of site and side), patient position, procedure consent form, relevant laboratory and imaging test results, antibiotic administration, safety precautions, and procedure-specific equipment needs.   COMPLICATIONS: No immediate adverse events identified.   FINDINGS: The patient was placed in the supine position.   Screening  ultrasound of the abdomen demonstrated a  large amount of free fluid.  The skin was marked in the region of the  left hemiabdomen.  Next, the skin was prepped and draped in the usual sterile fashion.  Following this, one percent lidocaine was used to anesthetize the skin and subcutaneous tissues.  Finally, a 5 Malay Yueh catheter was placed into the peritoneal cavity without difficulty. Approximately 4800 mL of yellowish colored fluid was collected.  The catheter was removed.   Patient tolerated the procedure with no immediate complications. Specimen(s) sent to the laboratory and pathology for further evaluation, per the requesting team.       Uneventful ultrasound guided paracentesis, as detailed above.   I was present for and/or performed the critical portions of the procedure and immediately available throughout the entire procedure.   I personally reviewed the image(s) / study and interpretation. I agree with the findings as stated.   Performed and dictated at [facility].   MACRO: None   Signed by: Adriana Darling 2/13/2024 3:03 PM Dictation workstation:   [id number]    EGD w Banding    Result Date: 2/12/2024  Table formatting from the original result was not included. Impression Grade B esophagitis in the GE junction Two small grade I varices in the lower third of the esophagus. No treatment necessary. Moderate to severe portal hypertensive gastropathy. No gastric varices. The examined part of the duodenum appeared normal. No findings of active bleeding or stigmata of recent bleeding. Findings Hypopharynx appeared normal. Grade B esophagitis with mucosal breaks measuring 5 mm or more not continuous between folds, covering less than 75% of the circumference in the GE junction Two small grade I varices (no red shyam sign) in the lower third of the esophagus; no bleeding was identified. Scarring noted from previous banding. The esophagus was otherwise normal. Regular Z-line 38  cm from the incisors Moderate to severe portal hypertensive gastropathy in the cardia, body of the stomach and pylorus; no bleeding was identified The exam of the stomach was otherwise normal. There were no gastric varices, ulcers or bleeding. Of note, no signs of GAVE at this time. The duodenal bulb, 1st part of the duodenum and 2nd part of the duodenum appeared normal. Recommendation  Follow up with primary gastroenterologist ---------------------------- - The patient will be observed post-procedure, until all discharge criteria are met. - Return patient to [facility] for [facility] care. - Defer resumption of diet and medications to the patient's primary team. No restrictions from a post-procedural standpoint. - Observe patient's clinical course. No findings to definitely explain a GI source for his anemia, other than known PHG. - The findings and recommendations were discussed with the referring physicians. - The signs and symptoms of potential delayed complications were discussed with the patient/team. - Patient has a contact number available for emergencies. - Follow up with Dr. Norris and Dr. Hdez for ongoing inpatient Hepatology consultation. Indication Anemia due to chronic blood loss, Anemia, unspecified type --------------- Vance Silva is a 58 y.o. M with hx cirrhosis c/b portal htn with esophageal varices s/p banding on 1/5/24, with significant ascites requiring frequent paracentesis, and HE, hx of TBI, and OA who was recently admitted at an OSH for weakness, fatigue and found to have UTI with ESBL+ E. Coli bacteremia and significant anemia requiring 2u pRBCs. He left that hospital in order to return to [facility] and is admitted with chief complaint of fatigue with hgb 6.2. Plan for EGD. Staff Staff Role No Staff Documented Medications See Anesthesia Record. Preprocedure A history and physical has been performed, and patient medication allergies have been reviewed. The patient's tolerance of previous  anesthesia has been reviewed. The risks and benefits of the procedure and the sedation options and risks were discussed with the patient. All questions were answered and informed consent obtained. Details of the Procedure The patient underwent monitored anesthesia care, which was administered by an anesthesia professional. The patient's blood pressure, ECG, ETCO2, heart rate, level of consciousness, oxygen and respirations were monitored throughout the procedure. The scope was introduced through the mouth and advanced to the second part of the duodenum. Retroflexion was performed in the cardia. The patient experienced no blood loss. The procedure was not difficult. The patient tolerated the procedure well. There were no apparent adverse events. Events Procedure Events Event Event Time ENDO SCOPE IN TIME 2/12/2024  3:29 PM ENDO SCOPE OUT TIME 2/12/2024  3:33 PM Specimens No specimens collected Procedure [facility] [address] [place] [facility] [address] [phone] Referring Provider Hesham Jacobson MD [address] [place] [address] Department Of Medicine-[place] [address] MD Valentin Amin MD (fellow) Procedure Provider MD Valentin Zeng MD (fellow)         Assessment/Plan     Patient is a 58-year-old with cirrhosis of the liver, portal hypertension, and significant anemia without obvious evidence of GI blood loss.  He has been transfused 3 units of blood at this point we will pursue enteroscopy and APC therapy if needed.  We will assess for any vascular malformations to treat or GAVE.  Following this he ultimately will be able to be discharged home to follow-up with his hepatologist and his ascites.  A capsule endoscopy may be considered.  All his questions were answered and he is agreeable to pursue this this morning.  I would continue all his medications at this point as well.    I spent 45 minutes in the professional and overall care  of this patient.      Herve Samson MD

## 2024-03-12 NOTE — POST-PROCEDURE NOTE
Patient tolerated enteroscopy to the proximal jejunum.  There was no signs of any bleeding.  There was evidence of portal hypertensive gastropathy and grade 1 esophageal varices.    I recommend he continue his current medications, follow-up with his hepatologist, and he can be discharged to home today.    Further discussion with his primary hepatologist consisted of plans to transfer to the main hospital to expedite his liver transplant evaluation.

## 2024-03-12 NOTE — PERIOPERATIVE NURSING NOTE
957: Phase 2 care begins  1015: Dr. Samson bedside speaking to pt  1020: Report sent to room 607 RN, all questions answered at this time  1034: Pt transported to room 607

## 2024-03-12 NOTE — ANESTHESIA PREPROCEDURE EVALUATION
"Patient: Vance Silva \"Karyn\"    Procedure Information       Date/Time: 03/12/24 0755    Procedure: ENTEROSCOPY    Location: [facility]            Relevant Problems   Cardiovascular   (+) Hypertension      GI   (+) Gastrointestinal hemorrhage with melena   (+) UGIB (upper gastrointestinal bleed)      /Renal   (+) Acute kidney injury (CMS/HCC)   (+) Alcoholic liver disease (CMS/HCC)   (+) Cirrhosis of liver with ascites (CMS/HCC)      Neuro/Psych   (+) Alcohol related seizure (CMS/HCC)   (+) Chronic traumatic encephalopathy   (+) Lesion of ulnar nerve   (+) Peripheral neuropathy      GI/Hepatic   (+) Alcoholic liver disease (CMS/HCC)   (+) Cirrhosis of liver with ascites (CMS/HCC)      Hematology   (+) Anemia   (+) Anemia due to chronic blood loss   (+) Anemia, unspecified type   (+) Thrombocytopenia (CMS/HCC)      Musculoskeletal   (+) Herniated lumbar intervertebral disc      Infectious Disease   (+) Primary bacterial peritonitis (CMS/HCC)      Other   (+) Acute tonsillitis   (+) Arthritis of great toe at metatarsophalangeal joint       Clinical information reviewed:   Tobacco  Allergies  Meds   Med Hx  Surg Hx   Fam Hx  Soc Hx         Past Medical History:   Diagnosis Date    Acute kidney injury (CMS/HCC)     Anemia     Ascites     Chronic kidney disease     Cirrhosis (CMS/HCC)     Hepatitis C     Liver disease     Parkinson's disease     dx 2/16/2024    Seizure (CMS/HCC)     Sleep apnea     Traumatic brain injury (CMS/HCC)       Past Surgical History:   Procedure Laterality Date    ESOPHAGOGASTRODUODENOSCOPY      OTHER SURGICAL HISTORY      Multiple orthopedic surgeries    UMBILICAL HERNIA REPAIR  2021     Social History     Tobacco Use    Smoking status: Never    Smokeless tobacco: Never   Vaping Use    Vaping Use: Never used   Substance Use Topics    Alcohol use: Not Currently    Drug use: Not Currently      Current Outpatient Medications   Medication Instructions    carvedilol (COREG) " "6.25 mg, oral, Nightly    Constulose 20 g, oral, 3 times daily, Titrate to achieve goal 2-3 bowel movements daily    furosemide (LASIX) 40 mg, oral, 2 times daily    pantoprazole (PROTONIX) 40 mg, oral, Daily before breakfast, Do not crush, chew, or split.    spironolactone (ALDACTONE) 150 mg, oral, Daily RT      No Known Allergies     Chemistry    Lab Results   Component Value Date/Time     03/12/2024 0624    K 3.9 03/12/2024 0624     03/12/2024 0624    CO2 23 03/12/2024 0624    BUN 20 03/12/2024 0624    CREATININE 1.32 (H) 03/12/2024 0624    Lab Results   Component Value Date/Time    CALCIUM 7.9 (L) 03/12/2024 0624    ALKPHOS 59 03/12/2024 0624    AST 11 03/12/2024 0624    ALT 9 (L) 03/12/2024 0624    BILITOT 0.6 03/12/2024 0624          Lab Results   Component Value Date    HGBA1C 5.1 02/23/2024     Lab Results   Component Value Date/Time    WBC 2.9 (L) 03/12/2024 0624    HGB 6.7 (L) 03/12/2024 0624    HCT 22.8 (L) 03/12/2024 0624    PLT 97 (L) 03/12/2024 0624     Lab Results   Component Value Date/Time    PROTIME 15.1 (H) 03/11/2024 1239    INR 1.3 (H) 03/11/2024 1239     No results found for: \"ABORH\"  Encounter Date: 03/11/24   ECG 12 lead   Result Value    Ventricular Rate 87    Atrial Rate 87    TN Interval 168    QRS Duration 88    QT Interval 380    QTC Calculation(Bazett) 457    P Axis 54    R Axis 12    T Axis 34    QRS Count 14    Q Onset 212    P Onset 128    P Offset 188    T Offset 402    QTC Fredericia 430    Narrative    Normal sinus rhythm  Low voltage QRS  Borderline ECG  When compared with ECG of 09-FEB-2024 23:57,  No significant change was found     No results found for this or any previous visit from the past 1095 days.   Echo 2/10/2024:  Left Ventricle: The left ventricular systolic function is normal. There are no regional wall motion abnormalities. The left ventricular cavity size is normal. Spectral Doppler shows a normal pattern of left ventricular diastolic filling. " "Technically difficult despite the use of echoconrast. Overall Grossly normal LV systolic function without obvious regional wall motion abnormalities.  Left Atrium: The left atrium is normal in size.  Right Ventricle: The right ventricle was not well visualized. Unable to determine right ventricular systolic function.  Right Atrium: The right atrium was not well visualized.  Aortic Valve: The aortic valve was not well visualized. There is minimal aortic valve cusp calcification. There is no evidence of aortic valve regurgitation. The peak instantaneous gradient of the aortic valve is 6.8 mmHg.  Mitral Valve: The mitral valve is normal in structure. There is mild mitral annular calcification. There is mild mitral valve regurgitation.  Tricuspid Valve: The tricuspid valve was not well visualized. There is trace tricuspid regurgitation. The right ventricular systolic pressure is unable to be estimated.  Pulmonic Valve: The pulmonic valve is not well visualized. The pulmonic valve regurgitation was not well visualized.  Pericardium: There is a trivial pericardial effusion.  Aorta: The aortic root is abnormal. There is mild dilatation of the aortic root.  Systemic Veins: The inferior vena cava size appears small. There is IVC inspiratory collapse greater than 50%.  In comparison to the previous echocardiogram(s): There are no prior studies on this patient for comparison purposes. No prior echocardiogram available for comparison.        CONCLUSIONS:   1. Left ventricular systolic function is normal.   2. Poorly visualized anatomical structures due to suboptimal image quality.   3. Technically difficult despite the use of echoconrast. Overall Grossly normal LV systolic function without obvious regional wall motion abnormalities.   4. No prior echocardiogram available for comparison.    Visit Vitals  /84   Pulse 69   Temp 36.6 °C (97.9 °F) (Temporal)   Resp 18   Ht 1.956 m (6' 5\")   Wt 89.4 kg (197 lb 1.5 oz)   SpO2 " 100%   BMI 23.37 kg/m²   Smoking Status Never   BSA 2.2 m²     NPO/Void Status  Date of Last Liquid: 03/11/24  Time of Last Liquid: 2300  Date of Last Solid: 03/11/24  Time of Last Solid: 2300  Last Intake Type: Clear fluids         Physical Exam    Airway  Mallampati: III  TM distance: >3 FB  Neck ROM: full     Cardiovascular   Rhythm: regular  Rate: normal     Dental - normal exam     Pulmonary   Breath sounds clear to auscultation     Abdominal - normal exam              Anesthesia Plan    History of general anesthesia?: yes  History of complications of general anesthesia?: no    ASA 3     MAC   (2 units PRBCs prepared, possible ETT)  intravenous induction   Anesthetic plan and risks discussed with patient.    Plan discussed with CRNA.

## 2024-03-12 NOTE — ANESTHESIA POSTPROCEDURE EVALUATION
"Patient: Vance Silva \"Karyn\"    Procedure Summary       Date: 03/12/24 Room / Location: [facility]    Anesthesia Start: 0930 Anesthesia Stop: 1000    Procedure: ENTEROSCOPY Diagnosis:       Anemia, unspecified type      Other cirrhosis of liver (CMS/HCC)      Gastrointestinal hemorrhage, unspecified gastrointestinal hemorrhage type    Scheduled Providers:  Responsible Provider: Dedrick Omalley MD    Anesthesia Type: MAC ASA Status: 3            Anesthesia Type: MAC    Vitals Value Taken Time   /77 03/12/24 1027   Temp 36.5 °C (97.7 °F) 03/12/24 0957   Pulse 70 03/12/24 1027   Resp 16 03/12/24 1027   SpO2 98 % 03/12/24 1012       Anesthesia Post Evaluation    Patient location during evaluation: PACU  Patient participation: complete - patient participated  Level of consciousness: awake and alert  Pain management: adequate  Airway patency: patent  Cardiovascular status: acceptable and hemodynamically stable  Respiratory status: acceptable, spontaneous ventilation and nonlabored ventilation  Hydration status: acceptable  Postoperative Nausea and Vomiting: none        No notable events documented.    "

## 2024-03-12 NOTE — DISCHARGE SUMMARY
Discharge Diagnosis  Anemia, unspecified type    Issues Requiring Follow-Up  Follow-up with PCP and hepatologist    Discharge Meds     Your medication list        ASK your doctor about these medications        Instructions Last Dose Given Next Dose Due   carvedilol 6.25 mg tablet  Commonly known as: Coreg      Take 1 tablet (6.25 mg) by mouth once daily at bedtime.       Constulose 20 gram/30 mL oral solution  Generic drug: lactulose      Take 30 mL (20 g) by mouth 3 times a day. Titrate to achieve goal 2-3 bowel movements daily       furosemide 20 mg tablet  Commonly known as: Lasix      Take 2 tablets (40 mg) by mouth 2 times a day.       pantoprazole 40 mg EC tablet  Commonly known as: ProtoNix      Take 1 tablet (40 mg) by mouth once daily in the morning. Take before meals. Do not crush, chew, or split.       spironolactone 50 mg tablet  Commonly known as: Aldactone      Take 3 tablets (150 mg) by mouth once daily.                Test Results Pending At Discharge  Pending Labs       No current pending labs.            Hospital Course   Karyn Silva is a 58 y.o. male with past medical history of liver disease, esophageal varices hypertension, hyperlipidemia, who presented to [facility] ED with with complaint of low hemoglobin level from recent lab.  Patient states that he is on a transplant list for liver transplant due to advanced liver disease, and has been doing pretty transplant workup since late last year with coordination of Dr. Norris, who is his hepatologist.  Stated that he presented for paracentesis and MRI of the liver today at [facility], and as a routine procedure blood work was done and revealed extremely low hemoglobin level, prompting recommendation to go to the ED for further management.  Stated that he had a history of low hemoglobin count, first episode was 18 months ago with a hemoglobin of 2.8, was admitted at [facility] and underwent blood transfusion.  Patient stated that  since then he must have gotten about okay for admission resulting with transfusion of multiple blood units.  Most recently in February while traveling to [facility] in [place], he became ill on flight, and was directly transferred to hospital where he underwent blood transfusion, and was told that he was bacteremic and started on IV antibiotic before transitioning to Bactrim p.o.  Patient stated that he was transferred back to [place] and admitted at [facility] for further management and additional blood transfusion.  Stated that he undergo routine EGDs for his esophageal varices, and find any reason for current lower level of hemoglobin, as he denies hematemesis, blood per rectum, melena, and hemoptysis.    Review of systems, patient admits to lightheadedness, exertional shortness of breath, and denied nausea, vomiting, abdominal pain, blood per rectum melena or any other symptoms at this time.    Patient was admitted for further management, he received 2 units of packed RBC transfusion ordered in the ED, with resulted with increment of his H&H to 6.7/22.8.  Gastroenterology service was consulted, and patient underwent EGD this morning which was negative for acute bleeding.  Upon consulting with his hepatologist at [facility], gastroenterology services agreed to transfer patient to [facility] for further management in light of workup for liver transplant.  Patient had uneventful hospital stay, with stable vitals, a.m. labs shows improvement in renal function, and he is now being discharged to [facility] under gastroenterology service at the request of Dr. Mu Norris for continued care.  ADT7 transfer request placed, and patient to be transferred to [facility] via BLS as his vitals are stable and in no acute blood loss reported at this time.    Pertinent Physical Exam At Time of Discharge  Physical Exam  Constitutional: Very pleasant gentleman, alert active, cooperative not in acute distress  Eyes: PERRLA, clear sclera  ENMT: Moist mucosal  membranes, no exudate  Head / Neck: Atraumatic, normocephalic, supple neck, JVP not visualized  Lungs: Patent airways, CTABL  Heart: RRR, S1S2, no murmurs appreciated, palpable pulses in all extremities  GI: Soft, NT, moderately distended and tympanic, bowel sounds present in all quadrants  MSK: Moves all extremities freely, no restriction  of ROM, no joint edema  Extremities: Bilateral lower extremity +2 peripheral edema  : No Cohen catheter inserted  Breast: Deferred  Neurological: AAO x 3 to person, place and date, facial muscles symmetrical, sensation intact, strength 4/4, no acute focal neurological deficits appreciated  Psychological: Appropriate mood and behavior  Outpatient Follow-Up  Future Appointments   Date Time Provider Department Center   3/13/2024 11:30 AM Mu Norris MD [id number]   3/21/2024 11:00 AM Virginia Wheeler MD [id number]   9/3/2024 10:15 AM POR CT 1 PORCT [facility]         Colton Ladd DO

## 2024-03-13 ENCOUNTER — APPOINTMENT (OUTPATIENT)
Dept: TRANSPLANT | Facility: HOSPITAL | Age: 59
End: 2024-03-13
Payer: COMMERCIAL

## 2024-03-13 ENCOUNTER — TELEPHONE (OUTPATIENT)
Dept: TRANSPLANT | Facility: HOSPITAL | Age: 59
End: 2024-03-13
Payer: COMMERCIAL

## 2024-03-13 ENCOUNTER — APPOINTMENT (OUTPATIENT)
Dept: GASTROENTEROLOGY | Facility: HOSPITAL | Age: 59
End: 2024-03-13
Payer: COMMERCIAL

## 2024-03-13 LAB
ALBUMIN SERPL BCP-MCNC: 3.5 G/DL (ref 3.4–5)
ANION GAP SERPL CALC-SCNC: 12 MMOL/L (ref 10–20)
APTT PPP: 28 SECONDS (ref 27–38)
BASOPHILS # BLD MANUAL: 0.06 X10*3/UL (ref 0–0.1)
BASOPHILS NFR BLD MANUAL: 1.7 %
BLOOD EXPIRATION DATE: NORMAL
BUN SERPL-MCNC: 20 MG/DL (ref 6–23)
CALCIUM SERPL-MCNC: 8.4 MG/DL (ref 8.6–10.6)
CHLORIDE SERPL-SCNC: 106 MMOL/L (ref 98–107)
CO2 SERPL-SCNC: 23 MMOL/L (ref 21–32)
CREAT SERPL-MCNC: 1.14 MG/DL (ref 0.5–1.3)
DISPENSE STATUS: NORMAL
EGFRCR SERPLBLD CKD-EPI 2021: 74 ML/MIN/1.73M*2
EOSINOPHIL # BLD MANUAL: 0 X10*3/UL (ref 0–0.7)
EOSINOPHIL NFR BLD MANUAL: 0 %
ERYTHROCYTE [DISTWIDTH] IN BLOOD BY AUTOMATED COUNT: 20.2 % (ref 11.5–14.5)
ERYTHROCYTE [DISTWIDTH] IN BLOOD BY AUTOMATED COUNT: 20.8 % (ref 11.5–14.5)
GLUCOSE SERPL-MCNC: 117 MG/DL (ref 74–99)
HCT VFR BLD AUTO: 24.3 % (ref 41–52)
HCT VFR BLD AUTO: 29.7 % (ref 41–52)
HGB BLD-MCNC: 7.3 G/DL (ref 13.5–17.5)
HGB BLD-MCNC: 8.9 G/DL (ref 13.5–17.5)
HYPOCHROMIA BLD QL SMEAR: ABNORMAL
IMM GRANULOCYTES # BLD AUTO: 0.01 X10*3/UL (ref 0–0.7)
IMM GRANULOCYTES NFR BLD AUTO: 0.3 % (ref 0–0.9)
INR PPP: 1.2 (ref 0.9–1.1)
LYMPHOCYTES # BLD MANUAL: 0.35 X10*3/UL (ref 1.2–4.8)
LYMPHOCYTES NFR BLD MANUAL: 10.3 %
MAGNESIUM SERPL-MCNC: 1.98 MG/DL (ref 1.6–2.4)
MCH RBC QN AUTO: 24.2 PG (ref 26–34)
MCH RBC QN AUTO: 24.6 PG (ref 26–34)
MCHC RBC AUTO-ENTMCNC: 30 G/DL (ref 32–36)
MCHC RBC AUTO-ENTMCNC: 30 G/DL (ref 32–36)
MCV RBC AUTO: 81 FL (ref 80–100)
MCV RBC AUTO: 82 FL (ref 80–100)
MONOCYTES # BLD MANUAL: 0.12 X10*3/UL (ref 0.1–1)
MONOCYTES NFR BLD MANUAL: 3.5 %
NEUTS SEG # BLD MANUAL: 2.87 X10*3/UL (ref 1.2–7)
NEUTS SEG NFR BLD MANUAL: 84.5 %
NRBC BLD-RTO: 0 /100 WBCS (ref 0–0)
NRBC BLD-RTO: 0 /100 WBCS (ref 0–0)
PHOSPHATE SERPL-MCNC: 3.9 MG/DL (ref 2.5–4.9)
PLATELET # BLD AUTO: 101 X10*3/UL (ref 150–450)
PLATELET # BLD AUTO: 129 X10*3/UL (ref 150–450)
POTASSIUM SERPL-SCNC: 4.3 MMOL/L (ref 3.5–5.3)
PRODUCT BLOOD TYPE: 5100
PRODUCT CODE: NORMAL
PROTHROMBIN TIME: 13.7 SECONDS (ref 9.8–12.8)
RBC # BLD AUTO: 2.97 X10*6/UL (ref 4.5–5.9)
RBC # BLD AUTO: 3.68 X10*6/UL (ref 4.5–5.9)
RBC MORPH BLD: ABNORMAL
SODIUM SERPL-SCNC: 137 MMOL/L (ref 136–145)
TOTAL CELLS COUNTED BLD: 116
UNIT ABO: NORMAL
UNIT NUMBER: NORMAL
UNIT RH: NORMAL
UNIT VOLUME: 286
UNIT VOLUME: 350
UNIT VOLUME: 350
WBC # BLD AUTO: 13.9 X10*3/UL (ref 4.4–11.3)
WBC # BLD AUTO: 3.4 X10*3/UL (ref 4.4–11.3)
XM INTEP: NORMAL

## 2024-03-13 PROCEDURE — 85007 BL SMEAR W/DIFF WBC COUNT: CPT

## 2024-03-13 PROCEDURE — P9016 RBC LEUKOCYTES REDUCED: HCPCS

## 2024-03-13 PROCEDURE — 80069 RENAL FUNCTION PANEL: CPT

## 2024-03-13 PROCEDURE — 36415 COLL VENOUS BLD VENIPUNCTURE: CPT

## 2024-03-13 PROCEDURE — G0378 HOSPITAL OBSERVATION PER HR: HCPCS

## 2024-03-13 PROCEDURE — 91110 GI TRC IMG INTRAL ESOPH-ILE: CPT

## 2024-03-13 PROCEDURE — 85027 COMPLETE CBC AUTOMATED: CPT

## 2024-03-13 PROCEDURE — 2500000001 HC RX 250 WO HCPCS SELF ADMINISTERED DRUGS (ALT 637 FOR MEDICARE OP)

## 2024-03-13 PROCEDURE — 2500000004 HC RX 250 GENERAL PHARMACY W/ HCPCS (ALT 636 FOR OP/ED)

## 2024-03-13 PROCEDURE — 2500000002 HC RX 250 W HCPCS SELF ADMINISTERED DRUGS (ALT 637 FOR MEDICARE OP, ALT 636 FOR OP/ED)

## 2024-03-13 PROCEDURE — C9113 INJ PANTOPRAZOLE SODIUM, VIA: HCPCS

## 2024-03-13 PROCEDURE — 2720000007 HC OR 272 NO HCPCS

## 2024-03-13 PROCEDURE — 36430 TRANSFUSION BLD/BLD COMPNT: CPT

## 2024-03-13 PROCEDURE — 85610 PROTHROMBIN TIME: CPT

## 2024-03-13 PROCEDURE — 83735 ASSAY OF MAGNESIUM: CPT

## 2024-03-13 PROCEDURE — 84155 ASSAY OF PROTEIN SERUM: CPT

## 2024-03-13 RX ORDER — PANTOPRAZOLE SODIUM 40 MG/1
40 TABLET, DELAYED RELEASE ORAL
Status: DISCONTINUED | OUTPATIENT
Start: 2024-03-13 | End: 2024-03-15 | Stop reason: HOSPADM

## 2024-03-13 RX ORDER — FUROSEMIDE 10 MG/ML
40 INJECTION INTRAMUSCULAR; INTRAVENOUS ONCE
Status: COMPLETED | OUTPATIENT
Start: 2024-03-13 | End: 2024-03-13

## 2024-03-13 RX ORDER — TRAZODONE HYDROCHLORIDE 50 MG/1
25 TABLET ORAL ONCE
Status: COMPLETED | OUTPATIENT
Start: 2024-03-13 | End: 2024-03-13

## 2024-03-13 RX ADMIN — LACTULOSE 20 G: 10 SOLUTION ORAL at 20:30

## 2024-03-13 RX ADMIN — DOCUSATE SODIUM LIQUID 100 MG: 100 LIQUID ORAL at 08:42

## 2024-03-13 RX ADMIN — PANTOPRAZOLE SODIUM 40 MG: 40 INJECTION, POWDER, FOR SOLUTION INTRAVENOUS at 08:42

## 2024-03-13 RX ADMIN — CARVEDILOL 6.25 MG: 12.5 TABLET, FILM COATED ORAL at 20:30

## 2024-03-13 RX ADMIN — PANTOPRAZOLE SODIUM 40 MG: 40 TABLET, DELAYED RELEASE ORAL at 18:09

## 2024-03-13 RX ADMIN — TRAZODONE HYDROCHLORIDE 25 MG: 50 TABLET ORAL at 22:13

## 2024-03-13 RX ADMIN — FUROSEMIDE 40 MG: 10 INJECTION, SOLUTION INTRAMUSCULAR; INTRAVENOUS at 09:35

## 2024-03-13 RX ADMIN — LACTULOSE 20 G: 10 SOLUTION ORAL at 08:42

## 2024-03-13 RX ADMIN — SPIRONOLACTONE 150 MG: 50 TABLET ORAL at 09:35

## 2024-03-13 ASSESSMENT — COGNITIVE AND FUNCTIONAL STATUS - GENERAL
MOBILITY SCORE: 24
MOBILITY SCORE: 24
DAILY ACTIVITIY SCORE: 24
DAILY ACTIVITIY SCORE: 24

## 2024-03-13 ASSESSMENT — PAIN SCALES - GENERAL
PAINLEVEL_OUTOF10: 0 - NO PAIN
PAINLEVEL_OUTOF10: 0 - NO PAIN

## 2024-03-13 ASSESSMENT — PAIN - FUNCTIONAL ASSESSMENT: PAIN_FUNCTIONAL_ASSESSMENT: 0-10

## 2024-03-13 NOTE — PROGRESS NOTES
"Pharmacy Admission Order Reconciliation Review    Vance Silva \"Karyn\" is a 58 y.o. male admitted for Decompensated hepatic cirrhosis (CMS/HCC). Pharmacy reviewed the patient's unreconciled admission medications.    Prior to admission medications that were reviewed and acted on by the pharmacist include:  Carvedilol 6.25 mg, 1po at bedtime  Lactulose sol  Pantoprazole 40 mg  Spironolactone 50 mg  Furosemide 20 mg  These medications have been reconciled.     Any other unreconcilied medications have been addressed and will be ordered or held by the patient's medical team. Medications addressed by the pharmacist may be added or changed by the patient's medical team at any time.    Kinsey Gutiérrez, [place]  Transitions of Care Pharmacist  [facility]  Please reach out via Secure Chat for questions, or if no response call [phone]   "

## 2024-03-13 NOTE — TELEPHONE ENCOUNTER
PT's mother called saying PT told her Dr. Norris came to see him and told him they were going to do some kind of test. She would like to know what it is and when they are doing it.[phone]

## 2024-03-13 NOTE — PROGRESS NOTES
"Physical Therapy                 Therapy Communication Note    Patient Name: Vance Silva \"Karyn\"  MRN: [id number]  Today's Date: 3/13/2024     Discipline: Physical Therapy    Missed Visit Reason: Missed Visit Reason: Patient in a medical procedure (GI lab)    Missed Time: Attempt      "

## 2024-03-13 NOTE — PROGRESS NOTES
Internal Medicine Daily Progress Note    Subjective     Interval events:  NAEO. Received 1 unit PRBCs, Hgb now 7.3. Denies abdominal pain, nausea, vomiting, blood in stool.       Objective     Vitals:  Visit Vitals  /73   Pulse 66   Temp 36.4 °C (97.5 °F)   Resp 18         Intake/Output Summary (Last 24 hours) at 3/13/2024 0945  Last data filed at 3/13/2024 0900  Gross per 24 hour   Intake 400 ml   Output --   Net 400 ml       Physical exam:  Constitutional: Well-developed male in no acute distress. Sitting up in bed watching TV.  HEENT: Normocephalic, atraumatic. EOMI. No scleral icterus.  Respiratory: CTA bilaterally. No wheezes, rales, or rhonchi. Normal respiratory effort.  Cardiovascular: RRR. No murmurs, gallops, or rubs.   Abdominal: Soft, distended, nontender to palpation. Bowel sounds present.  Neuro: Alert and oriented to person, place, and time. No focal deficit  MSK: Mild LE edema bilaterally. No UE edema.  Skin: Warm, dry. No rashes or wounds.  Psych: Appropriate mood and affect.    Medications:  carvedilol, 6.25 mg, oral, Nightly  cefTRIAXone, 1 g, intravenous, q24h  docusate sodium, 100 mg, oral, BID  lactulose, 20 g, oral, TID  oxygen, , inhalation, Continuous - 02/gases  pantoprazole, 40 mg, intravenous, BID  spironolactone, 150 mg, oral, Daily         PRN medications: ondansetron **OR** ondansetron    Labs:      Results from last 72 hours   Lab Units 03/13/24  0312 03/12/24  0624 03/12/24  0110 03/11/24  1239   WBC AUTO x10*3/uL 3.4* 2.9*  --  5.7   HEMOGLOBIN g/dL 7.3* 6.7*  --  4.8*   HEMATOCRIT % 24.3* 22.8*  --  17.0*   PLATELETS AUTO x10*3/uL 101* 97*  --  130*   INR  1.2*  --   --  1.3*   SODIUM mmol/L 137 137 137 139   POTASSIUM mmol/L 4.3 3.9 4.3 4.2   CHLORIDE mmol/L 106 106 105 107   CO2 mmol/L 23 23 22 23   BUN mg/dL 20 20 19 18   CREATININE mg/dL 1.14 1.32* 1.45* 1.33*   GLUCOSE mg/dL 117* 85 100* 96   CALCIUM mg/dL 8.4* 7.9* 7.8* 8.2*   MAGNESIUM mg/dL 1.98  --  1.90  --   LM informing patient FMLA has been completed and faxed to [employer] on 5-2-22.   PHOSPHORUS mg/dL 3.9  --   --   --    ALBUMIN g/dL 3.5 3.7 4.0 4.4   ALK PHOS U/L  --  59 68 74   ALT U/L  --  9* 11 11   AST U/L  --  11 13 12   BILIRUBIN TOTAL mg/dL  --  0.6 0.4 0.4           Imaging:  ECG 12 lead    Result Date: 3/12/2024  Normal sinus rhythm Low voltage QRS Borderline ECG When compared with ECG of 09-FEB-2024 23:57, No significant change was found See ED provider note for full interpretation and clinical correlation Confirmed by Leonard Alamo ([id number]) on 3/12/2024 9:55:07 AM    Enteroscopy    Result Date: 3/12/2024  Table formatting from the original result was not included. Impression Two small and minimal grade I varices in the esophagus Mild abnormal mucosa in the stomach The duodenum, major papilla and proximal jejunum appeared normal. Findings Two small and minimal grade I varices (no red shyam sign) in the esophagus; no bleeding was identified. Extend from 30 -40 cm Mild, generalized abnormal mucosa in the stomach. Portal hypertensive gastropathy and no gastric varices or bleeding The duodenum, major papilla and proximal jejunum appeared normal. Recommendation  Follow up with primary gastroenterologist  Follow up with Ernie Norris, hepatology Continue meds Can go home today  Indication Other cirrhosis of liver (CMS/HCC), Anemia, unspecified type, Gastrointestinal hemorrhage, unspecified gastrointestinal hemorrhage type Staff Staff Role Herve Samson MD Proceduralist Medications See Anesthesia Record. Preprocedure A history and physical has been performed, and patient medication allergies have been reviewed. The patient's tolerance of previous anesthesia has been reviewed. The risks and benefits of the procedure and the sedation options and risks were discussed with the patient. All questions were answered and informed consent obtained. Details of the Procedure The patient underwent monitored anesthesia care, which was administered by an anesthesia professional. The patient's blood  pressure, heart rate, level of consciousness, oxygen, respirations, ECG and ETCO2 were monitored throughout the procedure. The scope was introduced through the mouth and advanced to the proximal part of the jejunum. Tattoo was not placed to renard the distal extent of the exam. The procedure was performed without an overtube. Fluoroscopy was not used. Retroflexion was performed in the cardia. The patient experienced no blood loss. The procedure was not difficult. The patient tolerated the procedure well. There were no apparent adverse events. Events Procedure Events Event Event Time ENDO SCOPE IN TIME 3/12/2024  9:35 AM ENDO SCOPE OUT TIME 3/12/2024  9:50 AM Specimens No specimens collected Procedure Location [facility] ([place], [place]) [address] [facility] [id number] [phone] Referring Provider No referring provider defined for this encounter. Procedure Provider No name on file     MR liver w and wo IV contrast    Result Date: 3/11/2024  Interpreted By:  Yuan Hauser, STUDY: MR LIVER W AND WO IV CONTRAST;  3/11/2024 3:26 pm   INDICATION: Signs/Symptoms:PRE LIVER TRANSPLANT EVALUATION.   COMPARISON: None.   ACCESSION NUMBER(S): [id number]   ORDERING CLINICIAN: GABY GILLIAM   TECHNIQUE: MRI LIVER; Multiplanar magnetic resonance images of the abdomen were obtained including the following sequences; T2-weighted SSFSE with and without fat saturation, T1-weighted GRE in/opposed phase, DWI, fat saturated 3D-T1w GRE pre and dynamically post contrast.  18 ml of Gadolinium contrast agent Dotarem were administered intravenously without immediate complication.   FINDINGS: Exam is limited due to motion artifact and dielectric effect relating to ascites.   LIVER: No signal changes of steatosis. Cirrhotic including diffuse surface nodularity with marked hypertrophy of the left lateral segment and caudate lobe. No focal lesions identified. Please note evaluation is slightly limited as the 1st  post-contrast scan is in the portal venous phase. No true arterial phase images were obtained.   BILE DUCTS: No dilatation.   GALLBLADDER: Mild wall thickening which could be reactive to ascites/liver disease.   PANCREAS: Grossly unremarkable. No main ductal dilatation or mass identified.   SPLEEN: Markedly enlarged measuring nearly 24 cm in length.   ADRENAL GLANDS: Unremarkable.   KIDNEYS: Small cyst in the left kidney.  Bilateral striated nephrograms are noted. Otherwise unremarkable kidneys without hydronephrosis.   LYMPH NODES: No lymphadenopathy identified.   ABDOMINAL VESSELS: Abdominal aorta is patent without aneurysm. Major visceral arterial branches are patent. IVC and visualized major branches are patent. Major portal venous branches are patent. There is a recanalized umbilical vein as well as tiny paraesophageal varices which indicate portal hypertension.   BOWEL: No dilated bowel is visualized.   PERITONEUM/RETROPERITONEUM: Partially imaged moderate to large volume ascites.   BONES AND LOWER THORAX: No focal abnormal marrow signal. Mild bibasilar atelectasis.         1.  Findings of cirrhosis and portal hypertension. No focal hepatic lesions identified. Please note the study lacks a true arterial phase post-contrast scan. 2. Moderate to large volume ascites. 3. Nonspecific striated nephrograms bilaterally. Diagnostic considerations may include pyelonephritis, acute tubular necrosis, or hypotension.     MACRO: None   Signed by: Yuan Hauser 3/11/2024 3:55 PM Dictation workstation:   [id number]    US guided abdominal paracentesis    Result Date: 3/11/2024  Interpreted By:  Seven Ewing, STUDY: US GUIDED ABDOMINAL PARACENTESIS; 3/11/492333:38 pm   INDICATION: Signs/Symptoms:refractory ascites - pre liver txp evaluation patient. Ascites, Cirrhosis   COMPARISON: US guided paracentesis 2/13/24   ACCESSION NUMBER(S): [id number]   ORDERING CLINICIAN: GABY GILLIAM   TECHNIQUE: INTERVENTIONALIST(S): Seven  "Gildardo   CONSENT: The patient/patient's POA/next of kin was informed of the nature of the proposed procedure. The purposes, alternatives, risks, and benefits were explained and discussed. All questions were answered and consent was obtained.   SEDATION: None   MEDICATION/CONTRAST: 1% lidocaine was used to anesthetize subcutaneously 62.5 Grams albumin IV was ordered following procedure.   TIME OUT: A time out was performed immediately prior to procedure start with the interventional team, correctly identifying the patient name, date of birth, MRN, procedure, anatomy (including marking of site and side), patient position, procedure consent form, relevant laboratory and imaging test results, antibiotic administration, safety precautions, and procedure-specific equipment needs.   FINDINGS: The patient was placed in the supine position.   The abdominal space was examined with grey scale ultrasound, and the most accessible fluid identified and marked for paracentesis.   The skin was prepped and draped in usual manner. Local anesthesia with Lidocaine was administered and a right-sided paracentesis was performed.  A 5 Upper sorbian One-Step paracentesis needle/catheter was then placed where marked. Approximately 6.7 L of yellowish colored fluid was removed.  The needle/catheter was then withdrawn.   The patient tolerated the procedure well and there were no immediate complications. Specimen(s) sent to the laboratory for further evaluation, per the requesting team.       Uneventful paracentesis, as detailed above. Right Hemiabdomen, 6.7 L   I personally performed and/or directly supervised this study and was present for the entire procedure.   Performed and dictated at [place].   Signed by: Seven Ewing 3/11/2024 12:40 PM Dictation workstation:   [id number]         Assessment and Plan:  Vance Silva \"Karyn\" is a 58 y.o. male with PMHx of cirrhosis c/b portal htn with esophageal varices sp " banding on 1/5, with significant ascites requiring frequent paracentesis, hepatic encephalopathy, hx of TBI, OA transferred from [facility] for acute anemia with Hgb 4.8.    Updates 03/13/24:  - start capsule endoscopy this afternoon. 2 hrs post placement can advance to [facility], then 4 hours after advance to [facility]     #Cirrhosis cb variceal bleeds, ascites, and HE  #Hx Variceal bleed sp banding most recent 1/5  #Iron deficiency Anemia  - cirrhosis etiology multifactorial including previous painkiller, heavy EtOH, and supplement use  - Acute anemia with Hgb 4.8 from 10 two weeks prior to presentation  - iron studies on 2/27 c/w DEANA, received 100 mg IV iron 3/12 AM at [facility]  - no observed bleeding by patient, push enteroscopy at [facility] negative  - sp 2u pRBCs on 3/11  - 3/11 paracentesis negative for SBP  - Hepatitis stamp w/up was negative on 2/23/24  - 1 unit PRBCs upon arrival to [facility]  MELD 3.0: 9 at 3/13/2024  3:12 AM  MELD-Na: 9 at 3/13/2024  3:12 AM  PLAN:  - discontinue CTX as no overt GIB for SBP prophy  - pantoprazole 40 mg BID  - continue aldactone 150 mg daily, Lasix dosed daily according to volume status  - continue lactulose 20g TID titrate to 3 soft BM daily  - maintain active T&s, consent in chart    #PORFIRIO, resolved  - Cr 1.32 on admission; peaked at 1.45; baseline 0.9-1.1  - improved to 1.14 on 3/13  - likely prerenal iso hypovolemia     E: Replete PRN  N: sodium restricted diet, otherwise NPO after MN  A: PIV  DVT: Holding due to anemia/recent bleed (SCD)  Full code (confirmed on admission)  NOK: States his son Jose Guadalupe ([phone]) and daughter Chante ([phone]) are his HCPOA    Patient and plan discussed with attending physician.    Eri Jenkins MD  PGY-1 Internal Medicine

## 2024-03-14 ENCOUNTER — HOSPITAL ENCOUNTER (OUTPATIENT)
Dept: CARDIOLOGY | Facility: HOSPITAL | Age: 59
Discharge: HOME | End: 2024-03-14
Payer: COMMERCIAL

## 2024-03-14 ENCOUNTER — APPOINTMENT (OUTPATIENT)
Dept: RADIOLOGY | Facility: HOSPITAL | Age: 59
End: 2024-03-14
Payer: COMMERCIAL

## 2024-03-14 LAB
ALBUMIN FLD-MCNC: 1.5 G/DL
ALBUMIN SERPL BCP-MCNC: 3.6 G/DL (ref 3.4–5)
AMYLASE FLD-CCNC: 26 U/L
ANION GAP SERPL CALC-SCNC: 15 MMOL/L
APTT PPP: 28 SECONDS (ref 27–38)
BASOPHILS # BLD AUTO: 0.04 X10*3/UL (ref 0–0.1)
BASOPHILS NFR BLD AUTO: 0.4 %
BASOPHILS NFR FLD MANUAL: 0 %
BLASTS NFR FLD MANUAL: 0 %
BUN SERPL-MCNC: 16 MG/DL (ref 6–23)
CALCIUM SERPL-MCNC: 8.6 MG/DL (ref 8.6–10.6)
CHLORIDE SERPL-SCNC: 105 MMOL/L (ref 98–107)
CLARITY FLD: CLEAR
CO2 SERPL-SCNC: 20 MMOL/L (ref 21–32)
COLOR FLD: YELLOW
CREAT SERPL-MCNC: 1.2 MG/DL (ref 0.5–1.3)
EGFRCR SERPLBLD CKD-EPI 2021: 69 ML/MIN/1.73M*2
EOSINOPHIL # BLD AUTO: 0.01 X10*3/UL (ref 0–0.7)
EOSINOPHIL NFR BLD AUTO: 0.1 %
EOSINOPHIL NFR FLD MANUAL: 0 %
ERYTHROCYTE [DISTWIDTH] IN BLOOD BY AUTOMATED COUNT: 21 % (ref 11.5–14.5)
GLUCOSE FLD-MCNC: 119 MG/DL
GLUCOSE SERPL-MCNC: 113 MG/DL (ref 74–99)
HCT VFR BLD AUTO: 26.1 % (ref 41–52)
HGB BLD-MCNC: 8.1 G/DL (ref 13.5–17.5)
HYPOCHROMIA BLD QL SMEAR: NORMAL
IMM GRANULOCYTES # BLD AUTO: 0.06 X10*3/UL (ref 0–0.7)
IMM GRANULOCYTES NFR BLD AUTO: 0.6 % (ref 0–0.9)
IMMATURE GRANULOCYTES IN FLUID: 0 %
INR PPP: 1.3 (ref 0.9–1.1)
LABORATORY REPORT: NORMAL
LYMPHOCYTES # BLD AUTO: 0.5 X10*3/UL (ref 1.2–4.8)
LYMPHOCYTES NFR BLD AUTO: 5 %
LYMPHOCYTES NFR FLD MANUAL: 27 %
MAGNESIUM SERPL-MCNC: 1.93 MG/DL (ref 1.6–2.4)
MCH RBC QN AUTO: 24.8 PG (ref 26–34)
MCHC RBC AUTO-ENTMCNC: 31 G/DL (ref 32–36)
MCV RBC AUTO: 80 FL (ref 80–100)
MONOCYTES # BLD AUTO: 0.78 X10*3/UL (ref 0.1–1)
MONOCYTES NFR BLD AUTO: 7.8 %
MONOS+MACROS NFR FLD MANUAL: 65 %
NEUTROPHILS # BLD AUTO: 8.66 X10*3/UL (ref 1.2–7.7)
NEUTROPHILS NFR BLD AUTO: 86.1 %
NEUTROPHILS NFR FLD MANUAL: 8 %
NRBC BLD-RTO: 0 /100 WBCS (ref 0–0)
OTHER CELLS NFR FLD MANUAL: 0 %
PETH INTERPRETATION: NORMAL
PHOSPHATE SERPL-MCNC: 3.2 MG/DL (ref 2.5–4.9)
PLASMA CELLS NFR FLD MANUAL: 0 %
PLATELET # BLD AUTO: 98 X10*3/UL (ref 150–450)
PLPETH BLD-MCNC: <10 NG/ML
POPETH BLD-MCNC: <10 NG/ML
POTASSIUM SERPL-SCNC: 4.1 MMOL/L (ref 3.5–5.3)
PROT FLD-MCNC: 2.2 G/DL
PROT SERPL-MCNC: 5.9 G/DL (ref 6.4–8.2)
PROTHROMBIN TIME: 15.2 SECONDS (ref 9.8–12.8)
RBC # BLD AUTO: 3.26 X10*6/UL (ref 4.5–5.9)
RBC # FLD AUTO: 72 /UL
RBC MORPH BLD: NORMAL
SODIUM SERPL-SCNC: 136 MMOL/L (ref 136–145)
TOTAL CELLS COUNTED FLD: 100
TRIGL FLD-MCNC: 16 MG/DL
WBC # BLD AUTO: 10.1 X10*3/UL (ref 4.4–11.3)
WBC # FLD AUTO: 103 /UL

## 2024-03-14 PROCEDURE — 2500000001 HC RX 250 WO HCPCS SELF ADMINISTERED DRUGS (ALT 637 FOR MEDICARE OP)

## 2024-03-14 PROCEDURE — 96366 THER/PROPH/DIAG IV INF ADDON: CPT | Mod: 59

## 2024-03-14 PROCEDURE — 87040 BLOOD CULTURE FOR BACTERIA: CPT

## 2024-03-14 PROCEDURE — 2500000004 HC RX 250 GENERAL PHARMACY W/ HCPCS (ALT 636 FOR OP/ED)

## 2024-03-14 PROCEDURE — 82945 GLUCOSE OTHER FLUID: CPT

## 2024-03-14 PROCEDURE — 96375 TX/PRO/DX INJ NEW DRUG ADDON: CPT | Mod: 59

## 2024-03-14 PROCEDURE — 85025 COMPLETE CBC W/AUTO DIFF WBC: CPT

## 2024-03-14 PROCEDURE — 85610 PROTHROMBIN TIME: CPT

## 2024-03-14 PROCEDURE — 84478 ASSAY OF TRIGLYCERIDES: CPT

## 2024-03-14 PROCEDURE — 36415 COLL VENOUS BLD VENIPUNCTURE: CPT

## 2024-03-14 PROCEDURE — G0378 HOSPITAL OBSERVATION PER HR: HCPCS

## 2024-03-14 PROCEDURE — 82150 ASSAY OF AMYLASE: CPT

## 2024-03-14 PROCEDURE — 82042 OTHER SOURCE ALBUMIN QUAN EA: CPT

## 2024-03-14 PROCEDURE — 91110 GI TRC IMG INTRAL ESOPH-ILE: CPT | Performed by: INTERNAL MEDICINE

## 2024-03-14 PROCEDURE — P9047 ALBUMIN (HUMAN), 25%, 50ML: HCPCS | Mod: JZ

## 2024-03-14 PROCEDURE — 2500000002 HC RX 250 W HCPCS SELF ADMINISTERED DRUGS (ALT 637 FOR MEDICARE OP, ALT 636 FOR OP/ED)

## 2024-03-14 PROCEDURE — 93010 ELECTROCARDIOGRAM REPORT: CPT | Performed by: INTERNAL MEDICINE

## 2024-03-14 PROCEDURE — 49083 ABD PARACENTESIS W/IMAGING: CPT | Performed by: NURSE PRACTITIONER

## 2024-03-14 PROCEDURE — 84157 ASSAY OF PROTEIN OTHER: CPT

## 2024-03-14 PROCEDURE — 83735 ASSAY OF MAGNESIUM: CPT

## 2024-03-14 PROCEDURE — 49083 ABD PARACENTESIS W/IMAGING: CPT

## 2024-03-14 PROCEDURE — 87070 CULTURE OTHR SPECIMN AEROBIC: CPT | Mod: 59

## 2024-03-14 PROCEDURE — 99232 SBSQ HOSP IP/OBS MODERATE 35: CPT

## 2024-03-14 PROCEDURE — 80069 RENAL FUNCTION PANEL: CPT

## 2024-03-14 PROCEDURE — 2500000004 HC RX 250 GENERAL PHARMACY W/ HCPCS (ALT 636 FOR OP/ED): Mod: JZ

## 2024-03-14 PROCEDURE — 96365 THER/PROPH/DIAG IV INF INIT: CPT | Mod: 59

## 2024-03-14 PROCEDURE — 89051 BODY FLUID CELL COUNT: CPT

## 2024-03-14 PROCEDURE — 93005 ELECTROCARDIOGRAM TRACING: CPT | Mod: 59

## 2024-03-14 RX ORDER — TRAZODONE HYDROCHLORIDE 50 MG/1
25 TABLET ORAL ONCE
Status: COMPLETED | OUTPATIENT
Start: 2024-03-14 | End: 2024-03-14

## 2024-03-14 RX ORDER — CEFTRIAXONE 2 G/50ML
2 INJECTION, SOLUTION INTRAVENOUS EVERY 24 HOURS
Status: DISCONTINUED | OUTPATIENT
Start: 2024-03-14 | End: 2024-03-15 | Stop reason: HOSPADM

## 2024-03-14 RX ORDER — CEFTRIAXONE 1 G/50ML
1 INJECTION, SOLUTION INTRAVENOUS EVERY 24 HOURS
Status: DISCONTINUED | OUTPATIENT
Start: 2024-03-14 | End: 2024-03-14

## 2024-03-14 RX ORDER — ALBUMIN HUMAN 250 G/1000ML
12.5 SOLUTION INTRAVENOUS ONCE
Status: COMPLETED | OUTPATIENT
Start: 2024-03-14 | End: 2024-03-14

## 2024-03-14 RX ORDER — FUROSEMIDE 10 MG/ML
40 INJECTION INTRAMUSCULAR; INTRAVENOUS ONCE
Status: COMPLETED | OUTPATIENT
Start: 2024-03-14 | End: 2024-03-14

## 2024-03-14 RX ADMIN — DOCUSATE SODIUM LIQUID 100 MG: 100 LIQUID ORAL at 20:03

## 2024-03-14 RX ADMIN — PANTOPRAZOLE SODIUM 40 MG: 40 TABLET, DELAYED RELEASE ORAL at 09:22

## 2024-03-14 RX ADMIN — CARVEDILOL 6.25 MG: 12.5 TABLET, FILM COATED ORAL at 20:03

## 2024-03-14 RX ADMIN — ALBUMIN HUMAN 12.5 G: 0.25 SOLUTION INTRAVENOUS at 20:24

## 2024-03-14 RX ADMIN — CEFTRIAXONE SODIUM 2 G: 2 INJECTION, SOLUTION INTRAVENOUS at 12:43

## 2024-03-14 RX ADMIN — LACTULOSE 20 G: 10 SOLUTION ORAL at 17:40

## 2024-03-14 RX ADMIN — SPIRONOLACTONE 150 MG: 50 TABLET ORAL at 09:22

## 2024-03-14 RX ADMIN — LACTULOSE 20 G: 10 SOLUTION ORAL at 20:03

## 2024-03-14 RX ADMIN — PANTOPRAZOLE SODIUM 40 MG: 40 TABLET, DELAYED RELEASE ORAL at 17:40

## 2024-03-14 RX ADMIN — TRAZODONE HYDROCHLORIDE 25 MG: 50 TABLET ORAL at 21:16

## 2024-03-14 RX ADMIN — LACTULOSE 20 G: 10 SOLUTION ORAL at 09:21

## 2024-03-14 RX ADMIN — FUROSEMIDE 40 MG: 10 INJECTION, SOLUTION INTRAVENOUS at 09:21

## 2024-03-14 ASSESSMENT — COGNITIVE AND FUNCTIONAL STATUS - GENERAL
DAILY ACTIVITIY SCORE: 24
MOBILITY SCORE: 24

## 2024-03-14 ASSESSMENT — PAIN SCALES - GENERAL
PAINLEVEL_OUTOF10: 0 - NO PAIN

## 2024-03-14 ASSESSMENT — PAIN - FUNCTIONAL ASSESSMENT
PAIN_FUNCTIONAL_ASSESSMENT: 0-10
PAIN_FUNCTIONAL_ASSESSMENT: 0-10

## 2024-03-14 NOTE — POST-PROCEDURE NOTE
INTERVENTIONAL RADIOLOGY ADVANCED PRACTICE PROCEDURE  [facility]    A time out was performed and Left Hemiabdomen was examined with US and appropriate entry point was confirmed and marked.   The patient was prepped and draped in a sterile manner, 1% lidocaine was used to anesthesize the skin and subcutaneous tissue.   A 5F Centesis needle was then introduced through the skin into the peritoneal space, the centesis catheter was then threaded without difficulty.   7000 ml of yellow fluid was removed without difficulty. The catheter was then removed.   No immediate complications were noted during and immediately following the procedure.

## 2024-03-14 NOTE — PROGRESS NOTES
Internal Medicine Daily Progress Note    Subjective     Interval events:  CHIO, HDS. Capsule endoscopy completed overnight. Denies chest pain, dyspnea, abdominal pain, nausea, vomiting, diarrhea.       Objective     Vitals:  Visit Vitals  /70   Pulse 86   Temp 36.6 °C (97.9 °F)   Resp 16         Intake/Output Summary (Last 24 hours) at 3/14/2024 0918  Last data filed at 3/14/2024 0500  Gross per 24 hour   Intake 480 ml   Output --   Net 480 ml       Physical exam:  Constitutional: Well-developed male in no acute distress.  HEENT: Normocephalic, atraumatic. EOMI. No scleral icterus.  Respiratory: CTA bilaterally. No wheezes, rales, or rhonchi. Normal respiratory effort.  Cardiovascular: RRR. No murmurs, gallops, or rubs.   Abdominal: Soft, distended, nontender to palpation.   Neuro: Alert and oriented to person, place, and time. No focal deficit  MSK: Mild LE edema bilaterally. No UE edema.  Skin: Warm, dry. No rashes or wounds.  Psych: Appropriate mood and affect.    Medications:  carvedilol, 6.25 mg, oral, Nightly  docusate sodium, 100 mg, oral, BID  furosemide, 40 mg, intravenous, Once  lactulose, 20 g, oral, TID  oxygen, , inhalation, Continuous - 02/gases  pantoprazole, 40 mg, oral, BID AC  spironolactone, 150 mg, oral, Daily         PRN medications: ondansetron **OR** ondansetron    Labs:      Results from last 72 hours   Lab Units 03/14/24  0843 03/13/24  2221 03/13/24  0312 03/12/24  0624 03/12/24  0110 03/11/24  1239   WBC AUTO x10*3/uL 10.1 13.9* 3.4* 2.9*  --  5.7   HEMOGLOBIN g/dL 8.1* 8.9* 7.3* 6.7*  --  4.8*   HEMATOCRIT % 26.1* 29.7* 24.3* 22.8*  --  17.0*   PLATELETS AUTO x10*3/uL 98* 129* 101* 97*  --  130*   INR   --   --  1.2*  --   --  1.3*   SODIUM mmol/L  --   --  137 137 137 139   POTASSIUM mmol/L  --   --  4.3 3.9 4.3 4.2   CHLORIDE mmol/L  --   --  106 106 105 107   CO2 mmol/L  --   --  23 23 22 23   BUN mg/dL  --   --  20 20 19 18   CREATININE mg/dL  --   --  1.14 1.32* 1.45* 1.33*    GLUCOSE mg/dL  --   --  117* 85 100* 96   CALCIUM mg/dL  --   --  8.4* 7.9* 7.8* 8.2*   MAGNESIUM mg/dL  --   --  1.98  --  1.90  --    PHOSPHORUS mg/dL  --   --  3.9  --   --   --    ALBUMIN g/dL  --   --  3.5 3.7 4.0 4.4   ALK PHOS U/L  --   --   --  59 68 74   ALT U/L  --   --   --  9* 11 11   AST U/L  --   --   --  11 13 12   BILIRUBIN TOTAL mg/dL  --   --   --  0.6 0.4 0.4           Imaging:  ECG 12 lead    Result Date: 3/12/2024  Normal sinus rhythm Low voltage QRS Borderline ECG When compared with ECG of 09-FEB-2024 23:57, No significant change was found See ED provider note for full interpretation and clinical correlation Confirmed by Leonard Alamo ([id number]) on 3/12/2024 9:55:07 AM    Enteroscopy    Result Date: 3/12/2024  Table formatting from the original result was not included. Impression Two small and minimal grade I varices in the esophagus Mild abnormal mucosa in the stomach The duodenum, major papilla and proximal jejunum appeared normal. Findings Two small and minimal grade I varices (no red shyam sign) in the esophagus; no bleeding was identified. Extend from 30 -40 cm Mild, generalized abnormal mucosa in the stomach. Portal hypertensive gastropathy and no gastric varices or bleeding The duodenum, major papilla and proximal jejunum appeared normal. Recommendation  Follow up with primary gastroenterologist  Follow up with Ernie Norris, hepatology Continue meds Can go home today  Indication Other cirrhosis of liver (CMS/HCC), Anemia, unspecified type, Gastrointestinal hemorrhage, unspecified gastrointestinal hemorrhage type Staff Staff Role Herve Samson MD Proceduralist Medications See Anesthesia Record. Preprocedure A history and physical has been performed, and patient medication allergies have been reviewed. The patient's tolerance of previous anesthesia has been reviewed. The risks and benefits of the procedure and the sedation options and risks were discussed with the patient. All questions  were answered and informed consent obtained. Details of the Procedure The patient underwent monitored anesthesia care, which was administered by an anesthesia professional. The patient's blood pressure, heart rate, level of consciousness, oxygen, respirations, ECG and ETCO2 were monitored throughout the procedure. The scope was introduced through the mouth and advanced to the proximal part of the jejunum. Tattoo was not placed to renard the distal extent of the exam. The procedure was performed without an overtube. Fluoroscopy was not used. Retroflexion was performed in the cardia. The patient experienced no blood loss. The procedure was not difficult. The patient tolerated the procedure well. There were no apparent adverse events. Events Procedure Events Event Event Time ENDO SCOPE IN TIME 3/12/2024  9:35 AM ENDO SCOPE OUT TIME 3/12/2024  9:50 AM Specimens No specimens collected Procedure Location [facility] [address] [facility] [address] [phone] Referring Provider No referring provider defined for this encounter. Procedure Provider No name on file     MR liver w and wo IV contrast    Result Date: 3/11/2024  Interpreted By:  Yuan Hauser, STUDY: MR LIVER W AND WO IV CONTRAST;  3/11/2024 3:26 pm   INDICATION: Signs/Symptoms:PRE LIVER TRANSPLANT EVALUATION.   COMPARISON: None.   ACCESSION NUMBER(S): [id number]   ORDERING CLINICIAN: GABY GILLIAM   TECHNIQUE: MRI LIVER; Multiplanar magnetic resonance images of the abdomen were obtained including the following sequences; T2-weighted SSFSE with and without fat saturation, T1-weighted GRE in/opposed phase, DWI, fat saturated 3D-T1w GRE pre and dynamically post contrast.  18 ml of Gadolinium contrast agent Dotarem were administered intravenously without immediate complication.   FINDINGS: Exam is limited due to motion artifact and dielectric effect relating to ascites.   LIVER: No signal changes of steatosis.  Cirrhotic including diffuse surface nodularity with marked hypertrophy of the left lateral segment and caudate lobe. No focal lesions identified. Please note evaluation is slightly limited as the 1st post-contrast scan is in the portal venous phase. No true arterial phase images were obtained.   BILE DUCTS: No dilatation.   GALLBLADDER: Mild wall thickening which could be reactive to ascites/liver disease.   PANCREAS: Grossly unremarkable. No main ductal dilatation or mass identified.   SPLEEN: Markedly enlarged measuring nearly 24 cm in length.   ADRENAL GLANDS: Unremarkable.   KIDNEYS: Small cyst in the left kidney.  Bilateral striated nephrograms are noted. Otherwise unremarkable kidneys without hydronephrosis.   LYMPH NODES: No lymphadenopathy identified.   ABDOMINAL VESSELS: Abdominal aorta is patent without aneurysm. Major visceral arterial branches are patent. IVC and visualized major branches are patent. Major portal venous branches are patent. There is a recanalized umbilical vein as well as tiny paraesophageal varices which indicate portal hypertension.   BOWEL: No dilated bowel is visualized.   PERITONEUM/RETROPERITONEUM: Partially imaged moderate to large volume ascites.   BONES AND LOWER THORAX: No focal abnormal marrow signal. Mild bibasilar atelectasis.         1.  Findings of cirrhosis and portal hypertension. No focal hepatic lesions identified. Please note the study lacks a true arterial phase post-contrast scan. 2. Moderate to large volume ascites. 3. Nonspecific striated nephrograms bilaterally. Diagnostic considerations may include pyelonephritis, acute tubular necrosis, or hypotension.     MACRO: None   Signed by: Yuan Hauser 3/11/2024 3:55 PM Dictation workstation:   [id number]    US guided abdominal paracentesis    Result Date: 3/11/2024  Interpreted By:  Seven Ewing, STUDY: US GUIDED ABDOMINAL PARACENTESIS; 3/11/576752:38 pm   INDICATION: Signs/Symptoms:refractory ascites - pre liver txp  evaluation patient. Ascites, Cirrhosis   COMPARISON: US guided paracentesis 2/13/24   ACCESSION NUMBER(S): [id number]   ORDERING CLINICIAN: GABY GILLIAM   TECHNIQUE: INTERVENTIONALIST(S): Seven Ewing   CONSENT: The patient/patient's POA/next of kin was informed of the nature of the proposed procedure. The purposes, alternatives, risks, and benefits were explained and discussed. All questions were answered and consent was obtained.   SEDATION: None   MEDICATION/CONTRAST: 1% lidocaine was used to anesthetize subcutaneously 62.5 Grams albumin IV was ordered following procedure.   TIME OUT: A time out was performed immediately prior to procedure start with the interventional team, correctly identifying the patient name, date of birth, MRN, procedure, anatomy (including marking of site and side), patient position, procedure consent form, relevant laboratory and imaging test results, antibiotic administration, safety precautions, and procedure-specific equipment needs.   FINDINGS: The patient was placed in the supine position.   The abdominal space was examined with grey scale ultrasound, and the most accessible fluid identified and marked for paracentesis.   The skin was prepped and draped in usual manner. Local anesthesia with Lidocaine was administered and a right-sided paracentesis was performed.  A 5 Ukrainian One-Step paracentesis needle/catheter was then placed where marked. Approximately 6.7 L of yellowish colored fluid was removed.  The needle/catheter was then withdrawn.   The patient tolerated the procedure well and there were no immediate complications. Specimen(s) sent to the laboratory for further evaluation, per the requesting team.       Uneventful paracentesis, as detailed above. Right Hemiabdomen, 6.7 L   I personally performed and/or directly supervised this study and was present for the entire procedure.   Performed and dictated at [facility].   Signed by: Seven Ewing  "3/11/2024 12:40 PM Dictation workstation:   [id number]         Assessment and Plan:  Vance Silva \"Christine" is a 58 y.o. male with PMHx of cirrhosis c/b portal htn with esophageal varices sp banding on 1/5, with significant ascites requiring frequent paracentesis, hepatic encephalopathy, hx of TBI, OA transferred from [facility] for acute anemia with Hgb 4.8.    Updates 03/14/24:  - completed capsule endoscopy, results pending  - Hgb stable  - WBC mildly elevated to 10.1 from 3.4 yesterday, ordering para to r/o SBP and blood cultures, restarted CTX     #Cirrhosis cb variceal bleeds, ascites, and HE  #Hx Variceal bleed sp banding most recent 1/5  #Iron deficiency Anemia  - cirrhosis etiology multifactorial including previous painkiller, heavy EtOH, and supplement use  - Acute anemia with Hgb 4.8 from 10 two weeks prior to presentation  - iron studies on 2/27 c/w DEANA, received 100 mg IV iron 3/12 AM at [facility]  - no observed bleeding by patient, push enteroscopy at [facility] negative  - sp 2u pRBCs on 3/11  - 3/11 paracentesis negative for SBP, discontinued CTX 3/13 as no overt GIB for SBP prophy  - Hepatitis stamp w/up was negative on 2/23/24  - 1 unit PRBCs upon arrival to [facility]  MELD 3.0: 10 at 3/14/2024  8:43 AM  MELD-Na: 10 at 3/14/2024  8:43 AM  PLAN:  - pantoprazole 40 mg BID  - continue aldactone 150 mg daily, Lasix dosed daily according to volume status  - continue lactulose 20g TID titrate to 3 soft BM daily  - maintain active T&s, consent in chart    #Leukocytosis  - WBC elevated to 10.1 on 3/14 from 3.4 on 3/13  - no other infectious s/s  - ordered paracentesis, blood cultures and will ctm given history of E coli bacteremia/SBP    #PORFIRIO, resolved  - Cr 1.32 on admission; peaked at 1.45; baseline 0.9-1.1  - improved to 1.14 on 3/13  - likely prerenal iso hypovolemia     E: Replete PRN  N: sodium restricted diet  A: PIV  DVT: Holding due to anemia/recent bleed (SCD)  Full code (confirmed on admission)  JUANYK: States his " son Jose Guadalupe ([phone]) and daughter Chante ([phone]) are his HCPOA    Patient and plan discussed with attending physician.    Eri Jenkins MD  PGY-1 Internal Medicine

## 2024-03-14 NOTE — PROGRESS NOTES
"Physical Therapy                 Therapy Communication Note    Patient Name: Vance Silva \"Karyn\"  MRN: [id number]  Today's Date: 3/14/2024     Discipline: Physical Therapy    Missed Visit Reason: Missed Visit Reason:  (Spoke with bedside nurse Christy about PT consult, pt reported he was told to have decreased activity due to capsule study which began yesterday.  Nursing [facility] is 24.  Will reattempt in future if PT consult needed for transplant w/u.)    Missed Time: Attempt      03/14/24 at 12:51 PM - Betty Garcia, PT   "

## 2024-03-15 ENCOUNTER — DOCUMENTATION (OUTPATIENT)
Dept: RADIOLOGY | Facility: HOSPITAL | Age: 59
End: 2024-03-15
Payer: COMMERCIAL

## 2024-03-15 ENCOUNTER — APPOINTMENT (OUTPATIENT)
Dept: RADIOLOGY | Facility: HOSPITAL | Age: 59
End: 2024-03-15
Payer: COMMERCIAL

## 2024-03-15 VITALS
BODY MASS INDEX: 23.12 KG/M2 | WEIGHT: 195.77 LBS | SYSTOLIC BLOOD PRESSURE: 91 MMHG | RESPIRATION RATE: 16 BRPM | HEART RATE: 66 BPM | OXYGEN SATURATION: 98 % | HEIGHT: 77 IN | DIASTOLIC BLOOD PRESSURE: 60 MMHG | TEMPERATURE: 97.9 F

## 2024-03-15 LAB
ALBUMIN SERPL BCP-MCNC: 3.7 G/DL (ref 3.4–5)
ANION GAP SERPL CALC-SCNC: 12 MMOL/L (ref 10–20)
APTT PPP: 28 SECONDS (ref 27–38)
BASOPHILS # BLD MANUAL: 0 X10*3/UL (ref 0–0.1)
BASOPHILS NFR BLD MANUAL: 0 %
BUN SERPL-MCNC: 20 MG/DL (ref 6–23)
CALCIUM SERPL-MCNC: 9.1 MG/DL (ref 8.6–10.6)
CHLORIDE SERPL-SCNC: 107 MMOL/L (ref 98–107)
CO2 SERPL-SCNC: 23 MMOL/L (ref 21–32)
CREAT SERPL-MCNC: 1.18 MG/DL (ref 0.5–1.3)
EGFRCR SERPLBLD CKD-EPI 2021: 71 ML/MIN/1.73M*2
EOSINOPHIL # BLD MANUAL: 0.04 X10*3/UL (ref 0–0.7)
EOSINOPHIL NFR BLD MANUAL: 0.9 %
ERYTHROCYTE [DISTWIDTH] IN BLOOD BY AUTOMATED COUNT: 21.3 % (ref 11.5–14.5)
GLUCOSE SERPL-MCNC: 94 MG/DL (ref 74–99)
HCT VFR BLD AUTO: 28.9 % (ref 41–52)
HGB BLD-MCNC: 8.7 G/DL (ref 13.5–17.5)
HYPOCHROMIA BLD QL SMEAR: ABNORMAL
IMM GRANULOCYTES # BLD AUTO: 0.02 X10*3/UL (ref 0–0.7)
IMM GRANULOCYTES NFR BLD AUTO: 0.4 % (ref 0–0.9)
INR PPP: 1.2 (ref 0.9–1.1)
LYMPHOCYTES # BLD MANUAL: 0.4 X10*3/UL (ref 1.2–4.8)
LYMPHOCYTES NFR BLD MANUAL: 8.6 %
MAGNESIUM SERPL-MCNC: 2.13 MG/DL (ref 1.6–2.4)
MCH RBC QN AUTO: 24.9 PG (ref 26–34)
MCHC RBC AUTO-ENTMCNC: 30.1 G/DL (ref 32–36)
MCV RBC AUTO: 83 FL (ref 80–100)
MONOCYTES # BLD MANUAL: 0.28 X10*3/UL (ref 0.1–1)
MONOCYTES NFR BLD MANUAL: 6 %
NEUTS SEG # BLD MANUAL: 3.89 X10*3/UL (ref 1.2–7)
NEUTS SEG NFR BLD MANUAL: 84.5 %
NRBC BLD-RTO: 0 /100 WBCS (ref 0–0)
PHOSPHATE SERPL-MCNC: 3.6 MG/DL (ref 2.5–4.9)
PLATELET # BLD AUTO: 88 X10*3/UL (ref 150–450)
POTASSIUM SERPL-SCNC: 4.3 MMOL/L (ref 3.5–5.3)
PROTHROMBIN TIME: 13.5 SECONDS (ref 9.8–12.8)
RBC # BLD AUTO: 3.49 X10*6/UL (ref 4.5–5.9)
RBC MORPH BLD: ABNORMAL
SODIUM SERPL-SCNC: 138 MMOL/L (ref 136–145)
TOTAL CELLS COUNTED BLD: 116
WBC # BLD AUTO: 4.6 X10*3/UL (ref 4.4–11.3)

## 2024-03-15 PROCEDURE — 36415 COLL VENOUS BLD VENIPUNCTURE: CPT

## 2024-03-15 PROCEDURE — G0378 HOSPITAL OBSERVATION PER HR: HCPCS

## 2024-03-15 PROCEDURE — 83735 ASSAY OF MAGNESIUM: CPT

## 2024-03-15 PROCEDURE — 85610 PROTHROMBIN TIME: CPT

## 2024-03-15 PROCEDURE — P9047 ALBUMIN (HUMAN), 25%, 50ML: HCPCS | Mod: JZ

## 2024-03-15 PROCEDURE — 85027 COMPLETE CBC AUTOMATED: CPT

## 2024-03-15 PROCEDURE — 75563 CARD MRI W/STRESS IMG & DYE: CPT

## 2024-03-15 PROCEDURE — 75563 CARD MRI W/STRESS IMG & DYE: CPT | Performed by: RADIOLOGY

## 2024-03-15 PROCEDURE — 2500000002 HC RX 250 W HCPCS SELF ADMINISTERED DRUGS (ALT 637 FOR MEDICARE OP, ALT 636 FOR OP/ED)

## 2024-03-15 PROCEDURE — 2500000001 HC RX 250 WO HCPCS SELF ADMINISTERED DRUGS (ALT 637 FOR MEDICARE OP)

## 2024-03-15 PROCEDURE — 2500000004 HC RX 250 GENERAL PHARMACY W/ HCPCS (ALT 636 FOR OP/ED): Mod: JZ

## 2024-03-15 PROCEDURE — A9575 INJ GADOTERATE MEGLUMI 0.1ML: HCPCS

## 2024-03-15 PROCEDURE — 2550000001 HC RX 255 CONTRASTS

## 2024-03-15 PROCEDURE — 96367 TX/PROPH/DG ADDL SEQ IV INF: CPT | Mod: 59

## 2024-03-15 PROCEDURE — 85007 BL SMEAR W/DIFF WBC COUNT: CPT

## 2024-03-15 PROCEDURE — 80069 RENAL FUNCTION PANEL: CPT

## 2024-03-15 RX ORDER — GADOTERATE MEGLUMINE 376.9 MG/ML
35 INJECTION INTRAVENOUS
Status: COMPLETED | OUTPATIENT
Start: 2024-03-15 | End: 2024-03-15

## 2024-03-15 RX ORDER — ALBUMIN HUMAN 250 G/1000ML
40 SOLUTION INTRAVENOUS ONCE
Status: COMPLETED | OUTPATIENT
Start: 2024-03-15 | End: 2024-03-15

## 2024-03-15 RX ORDER — TRAZODONE HYDROCHLORIDE 50 MG/1
25 TABLET ORAL NIGHTLY PRN
Status: DISCONTINUED | OUTPATIENT
Start: 2024-03-15 | End: 2024-03-15 | Stop reason: HOSPADM

## 2024-03-15 RX ADMIN — PANTOPRAZOLE SODIUM 40 MG: 40 TABLET, DELAYED RELEASE ORAL at 09:01

## 2024-03-15 RX ADMIN — ALBUMIN HUMAN 37.5 G: 0.25 SOLUTION INTRAVENOUS at 09:01

## 2024-03-15 RX ADMIN — LACTULOSE 20 G: 10 SOLUTION ORAL at 09:11

## 2024-03-15 RX ADMIN — SPIRONOLACTONE 150 MG: 50 TABLET ORAL at 09:00

## 2024-03-15 RX ADMIN — GADOTERATE MEGLUMINE 35 ML: 376.9 INJECTION INTRAVENOUS at 11:27

## 2024-03-15 ASSESSMENT — PAIN - FUNCTIONAL ASSESSMENT: PAIN_FUNCTIONAL_ASSESSMENT: 0-10

## 2024-03-15 ASSESSMENT — PAIN SCALES - GENERAL: PAINLEVEL_OUTOF10: 0 - NO PAIN

## 2024-03-15 NOTE — DISCHARGE INSTRUCTIONS
"Allison Silva \"Karyn\",    You were admitted to [facility] from 3/12 to 3/15 for anemia in the setting of cirrhosis. We were concerned you had some GI bleeding, so we did a procedure called a capsule endoscopy. The capsule endoscopy showed some gastropathy in your stomach from cirrhosis, and two erosions in your small intestine that could be the source of the bleeding. Erosions can happen from irritation from certain medications or substances in the bowel, so we recommend abstaining from supplements for the time being. While you were here, we were also able to perform your cardiac MRI stress test to complete your transplant workup. You should hear back from the liver team early next week about potential listing on the transplant list.     Medication changes:  No changes other than discontinuing supplements    It was a pleasure taking care of you,    Your  Care Team    "

## 2024-03-15 NOTE — PROGRESS NOTES
Cardiac MRI Regadenoson Stress Test Nursing Note    Baseline:   HR 66 /62 SpO2 100 % RA     Stress:  1min HR 85 /74 SpO2 99 %RA Comments: 0.4mg Regadenoson IVP  2min HR 84 BP 98/56  SpO2  99 %RA Comments: Denes complaints    Recovery:  1min HR 76 BP 97/61 SpO2 100 %RA Comments: 100mg Aminophylline IVP  2min HR 68 BP 99/67 SpO2 100%RA Comments: Denes complaints  4min HR 68  /71  SpO2 100 %RA Comments: Denes complaints  6min HR  66 /68  SpO2 100 %RA Comments: Denes complaints    The patient received a total of 0.4mg Regadenoson and 100mg Aminophylline via L FA #20g per protocol with no issues or complaints.  Baseline HR of 66 and BP of 102/62 peaked at HR of 85 and BP of 95/56.    The patient met criteria for discharge from cMRI to hospital room. See EMR/Scanned documents for more info.

## 2024-03-15 NOTE — PROGRESS NOTES
"Physical Therapy                 Therapy Communication Note/ PT Screen    Patient Name: Vance Silva \"Karyn\"  MRN: [id number]  Today's Date: 3/15/2024     Discipline: Physical Therapy    Per chart review, discussion with bedside nurse Bozena and MD Jenkins, pt currently does not present with acute PT needs.  Pt has been up independently in room, has not required assist for safety.  Nursing [facility] is 24.  Advised that patient could be given a script for outpatient PT if he desired to work on further strength/endurance/balance upon hospital discharge.  Will discharge current PT order.  MD and bedside nurse aware.       03/15/24 at 10:52 AM - Betty Garcia, PT     "

## 2024-03-15 NOTE — DISCHARGE SUMMARY
Discharge Diagnosis  Decompensated hepatic cirrhosis (CMS/HCC)    Issues Requiring Follow-Up  Liver transplant evaluation.      Test Results Pending At Discharge  Pending Labs       Order Current Status    Blood Culture Preliminary result    Blood Culture Preliminary result    Sterile Fluid Culture/Smear Preliminary result            Hospital Course   Work up for liver transplant.    Pill cam with small small bowel erosions.    No further bleeding.   Will consider TIPS as an outpatient.      Pertinent Physical Exam At Time of Discharge  Physical Exam    Home Medications     Medication List      CONTINUE taking these medications     carvedilol 6.25 mg tablet; Commonly known as: Coreg; Take 1 tablet (6.25   mg) by mouth once daily at bedtime.   Constulose 20 gram/30 mL oral solution; Generic drug: lactulose; Take 30   mL (20 g) by mouth 3 times a day. Titrate to achieve goal 2-3 bowel   movements daily   furosemide 20 mg tablet; Commonly known as: Lasix; Take 2 tablets (40   mg) by mouth 2 times a day.   pantoprazole 40 mg EC tablet; Commonly known as: ProtoNix; Take 1 tablet   (40 mg) by mouth once daily in the morning. Take before meals. Do not   crush, chew, or split.   spironolactone 50 mg tablet; Commonly known as: Aldactone; Take 3   tablets (150 mg) by mouth once daily.       Outpatient Follow-Up  Future Appointments   Date Time Provider Department Center   3/21/2024 11:00 AM Virginia Wheeler MD [id number] Academic   9/3/2024 10:15 AM POR CT 1 PORCT POR Darren Norris MD

## 2024-03-16 NOTE — DISCHARGE SUMMARY
"Discharge Summary    Admission date: 3/12/2024  Discharge date: 3/15/2024  Attending physician at discharge: Mu Norris MD    Admission diagnosis:  Decompensated cirrhosis  2.   GI bleed    Discharge diagnosis:  Decompensated cirrhosis  Portal hypertensive gastropathy, 2 nonbleeding erosions in terminal ileum and mid-jejunum    Discharge disposition:  Home    Vitals:  Visit Vitals  BP 91/60   Pulse 66   Temp 36.6 °C (97.9 °F)   Resp 16       Physical exam:  Constitutional: Well-developed male in no acute distress.  HEENT: Normocephalic, atraumatic. EOMI. No scleral icterus.  Respiratory: CTA bilaterally. No wheezes, rales, or rhonchi. Normal respiratory effort.  Cardiovascular: RRR. No murmurs, gallops, or rubs.   Abdominal: Soft, distended, nontender to palpation.   Neuro: Alert and oriented to person, place, and time. No focal deficit  MSK: Mild LE edema bilaterally. No UE edema.  Skin: Warm, dry. No rashes or wounds.  Psych: Appropriate mood and affect.    Discharge medications:     Your medication list        CONTINUE taking these medications        Instructions Last Dose Given Next Dose Due   carvedilol 6.25 mg tablet  Commonly known as: Coreg      Take 1 tablet (6.25 mg) by mouth once daily at bedtime.       Constulose 20 gram/30 mL oral solution  Generic drug: lactulose      Take 30 mL (20 g) by mouth 3 times a day. Titrate to achieve goal 2-3 bowel movements daily       furosemide 20 mg tablet  Commonly known as: Lasix      Take 2 tablets (40 mg) by mouth 2 times a day.       pantoprazole 40 mg EC tablet  Commonly known as: ProtoNix      Take 1 tablet (40 mg) by mouth once daily in the morning. Take before meals. Do not crush, chew, or split.       spironolactone 50 mg tablet  Commonly known as: Aldactone      Take 3 tablets (150 mg) by mouth once daily.                Hospital course:  Vance Silva \"Christine" is a 58 y.o. male with PMHx of cirrhosis c/b portal htn with esophageal varices sp banding on " 1/5, with significant ascites requiring frequent paracentesis, hepatic encephalopathy, hx of TBI, OA transferred from [facility] for acute anemia with Hgb 4.8. Patient presented for paracentesis and MRI of the liver 3/11 at [facility], and as a routine procedure blood work was done and revealed extremely low hemoglobin level, prompting recommendation to go to the ED. Patient was admitted for further management, he received 2 units of packed RBC transfusion ordered in the ED, with resulted with increment of his H&H to 6.7/22.8. Gastroenterology service was consulted, and patient underwent EGD 3/12 AM which was negative for acute bleeding.  Upon consulting with his hepatologist at [facility], gastroenterology services agreed to transfer patient to [facility] for further management in light of workup for liver transplant. He received 1 unit of RBC upon arrival to [facility] with Hgb improvement to 8.9. He had no further episodes of GIB. Performed a capsule endoscopy which showed portal hypertensive gastropathy as well as two small non bleeding erosions in the mid-jejunum and terminal ileum. No intervention was recommended other than discontinuing his supplements that could have potentially caused this erosion (no other NSAID use). He did have elevation in his WBC and underwent diagnostic/therapeutic paracentesis which removed 7L and was negative for SBP, with subsequent resolution in leukocytosis. Was also able to undergo cMRI stress test needed to complete his liver transplant evaluation. As his hemoglobin was stable and he was not having any signs/symptoms of bleeding, he was discharged home on 3/15 with plans to follow up with his hepatology team.    To be followed up:  - Liver transplant evaluation

## 2024-03-16 NOTE — HOSPITAL COURSE
"Vance Silva \"Christine" is a 58 y.o. male with PMHx of cirrhosis c/b portal htn with esophageal varices sp banding on 1/5, with significant ascites requiring frequent paracentesis, hepatic encephalopathy, hx of TBI, OA transferred from [facility] for acute anemia with Hgb 4.8. Patient presented for paracentesis and MRI of the liver 3/11 at [facility] [place], and as a routine procedure blood work was done and revealed extremely low hemoglobin level, prompting recommendation to go to the ED. Patient was admitted for further management, he received 2 units of packed RBC transfusion ordered in the ED, with resulted with increment of his H&H to 6.7/22.8. Gastroenterology service was consulted, and patient underwent EGD 3/12 AM which was negative for acute bleeding.  Upon consulting with his hepatologist at [facility] [place], gastroenterology services agreed to transfer patient to [facility] [place] for further management in light of workup for liver transplant. He received 1 unit of RBC upon arrival to [facility] with Hgb improvement to 8.9. He had no further episodes of GIB. Performed a capsule endoscopy which showed portal hypertensive gastropathy as well as two small non bleeding erosions in the mid-jejunum and terminal ileum. No intervention was recommended other than discontinuing his supplements that could have potentially caused this erosion (no other NSAID use). He did have elevation in his WBC and underwent diagnostic/therapeutic paracentesis which removed 7L and was negative for SBP, with subsequent resolution in leukocytosis. Was also able to undergo cMRI stress test needed to complete his liver transplant evaluation. As his hemoglobin was stable and he was not having any signs/symptoms of bleeding, he was discharged home on 3/15 with plans to follow up with his hepatology team.  "

## 2024-03-17 LAB
BACTERIA FLD CULT: NORMAL
GRAM STN SPEC: NORMAL
GRAM STN SPEC: NORMAL

## 2024-03-18 ENCOUNTER — DOCUMENTATION (OUTPATIENT)
Dept: CARDIOLOGY | Facility: CLINIC | Age: 59
End: 2024-03-18
Payer: COMMERCIAL

## 2024-03-18 ENCOUNTER — DOCUMENTATION (OUTPATIENT)
Dept: TRANSPLANT | Facility: HOSPITAL | Age: 59
End: 2024-03-18
Payer: COMMERCIAL

## 2024-03-18 ENCOUNTER — COMMITTEE REVIEW (OUTPATIENT)
Dept: TRANSPLANT | Facility: HOSPITAL | Age: 59
End: 2024-03-18
Payer: COMMERCIAL

## 2024-03-18 DIAGNOSIS — F32.A ANXIETY AND DEPRESSION: ICD-10-CM

## 2024-03-18 DIAGNOSIS — F41.9 ANXIETY AND DEPRESSION: ICD-10-CM

## 2024-03-18 DIAGNOSIS — Z01.818 ENCOUNTER FOR PRE-TRANSPLANT EVALUATION FOR LIVER TRANSPLANT: ICD-10-CM

## 2024-03-18 LAB
BACTERIA BLD CULT: NORMAL
BACTERIA BLD CULT: NORMAL

## 2024-03-18 NOTE — PROGRESS NOTES
CARDIOLOGY UPDATE    Patient underwent a cardiac stress MRI which ruled out ischemia and showed normal size and function RV. Ascending aorta dilation measured around 45 mm, which is similar to previous assessment. Aortic valve is tricuspid on my review.  From the cardiovascular standpoint there is no contraindication for liver transplantation.     Luis F Walton MD

## 2024-03-18 NOTE — CONSULTS
Chief Complaint: Patient seen for liver transplant evaluation, MELD 8     History of Present Illness:  Vance Silva  is a 58 y.o. y.o. presents with ESLD from alcoholic cirrhosis. Decompensated with portal HTN, esophageal varices s/p banding, hx duodenal ulcer, protein-wasting malnutrition, and encephalopathy. Currently admitted after presenting to ED with E. Coli bacteremia. Patient had traveled out of [facility] and forgot his medications. Presented to ED there with mental confusion, fevers, leukocytosis, tachycardia., and anemia. He was transfused 2U PRBCs, started on Zosyn/fluconazole/IV PPI and pan-cx'd. Blood cx grew E. Coli. He then left AMA to directly return to [place] and .      Ascites: Yes  Encephalopathy: Yes  Varices: Yes  Jaundice: no  DVT/PE: no  HCC/Malignancy: no  Portal Vein Thrombosis: no     Past Medical History:  Decompensated alcoholic cirrhosis per HPI  Traumatic brain injury  Hx seizure  Alcohol use d/o in long-term remission  Severe protein calorie malnutrition     Past Surgical History:  No abdominal surgeries     Review of Systems:  General: +fatigue, malaise  Cardiac: Denies chest pain, palpitations  GI:+melena  : Normal urine output. Denies history of gross hematuria, urinary retention, or recurrent UTIs.  Vascular: No active claudication or non-healing LE wounds.  Functional Status: Can walk up 1 flight of stairs     Transfusions: Yes  Pregnancy: not applicable  Prior transplant: not applicable     Family History:  Non-contributory  No hx DVT/PE or anesthetic reactions     Physical Exam:  Gen: A+OX3; NAD, temporal wasting  HEENT: PERRL, sclera anicteric, MMM  Cardiac: RRR  Chest: Normal inspiratory effort  Abdomen: S/NT/distended +fluid wave.  Ext: NoLE edema  Vascular: 2+ palpable femoral pulses  Psychiatric: Normal mood, affect     Assessment/Plan:  - The patient is a good candidate  for liver transplantation.  - Routine age/gender based screening  - Cardiology clearance per protocol  - MRI liver   - Will need ongoing assessment of functional and nutritional status given limited reserves now and low MELD      Transplant Education:     I had a discussion with this patient regarding 1 year graft and patient survival statistics following liver transplantation for  donor allograft recipients. This data included [facility] data compared to National data readily available for review on https://www.SRTR.org. The patient also had attended the liver transplant education class provided by the transplant institute.     The difference between allograft function was discussed comparing DCD vs DBD donors     Further discussion included:  -The transplant selection committee process.  -The need for lifelong immunosuppressive therapy, and the side effects of these medications including the risk of infections, cancer, and lymphoma.  -The wait list time is determined by blood type and MELD score.     -Using identified donors with risk criteria for transmission of infection  -The possibility of utilizing  donors with known HCV antibody and/or TIGIST positivity and post-transplant treatment/surveillance protocol  -Potential transmission of infectious disease from any  donors, as well as living donors.   -The possibility of transmission of tumors and infections via the transplanted organ.  -The inability to completely test for all potential harmful tumors or infectious agents.  -The possibility of listing at multiple locations.     Surgical complications including need for reoperation(s) including but not limited to:  -Bleeding.  -Control of infection.  -Blood clots or stenoses in the transplant vessels.  -Bile duct strictures/leaks     The medical complications including but not limited to:  -Death.  -Cardiac.  -Pulmonary.  -Infectious.  -Neurologic.  -Other Complications.     We also  discussed how the liver transplant could function:  -Non-function and need to re-list Status 1a  -Have intra-hepatic biliary strictures necessitating re-transplant     Time Attestation:  I spent 60 minutes with the patient, over 50 minutes in counseling and education as outlined above.     Rina Mosley MD, PHD, MPH, FACS  Chief of Transplant and Hepatobiliary Surgery

## 2024-03-18 NOTE — PROGRESS NOTES
Pharmacist Pre-Transplant Candidate Screening Note     Current Outpatient Medications on File Prior to Visit   Medication Sig Dispense Refill    carvedilol (Coreg) 6.25 mg tablet Take 1 tablet (6.25 mg) by mouth once daily at bedtime. 30 tablet 11    Constulose 10 gram/15 mL oral solution Take 30 mL (20 g) by mouth 3 times a day. Titrate to achieve goal 2-3 bowel movements daily 8100 mL 3    furosemide (Lasix) 20 mg tablet Take 2 tablets (40 mg) by mouth 2 times a day. 120 tablet 11    pantoprazole (ProtoNix) 40 mg EC tablet Take 1 tablet (40 mg) by mouth once daily in the morning. Take before meals. Do not crush, chew, or split. 30 tablet 3    spironolactone (Aldactone) 50 mg tablet Take 3 tablets (150 mg) by mouth once daily. 90 tablet 11     No current facility-administered medications on file prior to visit.       The patient's reported medications have been reviewed. Based on the above medication list, there are no pharmacologic contraindications to liver transplantation.    Hue Macedo, PharmD, BCTXP  Clinical Pharmacy Specialist - Solid Organ Transplant

## 2024-03-18 NOTE — COMMITTEE REVIEW
Committee Review Date: 3/18/2024    Organ being evaluated for: Liver    Transplant Phase: Evaluation  Transplant Status: Active    Referring Physician: Mu Norris    Primary Diagnosis: Alcohol-Associated Cirrhosis Without Acute Alcohol-Associated Hepatitis      Committee Review Decision: Needs Re-presentation      Committee Discussion Details:   The candidate's evaluation was presented and discussed at the Liver  Multidisciplinary Selection Conference. After review of the candidate's diagnosis and the evaluations of the multidisciplinary team members, it was the consensus of the Selection Committee that the candidate does not meet Liver Selection Criteria at this time and Needs Re-presentation for Liver transplant.    -Refer patient to psychiatry and establish continued care for anxiety and depression  -Require support person at all clinic visits  -Re-educate patient and significant other on medications and utilization of a pill box

## 2024-03-19 ENCOUNTER — DOCUMENTATION (OUTPATIENT)
Dept: TRANSPLANT | Facility: HOSPITAL | Age: 59
End: 2024-03-19
Payer: COMMERCIAL

## 2024-03-19 LAB
ATRIAL RATE: 87 BPM
P AXIS: 38 DEGREES
P OFFSET: 187 MS
P ONSET: 130 MS
PR INTERVAL: 166 MS
Q ONSET: 213 MS
QRS COUNT: 14 BEATS
QRS DURATION: 88 MS
QT INTERVAL: 362 MS
QTC CALCULATION(BAZETT): 435 MS
QTC FREDERICIA: 409 MS
R AXIS: -11 DEGREES
T AXIS: 23 DEGREES
T OFFSET: 394 MS
VENTRICULAR RATE: 87 BPM

## 2024-03-20 ENCOUNTER — DOCUMENTATION (OUTPATIENT)
Dept: TRANSPLANT | Facility: HOSPITAL | Age: 59
End: 2024-03-20
Payer: COMMERCIAL

## 2024-03-20 NOTE — PROGRESS NOTES
"Listing request has been withdrawn.  Note sent to the nurses for clarification based on the email rec'd from [employer]:Amarjit Garcia please confirm.  Was the member approved for transplant?  Are you requesting transplant authorization?    \"Liver Committee Review dated 03/18/2024 - Decision:  Needs Re-presentation\"  ... \"Multidisciplinary Selection Conference. After review of the candidate's diagnosis and the evaluations of the multidisciplinary team members, it was the consensus of the Selection Committee that the candidate does not meet Liver Selection Criteria at this time and Needs Re-presentation for Liver transplant.\"      "

## 2024-03-21 ENCOUNTER — OFFICE VISIT (OUTPATIENT)
Dept: NEUROLOGY | Facility: HOSPITAL | Age: 59
End: 2024-03-21
Payer: COMMERCIAL

## 2024-03-21 ENCOUNTER — DOCUMENTATION (OUTPATIENT)
Dept: TRANSPLANT | Facility: HOSPITAL | Age: 59
End: 2024-03-21
Payer: COMMERCIAL

## 2024-03-21 DIAGNOSIS — G20.A1 PARKINSON'S DISEASE WITHOUT DYSKINESIA OR FLUCTUATING MANIFESTATIONS (MULTI): ICD-10-CM

## 2024-03-21 DIAGNOSIS — G20.C PARKINSONISM, UNSPECIFIED PARKINSONISM TYPE (MULTI): Primary | ICD-10-CM

## 2024-03-21 DIAGNOSIS — K76.82 HEPATIC ENCEPHALOPATHY (MULTI): ICD-10-CM

## 2024-03-21 DIAGNOSIS — G62.89 OTHER POLYNEUROPATHY: ICD-10-CM

## 2024-03-21 DIAGNOSIS — R41.3 MEMORY CHANGES: ICD-10-CM

## 2024-03-21 DIAGNOSIS — F07.81 CHRONIC TRAUMATIC ENCEPHALOPATHY: ICD-10-CM

## 2024-03-21 DIAGNOSIS — Z01.818 ENCOUNTER FOR PRE-TRANSPLANT EVALUATION FOR LIVER TRANSPLANT: ICD-10-CM

## 2024-03-21 DIAGNOSIS — R20.0 HEMISENSORY LOSS: ICD-10-CM

## 2024-03-21 DIAGNOSIS — K70.31 ALCOHOLIC CIRRHOSIS OF LIVER WITH ASCITES (MULTI): ICD-10-CM

## 2024-03-21 PROCEDURE — 99205 OFFICE O/P NEW HI 60 MIN: CPT | Performed by: PSYCHIATRY & NEUROLOGY

## 2024-03-21 PROCEDURE — 99417 PROLNG OP E/M EACH 15 MIN: CPT | Performed by: PSYCHIATRY & NEUROLOGY

## 2024-03-21 PROCEDURE — 99215 OFFICE O/P EST HI 40 MIN: CPT | Performed by: PSYCHIATRY & NEUROLOGY

## 2024-03-21 PROCEDURE — 1036F TOBACCO NON-USER: CPT | Performed by: PSYCHIATRY & NEUROLOGY

## 2024-03-21 ASSESSMENT — UNIFIED PARKINSONS DISEASE RATING SCALE (UPDRS)
LEVODOPA: NO
RIGIDITY_LLE: 0
RIGIDITY_RUE: 1
PRONATION_SUPINATION_LEFT: 0
SPEECH: 1
FINGER_TAPPING_LEFT: 1
RIGIDITY_NECK: 0
TOETAPPING_RIGHT: 2
FINGER_TAPPING_RIGHT: 1
PRONATION_SUPINATION_RIGHT: 0
RIGIDITY_LUE: 0
TOETAPPING_LEFT: 1
LEG_AGILITY_LEFT: 0
RIGIDITY_RLE: 0
PARKINSONS_MEDS: NO
FACIAL_EXPRESSION: 0
HANDMOVEMENTS_RIGHT: 1
LEG_AGILITY_RIGHT: 1

## 2024-03-21 NOTE — LETTER
"March 21, 2024     Mu Norris MD  [address] [place] [address]  [place] [address]    Patient: Karyn Silva   YOB: 1965   Date of Visit: 3/21/2024       Dear Dr. Mu Norris MD:    Thank you for referring Karyn Silva to me for evaluation. Below are my notes for this consultation.  If you have questions, please do not hesitate to call me. I look forward to following your patient along with you.       Sincerely,     Virginia Wheeler MD      CC: No Recipients  ______________________________________________________________________________________    Subjective    Vance Silva is a right handed  58 y.o. year old male who presents with No chief complaint on file..   Visit type: new patient visit     He is here for consideration and neurological clearance for liver transplantation. Arrives with his partner.     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 He is currently undergoing evaluation for possible liver transplant here at . He states he was previously evaluated by a neurologist at [facility] and was diagnosed with possible PD.  He has noted that he has been slowing down in his movements, more so with the R hemibody for the last 1-2 years. The R side of his body is \"slower\" and \"not working well\".   He has had neuropathy of the feet for a long time and that he has very limited sensation below the knee caps. This is amplifiying his difficulty in walking.   He feels that the R side of his body is slower or \"not working as well.\"   He has cramping of the R sided muscles  for the last 6 months.       Review of Motor symptoms:  Tremor:  Location- lip tremors. As well as one hand will shake at times.                  Rest/postural/action- " "resting.   Stiffness/rigidity: R side specifically.   Slowness:  has slowed down - much slower than before.   Trouble walking:  he walks w a stooped posture. He has started shuffling.  Feels like he drags his feet.   Freezing of gait:  does feel that he gets stuck when he walks. Almost constant.   Balance problems:  yes.   Falls:  has had several falls. fell before Xmas, fell once in January.   Changes in speech:  speech is more quiet and also feels that there is a delay in speech.  Partner says his speech is more \"wet\".   Swallowing difficulties: , has to eat a lot of protein. Sometimes hard if social, bites his tongue . He chokes on his food sometimes, he has choked on his water. He says he needs to \"work to eat\".   Abnormal postures:  R>L foot cramps, toes curl, more so at night.   Handwriting: maybe smaller.   Activities of daily living (buttoning clothes, bathing, cutting food, etc):  independent, but has needed help prior - this has improved after he has had his ascites drained, at times he can get short of breath and needs help with many activities.    Review of Non-Motor symptoms:  Cognition:  Memory- issues started 18 months to 2 years back. Issues with response time, sometimes losing his train of thought, harder when he does interviews or when he is in the public eye, would not want to do long interviews anymore, as would be worried would lose train of thought.          Hallucinations-  sometimes feels like things are moving in the periphery of the vision.         Mood:        Depression-  \"spikey\", has felt depressed, but more hopeful now that he may get a transplant.  Can be more irritable.                        Anxiety: yes.   Sleep disturbances including REM behavior disorder: wakes up a lot due to cramping, he has involuntary movements in his sleep, maybe some RBD like bevahior. Has poor sleep maintenance, he wakes up every 90 minutes or so.   Sensory changes (ie, smell or taste):  limited ability of " "smelling for 6 months. Taste is not as good.   Cramps/pain:  yes.  Gastrointestinal complaints/Constipation:  he is on lactulose for his liver, so ok -  but  if he does not take it - he is constipated.   Urinary retention or frequency:  frequency, is on diuretics, urgency, not incontinence.   Positional lightheadedness and/or syncope:  yes, has presyncopal symptoms   Excessive saliva/drooling:  R side of the mouth is wet.   Fatigue:  significant.     Risk factors:  He says he has had maybe 100 concussions in his life. \"Saw stars\" hundred of times, was given smelling salts in field, and has had loss of consciousness at least over  dozen times.   Paternal grandfather - PD, In his 70s.         History of Seizures: has had several seizures. He has been hospitalized for a couple of them. He says that he was never given a reason for his seizures. He has a history of alcohol and opioid abuse   Last seizures where is 2017.  When fell before as last year, - daughter noted that he was convulsing for 20 seconds.   Never been on AED's.     Medication History:  He has never been on dopamine blocking agents.  He has never been on medications for treatments of parkinsonism     Patient Active Problem List   Diagnosis   • Acute encephalopathy   • Transaminitis   • Thrombocytopenia (CMS/HCC)   • Secondary esophageal varices without bleeding (CMS/HCC)   • Portal hypertension with esophageal varices (CMS/HCC)   • Peripheral neuropathy   • Hypertension   • Hyperammonemia (CMS/HCC)   • Herniated lumbar intervertebral disc   • Helicobacter pylori ab+   • Cirrhosis of liver with ascites (CMS/HCC)   • Chronic traumatic encephalopathy   • Electrolyte abnormality   • Chronic pain   • Back pain   • Lactic acidosis   • Lesion of ulnar nerve   • Open wound of toe   • Right foot pain   • Arthritis of great toe at metatarsophalangeal joint   • Anemia due to chronic blood loss   • Anemia   • Alcoholic liver disease (CMS/HCC)   • Alcohol " withdrawal delirium (CMS/HCC)   • Alcohol related seizure (CMS/HCC)   • Acute tonsillitis   • Lightheadedness   • Ascending aorta dilation (CMS/HCC)   • UGIB (upper gastrointestinal bleed)   • Primary bacterial peritonitis (CMS/HCC)   • Other ascites   • Memory changes   • Hepatic encephalopathy (CMS/HCC)   • High prostate specific antigen (PSA)   • Gastrointestinal hemorrhage with melena   • Acute kidney injury (CMS/HCC)   • Edema of both lower extremities   • Coronary artery calcification of native artery   • Encounter for pre-transplant evaluation for liver transplant   • Anemia, unspecified type   • Decompensated hepatic cirrhosis (CMS/HCC)      Past Medical History:   Diagnosis Date   • Acute kidney injury (CMS/HCC)    • Anemia    • Ascites    • Chronic kidney disease    • Cirrhosis (CMS/HCC)    • Hepatitis C    • Liver disease    • Parkinson's disease     dx 2/16/2024   • Seizure (CMS/HCC)    • Sleep apnea    • Traumatic brain injury (CMS/HCC)       Past Surgical History:   Procedure Laterality Date   • ESOPHAGOGASTRODUODENOSCOPY     • OTHER SURGICAL HISTORY      Multiple orthopedic surgeries   • UMBILICAL HERNIA REPAIR  2021      Social History     Socioeconomic History   • Marital status:      Spouse name: Not on file   • Number of children: Not on file   • Years of education: Not on file   • Highest education level: Not on file   Occupational History   • Not on file   Tobacco Use   • Smoking status: Never   • Smokeless tobacco: Never   Vaping Use   • Vaping Use: Never used   Substance and Sexual Activity   • Alcohol use: Not Currently   • Drug use: Not Currently   • Sexual activity: Not Currently   Other Topics Concern   • Not on file   Social History Narrative   • Not on file     Social Determinants of Health     Financial Resource Strain: High Risk (3/12/2024)    Overall Financial Resource Strain (CARDIA)    • Difficulty of Paying Living Expenses: Very hard   Food Insecurity: Not on file    Transportation Needs: No Transportation Needs (3/12/2024)    PRAPARE - Transportation    • Lack of Transportation (Medical): No    • Lack of Transportation (Non-Medical): No   Physical Activity: Not on file   Stress: Not on file   Social Connections: Not on file   Intimate Partner Violence: Not on file   Housing Stability: Low Risk  (3/12/2024)    Housing Stability Vital Sign    • Unable to Pay for Housing in the Last Year: No    • Number of Places Lived in the Last Year: 2    • Unstable Housing in the Last Year: No      No family history on file.              Review of Systems  All other system have been reviewed and are negative for complaint.  Objective  Neurological Exam  Mental Status  Awake, alert and oriented to person, place and time.    Cranial Nerves  CN II: Visual fields full to confrontation.  CN III, IV, VI: Extraocular movements intact bilaterally.  CN V:  Right: Diminished sensation of the entire right side of the face.  CN VII: Full and symmetric facial movement.  CN VIII: Hearing is normal.  CN IX, X: Palate elevates symmetrically  CN XI: Shoulder shrug strength is normal.  CN XII: Tongue midline without atrophy or fasciculations.    Motor  Normal muscle bulk throughout. Normal muscle tone. Strength is 5/5 throughout all four extremities.    Sensory  Light touch abnormality: Pinprick abnormality: Vibration abnormality:   Significant peripheral neuropathy on exam to LT, PP and Vibration.   Reduced sensation on R>L to PP. .    Reflexes                                            Right                      Left  Brachioradialis                    Tr                         Tr  Biceps                                 Tr                         Tr  Triceps                                Tr                         Tr  Patellar                                0                         0  Achilles                                0                         0  Right Plantar: mute  Left Plantar:  mute    Coordination  Right: Finger-to-nose normal. Rapid alternating movement normal.Left: Finger-to-nose normal. Rapid alternating movement normal.    Gait   Tandem gait abnormality: Romberg is present.  Wide based,  no shuffle, he is stooped, does drag R leg slightly.   Reduced R arm swing but reduced ROM of the R elbow. .        MDS UPDRS 1st Score: Motor Examination  Is the patient on medication for treating the symptoms of Parkinson's Disease?: No  Is the patient on Levodopa?: No  Speech: 1  Facial Expression: 0  Rigidty Neck: 0  Rigidty RUE: 1  Rigidity - LUE: 0  Rigidity RLE: 0  Rigidity LLE: 0  Finger Tapping Right Hand: 1  Finger Tapping Left Hand: 1  Hand Movements- Right Hand: 1  Hand Movements- Left Hand: 0  Pronatiaon-Supination Movments - Right Hand: 0  Pronatiaon-Supination Movments Left Hand: 0  Toe Tapping Right Foot: 2  Toe Tapping - Left Foot: 1  Leg Agility - Right Le  Leg Agility - Left le                  Hemoglobin A1C   Date Value Ref Range Status   2024 5.1 see below % Final     Estimated Average Glucose   Date Value Ref Range Status   2024 100 Not Established mg/dL Final   12/15/2021 94 mg/dL Final     Comment:     eAG: (Estimated average glucose) is a calculated value from HgbA1c and is   representative of the average blood glucose level in the last 2-3 month   period.     Thyroid Stimulating Hormone   Date Value Ref Range Status   2024 3.45 0.44 - 3.98 mIU/L Final     Folate, Serum   Date Value Ref Range Status   02/10/2024 13.6 >5.0 ng/mL Final       CT head and C-spine [facility] 2024:    RESULT: MRI CERVICAL SPINE: Acute abnormality: None.     No trauma change.  Normal skull base/C1-C2 articulation.  Decreased disc   height and endplate changes with disc osteophyte and disc bulges at C5-6   and C6/7 along with facet and uncovertebral joint degeneration indicating   cervical spondylosis.  Remaining discs are normal.  Additional left   greater than right facet  osteoarthritis and uncovertebral joint   degeneration indicating spondylosis.  Mild positional levoscoliosis   without vertebral anomaly.  Normal alignment, vertebral height, bone   density, central canal, thecal sac and cord caliber.  No   fracture/dislocation.  Normal tissues.     C2 -- 3: Left joint degeneration with severe foramina narrowing.  Patent   canal and right foramina.     C3 -- 4: Joint degeneration with severe left and mild right foramina   narrowing.  Patent canal.     C4 -- 5: Patent canal and foramina.     C5 -- 6: Disc/joint degeneration.  Mild canal narrowing.  Severe left &   moderate right foramina narrowing.     C6 -- 7: Disc/joint degeneration.  Moderate canal narrowing.  Severe   bilateral foramina narrowing.     C7 -- T1: Patent canal and foramina.     HEAD CT: Acute abnormality: None.     No trauma change.  No acute intracranial disease.  Age expected sulci,   gyri, ventricles, CSF spaces and brain.  No acute infarct/hemorrhage,   mass effect or collections.  Normal bones & skull base.     Record review:  MoCA w Dr Barbour in 2018; 25/30  Mo[facility] at [facility] in 2/24 was 24/30    Assessment/Plan    Vance Silva is a 58 y.o. year old male here for for assessment of possible Parkinsonism. He has a complex medical history including decompensated liver cirrhosis with several recent hospitalizations due to complications for same. In addition he has noted about a 2 year history of generalized slowness of movement and stiffness, more so on the R hemibody, as well as a resting tremor noted in the UE and chin as per report by his partner, although this was not observed on my clinical exam today. (It is intermittent).  He also has several years or cognitive changes, and has been evaluated as part of the [employer] program for same. Reassuringly his MoCA appears stable.   He has several possible non motor symptoms of PD such as reduced ability to smell, constipation as well as possibility of RBD like  behavior. His examination shows mild R sided bradykinesia, no clear increased tone, although RUE exam is limited by his reduced ROM of elbow. He has a slight R leg drag when walking, is wide based, and has significant peripheral neuropathy on his examination.     The differential diagnosis here includes PD versus aquired hepatocerebral degeneration, and I will therefor order an MRI brain to assess for T1 hyperintensities in GPI as is seen in latter as well as a Rica scan to assess for presynaptic dopaminergic deficits. He has marked peripheral neuropathy, and I therefor ordered a few more laboratory work that I had not seen repeat in recent past.     Can also consider a trial of carbidopa/levodopa, for his parkinsonism but will want to discuss with his Transplant physician Dr Norris, given his many recent acute hepatic issues.     I will update the patient on the results of the imaging and blood work and arrange follow up in near future.   He also asks for a recommendation for a psychiatrist, and I suggested Dr Jarvis.

## 2024-03-21 NOTE — PATIENT INSTRUCTIONS
It was a pleasure to meet you.   I will contact Dr Norris.     You have evidence of peripheral neuropathy, as well some mild parkinsonism - mostly in your legs. You also have several non-motor symptoms of PD such as loss of ability to smell as well as the acting out of your dreams and constipation.  The parkinsonism could also be due to the liver issues, so I will order an MRI of the brain as well as a Rica scan which will help confirm if there is Parkinson's disease.     I have ordered some blood tests for your neuropathy.     I will contact you with the results.

## 2024-03-21 NOTE — PROGRESS NOTES
SW spoke with Pt's mother, Wanda, via telephone call to inquiry about Pt's children possibly being additional support people. Pt's mother informed SW that 3/4 of Pt's children would be unavailable to be supports due to living out of town and school/work. Pt's mother shared the remaining child recently moved to [place] and would be available to help. Pt's mother provided SW with that child's contact information.    SW spoke with Pt's daughter, Harleen ([phone]), via telephone call to confirm commitment as an alternate support. Pt's daughter reported she moved to [place] permanently last week into an apartment and is looking for a house. Pt's daughter shared her and Pt have discussed Pt staying with her post-transplant. Pt's daughter reported she is self-employed and would be available to care for Pt. Pt's daughter denied any medical, psychological, or physical limitations except mild environmental allergies/irritations. Pt's daughter reported she drives and her significant other has a working vehicle that she can use here. Pt's daughter reported she is committed to helping Pt through transplant process.    Plan: SW to remain available.

## 2024-03-21 NOTE — PROGRESS NOTES
"Subjective     Vance Silva is a right handed  58 y.o. year old male who presents with No chief complaint on file..   Visit type: new patient visit     He is here for consideration and neurological clearance for liver transplantation. Arrives with his partner.     Karyn Silva is a 58 y.o. male retired [employer]  with past medical history of  decompensated cirrhosis complicated by portal hypertension, esophageal varices s/p banding,,ascites, hx of GI bleed from duodenal ulcer 11/2020, traumatic head injury, multiple fractures, arthritis,  hypertension, hyperlipidemia. He is currently undergoing evaluation for possible liver transplant here at . He states he was previously evaluated by a neurologist at [facility] and was diagnosed with possible PD.  He has noted that he has been slowing down in his movements, more so with the R hemibody for the last 1-2 years. The R side of his body is \"slower\" and \"not working well\".   He has had neuropathy of the feet for a long time and that he has very limited sensation below the knee caps. This is amplifiying his difficulty in walking.   He feels that the R side of his body is slower or \"not working as well.\"   He has cramping of the R sided muscles  for the last 6 months.       Review of Motor symptoms:  Tremor:  Location- lip tremors. As well as one hand will shake at times.                  Rest/postural/action- resting.   Stiffness/rigidity: R side specifically.   Slowness:  has slowed down - much slower than before.   Trouble walking:  he walks w a stooped posture. He has started shuffling.  Feels like he drags his feet.   Freezing of gait:  does feel that he gets stuck when he walks. Almost constant.   Balance problems:  yes.   Falls:  has had several falls. fell before Xmas, fell once in January.   Changes in speech:  speech is more quiet and also feels that there is a delay in speech.  Partner says his speech is more \"wet\".   Swallowing difficulties: , has to " "eat a lot of protein. Sometimes hard if social, bites his tongue . He chokes on his food sometimes, he has choked on his water. He says he needs to \"work to eat\".   Abnormal postures:  R>L foot cramps, toes curl, more so at night.   Handwriting: maybe smaller.   Activities of daily living (buttoning clothes, bathing, cutting food, etc):  independent, but has needed help prior - this has improved after he has had his ascites drained, at times he can get short of breath and needs help with many activities.    Review of Non-Motor symptoms:  Cognition:  Memory- issues started 18 months to 2 years back. Issues with response time, sometimes losing his train of thought, harder when he does interviews or when he is in the public eye, would not want to do long interviews anymore, as would be worried would lose train of thought.          Hallucinations-  sometimes feels like things are moving in the periphery of the vision.         Mood:        Depression-  \"spikey\", has felt depressed, but more hopeful now that he may get a transplant.  Can be more irritable.                        Anxiety: yes.   Sleep disturbances including REM behavior disorder: wakes up a lot due to cramping, he has involuntary movements in his sleep, maybe some RBD like bevahior. Has poor sleep maintenance, he wakes up every 90 minutes or so.   Sensory changes (ie, smell or taste):  limited ability of smelling for 6 months. Taste is not as good.   Cramps/pain:  yes.  Gastrointestinal complaints/Constipation:  he is on lactulose for his liver, so ok -  but  if he does not take it - he is constipated.   Urinary retention or frequency:  frequency, is on diuretics, urgency, not incontinence.   Positional lightheadedness and/or syncope:  yes, has presyncopal symptoms   Excessive saliva/drooling:  R side of the mouth is wet.   Fatigue:  significant.     Risk factors:  He says he has had maybe 100 concussions in his life. \"Saw stars\" hundred of times, was " given smelling salts in field, and has had loss of consciousness at least over  dozen times.   Paternal grandfather - PD, In his 70s.         History of Seizures: has had several seizures. He has been hospitalized for a couple of them. He says that he was never given a reason for his seizures. He has a history of alcohol and opioid abuse   Last seizures where is 2017.  When fell before Xmas last year, - daughter noted that he was convulsing for 20 seconds.   Never been on AED's.     Medication History:  He has never been on dopamine blocking agents.  He has never been on medications for treatments of parkinsonism     Patient Active Problem List   Diagnosis    Acute encephalopathy    Transaminitis    Thrombocytopenia (CMS/HCC)    Secondary esophageal varices without bleeding (CMS/HCC)    Portal hypertension with esophageal varices (CMS/HCC)    Peripheral neuropathy    Hypertension    Hyperammonemia (CMS/HCC)    Herniated lumbar intervertebral disc    Helicobacter pylori ab+    Cirrhosis of liver with ascites (CMS/HCC)    Chronic traumatic encephalopathy    Electrolyte abnormality    Chronic pain    Back pain    Lactic acidosis    Lesion of ulnar nerve    Open wound of toe    Right foot pain    Arthritis of great toe at metatarsophalangeal joint    Anemia due to chronic blood loss    Anemia    Alcoholic liver disease (CMS/HCC)    Alcohol withdrawal delirium (CMS/HCC)    Alcohol related seizure (CMS/HCC)    Acute tonsillitis    Lightheadedness    Ascending aorta dilation (CMS/HCC)    UGIB (upper gastrointestinal bleed)    Primary bacterial peritonitis (CMS/HCC)    Other ascites    Memory changes    Hepatic encephalopathy (CMS/HCC)    High prostate specific antigen (PSA)    Gastrointestinal hemorrhage with melena    Acute kidney injury (CMS/HCC)    Edema of both lower extremities    Coronary artery calcification of native artery    Encounter for pre-transplant evaluation for liver transplant    Anemia, unspecified type     Decompensated hepatic cirrhosis (CMS/HCC)      Past Medical History:   Diagnosis Date    Acute kidney injury (CMS/HCC)     Anemia     Ascites     Chronic kidney disease     Cirrhosis (CMS/HCC)     Hepatitis C     Liver disease     Parkinson's disease     dx 2/16/2024    Seizure (CMS/HCC)     Sleep apnea     Traumatic brain injury (CMS/HCC)       Past Surgical History:   Procedure Laterality Date    ESOPHAGOGASTRODUODENOSCOPY      OTHER SURGICAL HISTORY      Multiple orthopedic surgeries    UMBILICAL HERNIA REPAIR  2021      Social History     Socioeconomic History    Marital status:      Spouse name: Not on file    Number of children: Not on file    Years of education: Not on file    Highest education level: Not on file   Occupational History    Not on file   Tobacco Use    Smoking status: Never    Smokeless tobacco: Never   Vaping Use    Vaping Use: Never used   Substance and Sexual Activity    Alcohol use: Not Currently    Drug use: Not Currently    Sexual activity: Not Currently   Other Topics Concern    Not on file   Social History Narrative    Not on file     Social Determinants of Health     Financial Resource Strain: High Risk (3/12/2024)    Overall Financial Resource Strain (CARDIA)     Difficulty of Paying Living Expenses: Very hard   Food Insecurity: Not on file   Transportation Needs: No Transportation Needs (3/12/2024)    PRAPARE - Transportation     Lack of Transportation (Medical): No     Lack of Transportation (Non-Medical): No   Physical Activity: Not on file   Stress: Not on file   Social Connections: Not on file   Intimate Partner Violence: Not on file   Housing Stability: Low Risk  (3/12/2024)    Housing Stability Vital Sign     Unable to Pay for Housing in the Last Year: No     Number of Places Lived in the Last Year: 2     Unstable Housing in the Last Year: No      No family history on file.              Review of Systems  All other system have been reviewed and are negative for  complaint.  Objective   Neurological Exam  Mental Status  Awake, alert and oriented to person, place and time.    Cranial Nerves  CN II: Visual fields full to confrontation.  CN III, IV, VI: Extraocular movements intact bilaterally.  CN V:  Right: Diminished sensation of the entire right side of the face.  CN VII: Full and symmetric facial movement.  CN VIII: Hearing is normal.  CN IX, X: Palate elevates symmetrically  CN XI: Shoulder shrug strength is normal.  CN XII: Tongue midline without atrophy or fasciculations.    Motor  Normal muscle bulk throughout. Normal muscle tone. Strength is 5/5 throughout all four extremities.    Sensory  Light touch abnormality: Pinprick abnormality: Vibration abnormality:   Significant peripheral neuropathy on exam to LT, PP and Vibration.   Reduced sensation on R>L to PP. .    Reflexes                                            Right                      Left  Brachioradialis                    Tr                         Tr  Biceps                                 Tr                         Tr  Triceps                                Tr                         Tr  Patellar                                0                         0  Achilles                                0                         0  Right Plantar: mute  Left Plantar: mute    Coordination  Right: Finger-to-nose normal. Rapid alternating movement normal.Left: Finger-to-nose normal. Rapid alternating movement normal.    Gait   Tandem gait abnormality: Romberg is present.  Wide based,  no shuffle, he is stooped, does drag R leg slightly.   Reduced R arm swing but reduced ROM of the R elbow. .        MDS UPDRS 1st Score: Motor Examination  Is the patient on medication for treating the symptoms of Parkinson's Disease?: No  Is the patient on Levodopa?: No  Speech: 1  Facial Expression: 0  Rigidty Neck: 0  Rigidty RUE: 1  Rigidity - LUE: 0  Rigidity RLE: 0  Rigidity LLE: 0  Finger Tapping Right Hand: 1  Finger Tapping Left  Hand: 1  Hand Movements- Right Hand: 1  Hand Movements- Left Hand: 0  Pronatiaon-Supination Movments - Right Hand: 0  Pronatiaon-Supination Movments Left Hand: 0  Toe Tapping Right Foot: 2  Toe Tapping - Left Foot: 1  Leg Agility - Right Le  Leg Agility - Left le                  Hemoglobin A1C   Date Value Ref Range Status   2024 5.1 see below % Final     Estimated Average Glucose   Date Value Ref Range Status   2024 100 Not Established mg/dL Final   12/15/2021 94 mg/dL Final     Comment:     eAG: (Estimated average glucose) is a calculated value from HgbA1c and is   representative of the average blood glucose level in the last 2-3 month   period.     Thyroid Stimulating Hormone   Date Value Ref Range Status   2024 3.45 0.44 - 3.98 mIU/L Final     Folate, Serum   Date Value Ref Range Status   02/10/2024 13.6 >5.0 ng/mL Final       CT head and C-spine [facility] 2024:    RESULT: MRI CERVICAL SPINE: Acute abnormality: None.     No trauma change.  Normal skull base/C1-C2 articulation.  Decreased disc   height and endplate changes with disc osteophyte and disc bulges at C5-6   and C6/7 along with facet and uncovertebral joint degeneration indicating   cervical spondylosis.  Remaining discs are normal.  Additional left   greater than right facet osteoarthritis and uncovertebral joint   degeneration indicating spondylosis.  Mild positional levoscoliosis   without vertebral anomaly.  Normal alignment, vertebral height, bone   density, central canal, thecal sac and cord caliber.  No   fracture/dislocation.  Normal tissues.     C2 -- 3: Left joint degeneration with severe foramina narrowing.  Patent   canal and right foramina.     C3 -- 4: Joint degeneration with severe left and mild right foramina   narrowing.  Patent canal.     C4 -- 5: Patent canal and foramina.     C5 -- 6: Disc/joint degeneration.  Mild canal narrowing.  Severe left &   moderate right foramina narrowing.     C6 -- 7: Disc/joint  degeneration.  Moderate canal narrowing.  Severe   bilateral foramina narrowing.     C7 -- T1: Patent canal and foramina.     HEAD CT: Acute abnormality: None.     No trauma change.  No acute intracranial disease.  Age expected sulci,   gyri, ventricles, CSF spaces and brain.  No acute infarct/hemorrhage,   mass effect or collections.  Normal bones & skull base.     Record review:  MoCA w Dr Barbour in 2018; 25/30  Mo[facility] at [facility] in 2/24 was 24/30    Assessment/Plan     Vance Silva is a 58 y.o. year old male here for for assessment of possible Parkinsonism. He has a complex medical history including decompensated liver cirrhosis with several recent hospitalizations due to complications for same. In addition he has noted about a 2 year history of generalized slowness of movement and stiffness, more so on the R hemibody, as well as a resting tremor noted in the UE and chin as per report by his partner, although this was not observed on my clinical exam today. (It is intermittent).  He also has several years or cognitive changes, and has been evaluated as part of the [employer] program for same. Reassuringly his MoCA appears stable.   He has several possible non motor symptoms of PD such as reduced ability to smell, constipation as well as possibility of RBD like behavior. His examination shows mild R sided bradykinesia, no clear increased tone, although RUE exam is limited by his reduced ROM of elbow. He has a slight R leg drag when walking, is wide based, and has significant peripheral neuropathy on his examination.     The differential diagnosis here includes PD versus aquired hepatocerebral degeneration, and I will therefor order an MRI brain to assess for T1 hyperintensities in GPI as is seen in latter as well as a [place] scan to assess for presynaptic dopaminergic deficits. He has marked peripheral neuropathy, and I therefor ordered a few more laboratory work that I had not seen repeat in recent past.     Can also  consider a trial of carbidopa/levodopa, for his parkinsonism but will want to discuss with his Transplant physician Dr Norris, given his many recent acute hepatic issues.     I will update the patient on the results of the imaging and blood work and arrange follow up in near future.   He also asks for a recommendation for a psychiatrist, and I suggested Dr Jarvis.

## 2024-03-27 ENCOUNTER — TELEPHONE (OUTPATIENT)
Dept: TRANSPLANT | Facility: HOSPITAL | Age: 59
End: 2024-03-27
Payer: COMMERCIAL

## 2024-03-27 NOTE — TELEPHONE ENCOUNTER
Spoke with patient and reviewed recommendation to see sports psychologist per [facility]. Patient requested to have neuro notes sent to [facility]. Coordinator emailed notes.

## 2024-03-27 NOTE — TELEPHONE ENCOUNTER
PT called to say that his doctor who saw him for the neurology recommended a psychiatrist to him, he wants to run it by you and the doctor. Please call him back directly at [phone]

## 2024-03-27 NOTE — TELEPHONE ENCOUNTER
Spoke with patient's mother, Wanda, and reviewed recommendation for patient to see Dr. Rolando Beverly, director of the [facility], per [facility]. Contact information provided.

## 2024-03-29 ENCOUNTER — DOCUMENTATION (OUTPATIENT)
Dept: TRANSPLANT | Facility: HOSPITAL | Age: 59
End: 2024-03-29
Payer: COMMERCIAL

## 2024-03-29 NOTE — PROGRESS NOTES
Late entry - On 3/27/24 coordinator received request from  at [employer] for patient's medical records. 123 pages faxed to stated number. Confirmation of receipt confirmed.

## 2024-04-08 ENCOUNTER — LAB (OUTPATIENT)
Dept: LAB | Facility: LAB | Age: 59
End: 2024-04-08
Payer: COMMERCIAL

## 2024-04-08 ENCOUNTER — OFFICE VISIT (OUTPATIENT)
Dept: TRANSPLANT | Facility: HOSPITAL | Age: 59
End: 2024-04-08
Payer: COMMERCIAL

## 2024-04-08 VITALS
OXYGEN SATURATION: 100 % | BODY MASS INDEX: 23.41 KG/M2 | WEIGHT: 197.4 LBS | SYSTOLIC BLOOD PRESSURE: 114 MMHG | HEART RATE: 82 BPM | TEMPERATURE: 97.6 F | DIASTOLIC BLOOD PRESSURE: 84 MMHG

## 2024-04-08 DIAGNOSIS — D50.0 IRON DEFICIENCY ANEMIA SECONDARY TO BLOOD LOSS (CHRONIC): ICD-10-CM

## 2024-04-08 DIAGNOSIS — R56.9 ALCOHOL RELATED SEIZURE (MULTI): ICD-10-CM

## 2024-04-08 DIAGNOSIS — Z01.818 ENCOUNTER FOR PRE-TRANSPLANT EVALUATION FOR LIVER TRANSPLANT: Primary | ICD-10-CM

## 2024-04-08 DIAGNOSIS — Z01.818 ENCOUNTER FOR PRE-TRANSPLANT EVALUATION FOR LIVER TRANSPLANT: ICD-10-CM

## 2024-04-08 DIAGNOSIS — R18.8 CIRRHOSIS OF LIVER WITH ASCITES, UNSPECIFIED HEPATIC CIRRHOSIS TYPE (MULTI): ICD-10-CM

## 2024-04-08 DIAGNOSIS — G61.9 INFLAMMATORY NEUROPATHY (MULTI): ICD-10-CM

## 2024-04-08 DIAGNOSIS — K70.31 ALCOHOLIC CIRRHOSIS OF LIVER WITH ASCITES (MULTI): ICD-10-CM

## 2024-04-08 DIAGNOSIS — K74.60 CIRRHOSIS OF LIVER WITH ASCITES, UNSPECIFIED HEPATIC CIRRHOSIS TYPE (MULTI): ICD-10-CM

## 2024-04-08 DIAGNOSIS — E72.20 HYPERAMMONEMIA (MULTI): ICD-10-CM

## 2024-04-08 DIAGNOSIS — F10.931 ALCOHOL WITHDRAWAL DELIRIUM (MULTI): ICD-10-CM

## 2024-04-08 LAB
ABO GROUP (TYPE) IN BLOOD: NORMAL
ALBUMIN SERPL BCP-MCNC: 4.4 G/DL (ref 3.4–5)
ALP SERPL-CCNC: 92 U/L (ref 33–136)
ALT SERPL W P-5'-P-CCNC: 34 U/L (ref 10–52)
ANION GAP SERPL CALC-SCNC: 15 MMOL/L (ref 10–20)
ANTIBODY SCREEN: NORMAL
AST SERPL W P-5'-P-CCNC: 28 U/L (ref 9–39)
BASOPHILS # BLD AUTO: 0.02 X10*3/UL (ref 0–0.1)
BASOPHILS NFR BLD AUTO: 0.5 %
BILIRUB DIRECT SERPL-MCNC: 0.1 MG/DL (ref 0–0.3)
BILIRUB SERPL-MCNC: 0.4 MG/DL (ref 0–1.2)
BUN SERPL-MCNC: 21 MG/DL (ref 6–23)
CALCIUM SERPL-MCNC: 9.4 MG/DL (ref 8.6–10.6)
CHLORIDE SERPL-SCNC: 108 MMOL/L (ref 98–107)
CO2 SERPL-SCNC: 22 MMOL/L (ref 21–32)
CREAT SERPL-MCNC: 1.13 MG/DL (ref 0.5–1.3)
EGFRCR SERPLBLD CKD-EPI 2021: 74 ML/MIN/1.73M*2
EOSINOPHIL # BLD AUTO: 0.03 X10*3/UL (ref 0–0.7)
EOSINOPHIL NFR BLD AUTO: 0.7 %
ERYTHROCYTE [DISTWIDTH] IN BLOOD BY AUTOMATED COUNT: 19.7 % (ref 11.5–14.5)
GLUCOSE SERPL-MCNC: 81 MG/DL (ref 74–99)
HCT VFR BLD AUTO: 29.7 % (ref 41–52)
HGB BLD-MCNC: 8.8 G/DL (ref 13.5–17.5)
IMM GRANULOCYTES # BLD AUTO: 0.01 X10*3/UL (ref 0–0.7)
IMM GRANULOCYTES NFR BLD AUTO: 0.2 % (ref 0–0.9)
INR PPP: 1 (ref 0.9–1.1)
LYMPHOCYTES # BLD AUTO: 0.5 X10*3/UL (ref 1.2–4.8)
LYMPHOCYTES NFR BLD AUTO: 11.7 %
MCH RBC QN AUTO: 23.7 PG (ref 26–34)
MCHC RBC AUTO-ENTMCNC: 29.6 G/DL (ref 32–36)
MCV RBC AUTO: 80 FL (ref 80–100)
MONOCYTES # BLD AUTO: 0.36 X10*3/UL (ref 0.1–1)
MONOCYTES NFR BLD AUTO: 8.4 %
NEUTROPHILS # BLD AUTO: 3.36 X10*3/UL (ref 1.2–7.7)
NEUTROPHILS NFR BLD AUTO: 78.5 %
NRBC BLD-RTO: 0 /100 WBCS (ref 0–0)
PLATELET # BLD AUTO: 74 X10*3/UL (ref 150–450)
POTASSIUM SERPL-SCNC: 4.9 MMOL/L (ref 3.5–5.3)
PROT SERPL-MCNC: 7.6 G/DL (ref 6.4–8.2)
PROTHROMBIN TIME: 11.8 SECONDS (ref 9.8–12.8)
RBC # BLD AUTO: 3.71 X10*6/UL (ref 4.5–5.9)
RH FACTOR (ANTIGEN D): NORMAL
SODIUM SERPL-SCNC: 140 MMOL/L (ref 136–145)
WBC # BLD AUTO: 4.3 X10*3/UL (ref 4.4–11.3)

## 2024-04-08 PROCEDURE — 86901 BLOOD TYPING SEROLOGIC RH(D): CPT

## 2024-04-08 PROCEDURE — 3074F SYST BP LT 130 MM HG: CPT | Performed by: INTERNAL MEDICINE

## 2024-04-08 PROCEDURE — 82248 BILIRUBIN DIRECT: CPT

## 2024-04-08 PROCEDURE — 99214 OFFICE O/P EST MOD 30 MIN: CPT | Performed by: INTERNAL MEDICINE

## 2024-04-08 PROCEDURE — 86900 BLOOD TYPING SEROLOGIC ABO: CPT

## 2024-04-08 PROCEDURE — 85025 COMPLETE CBC W/AUTO DIFF WBC: CPT

## 2024-04-08 PROCEDURE — 80053 COMPREHEN METABOLIC PANEL: CPT

## 2024-04-08 PROCEDURE — 85610 PROTHROMBIN TIME: CPT

## 2024-04-08 PROCEDURE — 36415 COLL VENOUS BLD VENIPUNCTURE: CPT

## 2024-04-08 PROCEDURE — 3079F DIAST BP 80-89 MM HG: CPT | Performed by: INTERNAL MEDICINE

## 2024-04-08 PROCEDURE — 86850 RBC ANTIBODY SCREEN: CPT

## 2024-04-08 RX ORDER — FERROUS SULFATE 325(65) MG
325 TABLET ORAL 2 TIMES DAILY
Qty: 60 TABLET | Refills: 11 | Status: SHIPPED | OUTPATIENT
Start: 2024-04-08 | End: 2025-04-08

## 2024-04-08 ASSESSMENT — PAIN SCALES - GENERAL: PAINLEVEL: 0-NO PAIN

## 2024-04-08 NOTE — PATIENT INSTRUCTIONS
We will follow up once your labs from this morning result to let you know if we need to adjust any medications.     When working out please restrict weight to 15lbs on each arm.     Try to get 60-100gm protein/day.    Please adjust your diuretics: Furosemide 60mg once a day    We will refer you to hematology for iron infusions, however, please start taking oral iron supplement in the interim. Dr. Norris will send a prescription to your pharmacy for Ferrous Sulfate to be taken twice daily.     We will discuss you at our next selection committee after receiving letter from psychologist to make final decision on transplant candidacy. Your coordinator will follow up with you after the meeting.     If you have any questions or concerns please contact your coordinator via Bango message, or by calling the liver transplant office at [phone].           Ways to Help Prevent Falls at Home    Quick Tips   ? Ask for help if you need it. Most people want to help!   ? Get up slowly after sitting or laying down   ? Wear a medical alert device or keep cell phone in your pocket   ? Use night lights, especially areas near a bathroom   ? Keep the items you use often within reach on a small stool or end table   ? Use an assistive device such as walker or cane, as directed by provider/physical therapy   ? Use a non-slip mat and grab bars in your bathroom. Look for home health sections for best options     Other Areas to Focus On   ? Exercise and nutrition: Regular exercise or taking a falls prevention class are great ways improve strength and balance. Don’t forget to stay hydrated and bring a snack!   ? Medicine side effects: Some medicines can make you sleepy or dizzy, which could cause a fall. Ask your healthcare provider about the side effects your medicines could cause. Be sure to let them know if you take any vitamins or supplements as well.   ? Tripping hazards: Remove items you could trip on, such as loose mats, rugs, cords,  and clutter. Wear closed toe shoes with rubber soles.   ? Health and wellness: Get regular checkups with your healthcare provider, plus routine vision and hearing screenings. Talk with your healthcare provider about:   o Your medicines and the possible side effects - bring them in a bag if that is easier!   o Problems with balance or feeling dizzy   o Ways to promote bone health, such as Vitamin D and calcium supplements   o Questions or concerns about falling     *Ask your healthcare team if you have questions     ©[facility], 2022

## 2024-04-08 NOTE — PROGRESS NOTES
"Subjective   Patient ID: Vance Silva \"Christine" is a 58 y.o. male who presents for evaluation of cirrhosis.   .  HPI Former  with cirrhosis multifactorial at least partially from alcohol none in many years.    Had severe ascites and anemia now better with diuretics Lasix 60 mg po bid and aldactone 150 mg a day.   Also taking lactulose 20 grams tid titrated to 2-5 soft BM's a day.   Limiting sodium in the diet.   Trying to eat  grams of protein a day in the diet.   Pill cam showed some small ulcers in the small bowel that could have been related to his digestive enzyme supplements which he has now stopped.   He did get more blood work today.   He did see a sports psychiatrist who will provide us a letter that he is doing well psychologically.      See today with his mother and girl friend.       Review of Systems  Ascites much better.     Better muscle strength.   Feels better.       Objective   Physical Exam  Physical Exam  Constitutional:       Appearance: Normal appearance.   Eyes:      General: No scleral icterus.  Cardiovascular:      Rate and Rhythm: Normal rate and regular rhythm.      Heart sounds: No murmur heard.     No gallop.   Pulmonary:      Effort: Pulmonary effort is normal.      Breath sounds: Normal breath sounds.   Abdominal:      General: Abdomen is flat. Bowel sounds are normal. There is no distension.      Palpations: Abdomen is soft. There is no fluid wave, hepatomegaly or splenomegaly. Minimal ascites.       Tenderness: There is no abdominal tenderness.   Musculoskeletal:      Cervical back: Normal range of motion. No tenderness.      Right lower leg: No edema.      Left lower leg: No edema.   Lymphadenopathy:      Cervical: No cervical adenopathy.   Skin:     Coloration: Skin is not jaundiced.   Neurological:      General: No focal deficit present.      Mental Status: She is alert.       Assessment/Plan     Marked improvement in ascites with sodium restriction, diuretics and " protein in the diet.    Will drop lasix to 60 mg a day and continue aldactone at 150 mg a day.   2 grams sodium or less a day and  grams of protein a day ( trying to get a protein snack before bed).  Can exercise with up to 15 lbs in each hand.   Will refer for iron infusion with iron deficiency and ongoing episodic GI bleeding which may resolve with stopping digestive enzymes.   For now will start PO iron supplements.    If ascites or GI bleeding is intractable will consider TIPS.   For now will work on listing for liver transplant .  Follow up in 2 months.            Mu Norris MD 04/08/24 12:29 PM   Patient was identified as a fall risk. Risk prevention instructions provided.

## 2024-04-18 ENCOUNTER — TELEPHONE (OUTPATIENT)
Dept: TRANSPLANT | Facility: HOSPITAL | Age: 59
End: 2024-04-18
Payer: COMMERCIAL

## 2024-04-18 NOTE — TELEPHONE ENCOUNTER
Spoke with Dr. Rolando Beverly at [facility] regarding assessment of patient per [facility] recommendation. Dr. Beverly confirmed patient attended initial counseling session, states he was pleasant and engaged, but did not indicate any signs of depression. Dr. Beverly noted indications of possible cognitive deficiency related to possible concussion syndrome vs hepatic encephalopathy. Dr. Beverly offered patient opportunity to schedule follow up session but patient stated that he would need to check with Dr. Norris and follow up. Dr. Beverly stated that he did attempt to reach patient after initial session and left message to see if patient would like to schedule follow up, but patient did not return call. Dr. Beverly offered to continue to see patient if recommended by [facility] or if patient reaches out to schedule session on his own.

## 2024-04-22 ENCOUNTER — DOCUMENTATION (OUTPATIENT)
Dept: TRANSPLANT | Facility: HOSPITAL | Age: 59
End: 2024-04-22
Payer: COMMERCIAL

## 2024-04-22 ENCOUNTER — COMMITTEE REVIEW (OUTPATIENT)
Dept: TRANSPLANT | Facility: HOSPITAL | Age: 59
End: 2024-04-22
Payer: COMMERCIAL

## 2024-04-22 ENCOUNTER — HOSPITAL ENCOUNTER (OUTPATIENT)
Dept: RADIOLOGY | Facility: HOSPITAL | Age: 59
Discharge: HOME | End: 2024-04-22
Payer: COMMERCIAL

## 2024-04-22 ENCOUNTER — TELEPHONE (OUTPATIENT)
Dept: TRANSPLANT | Facility: HOSPITAL | Age: 59
End: 2024-04-22
Payer: COMMERCIAL

## 2024-04-22 DIAGNOSIS — G20.C PARKINSONISM, UNSPECIFIED PARKINSONISM TYPE (MULTI): ICD-10-CM

## 2024-04-22 PROCEDURE — 70551 MRI BRAIN STEM W/O DYE: CPT

## 2024-04-22 PROCEDURE — 70551 MRI BRAIN STEM W/O DYE: CPT | Performed by: RADIOLOGY

## 2024-04-22 NOTE — LETTER
2024    Mu RAUSCH Post  [address]  Department of Medicine-Gastroenterology  [facility] [address]  Phone: [phone]  Fax: [phone]    RE: Karyn Silva  : 1965    Dear Dr. Mu RAUSCH Post:     Our multi-disciplinary transplant team completed a review of your patient, Karyn Silva, on 2024.  I am pleased to inform you that Vance will be placed on the [employer] Organ [facility] (UNOS) waiting list for a Liver transplant.    Our transplant program consists of surgeons and medical doctors who provide coverage 365 days a year, 24 hours a day.      Please don't hesitate to contact us at Dept: [phone] with any questions or concerns.       Sincerely,      Jazmine Hammond RN            The UNOS Toll-free Patient Services Line:  Your Resource for Organ Transplant Information    If you have a question regarding your own medical care, you always should call your transplant hospital first. However, for general organ transplant-related information, you should call the [employer] [facility] [employer] Organ Sharing (UNOS) toll-free patient services line at [phone].  Anyone, including potential transplant candidates, candidates, recipients, family members, friends, living donors, and donor family members, can call this number to:    Talk about organ donation, living donation, the transplant process, the donation process, and transplant policies.  Get a free patient information kit with helpful booklets, waiting list and transplant information, and a list of all transplant hospitals.  Ask questions about the Organ Procurement and Transplantation Network (OPTN) web site (http://optn.transplant.hrsa.gov/), the UNOS Web site (http://unos.org/), or the UNOS web site for living donors and transplant recipients. (http://www.transplantliving.org/).  Learn how UNOS and the OPTN can help you.  Talk about any concerns that you may have with a transplant hospital.    [employer] is a not-for-profit organization that  provides the administrative services for the national OPTN under federal contract to the Health Resources and Services Administration (HRSA), an agency under the U.S. Department of Health and Human Services (HHS).    OS and the OPTN are responsible for:    Providing educational material for patients, the public, and professionals.  Raising awareness of the need for donated organs and tissue.  Writing organ transplant policy with help from transplant professionals, transplant patients, transplant candidates, donor families, living donors, and the public.  Coordinating organ procurement, matching, and placement.  Collecting information about every organ transplant and donation that occurs in the [place].    Remember, you should contact your transplant hospital directly if you have questions or concerns about your own medical care including medical records, work-up progress, and test results.    [facility] is not your transplant hospital, and staff at [facility] will not be able to transfer you to your transplant hospital, so keep your transplant hospital’s phone number handy.    However, while you research your transplant needs and learn as much as you can about transplantation and donation, we welcome your call to our toll-free patient services line at [phone].      [facility] PIL Final Rev 1-

## 2024-04-22 NOTE — LETTER
April 22, 2024    Karyn Silva  [address] [address]      Dear Mr. Silva:    Our multi-disciplinary transplant team completed a review of your medical records on 4/22/2024.  I am pleased to inform you that you will be placed on the [employer] [facility] [employer] Organ Sharing (UNOS) waiting list for a Liver transplant.    Our transplant program consists of surgeons and medical doctors who provide coverage 365 days a year, 24 hours a day.     If you have any questions or concerns regarding your insurance coverage or billing issues, a  is available to speak with you.     It is important to keep us updated of any major changes in your medical condition, contact information and health insurance coverage.     Please don't hesitate to contact us at Dept: [phone] with any questions or concerns. We look forward to working with you through this process.      Sincerely,      Jazmine Hammond RN          The UNOS Toll-free Patient Services Line:  Your Resource for Organ Transplant Information    If you have a question regarding your own medical care, you always should call your transplant hospital first. However, for general organ transplant-related information, you should call the [facility] for Organ Sharing (UNOS) toll-free patient services line at [phone].  Anyone, including potential transplant candidates, candidates, recipients, family members, friends, living donors, and donor family members, can call this number to:    Talk about organ donation, living donation, the transplant process, the donation process, and transplant policies.  Get a free patient information kit with helpful booklets, waiting list and transplant information, and a list of all transplant hospitals.  Ask questions about the Organ Procurement and Transplantation Network (OPTN) web site (http://optn.transplant.hrsa.gov/), the UNOS Web site (http://unos.org/), or the UNOS web site for living donors and transplant  recipients. (http://www.transplantliving.org/).  Learn how [facility] and the OPTN can help you.  Talk about any concerns that you may have with a transplant hospital.    [facility] is a not-for-profit organization that provides the administrative services for the national OPTN under federal contract to the Health Resources and Services Administration (HRSA), an agency under the U.S. Department of Health and Human Services (HHS).    [facility] and the OPTN are responsible for:    Providing educational material for patients, the public, and professionals.  Raising awareness of the need for donated organs and tissue.  Writing organ transplant policy with help from transplant professionals, transplant patients, transplant candidates, donor families, living donors, and the public.  Coordinating organ procurement, matching, and placement.  Collecting information about every organ transplant and donation that occurs in the [place].    Remember, you should contact your transplant hospital directly if you have questions or concerns about your own medical care including medical records, work-up progress, and test results.    [facility] is not your transplant hospital, and staff at [facility] will not be able to transfer you to your transplant hospital, so keep your transplant hospital’s phone number handy.    However, while you research your transplant needs and learn as much as you can about transplantation and donation, we welcome your call to our toll-free patient services line at [phone].      [facility] PIL Final Rev 1-

## 2024-04-22 NOTE — COMMITTEE REVIEW
Patient Discussion Note     Organ being evaluated for: Liver    Transplant Coordinator: Jazmine Hammond  Transplant Surgeon:        Referring Physician: Mu Norris    Committee Review Members:  Anesthesiology Phuc Riley MD   Finance Radha Peace   Pharmacy Hue Macedo, ShawnD    IAN LEAL, [facility]   Transplant Jazlyn Watson, JAGDISH, Jazmine Hammond, RN, Elle Faye, RN, Kuldeep Marie MD, Erika Sandoval RN   Transplant Hepatology Mu Norris MD, Stephen Thompson MD, Chip Lora MD, Alexis Sears MD   Transplant Surgery Juliette Parker MD, Antony Wallace MD, Palmer Farris MD, Rina Mosley MD       Additional Discussion Notes and Findings:   Reviewed patient's appointment with sport psychologist, coordinator's follow up conversation with Dr. Beverly and his recommendation for ongoing outpatient counseling. Reviewed patient's follow up appointment in clinic and ongoing medication education in TI clinic with Dr. Norris and nurse coordinator.     Committee Recommendations:  Good candidate - Proceed with listing.   Patient should continue counseling with Dr. Beverly.     Patient attests to being fully vaccinated against Hepatitis B: No   Reason(s) for deferral:     Patient objection    Lab Results   Component Value Date    HEPBSAB <3.1 01/31/2024       Immunization History   Administered Date(s) Administered Comments    None   Deferred Date(s) Deferred Comments    Hepatitis B vaccine, adult *Check Product/Dose* 05/09/2024 Patient Decision

## 2024-04-22 NOTE — TELEPHONE ENCOUNTER
Unable to reach patient and vm inbox is full. VM left for patient's mom to follow up on potential direct donor.

## 2024-04-23 ENCOUNTER — DOCUMENTATION (OUTPATIENT)
Dept: TRANSPLANT | Facility: HOSPITAL | Age: 59
End: 2024-04-23

## 2024-04-24 ENCOUNTER — TELEPHONE (OUTPATIENT)
Dept: NEUROLOGY | Facility: HOSPITAL | Age: 59
End: 2024-04-24
Payer: COMMERCIAL

## 2024-04-24 NOTE — TELEPHONE ENCOUNTER
Called Mr Silva to go over the MRI brain results,no answer and mailbox full.   Will send message on my chart and communicate that way.

## 2024-04-25 ENCOUNTER — DOCUMENTATION (OUTPATIENT)
Dept: TRANSPLANT | Facility: HOSPITAL | Age: 59
End: 2024-04-25
Payer: COMMERCIAL

## 2024-04-26 ENCOUNTER — DOCUMENTATION (OUTPATIENT)
Dept: TRANSPLANT | Facility: HOSPITAL | Age: 59
End: 2024-04-26
Payer: COMMERCIAL

## 2024-04-28 DIAGNOSIS — K74.60 CIRRHOSIS OF LIVER WITH ASCITES, UNSPECIFIED HEPATIC CIRRHOSIS TYPE (MULTI): ICD-10-CM

## 2024-04-28 DIAGNOSIS — R18.8 CIRRHOSIS OF LIVER WITH ASCITES, UNSPECIFIED HEPATIC CIRRHOSIS TYPE (MULTI): ICD-10-CM

## 2024-04-28 DIAGNOSIS — D64.9 ANEMIA, UNSPECIFIED TYPE: ICD-10-CM

## 2024-04-28 DIAGNOSIS — Z01.818 ENCOUNTER FOR PRE-TRANSPLANT EVALUATION FOR LIVER TRANSPLANT: Primary | ICD-10-CM

## 2024-04-30 ENCOUNTER — DOCUMENTATION (OUTPATIENT)
Dept: TRANSPLANT | Facility: HOSPITAL | Age: 59
End: 2024-04-30
Payer: COMMERCIAL

## 2024-04-30 NOTE — PROGRESS NOTES
Listing request has been withdrawn at this time.  RN will send me additional information when available.

## 2024-05-01 ENCOUNTER — TELEPHONE (OUTPATIENT)
Dept: TRANSPLANT | Facility: HOSPITAL | Age: 59
End: 2024-05-01
Payer: COMMERCIAL

## 2024-05-01 ENCOUNTER — APPOINTMENT (OUTPATIENT)
Dept: RADIOLOGY | Facility: HOSPITAL | Age: 59
End: 2024-05-01
Payer: COMMERCIAL

## 2024-05-01 NOTE — TELEPHONE ENCOUNTER
"Received email from Aleena stating \"Karyn is back in [place]. Angélicay is quit large and needs drained   Feet are super swollen and his speech is off .\" Spoke with Aleena and discussed LTSC decision to list, insurance need for follow up on positive drug screen in February, surgeon visit to do listing consents. Also planned for patient to go to lab today so that Dr. Norris can adjust diuretics. With regards to swelling - labs ordered to assess ability to increase diuretics, sodium restriction in diet. With regards to altered speech - most likely related to build up of toxins if patient held doses of lactulose due to traveling and business meetings, advised to titrate lactulose and monitor for additional s/s of HE.     Patient asleep at this time and unable to schedule follow up appointments. Aleena verbalized understanding of above recommendations and states she will call  once patient is awake to schedule.     "

## 2024-05-06 ENCOUNTER — LAB (OUTPATIENT)
Dept: LAB | Facility: LAB | Age: 59
End: 2024-05-06
Payer: COMMERCIAL

## 2024-05-06 DIAGNOSIS — D64.9 ANEMIA, UNSPECIFIED TYPE: ICD-10-CM

## 2024-05-06 DIAGNOSIS — K74.60 CIRRHOSIS OF LIVER WITH ASCITES, UNSPECIFIED HEPATIC CIRRHOSIS TYPE (MULTI): ICD-10-CM

## 2024-05-06 DIAGNOSIS — G62.89 OTHER POLYNEUROPATHY: ICD-10-CM

## 2024-05-06 DIAGNOSIS — R20.0 HEMISENSORY LOSS: ICD-10-CM

## 2024-05-06 DIAGNOSIS — Z01.818 ENCOUNTER FOR PRE-TRANSPLANT EVALUATION FOR LIVER TRANSPLANT: ICD-10-CM

## 2024-05-06 DIAGNOSIS — R18.8 CIRRHOSIS OF LIVER WITH ASCITES, UNSPECIFIED HEPATIC CIRRHOSIS TYPE (MULTI): ICD-10-CM

## 2024-05-06 LAB
ALBUMIN SERPL BCP-MCNC: 4.2 G/DL (ref 3.4–5)
ALP SERPL-CCNC: 70 U/L (ref 33–136)
ALT SERPL W P-5'-P-CCNC: 25 U/L (ref 10–52)
AMPHETAMINES UR QL SCN: ABNORMAL
ANION GAP SERPL CALC-SCNC: 11 MMOL/L (ref 10–20)
AST SERPL W P-5'-P-CCNC: 23 U/L (ref 9–39)
BARBITURATES UR QL SCN: ABNORMAL
BASOPHILS # BLD AUTO: 0.02 X10*3/UL (ref 0–0.1)
BASOPHILS NFR BLD AUTO: 0.6 %
BENZODIAZ UR QL SCN: ABNORMAL
BILIRUB SERPL-MCNC: 0.4 MG/DL (ref 0–1.2)
BUN SERPL-MCNC: 21 MG/DL (ref 6–23)
BZE UR QL SCN: ABNORMAL
CALCIUM SERPL-MCNC: 8.5 MG/DL (ref 8.6–10.3)
CANNABINOIDS UR QL SCN: ABNORMAL
CHLORIDE SERPL-SCNC: 107 MMOL/L (ref 98–107)
CO2 SERPL-SCNC: 25 MMOL/L (ref 21–32)
CREAT SERPL-MCNC: 1.21 MG/DL (ref 0.5–1.3)
EGFRCR SERPLBLD CKD-EPI 2021: 69 ML/MIN/1.73M*2
EOSINOPHIL # BLD AUTO: 0.04 X10*3/UL (ref 0–0.7)
EOSINOPHIL NFR BLD AUTO: 1.2 %
ERYTHROCYTE [DISTWIDTH] IN BLOOD BY AUTOMATED COUNT: 18.7 % (ref 11.5–14.5)
FENTANYL+NORFENTANYL UR QL SCN: ABNORMAL
GLUCOSE SERPL-MCNC: 75 MG/DL (ref 74–99)
HCT VFR BLD AUTO: 31.7 % (ref 41–52)
HGB BLD-MCNC: 9.2 G/DL (ref 13.5–17.5)
IMM GRANULOCYTES # BLD AUTO: 0.01 X10*3/UL (ref 0–0.7)
IMM GRANULOCYTES NFR BLD AUTO: 0.3 % (ref 0–0.9)
INR PPP: 1.1 (ref 0.9–1.1)
LYMPHOCYTES # BLD AUTO: 0.42 X10*3/UL (ref 1.2–4.8)
LYMPHOCYTES NFR BLD AUTO: 12.7 %
MCH RBC QN AUTO: 23.2 PG (ref 26–34)
MCHC RBC AUTO-ENTMCNC: 29 G/DL (ref 32–36)
MCV RBC AUTO: 80 FL (ref 80–100)
METHADONE UR QL SCN: ABNORMAL
MONOCYTES # BLD AUTO: 0.3 X10*3/UL (ref 0.1–1)
MONOCYTES NFR BLD AUTO: 9.1 %
NEUTROPHILS # BLD AUTO: 2.52 X10*3/UL (ref 1.2–7.7)
NEUTROPHILS NFR BLD AUTO: 76.1 %
NRBC BLD-RTO: 0 /100 WBCS (ref 0–0)
OPIATES UR QL SCN: ABNORMAL
OXYCODONE+OXYMORPHONE UR QL SCN: ABNORMAL
PATH REVIEW-CBC DIFFERENTIAL: NORMAL
PCP UR QL SCN: ABNORMAL
PLATELET # BLD AUTO: 52 X10*3/UL (ref 150–450)
POTASSIUM SERPL-SCNC: 4.2 MMOL/L (ref 3.5–5.3)
PROT SERPL-MCNC: 7.1 G/DL (ref 6.4–8.2)
PROT SERPL-MCNC: 7.4 G/DL (ref 6.4–8.2)
PROTHROMBIN TIME: 12.5 SECONDS (ref 9.8–12.8)
RBC # BLD AUTO: 3.96 X10*6/UL (ref 4.5–5.9)
SODIUM SERPL-SCNC: 139 MMOL/L (ref 136–145)
TSH SERPL-ACNC: 3.41 MIU/L (ref 0.44–3.98)
WBC # BLD AUTO: 3.3 X10*3/UL (ref 4.4–11.3)

## 2024-05-06 PROCEDURE — 80349 CANNABINOIDS NATURAL: CPT

## 2024-05-06 PROCEDURE — 84446 ASSAY OF VITAMIN E: CPT

## 2024-05-06 PROCEDURE — 84155 ASSAY OF PROTEIN SERUM: CPT

## 2024-05-06 PROCEDURE — 84166 PROTEIN E-PHORESIS/URINE/CSF: CPT | Performed by: PSYCHIATRY & NEUROLOGY

## 2024-05-06 PROCEDURE — 82746 ASSAY OF FOLIC ACID SERUM: CPT

## 2024-05-06 PROCEDURE — 84166 PROTEIN E-PHORESIS/URINE/CSF: CPT

## 2024-05-06 PROCEDURE — 85060 BLOOD SMEAR INTERPRETATION: CPT | Performed by: INTERNAL MEDICINE

## 2024-05-06 PROCEDURE — 80307 DRUG TEST PRSMV CHEM ANLYZR: CPT

## 2024-05-06 PROCEDURE — 84156 ASSAY OF PROTEIN URINE: CPT

## 2024-05-06 PROCEDURE — 80053 COMPREHEN METABOLIC PANEL: CPT

## 2024-05-06 PROCEDURE — 84165 PROTEIN E-PHORESIS SERUM: CPT

## 2024-05-06 PROCEDURE — 80321 ALCOHOLS BIOMARKERS 1OR 2: CPT

## 2024-05-06 PROCEDURE — 84165 PROTEIN E-PHORESIS SERUM: CPT | Performed by: PSYCHIATRY & NEUROLOGY

## 2024-05-06 PROCEDURE — 36415 COLL VENOUS BLD VENIPUNCTURE: CPT

## 2024-05-06 PROCEDURE — 85025 COMPLETE CBC W/AUTO DIFF WBC: CPT

## 2024-05-06 PROCEDURE — 84443 ASSAY THYROID STIM HORMONE: CPT

## 2024-05-06 PROCEDURE — 85610 PROTHROMBIN TIME: CPT

## 2024-05-07 LAB
FOLATE SERPL-MCNC: 15 NG/ML
PROT UR-ACNC: <4 MG/DL (ref 5–25)

## 2024-05-08 ENCOUNTER — DOCUMENTATION (OUTPATIENT)
Dept: TRANSPLANT | Facility: HOSPITAL | Age: 59
End: 2024-05-08
Payer: COMMERCIAL

## 2024-05-08 LAB
ALBUMIN MFR UR ELPH: 40.5 %
ALBUMIN: 4.2 G/DL (ref 3.4–5)
ALPHA 1 GLOBULIN: 0.3 G/DL (ref 0.2–0.6)
ALPHA 2 GLOBULIN: 0.6 G/DL (ref 0.4–1.1)
ALPHA1 GLOB MFR UR ELPH: 9 %
ALPHA2 GLOB MFR UR ELPH: 11.1 %
B-GLOBULIN MFR UR ELPH: 14.5 %
BETA GLOBULIN: 1 G/DL (ref 0.5–1.2)
GAMMA GLOB MFR UR ELPH: 24.9 %
GAMMA GLOBULIN: 1.3 G/DL (ref 0.5–1.4)
PATH REVIEW-SERUM PROTEIN ELECTROPHORESIS: NORMAL
PATH REVIEW-URINE PROTEIN ELECTROPHORESIS: NORMAL
PROTEIN ELECTROPHORESIS COMMENT: NORMAL
URINE ELECTROPHORESIS COMMENT: NORMAL

## 2024-05-09 ENCOUNTER — DOCUMENTATION (OUTPATIENT)
Dept: TRANSPLANT | Facility: HOSPITAL | Age: 59
End: 2024-05-09
Payer: COMMERCIAL

## 2024-05-09 ENCOUNTER — OFFICE VISIT (OUTPATIENT)
Dept: TRANSPLANT | Facility: HOSPITAL | Age: 59
End: 2024-05-09
Payer: COMMERCIAL

## 2024-05-09 ENCOUNTER — DOCUMENTATION (OUTPATIENT)
Dept: TRANSPLANT | Facility: HOSPITAL | Age: 59
End: 2024-05-09

## 2024-05-09 VITALS
DIASTOLIC BLOOD PRESSURE: 68 MMHG | SYSTOLIC BLOOD PRESSURE: 107 MMHG | HEART RATE: 58 BPM | TEMPERATURE: 97.9 F | BODY MASS INDEX: 25.89 KG/M2 | WEIGHT: 218.3 LBS | OXYGEN SATURATION: 96 %

## 2024-05-09 DIAGNOSIS — Z76.82 PRE-LIVER TRANSPLANT, LISTED: Primary | ICD-10-CM

## 2024-05-09 LAB
A-TOCOPHEROL VIT E SERPL-MCNC: 11.9 MG/L (ref 5.5–18)
BETA+GAMMA TOCOPHEROL SERPL-MCNC: 1.6 MG/L (ref 0–6)
LABORATORY REPORT: NORMAL
PETH INTERPRETATION: NORMAL
PLPETH BLD-MCNC: <10 NG/ML
POPETH BLD-MCNC: <10 NG/ML

## 2024-05-09 PROCEDURE — 99213 OFFICE O/P EST LOW 20 MIN: CPT | Performed by: STUDENT IN AN ORGANIZED HEALTH CARE EDUCATION/TRAINING PROGRAM

## 2024-05-09 PROCEDURE — 3074F SYST BP LT 130 MM HG: CPT | Performed by: STUDENT IN AN ORGANIZED HEALTH CARE EDUCATION/TRAINING PROGRAM

## 2024-05-09 PROCEDURE — 3078F DIAST BP <80 MM HG: CPT | Performed by: STUDENT IN AN ORGANIZED HEALTH CARE EDUCATION/TRAINING PROGRAM

## 2024-05-09 ASSESSMENT — PAIN SCALES - GENERAL: PAINLEVEL: 0-NO PAIN

## 2024-05-09 NOTE — PROGRESS NOTES
I met with Mr Silva this morning for a listing consent for [facility] Liver waitlist.    We discussed about DBD donors, as well as the extended criteria DCD, HepC, and HepB donors. We also discussed high risk lifestyle donors. All questions were answered and he consented to be listed for all donor types listed above.    Palmer Farris MD, [facility]  Transplant & Hepatobiliary Surgery     alert and awake

## 2024-05-09 NOTE — PATIENT INSTRUCTIONS
We will call you when you need to have bloodwork.  We will call you to let you know when you are listed.  Follow up with Dr. Norris in June.  As a reminder, once you are listed, keep your phone on you at all times as we can call night or day from a blocked or out of state number for liver offers.  Make sure to follow up with Dr. Walters.  Make sure to follow up with neuro test and Dr. Virginia Wheeler

## 2024-05-09 NOTE — PROGRESS NOTES
05/9/2024--7941--Met with patient and support.  Listing consent completed    LISTING EDUCATION   Patient educated regarding the following prior to placement on the transplant waiting list:   The patient's medical condition, prognosis, and treatment plan.   The expectations and patient responsibilities while on the waiting list, including:   Keeping the transplant center informed of any changes in contact information or insurance coverage   Notifying the transplant center of any changes in medical status   Required testing and/or re-evaluation appointments while awaiting transplant  An overview of the surgical procedure, including potential risks and alternatives.   Information regarding what to expect during the inpatient admission and recovery period.   A discussion regarding organ offers and types of potential donors, including potential risks that may be associated with specific types of donors that could affect the success of the transplant or the health of the patient.   The right to refuse transplantation.   Patient was given the opportunity to have questions answered. Patient was provided a copy of the signed informed consent for transplant listing.   Education provided by:   Transplant Coordinator: Jazlyn Watson   Transplant Physician: Dr. Palmer Farris   Signed listing informed consent received? Liver  Patient agrees to be listed for the following: DCD, HCV, DBD  Patient will be placed on the UNOS waiting list.

## 2024-05-10 DIAGNOSIS — K21.00 GASTROESOPHAGEAL REFLUX DISEASE WITH ESOPHAGITIS WITHOUT HEMORRHAGE: ICD-10-CM

## 2024-05-10 LAB — CARBOXYTHC UR-MCNC: 444 NG/ML

## 2024-05-10 RX ORDER — PANTOPRAZOLE SODIUM 40 MG/1
40 TABLET, DELAYED RELEASE ORAL
Qty: 90 TABLET | Refills: 1 | Status: SHIPPED | OUTPATIENT
Start: 2024-05-10 | End: 2024-09-07

## 2024-05-15 ENCOUNTER — TELEPHONE (OUTPATIENT)
Dept: TRANSPLANT | Facility: HOSPITAL | Age: 59
End: 2024-05-15
Payer: COMMERCIAL

## 2024-05-15 ENCOUNTER — DOCUMENTATION (OUTPATIENT)
Dept: TRANSPLANT | Facility: HOSPITAL | Age: 59
End: 2024-05-15
Payer: COMMERCIAL

## 2024-05-15 DIAGNOSIS — Z01.818 ENCOUNTER FOR PRE-TRANSPLANT EVALUATION FOR LIVER TRANSPLANT: ICD-10-CM

## 2024-05-15 NOTE — PROGRESS NOTES
Rec'd listing approval from [facility] ref# [id number] valid 05/13/24-05/13/25.  Precert will be needed at time of txp.

## 2024-05-15 NOTE — TELEPHONE ENCOUNTER
Patient notified that insurance authorization for listing has been received. Patient will go to  lab tomorrow and will be notified of listing once completed.     Patient reports feeling very good and denied recent issues with volume overload, bleeding, confusion.

## 2024-05-15 NOTE — Clinical Note
Rec'd listing approval from [facility] ref# [id number] valid 05/13/24-05/13/25.

## 2024-05-16 ENCOUNTER — LAB (OUTPATIENT)
Dept: LAB | Facility: LAB | Age: 59
End: 2024-05-16
Payer: COMMERCIAL

## 2024-05-16 DIAGNOSIS — Z01.818 ENCOUNTER FOR PRE-TRANSPLANT EVALUATION FOR LIVER TRANSPLANT: ICD-10-CM

## 2024-05-16 LAB
ABO GROUP (TYPE) IN BLOOD: NORMAL
ALBUMIN SERPL BCP-MCNC: 5.2 G/DL (ref 3.4–5)
ALP SERPL-CCNC: 72 U/L (ref 33–136)
ALT SERPL W P-5'-P-CCNC: 24 U/L (ref 10–52)
ANION GAP SERPL CALC-SCNC: 15 MMOL/L (ref 10–20)
ANTIBODY SCREEN: NORMAL
AST SERPL W P-5'-P-CCNC: 25 U/L (ref 9–39)
BASOPHILS # BLD AUTO: 0.03 X10*3/UL (ref 0–0.1)
BASOPHILS NFR BLD AUTO: 0.8 %
BILIRUB SERPL-MCNC: 0.6 MG/DL (ref 0–1.2)
BUN SERPL-MCNC: 25 MG/DL (ref 6–23)
CALCIUM SERPL-MCNC: 9.6 MG/DL (ref 8.6–10.3)
CHLORIDE SERPL-SCNC: 102 MMOL/L (ref 98–107)
CO2 SERPL-SCNC: 25 MMOL/L (ref 21–32)
CREAT SERPL-MCNC: 1.33 MG/DL (ref 0.5–1.3)
EGFRCR SERPLBLD CKD-EPI 2021: 61 ML/MIN/1.73M*2
EOSINOPHIL # BLD AUTO: 0.02 X10*3/UL (ref 0–0.7)
EOSINOPHIL NFR BLD AUTO: 0.5 %
ERYTHROCYTE [DISTWIDTH] IN BLOOD BY AUTOMATED COUNT: 23.1 % (ref 11.5–14.5)
GLUCOSE SERPL-MCNC: 71 MG/DL (ref 74–99)
HCT VFR BLD AUTO: 35.7 % (ref 41–52)
HGB BLD-MCNC: 10.5 G/DL (ref 13.5–17.5)
IMM GRANULOCYTES # BLD AUTO: 0.02 X10*3/UL (ref 0–0.7)
IMM GRANULOCYTES NFR BLD AUTO: 0.5 % (ref 0–0.9)
INR PPP: 1.1 (ref 0.9–1.1)
LYMPHOCYTES # BLD AUTO: 0.43 X10*3/UL (ref 1.2–4.8)
LYMPHOCYTES NFR BLD AUTO: 11 %
MCH RBC QN AUTO: 24.1 PG (ref 26–34)
MCHC RBC AUTO-ENTMCNC: 29.4 G/DL (ref 32–36)
MCV RBC AUTO: 82 FL (ref 80–100)
MONOCYTES # BLD AUTO: 0.32 X10*3/UL (ref 0.1–1)
MONOCYTES NFR BLD AUTO: 8.2 %
NEUTROPHILS # BLD AUTO: 3.1 X10*3/UL (ref 1.2–7.7)
NEUTROPHILS NFR BLD AUTO: 79 %
NRBC BLD-RTO: 0 /100 WBCS (ref 0–0)
OVALOCYTES BLD QL SMEAR: NORMAL
PLATELET # BLD AUTO: 54 X10*3/UL (ref 150–450)
POTASSIUM SERPL-SCNC: 5.3 MMOL/L (ref 3.5–5.3)
PROT SERPL-MCNC: 8.2 G/DL (ref 6.4–8.2)
PROTHROMBIN TIME: 12.8 SECONDS (ref 9.8–12.8)
RBC # BLD AUTO: 4.36 X10*6/UL (ref 4.5–5.9)
RBC MORPH BLD: NORMAL
RH FACTOR (ANTIGEN D): NORMAL
SODIUM SERPL-SCNC: 137 MMOL/L (ref 136–145)
WBC # BLD AUTO: 3.9 X10*3/UL (ref 4.4–11.3)

## 2024-05-16 PROCEDURE — 85610 PROTHROMBIN TIME: CPT

## 2024-05-16 PROCEDURE — 86900 BLOOD TYPING SEROLOGIC ABO: CPT

## 2024-05-16 PROCEDURE — 86850 RBC ANTIBODY SCREEN: CPT

## 2024-05-16 PROCEDURE — 36415 COLL VENOUS BLD VENIPUNCTURE: CPT

## 2024-05-16 PROCEDURE — 85025 COMPLETE CBC W/AUTO DIFF WBC: CPT

## 2024-05-16 PROCEDURE — 86901 BLOOD TYPING SEROLOGIC RH(D): CPT

## 2024-05-16 PROCEDURE — 80053 COMPREHEN METABOLIC PANEL: CPT

## 2024-05-16 PROCEDURE — 86880 COOMBS TEST DIRECT: CPT

## 2024-05-17 ENCOUNTER — TELEPHONE (OUTPATIENT)
Dept: TRANSPLANT | Facility: HOSPITAL | Age: 59
End: 2024-05-17
Payer: COMMERCIAL

## 2024-05-17 ENCOUNTER — DOCUMENTATION (OUTPATIENT)
Dept: TRANSPLANT | Facility: HOSPITAL | Age: 59
End: 2024-05-17
Payer: COMMERCIAL

## 2024-05-17 DIAGNOSIS — Z76.82 PRE-LIVER TRANSPLANT, LISTED: Primary | ICD-10-CM

## 2024-05-17 DIAGNOSIS — R18.8 REFRACTORY ASCITES: ICD-10-CM

## 2024-05-17 DIAGNOSIS — K74.60 CIRRHOSIS OF LIVER WITH ASCITES, UNSPECIFIED HEPATIC CIRRHOSIS TYPE (MULTI): ICD-10-CM

## 2024-05-17 DIAGNOSIS — R18.8 CIRRHOSIS OF LIVER WITH ASCITES, UNSPECIFIED HEPATIC CIRRHOSIS TYPE (MULTI): ICD-10-CM

## 2024-05-17 LAB
BB ANTIBODY IDENTIFICATION: NORMAL
CASE #: NORMAL
DAT-POLYSPECIFIC: NORMAL

## 2024-05-17 NOTE — TELEPHONE ENCOUNTER
Attempted to notify patient of transplant listing but there was no answer and unable to leave VM.   Spoke with patient's mom, Wanda, and reviewed active listing with MELD 10. Labs should be done 1 week prior to seeing Dr. Norris in June. Letter will be sent to patient as well.

## 2024-05-17 NOTE — LETTER
05/17/24     Karyn Silva  [address] [place] [address]     RE:  Transplant Listing Status    Dear Mr. Silva:    [facility] ([facility] Transplant [place] Liver Selection Committee is pleased to inform you that you have been listed for a liver transplant effective 5/17/2024  with a MELD of 10. Your coordinator has ordered labs for you to complete the week before you see Dr. Norris in the Transplant Clinic on 6/24/2024.      While you are waiting for a donor offer it is important to remember the following:      CHANGES IN ADDRESS, PHONE, INSURANCE  Notify the transplant office immediately of any changes to:  Your address, phone number, or insurance   Your support person(s) contact information  **Failure to do so may result in you missing an opportunity for a transplant and/or may result in your insurance company refusing payment.    MELD LABS   You will need to have bloodwork drawn periodically to update your MELD score. The frequency of lab work is based on your most recent MELD score which can fluctuate. Your coordinator will notify you when to have labs drawn.     MEDICAL EMERGENCIES & PATIENT HOSPITALIZATIONS  In the case of a medical emergency, please:  Seek treatment at your closest Emergency Department.   Notify the emergency department's medical team that you are listed for a liver transplant and are followed by the liver transplant team at [facility].  Remember that you or a family member should request to be transferred to [facility] if the outside hospital believes you require admission.  Call the transplant office if you have been admitted to an outside hospital for any reason.     TRAVEL  If you plan to travel while you are active on the wait list, please call the transplant office and provide information on how we may contact you, as well as the dates you will be travelling.    If you are considering travel outside of the [place], please discuss this with your  coordinator or physician prior to making arrangements.       TRANSPLANT ADMISSION & POSSIBILITY OF A DRY RUN  Remember to always answer your phone!!   Keep your phone by your bed and the ringer at full volume overnight.   It is also important to clear your voicemail inbox so that you may be able to receive messages.    You will receive a call from a transplant coordinator when a donor has been matched for you. You will be notified of what type of donor organ is offered (Brain Dead, DCD, HepBcAb+, HCV+) and if donor meets risk criteria.   You will have ONE (1) HOUR to respond and accept the offer.   The transplant coordinator will provide instructions regarding your admission to the hospital including the time and location to arrive.   **Please remember that you may be contacted with a donor offer at any time, day or night. This call may appear as a blocked, out-of-state, or private number, therefore, it is extremely important to answer all phone calls.    It is possible that your transplant operation may be canceled after your arrival to the hospital -- this is called a [place] or False Alarm. There are many reasons this may happen, but here are a few examples:   DCD donor does not pass within 30 minute time frame  Active infection  Hyponatremia - Serum sodium </= 125  Organ arrives and is not in good condition  **As frustrating as this may be please understand that your transplant team makes these decisions in your best interest.     For general organ transplant-related information, you may call the Organ Procurement and Transplantation Network (OPTN) toll-free patient services phone line at [phone].  The OPTN is a great resource for transplant candidates, recipients, family members, and friends. Additional information on the OPTN is enclosed for your review.    Sincerely,    Liver Transplant Team  Transplant [place]  [facility]    CC:  Transplant Hepatologist -  Mu  B Post, MD            Enclosures: OPTN Patient Information Letter         Surgeon and Physician Coverage Plan

## 2024-06-05 ENCOUNTER — TELEPHONE (OUTPATIENT)
Dept: TRANSPLANT | Facility: HOSPITAL | Age: 59
End: 2024-06-05
Payer: COMMERCIAL

## 2024-06-05 NOTE — TELEPHONE ENCOUNTER
Patient is calling inquiring if Dr. Norris has spoken to Dr. Sheppard about him starting Levodopa.  Patient is asking if PT/OT oders can be written for him in assistance to his movement disorder.

## 2024-06-07 ENCOUNTER — TELEPHONE (OUTPATIENT)
Dept: TRANSPLANT | Facility: HOSPITAL | Age: 59
End: 2024-06-07
Payer: COMMERCIAL

## 2024-06-10 DIAGNOSIS — G63 POLYNEUROPATHY ASSOCIATED WITH UNDERLYING DISEASE (MULTI): ICD-10-CM

## 2024-06-10 DIAGNOSIS — Z76.82 PRE-LIVER TRANSPLANT, LISTED: ICD-10-CM

## 2024-06-10 DIAGNOSIS — G20.A1 PARKINSON'S DISEASE WITHOUT DYSKINESIA OR FLUCTUATING MANIFESTATIONS (MULTI): ICD-10-CM

## 2024-06-10 DIAGNOSIS — K76.82 HEPATIC ENCEPHALOPATHY (MULTI): ICD-10-CM

## 2024-06-10 DIAGNOSIS — R20.0 HEMISENSORY LOSS: ICD-10-CM

## 2024-06-21 ENCOUNTER — LAB (OUTPATIENT)
Dept: LAB | Facility: LAB | Age: 59
End: 2024-06-21
Payer: COMMERCIAL

## 2024-06-21 DIAGNOSIS — K74.60 CIRRHOSIS OF LIVER WITH ASCITES, UNSPECIFIED HEPATIC CIRRHOSIS TYPE (MULTI): ICD-10-CM

## 2024-06-21 DIAGNOSIS — R18.8 REFRACTORY ASCITES: ICD-10-CM

## 2024-06-21 DIAGNOSIS — Z76.82 PRE-LIVER TRANSPLANT, LISTED: ICD-10-CM

## 2024-06-21 DIAGNOSIS — R18.8 CIRRHOSIS OF LIVER WITH ASCITES, UNSPECIFIED HEPATIC CIRRHOSIS TYPE (MULTI): ICD-10-CM

## 2024-06-21 LAB
ALBUMIN SERPL BCP-MCNC: 4.5 G/DL (ref 3.4–5)
ALP SERPL-CCNC: 65 U/L (ref 33–136)
ALT SERPL W P-5'-P-CCNC: 38 U/L (ref 10–52)
ANION GAP SERPL CALC-SCNC: 15 MMOL/L (ref 10–20)
AST SERPL W P-5'-P-CCNC: 34 U/L (ref 9–39)
BASOPHILS # BLD AUTO: 0.04 X10*3/UL (ref 0–0.1)
BASOPHILS NFR BLD AUTO: 0.8 %
BILIRUB SERPL-MCNC: 0.6 MG/DL (ref 0–1.2)
BUN SERPL-MCNC: 20 MG/DL (ref 6–23)
BURR CELLS BLD QL SMEAR: NORMAL
CALCIUM SERPL-MCNC: 9.7 MG/DL (ref 8.6–10.3)
CHLORIDE SERPL-SCNC: 104 MMOL/L (ref 98–107)
CO2 SERPL-SCNC: 22 MMOL/L (ref 21–32)
CREAT SERPL-MCNC: 1.39 MG/DL (ref 0.5–1.3)
DACRYOCYTES BLD QL SMEAR: NORMAL
EGFRCR SERPLBLD CKD-EPI 2021: 58 ML/MIN/1.73M*2
EOSINOPHIL # BLD AUTO: 0.04 X10*3/UL (ref 0–0.7)
EOSINOPHIL NFR BLD AUTO: 0.8 %
ERYTHROCYTE [DISTWIDTH] IN BLOOD BY AUTOMATED COUNT: 21.3 % (ref 11.5–14.5)
GLUCOSE SERPL-MCNC: 83 MG/DL (ref 74–99)
HCT VFR BLD AUTO: 41.3 % (ref 41–52)
HGB BLD-MCNC: 13.4 G/DL (ref 13.5–17.5)
IMM GRANULOCYTES # BLD AUTO: 0.03 X10*3/UL (ref 0–0.7)
IMM GRANULOCYTES NFR BLD AUTO: 0.6 % (ref 0–0.9)
INR PPP: 1.1 (ref 0.9–1.1)
LYMPHOCYTES # BLD AUTO: 0.61 X10*3/UL (ref 1.2–4.8)
LYMPHOCYTES NFR BLD AUTO: 11.5 %
MCH RBC QN AUTO: 26.2 PG (ref 26–34)
MCHC RBC AUTO-ENTMCNC: 32.4 G/DL (ref 32–36)
MCV RBC AUTO: 81 FL (ref 80–100)
MONOCYTES # BLD AUTO: 0.37 X10*3/UL (ref 0.1–1)
MONOCYTES NFR BLD AUTO: 7 %
NEUTROPHILS # BLD AUTO: 4.2 X10*3/UL (ref 1.2–7.7)
NEUTROPHILS NFR BLD AUTO: 79.3 %
NRBC BLD-RTO: 0 /100 WBCS (ref 0–0)
OVALOCYTES BLD QL SMEAR: NORMAL
PLATELET # BLD AUTO: 56 X10*3/UL (ref 150–450)
POTASSIUM SERPL-SCNC: 4.3 MMOL/L (ref 3.5–5.3)
PROT SERPL-MCNC: 8 G/DL (ref 6.4–8.2)
PROTHROMBIN TIME: 12.5 SECONDS (ref 9.8–12.8)
RBC # BLD AUTO: 5.11 X10*6/UL (ref 4.5–5.9)
RBC MORPH BLD: NORMAL
SODIUM SERPL-SCNC: 137 MMOL/L (ref 136–145)
WBC # BLD AUTO: 5.3 X10*3/UL (ref 4.4–11.3)

## 2024-06-21 PROCEDURE — 80053 COMPREHEN METABOLIC PANEL: CPT

## 2024-06-21 PROCEDURE — 36415 COLL VENOUS BLD VENIPUNCTURE: CPT

## 2024-06-21 PROCEDURE — 85025 COMPLETE CBC W/AUTO DIFF WBC: CPT

## 2024-06-21 PROCEDURE — 85610 PROTHROMBIN TIME: CPT

## 2024-06-24 ENCOUNTER — OFFICE VISIT (OUTPATIENT)
Dept: TRANSPLANT | Facility: HOSPITAL | Age: 59
End: 2024-06-24
Payer: COMMERCIAL

## 2024-06-24 VITALS
SYSTOLIC BLOOD PRESSURE: 145 MMHG | DIASTOLIC BLOOD PRESSURE: 102 MMHG | WEIGHT: 207.3 LBS | OXYGEN SATURATION: 97 % | TEMPERATURE: 97.9 F | HEART RATE: 77 BPM | BODY MASS INDEX: 24.58 KG/M2

## 2024-06-24 DIAGNOSIS — Z76.82 PRE-LIVER TRANSPLANT, LISTED: ICD-10-CM

## 2024-06-24 DIAGNOSIS — R18.8 REFRACTORY ASCITES: ICD-10-CM

## 2024-06-24 DIAGNOSIS — D50.0 IRON DEFICIENCY ANEMIA SECONDARY TO BLOOD LOSS (CHRONIC): ICD-10-CM

## 2024-06-24 DIAGNOSIS — R18.8 CIRRHOSIS OF LIVER WITH ASCITES, UNSPECIFIED HEPATIC CIRRHOSIS TYPE (MULTI): ICD-10-CM

## 2024-06-24 DIAGNOSIS — K74.60 CIRRHOSIS OF LIVER WITH ASCITES, UNSPECIFIED HEPATIC CIRRHOSIS TYPE (MULTI): ICD-10-CM

## 2024-06-24 PROCEDURE — 3077F SYST BP >= 140 MM HG: CPT | Performed by: INTERNAL MEDICINE

## 2024-06-24 PROCEDURE — 3080F DIAST BP >= 90 MM HG: CPT | Performed by: INTERNAL MEDICINE

## 2024-06-24 PROCEDURE — 99214 OFFICE O/P EST MOD 30 MIN: CPT | Performed by: INTERNAL MEDICINE

## 2024-06-24 RX ORDER — SPIRONOLACTONE 50 MG/1
50 TABLET, FILM COATED ORAL DAILY
Qty: 30 TABLET | Refills: 11 | Status: SHIPPED | OUTPATIENT
Start: 2024-06-24 | End: 2025-06-24

## 2024-06-24 RX ORDER — FUROSEMIDE 20 MG/1
40 TABLET ORAL DAILY
Qty: 60 TABLET | Refills: 11 | Status: SHIPPED | OUTPATIENT
Start: 2024-06-24 | End: 2025-06-24

## 2024-06-24 ASSESSMENT — PAIN SCALES - GENERAL: PAINLEVEL: 0-NO PAIN

## 2024-06-24 NOTE — PATIENT INSTRUCTIONS
Plan:    Reduced your diuretics:  Lasix 40mg daily  Spironolactone 50mg daily    Let us know if you are developing build up of fluid.    Monthly labs. Next labs in 1 month.     Return to clinic in 4 months.

## 2024-07-23 ENCOUNTER — LAB (OUTPATIENT)
Dept: LAB | Facility: LAB | Age: 59
End: 2024-07-23
Payer: COMMERCIAL

## 2024-07-23 DIAGNOSIS — K74.60 CIRRHOSIS OF LIVER WITH ASCITES, UNSPECIFIED HEPATIC CIRRHOSIS TYPE (MULTI): ICD-10-CM

## 2024-07-23 DIAGNOSIS — Z76.82 PRE-LIVER TRANSPLANT, LISTED: ICD-10-CM

## 2024-07-23 DIAGNOSIS — D50.0 IRON DEFICIENCY ANEMIA SECONDARY TO BLOOD LOSS (CHRONIC): ICD-10-CM

## 2024-07-23 DIAGNOSIS — R18.8 CIRRHOSIS OF LIVER WITH ASCITES, UNSPECIFIED HEPATIC CIRRHOSIS TYPE (MULTI): ICD-10-CM

## 2024-07-23 LAB
ALBUMIN SERPL BCP-MCNC: 4.8 G/DL (ref 3.4–5)
ALP SERPL-CCNC: 75 U/L (ref 33–136)
ALT SERPL W P-5'-P-CCNC: 39 U/L (ref 10–52)
ANION GAP SERPL CALC-SCNC: 11 MMOL/L (ref 10–20)
AST SERPL W P-5'-P-CCNC: 32 U/L (ref 9–39)
BASOPHILS # BLD AUTO: 0.02 X10*3/UL (ref 0–0.1)
BASOPHILS NFR BLD AUTO: 0.6 %
BILIRUB SERPL-MCNC: 0.8 MG/DL (ref 0–1.2)
BUN SERPL-MCNC: 15 MG/DL (ref 6–23)
CALCIUM SERPL-MCNC: 9.3 MG/DL (ref 8.6–10.3)
CHLORIDE SERPL-SCNC: 104 MMOL/L (ref 98–107)
CO2 SERPL-SCNC: 27 MMOL/L (ref 21–32)
CREAT SERPL-MCNC: 1.16 MG/DL (ref 0.5–1.3)
EGFRCR SERPLBLD CKD-EPI 2021: 72 ML/MIN/1.73M*2
EOSINOPHIL # BLD AUTO: 0.04 X10*3/UL (ref 0–0.7)
EOSINOPHIL NFR BLD AUTO: 1.1 %
ERYTHROCYTE [DISTWIDTH] IN BLOOD BY AUTOMATED COUNT: 18.1 % (ref 11.5–14.5)
FERRITIN SERPL-MCNC: 68 NG/ML (ref 20–300)
GLUCOSE SERPL-MCNC: 93 MG/DL (ref 74–99)
HCT VFR BLD AUTO: 40.1 % (ref 41–52)
HGB BLD-MCNC: 13.1 G/DL (ref 13.5–17.5)
IMM GRANULOCYTES NFR BLD AUTO: 0.3 % (ref 0–0.9)
INR PPP: 1.1 (ref 0.9–1.1)
IRON SATN MFR SERPL: 20 % (ref 25–45)
IRON SERPL-MCNC: 80 UG/DL (ref 35–150)
LYMPHOCYTES # BLD AUTO: 0.53 X10*3/UL (ref 1.2–4.8)
LYMPHOCYTES NFR BLD AUTO: 14.9 %
MCH RBC QN AUTO: 28.7 PG (ref 26–34)
MCHC RBC AUTO-ENTMCNC: 32.7 G/DL (ref 32–36)
MCV RBC AUTO: 88 FL (ref 80–100)
MONOCYTES # BLD AUTO: 0.25 X10*3/UL (ref 0.1–1)
MONOCYTES NFR BLD AUTO: 7 %
NEUTROPHILS # BLD AUTO: 2.7 X10*3/UL (ref 1.2–7.7)
NEUTROPHILS NFR BLD AUTO: 76.1 %
PLATELET # BLD AUTO: 49 X10*3/UL (ref 150–450)
POTASSIUM SERPL-SCNC: 4.3 MMOL/L (ref 3.5–5.3)
PROT SERPL-MCNC: 8 G/DL (ref 6.4–8.2)
PROTHROMBIN TIME: 12 SECONDS (ref 9.8–12.8)
RBC # BLD AUTO: 4.57 X10*6/UL (ref 4.5–5.9)
SODIUM SERPL-SCNC: 138 MMOL/L (ref 136–145)
TIBC SERPL-MCNC: 405 UG/DL (ref 240–445)
UIBC SERPL-MCNC: 325 UG/DL (ref 110–370)
WBC # BLD AUTO: 3.6 X10*3/UL (ref 4.4–11.3)

## 2024-07-23 PROCEDURE — 83540 ASSAY OF IRON: CPT

## 2024-07-23 PROCEDURE — 80053 COMPREHEN METABOLIC PANEL: CPT

## 2024-07-23 PROCEDURE — 85025 COMPLETE CBC W/AUTO DIFF WBC: CPT

## 2024-07-23 PROCEDURE — 82728 ASSAY OF FERRITIN: CPT

## 2024-07-23 PROCEDURE — 83550 IRON BINDING TEST: CPT

## 2024-07-23 PROCEDURE — 85610 PROTHROMBIN TIME: CPT

## 2024-08-16 ENCOUNTER — DOCUMENTATION (OUTPATIENT)
Dept: TRANSPLANT | Facility: HOSPITAL | Age: 59
End: 2024-08-16
Payer: COMMERCIAL

## 2024-08-21 ENCOUNTER — LAB (OUTPATIENT)
Dept: LAB | Facility: LAB | Age: 59
End: 2024-08-21
Payer: COMMERCIAL

## 2024-08-21 ENCOUNTER — TELEPHONE (OUTPATIENT)
Dept: TRANSPLANT | Facility: HOSPITAL | Age: 59
End: 2024-08-21

## 2024-08-21 DIAGNOSIS — K74.60 CIRRHOSIS OF LIVER WITH ASCITES, UNSPECIFIED HEPATIC CIRRHOSIS TYPE (MULTI): ICD-10-CM

## 2024-08-21 DIAGNOSIS — Z76.82 PRE-LIVER TRANSPLANT, LISTED: ICD-10-CM

## 2024-08-21 DIAGNOSIS — R18.8 CIRRHOSIS OF LIVER WITH ASCITES, UNSPECIFIED HEPATIC CIRRHOSIS TYPE (MULTI): ICD-10-CM

## 2024-08-21 DIAGNOSIS — D50.0 IRON DEFICIENCY ANEMIA SECONDARY TO BLOOD LOSS (CHRONIC): ICD-10-CM

## 2024-08-21 LAB
ALBUMIN SERPL BCP-MCNC: 4.6 G/DL (ref 3.4–5)
ALP SERPL-CCNC: 61 U/L (ref 33–136)
ALT SERPL W P-5'-P-CCNC: 38 U/L (ref 10–52)
ANION GAP SERPL CALC-SCNC: 14 MMOL/L (ref 10–20)
AST SERPL W P-5'-P-CCNC: 29 U/L (ref 9–39)
BASOPHILS # BLD AUTO: 0.03 X10*3/UL (ref 0–0.1)
BASOPHILS NFR BLD AUTO: 0.8 %
BILIRUB SERPL-MCNC: 1 MG/DL (ref 0–1.2)
BUN SERPL-MCNC: 16 MG/DL (ref 6–23)
CALCIUM SERPL-MCNC: 8.9 MG/DL (ref 8.6–10.3)
CHLORIDE SERPL-SCNC: 108 MMOL/L (ref 98–107)
CO2 SERPL-SCNC: 20 MMOL/L (ref 21–32)
CREAT SERPL-MCNC: 1.14 MG/DL (ref 0.5–1.3)
EGFRCR SERPLBLD CKD-EPI 2021: 74 ML/MIN/1.73M*2
EOSINOPHIL # BLD AUTO: 0.02 X10*3/UL (ref 0–0.7)
EOSINOPHIL NFR BLD AUTO: 0.6 %
ERYTHROCYTE [DISTWIDTH] IN BLOOD BY AUTOMATED COUNT: 15.1 % (ref 11.5–14.5)
GLUCOSE SERPL-MCNC: 88 MG/DL (ref 74–99)
HCT VFR BLD AUTO: 38.7 % (ref 41–52)
HGB BLD-MCNC: 13 G/DL (ref 13.5–17.5)
IMM GRANULOCYTES # BLD AUTO: 0.01 X10*3/UL (ref 0–0.7)
IMM GRANULOCYTES NFR BLD AUTO: 0.3 % (ref 0–0.9)
INR PPP: 1.2 (ref 0.9–1.1)
LYMPHOCYTES # BLD AUTO: 0.43 X10*3/UL (ref 1.2–4.8)
LYMPHOCYTES NFR BLD AUTO: 12.1 %
MCH RBC QN AUTO: 29.7 PG (ref 26–34)
MCHC RBC AUTO-ENTMCNC: 33.6 G/DL (ref 32–36)
MCV RBC AUTO: 88 FL (ref 80–100)
MONOCYTES # BLD AUTO: 0.21 X10*3/UL (ref 0.1–1)
MONOCYTES NFR BLD AUTO: 5.9 %
NEUTROPHILS # BLD AUTO: 2.84 X10*3/UL (ref 1.2–7.7)
NEUTROPHILS NFR BLD AUTO: 80.3 %
NRBC BLD-RTO: 0 /100 WBCS (ref 0–0)
PATH REVIEW-CBC DIFFERENTIAL: NORMAL
PLATELET # BLD AUTO: 39 X10*3/UL (ref 150–450)
POTASSIUM SERPL-SCNC: 3.9 MMOL/L (ref 3.5–5.3)
PROT SERPL-MCNC: 7.4 G/DL (ref 6.4–8.2)
PROTHROMBIN TIME: 13.6 SECONDS (ref 9.8–12.8)
RBC # BLD AUTO: 4.38 X10*6/UL (ref 4.5–5.9)
SODIUM SERPL-SCNC: 138 MMOL/L (ref 136–145)
WBC # BLD AUTO: 3.5 X10*3/UL (ref 4.4–11.3)

## 2024-08-21 PROCEDURE — 80053 COMPREHEN METABOLIC PANEL: CPT

## 2024-08-21 PROCEDURE — 85610 PROTHROMBIN TIME: CPT

## 2024-08-21 PROCEDURE — 36415 COLL VENOUS BLD VENIPUNCTURE: CPT

## 2024-08-21 PROCEDURE — 85025 COMPLETE CBC W/AUTO DIFF WBC: CPT

## 2024-08-23 ENCOUNTER — TELEPHONE (OUTPATIENT)
Dept: TRANSPLANT | Facility: HOSPITAL | Age: 59
End: 2024-08-23
Payer: COMMERCIAL

## 2024-08-23 NOTE — TELEPHONE ENCOUNTER
VM left for patient to follow up on report of pain received via email by patient's support. Coordinator reviewed labs -- all good except for slight drop in platelets but near baseline due to chronic thrombocytopenia. Will review with Dr. Norris when he returns to clinic. Advised patient to contact coordinator if there are any concerns.

## 2024-08-29 DIAGNOSIS — Z01.818 ENCOUNTER FOR PRE-TRANSPLANT EVALUATION FOR LIVER TRANSPLANT: ICD-10-CM

## 2024-08-29 DIAGNOSIS — D50.0 IRON DEFICIENCY ANEMIA SECONDARY TO BLOOD LOSS (CHRONIC): ICD-10-CM

## 2024-08-29 DIAGNOSIS — K74.60 CIRRHOSIS OF LIVER WITH ASCITES, UNSPECIFIED HEPATIC CIRRHOSIS TYPE (MULTI): ICD-10-CM

## 2024-08-29 DIAGNOSIS — R18.8 CIRRHOSIS OF LIVER WITH ASCITES, UNSPECIFIED HEPATIC CIRRHOSIS TYPE (MULTI): ICD-10-CM

## 2024-09-03 ENCOUNTER — HOSPITAL ENCOUNTER (OUTPATIENT)
Dept: RADIOLOGY | Facility: HOSPITAL | Age: 59
Discharge: HOME | End: 2024-09-03
Payer: COMMERCIAL

## 2024-09-03 DIAGNOSIS — I71.21 ANEURYSM OF ASCENDING AORTA WITHOUT RUPTURE (CMS-HCC): ICD-10-CM

## 2024-09-03 PROCEDURE — 71250 CT THORAX DX C-: CPT

## 2024-09-04 ENCOUNTER — APPOINTMENT (OUTPATIENT)
Dept: NEUROLOGY | Facility: HOSPITAL | Age: 59
End: 2024-09-04
Payer: COMMERCIAL

## 2024-09-04 ENCOUNTER — OFFICE VISIT (OUTPATIENT)
Dept: NEUROLOGY | Facility: CLINIC | Age: 59
End: 2024-09-04
Payer: COMMERCIAL

## 2024-09-04 DIAGNOSIS — G62.89 OTHER POLYNEUROPATHY: ICD-10-CM

## 2024-09-04 DIAGNOSIS — G20.C PARKINSONISM, UNSPECIFIED PARKINSONISM TYPE (MULTI): Primary | ICD-10-CM

## 2024-09-04 ASSESSMENT — UNIFIED PARKINSONS DISEASE RATING SCALE (UPDRS)
LEG_AGILITY_RIGHT: 1
LEVODOPA: NO
TOETAPPING_LEFT: 2
POSTURAL_TREMOR_LEFTHAND: 0
SPONTANEITY_OF_MOVEMENT: 0
TOTAL_SCORE: 8
LEG_AGILITY_LEFT: 1
FINGER_TAPPING_LEFT: 1
FINGER_TAPPING_RIGHT: 1
RIGIDITY_NECK: 0
AMPLITUDE_LUE: 0
PARKINSONS_MEDS: NO
HANDMOVEMENTS_RIGHT: 0
AMPLITUDE_RUE: 0
RIGIDITY_RUE: 0
RIGIDITY_RLE: 0
PRONATION_SUPINATION_LEFT: 0
RIGIDITY_LUE: 0
CHAIR_RISING_SCALE: 0
POSTURAL_TREMOR_RIGHTHAND: 0
AMPLITUDE_LIP_JAW: 0
FREEZING_GAIT: 0
AMPLITUDE_RLE: 0
POSTURE: 0
SPEECH: 0
FACIAL_EXPRESSION: 0
DYSKINESIAS_PRESENT: NO
TOETAPPING_RIGHT: 1
KINETIC_TREMOR_RIGHTHAND: 0
POSTURAL_STABILITY: 0
CONSTANCY_TREMOR_ATREST: 0
RIGIDITY_LLE: 0
PRONATION_SUPINATION_RIGHT: 0
KINETIC_TREMOR_LEFTHAND: 0
AMPLITUDE_LLE: 0
GAIT: 0

## 2024-09-04 NOTE — PROGRESS NOTES
"Subjective     Vance Silva is a right handed  58 y.o. year old male who presents with No chief complaint on file..   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 He is currently on the liver transplant list, however is doing dramatically better than he was when last evaluated in March of 2024. At that time he was seen for evaluation of possible parkinsonism. During my evaluation I noted some subtle bradykinesia, and he endorses multiple non motor symptoms of PD. I ordered an MRI brain to assess for hepatocerebral degeneration, and plan was to consider Rica scan for his possible mild parkinsonism.     To briefly review, he states he was previously evaluated by a neurologist at [facility] and was diagnosed with possible PD.  He has noted that he has been slowing down in his movements, more so with the R hemibody for the last 1-2 years. The R side of his body is \"slower\" and \"not working well\". He has had neuropathy of the feet for a long time and that he has very limited sensation below the knee caps.       Interval history;   Has been doing well. He has now had resolution of his ascites, and his numbers are doing better.   He feels a lot better than what he was 6 months ago.  He no longer feels that his R hemibody is working slower.   He also feels that his hand numbness is gone, although continues to have some numbness of the feet.         Review of Motor symptoms:  Tremor:  Location- lip tremors as well as hand in past - used to be regular, partner has not seen it in his lips nor hand lately either.                  Rest/postural/action- denies  Stiffness/rigidity: denies, has some stiffness from orthopedic " "issues.   Slowness:  much improved.   Trouble walking:  he walks w a stooped posture. He has started shuffling, but less so than last time. Feels that can pick feet up better.   Freezing of gait:  FOG is also gone.   Balance problems:  yes.   Falls: no recent falls, and less tripping.   Changes in speech:  seems to be better.   Swallowing difficulties: , has to eat a lot of protein. But does better.   Abnormal postures:  R>L foot cramps, toes curl, more so at night. - much less so than prior.   Handwriting: maybe smaller, but now better. .   Activities of daily living (buttoning clothes, bathing, cutting food, etc):  independent,    Review of Non-Motor symptoms:  Cognition:  Memory-improved           Hallucinations-  denies    Mood:        Depression way better.                        Anxiety: denies  Sleep disturbances including REM behavior disorder:appears to be sleeping much better, and less dream enactment per his partner.   Sensory changes (ie, smell or taste):  limited ability of smelling but feels a little better now. Taste is not as good.   Cramps/pain:  yes.  Gastrointestinal complaints/Constipation:  he is on lactulose for his liver, so ok -  but  if he does not take it - he is constipated.   Urinary retention or frequency:  frequency, is on diuretics, urgency, not incontinence.   Positional lightheadedness and/or syncope: denies  Excessive saliva/drooling: denies  Fatigue:  improved.     Risk factors:  He says he has had maybe 100 concussions in his life. \"Saw stars\" hundred of times, was given smelling salts in field, and has had loss of consciousness at least over  dozen times.   Paternal grandfather - PD, In his 70s.         History of Seizures: has had several seizures. He has been hospitalized for a couple of them. He says that he was never given a reason for his seizures. He has a history of alcohol and opioid abuse   Last seizures where is 2017.  When fell before [facility] last year, - daughter noted " that he was convulsing for 20 seconds.   Never been on AED's.     Medication History:  He has never been on dopamine blocking agents.  He has never been on medications for treatments of parkinsonism     Patient Active Problem List   Diagnosis    Acute encephalopathy    Transaminitis    Thrombocytopenia (CMS-HCC)    Secondary esophageal varices without bleeding (Multi)    Portal hypertension with esophageal varices (Multi)    Peripheral neuropathy    Hypertension    Hyperammonemia (Multi)    Herniated lumbar intervertebral disc    Helicobacter pylori ab+    Cirrhosis of liver with ascites (Multi)    Chronic traumatic encephalopathy    Electrolyte abnormality    Chronic pain    Back pain    Lactic acidosis    Lesion of ulnar nerve    Open wound of toe    Right foot pain    Arthritis of great toe at metatarsophalangeal joint    Anemia due to chronic blood loss    Anemia    Alcoholic liver disease (Multi)    Alcohol withdrawal delirium (Multi)    Alcohol related seizure (Multi)    Acute tonsillitis    Lightheadedness    Ascending aorta dilation (CMS-HCC)    UGIB (upper gastrointestinal bleed)    Primary bacterial peritonitis (Multi)    Other ascites    Memory changes    Hepatic encephalopathy (Multi)    High prostate specific antigen (PSA)    Gastrointestinal hemorrhage with melena    Acute kidney injury (CMS-HCC)    Edema of both lower extremities    Coronary artery calcification of native artery    Encounter for pre-transplant evaluation for liver transplant    Anemia, unspecified type    Decompensated hepatic cirrhosis (Multi)    Inflammatory neuropathy (Multi)    Pre-liver transplant, listed    Parkinson's disease without dyskinesia or fluctuating manifestations (Multi)    Hemisensory loss      Past Medical History:   Diagnosis Date    Acute kidney injury (CMS-HCC)     Anemia     Ascites     Chronic kidney disease     Cirrhosis (Multi)     Hepatitis C     Liver disease     Parkinson's disease (Multi)     dx  2/16/2024    Seizure (Multi)     Sleep apnea     Traumatic brain injury (Multi)       Past Surgical History:   Procedure Laterality Date    ESOPHAGOGASTRODUODENOSCOPY      OTHER SURGICAL HISTORY      Multiple orthopedic surgeries    UMBILICAL HERNIA REPAIR  2021      Social History     Socioeconomic History    Marital status:      Spouse name: Not on file    Number of children: Not on file    Years of education: Not on file    Highest education level: Not on file   Occupational History    Not on file   Tobacco Use    Smoking status: Never    Smokeless tobacco: Never   Vaping Use    Vaping status: Never Used   Substance and Sexual Activity    Alcohol use: Not Currently    Drug use: Not Currently    Sexual activity: Not Currently   Other Topics Concern    Not on file   Social History Narrative    Not on file     Social Determinants of Health     Financial Resource Strain: High Risk (3/12/2024)    Overall Financial Resource Strain (CARDIA)     Difficulty of Paying Living Expenses: Very hard   Food Insecurity: Not on file   Transportation Needs: No Transportation Needs (3/12/2024)    PRAPARE - Transportation     Lack of Transportation (Medical): No     Lack of Transportation (Non-Medical): No   Physical Activity: Not on file   Stress: Not on file   Social Connections: Not on file   Intimate Partner Violence: Not on file   Housing Stability: Low Risk  (3/12/2024)    Housing Stability Vital Sign     Unable to Pay for Housing in the Last Year: No     Number of Places Lived in the Last Year: 2     Unstable Housing in the Last Year: No      No family history on file.           Current Outpatient Medications on File Prior to Visit   Medication Sig Dispense Refill    carvedilol (Coreg) 6.25 mg tablet Take 1 tablet (6.25 mg) by mouth once daily at bedtime. 30 tablet 11    Constulose 10 gram/15 mL oral solution Take 30 mL (20 g) by mouth 3 times a day. Titrate to achieve goal 2-3 bowel movements daily 8100 mL 3     ferrous sulfate, 325 mg ferrous sulfate, tablet Take 1 tablet by mouth 2 times a day. 60 tablet 11    furosemide (Lasix) 20 mg tablet Take 2 tablets (40 mg) by mouth once daily. 60 tablet 11    pantoprazole (ProtoNix) 40 mg EC tablet TAKE 1 TABLET (40 MG) BY MOUTH ONCE DAILY IN THE MORNING. TAKE BEFORE MEALS. DO NOT CRUSH, CHEW, OR SPLIT. 90 tablet 1    spironolactone (Aldactone) 50 mg tablet Take 1 tablet (50 mg) by mouth once daily. 30 tablet 11     No current facility-administered medications on file prior to visit.           Review of Systems  All other system have been reviewed and are negative for complaint.  Objective   There were no vitals filed for this visit.       MDS UPDRS 1st Score: Motor Examination  Is the patient on medication for treating the symptoms of Parkinson's Disease?: No  Is the patient on Levodopa?: No  Speech: 0  Facial Expression: 0  Rigidty Neck: 0  Rigidty RUE: 0  Rigidity - LUE: 0  Rigidity RLE: 0  Rigidity LLE: 0  Finger Tapping Right Hand: 1  Finger Tapping Left Hand: 1  Hand Movements- Right Hand: 0  Hand Movements- Left Hand: 1  Pronatiaon-Supination Movments - Right Hand: 0  Pronatiaon-Supination Movments Left Hand: 0  Toe Tapping Right Foot: 1  Toe Tapping - Left Foot: 2  Leg Agility - Right Le  Leg Agility - Left le  Arising from Chair: 0  Gait: 0  Freezing of Gait: 0  Postural Stability: 0  Posture: 0  Global Spontanteity of Movment ( Body Bradykinesia): 0  Postural Tremor - Right Hand: 0  Postural Tremor - Left hand: 0  Kinetic Tremor - Right hand: 0  Kinetic Tremor - Left hand: 0  Rest Tremor Amplitude - RUE: 0  Rest Tremor Amplitude - LUE: 0  Rest Tremor Amplitude - RLE: 0  Rest Tremor Amplitude - LLE: 0  Rest Tremor Amplitude - Lip/Jaw: 0  Constancy of Rest Tremor: 0  MDS UPDRS Total Score: 8  Were dyskinesias (chorea or dystonia) present during examination?: No          MRI brain 2024:   INDICATION:  Signs/Symptoms:parkinsonism as well as some hemisensory  deficit..      COMPARISON:  None.      ACCESSION NUMBER(S):  [id number]      ORDERING CLINICIAN:  ASHLEY JONAS      TECHNIQUE:  Standard multiplanar multisequence MR imaging was performed through  the brain without intravenous contrast. Axial T2, FLAIR, DWI,  gradient echo T2 and sagittal and coronal T1 weighted images of brain  were acquired.      FINDINGS:  Parenchyma: There is no diffusion restriction abnormality to suggest  acute infarct.  No evidence of recent hemorrhage. There is no mass  effect or midline shift. Mild-to-moderate burden of small nonspecific  T2/FLAIR white matter hyperintensities throughout the bilateral  cerebral hemispheres, likely representing chronic small vessel  ischemic disease.      CSF Spaces: The ventricles, sulci and basal cisterns are within  normal limits for age with mild generalized brain atrophy. Basilar  cisterns are patent.      Extra-axial spaces: No extra-axial fluid collection.      Paranasal Sinuses: Visualized paranasal sinuses are clear.      Mastoids: Clear.      Orbits: Normal.      Calvarium: No suspicious osseous marrow signal.      IMPRESSION:  No acute infarct, recent hemorrhage, or intracranial mass effect.      Mild-to-moderate chronic small vessel ischemic disease with mild  generalized brain atrophy.      MACRO:  None             Hemoglobin A1C   Date Value Ref Range Status   02/23/2024 5.1 see below % Final     Estimated Average Glucose   Date Value Ref Range Status   02/23/2024 100 Not Established mg/dL Final   12/15/2021 94 mg/dL Final     Comment:     eAG: (Estimated average glucose) is a calculated value from HgbA1c and is   representative of the average blood glucose level in the last 2-3 month   period.     Thyroid Stimulating Hormone   Date Value Ref Range Status   05/06/2024 3.41 0.44 - 3.98 mIU/L Final     Folate, Serum   Date Value Ref Range Status   05/06/2024 15.0 >5.0 ng/mL Final       CT head and C-spine [facility] Jan 2024:    RESULT: MRI  CERVICAL SPINE: Acute abnormality: None.     No trauma change.  Normal skull base/C1-C2 articulation.  Decreased disc   height and endplate changes with disc osteophyte and disc bulges at C5-6   and C6/7 along with facet and uncovertebral joint degeneration indicating   cervical spondylosis.  Remaining discs are normal.  Additional left   greater than right facet osteoarthritis and uncovertebral joint   degeneration indicating spondylosis.  Mild positional levoscoliosis   without vertebral anomaly.  Normal alignment, vertebral height, bone   density, central canal, thecal sac and cord caliber.  No   fracture/dislocation.  Normal tissues.     C2 -- 3: Left joint degeneration with severe foramina narrowing.  Patent   canal and right foramina.     C3 -- 4: Joint degeneration with severe left and mild right foramina   narrowing.  Patent canal.     C4 -- 5: Patent canal and foramina.     C5 -- 6: Disc/joint degeneration.  Mild canal narrowing.  Severe left &   moderate right foramina narrowing.     C6 -- 7: Disc/joint degeneration.  Moderate canal narrowing.  Severe   bilateral foramina narrowing.     C7 -- T1: Patent canal and foramina.     HEAD CT: Acute abnormality: None.     No trauma change.  No acute intracranial disease.  Age expected sulci,   gyri, ventricles, CSF spaces and brain.  No acute infarct/hemorrhage,   mass effect or collections.  Normal bones & skull base.     Record review:  [facility] w Dr Barbour in 2018; 25/30  [facility] at [facility] in 2/24 was 24/30    Assessment/Plan     Vance Silva is a 58 y.o. year old male here for for follow up  of possible Parkinsonism. He has a complex medical history including decompensated liver cirrhosis with several recent hospitalizations due to complications for same, but he is thankfully doing much better.  In addition he has noted about a 2 year history of generalized slowness of movement and stiffness, more so on the R hemibody, as well as a resting tremor noted in the  UE and chin as per report by his partner, although this was not observed on my clinical exam at the initial encounter. His examination shows mild R sided bradykinesia, no clear increased tone, although RUE exam is limited by his reduced ROM of elbow. He has a slight R leg drag when walking, is wide based, and has significant peripheral neuropathy on his examination.  At this point he does not meet clinical criteria for parkinsonism - his bradykinesia is clearly improved from the last visit, maybe he was performing poorly due to his general medical issues at that time. It would make sense to do a Rica scan at this time and we discussed this plan in detail and he is in agreement with same. All questions were answered.

## 2024-09-04 NOTE — PATIENT INSTRUCTIONS
It was nice to see you today.     Your exam looks much improved, there is minimal parkinsonism on your exam today.   Let;s do a Dopamine Transporter scan to determine for sure, but I do not suspect that you have Parkinson's disease.   Keep up the good work, and eat and exercise.     My office number is .      RTC in Spring.

## 2024-09-04 NOTE — LETTER
"September 6, 2024     Mu Norris MD  [address] [facility] [address]  [facility] [address]    Patient: Karyn Silva   YOB: 1965   Date of Visit: 9/4/2024       Dear Dr. Mu Norris MD:    Thank you for referring Karyn Silva to me for evaluation. Below are my notes for this consultation.  If you have questions, please do not hesitate to call me. I look forward to following your patient along with you.       Sincerely,     Virginia Wheeler MD      CC: No Recipients  ______________________________________________________________________________________    Subjective    Vance Silva is a right handed  58 y.o. year old male who presents with No chief complaint on file..   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 He is currently on the liver transplant list, however is doing dramatically better than he was when last evaluated in March of 2024. At that time he was seen for evaluation of possible parkinsonism. During my evaluation I noted some subtle bradykinesia, and he endorses multiple non motor symptoms of PD. I ordered an MRI brain to assess for hepatocerebral degeneration, and plan was to consider Rica scan for his possible mild parkinsonism.     To briefly review, he states he was previously evaluated by a neurologist at [facility] and was diagnosed with possible PD.  He has noted that he has been slowing down in his movements, more so with the R hemibody for the last 1-2 years. The R side of his body is \"slower\" and \"not working well\". He has had neuropathy of the feet for a long time and that he has very limited sensation below the knee caps.       Interval history;   Has been doing well. " He has now had resolution of his ascites, and his numbers are doing better.   He feels a lot better than what he was 6 months ago.  He no longer feels that his R hemibody is working slower.   He also feels that his hand numbness is gone, although continues to have some numbness of the feet.         Review of Motor symptoms:  Tremor:  Location- lip tremors as well as hand in past - used to be regular, partner has not seen it in his lips nor hand lately either.                  Rest/postural/action- denies  Stiffness/rigidity: denies, has some stiffness from orthopedic issues.   Slowness:  much improved.   Trouble walking:  he walks w a stooped posture. He has started shuffling, but less so than last time. Feels that can pick feet up better.   Freezing of gait:  FOG is also gone.   Balance problems:  yes.   Falls: no recent falls, and less tripping.   Changes in speech:  seems to be better.   Swallowing difficulties: , has to eat a lot of protein. But does better.   Abnormal postures:  R>L foot cramps, toes curl, more so at night. - much less so than prior.   Handwriting: maybe smaller, but now better. .   Activities of daily living (buttoning clothes, bathing, cutting food, etc):  independent,    Review of Non-Motor symptoms:  Cognition:  Memory-improved           Hallucinations-  denies    Mood:        Depression way better.                        Anxiety: denies  Sleep disturbances including REM behavior disorder:appears to be sleeping much better, and less dream enactment per his partner.   Sensory changes (ie, smell or taste):  limited ability of smelling but feels a little better now. Taste is not as good.   Cramps/pain:  yes.  Gastrointestinal complaints/Constipation:  he is on lactulose for his liver, so ok -  but  if he does not take it - he is constipated.   Urinary retention or frequency:  frequency, is on diuretics, urgency, not incontinence.   Positional lightheadedness and/or syncope: denies  Excessive  "saliva/drooling: denies  Fatigue:  improved.     Risk factors:  He says he has had maybe 100 concussions in his life. \"Saw stars\" hundred of times, was given smelling salts in field, and has had loss of consciousness at least over  dozen times.   Paternal grandfather - PD, In his 70s.         History of Seizures: has had several seizures. He has been hospitalized for a couple of them. He says that he was never given a reason for his seizures. He has a history of alcohol and opioid abuse   Last seizures where is 2017.  When fell before Xmas last year, - daughter noted that he was convulsing for 20 seconds.   Never been on AED's.     Medication History:  He has never been on dopamine blocking agents.  He has never been on medications for treatments of parkinsonism     Patient Active Problem List   Diagnosis   • Acute encephalopathy   • Transaminitis   • Thrombocytopenia (CMS-HCC)   • Secondary esophageal varices without bleeding (Multi)   • Portal hypertension with esophageal varices (Multi)   • Peripheral neuropathy   • Hypertension   • Hyperammonemia (Multi)   • Herniated lumbar intervertebral disc   • Helicobacter pylori ab+   • Cirrhosis of liver with ascites (Multi)   • Chronic traumatic encephalopathy   • Electrolyte abnormality   • Chronic pain   • Back pain   • Lactic acidosis   • Lesion of ulnar nerve   • Open wound of toe   • Right foot pain   • Arthritis of great toe at metatarsophalangeal joint   • Anemia due to chronic blood loss   • Anemia   • Alcoholic liver disease (Multi)   • Alcohol withdrawal delirium (Multi)   • Alcohol related seizure (Multi)   • Acute tonsillitis   • Lightheadedness   • Ascending aorta dilation (CMS-HCC)   • UGIB (upper gastrointestinal bleed)   • Primary bacterial peritonitis (Multi)   • Other ascites   • Memory changes   • Hepatic encephalopathy (Multi)   • High prostate specific antigen (PSA)   • Gastrointestinal hemorrhage with melena   • Acute kidney injury (CMS-HCC)   • " Edema of both lower extremities   • Coronary artery calcification of native artery   • Encounter for pre-transplant evaluation for liver transplant   • Anemia, unspecified type   • Decompensated hepatic cirrhosis (Multi)   • Inflammatory neuropathy (Multi)   • Pre-liver transplant, listed   • Parkinson's disease without dyskinesia or fluctuating manifestations (Multi)   • Hemisensory loss      Past Medical History:   Diagnosis Date   • Acute kidney injury (CMS-HCC)    • Anemia    • Ascites    • Chronic kidney disease    • Cirrhosis (Multi)    • Hepatitis C    • Liver disease    • Parkinson's disease (Multi)     dx 2/16/2024   • Seizure (Multi)    • Sleep apnea    • Traumatic brain injury (Multi)       Past Surgical History:   Procedure Laterality Date   • ESOPHAGOGASTRODUODENOSCOPY     • OTHER SURGICAL HISTORY      Multiple orthopedic surgeries   • UMBILICAL HERNIA REPAIR  2021      Social History     Socioeconomic History   • Marital status:      Spouse name: Not on file   • Number of children: Not on file   • Years of education: Not on file   • Highest education level: Not on file   Occupational History   • Not on file   Tobacco Use   • Smoking status: Never   • Smokeless tobacco: Never   Vaping Use   • Vaping status: Never Used   Substance and Sexual Activity   • Alcohol use: Not Currently   • Drug use: Not Currently   • Sexual activity: Not Currently   Other Topics Concern   • Not on file   Social History Narrative   • Not on file     Social Determinants of Health     Financial Resource Strain: High Risk (3/12/2024)    Overall Financial Resource Strain (CARDIA)    • Difficulty of Paying Living Expenses: Very hard   Food Insecurity: Not on file   Transportation Needs: No Transportation Needs (3/12/2024)    PRAPARE - Transportation    • Lack of Transportation (Medical): No    • Lack of Transportation (Non-Medical): No   Physical Activity: Not on file   Stress: Not on file   Social Connections: Not on file    Intimate Partner Violence: Not on file   Housing Stability: Low Risk  (3/12/2024)    Housing Stability Vital Sign    • Unable to Pay for Housing in the Last Year: No    • Number of Places Lived in the Last Year: 2    • Unstable Housing in the Last Year: No      No family history on file.           Current Outpatient Medications on File Prior to Visit   Medication Sig Dispense Refill   • carvedilol (Coreg) 6.25 mg tablet Take 1 tablet (6.25 mg) by mouth once daily at bedtime. 30 tablet 11   • Constulose 10 gram/15 mL oral solution Take 30 mL (20 g) by mouth 3 times a day. Titrate to achieve goal 2-3 bowel movements daily 8100 mL 3   • ferrous sulfate, 325 mg ferrous sulfate, tablet Take 1 tablet by mouth 2 times a day. 60 tablet 11   • furosemide (Lasix) 20 mg tablet Take 2 tablets (40 mg) by mouth once daily. 60 tablet 11   • pantoprazole (ProtoNix) 40 mg EC tablet TAKE 1 TABLET (40 MG) BY MOUTH ONCE DAILY IN THE MORNING. TAKE BEFORE MEALS. DO NOT CRUSH, CHEW, OR SPLIT. 90 tablet 1   • spironolactone (Aldactone) 50 mg tablet Take 1 tablet (50 mg) by mouth once daily. 30 tablet 11     No current facility-administered medications on file prior to visit.           Review of Systems  All other system have been reviewed and are negative for complaint.  Objective  There were no vitals filed for this visit.       MDS UPDRS 1st Score: Motor Examination  Is the patient on medication for treating the symptoms of Parkinson's Disease?: No  Is the patient on Levodopa?: No  Speech: 0  Facial Expression: 0  Rigidty Neck: 0  Rigidty RUE: 0  Rigidity - LUE: 0  Rigidity RLE: 0  Rigidity LLE: 0  Finger Tapping Right Hand: 1  Finger Tapping Left Hand: 1  Hand Movements- Right Hand: 0  Hand Movements- Left Hand: 1  Pronatiaon-Supination Movments - Right Hand: 0  Pronatiaon-Supination Movments Left Hand: 0  Toe Tapping Right Foot: 1  Toe Tapping - Left Foot: 2  Leg Agility - Right Le  Leg Agility - Left le  Arising from Chair:  0  Gait: 0  Freezing of Gait: 0  Postural Stability: 0  Posture: 0  Global Spontanteity of Movment ( Body Bradykinesia): 0  Postural Tremor - Right Hand: 0  Postural Tremor - Left hand: 0  Kinetic Tremor - Right hand: 0  Kinetic Tremor - Left hand: 0  Rest Tremor Amplitude - RUE: 0  Rest Tremor Amplitude - LUE: 0  Rest Tremor Amplitude - RLE: 0  Rest Tremor Amplitude - LLE: 0  Rest Tremor Amplitude - Lip/Jaw: 0  Constancy of Rest Tremor: 0  MDS UPDRS Total Score: 8  Were dyskinesias (chorea or dystonia) present during examination?: No          MRI brain 04/2024:   INDICATION:  Signs/Symptoms:parkinsonism as well as some hemisensory deficit..      COMPARISON:  None.      ACCESSION NUMBER(S):  [id number]      ORDERING CLINICIAN:  ASHLEY JONAS      TECHNIQUE:  Standard multiplanar multisequence MR imaging was performed through  the brain without intravenous contrast. Axial T2, FLAIR, DWI,  gradient echo T2 and sagittal and coronal T1 weighted images of brain  were acquired.      FINDINGS:  Parenchyma: There is no diffusion restriction abnormality to suggest  acute infarct.  No evidence of recent hemorrhage. There is no mass  effect or midline shift. Mild-to-moderate burden of small nonspecific  T2/FLAIR white matter hyperintensities throughout the bilateral  cerebral hemispheres, likely representing chronic small vessel  ischemic disease.      CSF Spaces: The ventricles, sulci and basal cisterns are within  normal limits for age with mild generalized brain atrophy. Basilar  cisterns are patent.      Extra-axial spaces: No extra-axial fluid collection.      Paranasal Sinuses: Visualized paranasal sinuses are clear.      Mastoids: Clear.      Orbits: Normal.      Calvarium: No suspicious osseous marrow signal.      IMPRESSION:  No acute infarct, recent hemorrhage, or intracranial mass effect.      Mild-to-moderate chronic small vessel ischemic disease with mild  generalized brain atrophy.      MACRO:  None              Hemoglobin A1C   Date Value Ref Range Status   02/23/2024 5.1 see below % Final     Estimated Average Glucose   Date Value Ref Range Status   02/23/2024 100 Not Established mg/dL Final   12/15/2021 94 mg/dL Final     Comment:     eAG: (Estimated average glucose) is a calculated value from HgbA1c and is   representative of the average blood glucose level in the last 2-3 month   period.     Thyroid Stimulating Hormone   Date Value Ref Range Status   05/06/2024 3.41 0.44 - 3.98 mIU/L Final     Folate, Serum   Date Value Ref Range Status   05/06/2024 15.0 >5.0 ng/mL Final       CT head and C-spine [facility] Jan 2024:    RESULT: MRI CERVICAL SPINE: Acute abnormality: None.     No trauma change.  Normal skull base/C1-C2 articulation.  Decreased disc   height and endplate changes with disc osteophyte and disc bulges at C5-6   and C6/7 along with facet and uncovertebral joint degeneration indicating   cervical spondylosis.  Remaining discs are normal.  Additional left   greater than right facet osteoarthritis and uncovertebral joint   degeneration indicating spondylosis.  Mild positional levoscoliosis   without vertebral anomaly.  Normal alignment, vertebral height, bone   density, central canal, thecal sac and cord caliber.  No   fracture/dislocation.  Normal tissues.     C2 -- 3: Left joint degeneration with severe foramina narrowing.  Patent   canal and right foramina.     C3 -- 4: Joint degeneration with severe left and mild right foramina   narrowing.  Patent canal.     C4 -- 5: Patent canal and foramina.     C5 -- 6: Disc/joint degeneration.  Mild canal narrowing.  Severe left &   moderate right foramina narrowing.     C6 -- 7: Disc/joint degeneration.  Moderate canal narrowing.  Severe   bilateral foramina narrowing.     C7 -- T1: Patent canal and foramina.     HEAD CT: Acute abnormality: None.     No trauma change.  No acute intracranial disease.  Age expected sulci,   gyri, ventricles, CSF spaces and brain.  No acute  infarct/hemorrhage,   mass effect or collections.  Normal bones & skull base.     Record review:  [facility] w Dr Barbour in 2018; 25/30  [facility] at [facility] in 2/24 was 24/30    Assessment/Plan    Vance Silva is a 58 y.o. year old male here for for follow up  of possible Parkinsonism. He has a complex medical history including decompensated liver cirrhosis with several recent hospitalizations due to complications for same, but he is thankfully doing much better.  In addition he has noted about a 2 year history of generalized slowness of movement and stiffness, more so on the R hemibody, as well as a resting tremor noted in the UE and chin as per report by his partner, although this was not observed on my clinical exam at the initial encounter. His examination shows mild R sided bradykinesia, no clear increased tone, although RUE exam is limited by his reduced ROM of elbow. He has a slight R leg drag when walking, is wide based, and has significant peripheral neuropathy on his examination.  At this point he does not meet clinical criteria for parkinsonism - his bradykinesia is clearly improved from the last visit, maybe he was performing poorly due to his general medical issues at that time. It would make sense to do a Rica scan at this time and we discussed this plan in detail and he is in agreement with same. All questions were answered.

## 2024-09-09 RX ORDER — FERROUS SULFATE 325(65) MG
1 TABLET ORAL 2 TIMES DAILY
Qty: 180 TABLET | Refills: 3 | Status: SHIPPED | OUTPATIENT
Start: 2024-09-09

## 2024-09-13 DIAGNOSIS — I71.21 ANEURYSM OF ASCENDING AORTA WITHOUT RUPTURE (CMS-HCC): ICD-10-CM

## 2024-09-20 ENCOUNTER — LAB (OUTPATIENT)
Dept: LAB | Facility: LAB | Age: 59
End: 2024-09-20
Payer: COMMERCIAL

## 2024-09-20 DIAGNOSIS — R18.8 CIRRHOSIS OF LIVER WITH ASCITES, UNSPECIFIED HEPATIC CIRRHOSIS TYPE (MULTI): ICD-10-CM

## 2024-09-20 DIAGNOSIS — K74.60 CIRRHOSIS OF LIVER WITH ASCITES, UNSPECIFIED HEPATIC CIRRHOSIS TYPE (MULTI): ICD-10-CM

## 2024-09-20 DIAGNOSIS — Z76.82 PRE-LIVER TRANSPLANT, LISTED: Primary | ICD-10-CM

## 2024-09-20 DIAGNOSIS — D50.0 IRON DEFICIENCY ANEMIA SECONDARY TO BLOOD LOSS (CHRONIC): ICD-10-CM

## 2024-09-20 DIAGNOSIS — Z76.82 PRE-LIVER TRANSPLANT, LISTED: ICD-10-CM

## 2024-09-20 LAB
AFP SERPL-MCNC: <4 NG/ML (ref 0–9)
ALBUMIN SERPL BCP-MCNC: 4.2 G/DL (ref 3.4–5)
ALP SERPL-CCNC: 73 U/L (ref 33–136)
ALT SERPL W P-5'-P-CCNC: 34 U/L (ref 10–52)
ANION GAP SERPL CALC-SCNC: 13 MMOL/L (ref 10–20)
AST SERPL W P-5'-P-CCNC: 27 U/L (ref 9–39)
BASOPHILS # BLD AUTO: 0.02 X10*3/UL (ref 0–0.1)
BASOPHILS NFR BLD AUTO: 0.6 %
BILIRUB SERPL-MCNC: 0.6 MG/DL (ref 0–1.2)
BUN SERPL-MCNC: 16 MG/DL (ref 6–23)
CALCIUM SERPL-MCNC: 8.7 MG/DL (ref 8.6–10.3)
CHLORIDE SERPL-SCNC: 105 MMOL/L (ref 98–107)
CO2 SERPL-SCNC: 25 MMOL/L (ref 21–32)
CREAT SERPL-MCNC: 1.23 MG/DL (ref 0.5–1.3)
EGFRCR SERPLBLD CKD-EPI 2021: 67 ML/MIN/1.73M*2
EOSINOPHIL # BLD AUTO: 0.03 X10*3/UL (ref 0–0.7)
EOSINOPHIL NFR BLD AUTO: 0.8 %
ERYTHROCYTE [DISTWIDTH] IN BLOOD BY AUTOMATED COUNT: 14.3 % (ref 11.5–14.5)
GLUCOSE SERPL-MCNC: 88 MG/DL (ref 74–99)
HCT VFR BLD AUTO: 37.4 % (ref 41–52)
HGB BLD-MCNC: 12.6 G/DL (ref 13.5–17.5)
IMM GRANULOCYTES # BLD AUTO: 0.01 X10*3/UL (ref 0–0.7)
IMM GRANULOCYTES NFR BLD AUTO: 0.3 % (ref 0–0.9)
INR PPP: 1.2 (ref 0.9–1.1)
LYMPHOCYTES # BLD AUTO: 0.46 X10*3/UL (ref 1.2–4.8)
LYMPHOCYTES NFR BLD AUTO: 12.9 %
MCH RBC QN AUTO: 30.4 PG (ref 26–34)
MCHC RBC AUTO-ENTMCNC: 33.7 G/DL (ref 32–36)
MCV RBC AUTO: 90 FL (ref 80–100)
MONOCYTES # BLD AUTO: 0.31 X10*3/UL (ref 0.1–1)
MONOCYTES NFR BLD AUTO: 8.7 %
NEUTROPHILS # BLD AUTO: 2.73 X10*3/UL (ref 1.2–7.7)
NEUTROPHILS NFR BLD AUTO: 76.7 %
NRBC BLD-RTO: 0 /100 WBCS (ref 0–0)
PLATELET # BLD AUTO: 42 X10*3/UL (ref 150–450)
POTASSIUM SERPL-SCNC: 4.5 MMOL/L (ref 3.5–5.3)
PREALB SERPL-MCNC: 25.2 MG/DL (ref 18–40)
PROT SERPL-MCNC: 6.8 G/DL (ref 6.4–8.2)
PROTHROMBIN TIME: 13 SECONDS (ref 9.8–12.8)
RBC # BLD AUTO: 4.15 X10*6/UL (ref 4.5–5.9)
SODIUM SERPL-SCNC: 138 MMOL/L (ref 136–145)
WBC # BLD AUTO: 3.6 X10*3/UL (ref 4.4–11.3)

## 2024-09-20 PROCEDURE — 84134 ASSAY OF PREALBUMIN: CPT

## 2024-09-20 PROCEDURE — 85610 PROTHROMBIN TIME: CPT

## 2024-09-20 PROCEDURE — 82105 ALPHA-FETOPROTEIN SERUM: CPT

## 2024-09-20 PROCEDURE — 80053 COMPREHEN METABOLIC PANEL: CPT

## 2024-09-20 PROCEDURE — 85025 COMPLETE CBC W/AUTO DIFF WBC: CPT

## 2024-09-23 ENCOUNTER — OFFICE VISIT (OUTPATIENT)
Dept: TRANSPLANT | Facility: HOSPITAL | Age: 59
End: 2024-09-23
Payer: COMMERCIAL

## 2024-09-23 VITALS
TEMPERATURE: 97.8 F | WEIGHT: 212.8 LBS | OXYGEN SATURATION: 98 % | DIASTOLIC BLOOD PRESSURE: 75 MMHG | BODY MASS INDEX: 25.23 KG/M2 | SYSTOLIC BLOOD PRESSURE: 118 MMHG | HEART RATE: 60 BPM

## 2024-09-23 DIAGNOSIS — M25.621 JOINT STIFFNESS OF ELBOW, RIGHT: ICD-10-CM

## 2024-09-23 DIAGNOSIS — M25.529 PAIN OF ELBOW ON MOVEMENT: ICD-10-CM

## 2024-09-23 DIAGNOSIS — L81.4 SKIN SPOTS-AGING: ICD-10-CM

## 2024-09-23 DIAGNOSIS — Z76.82 PRE-LIVER TRANSPLANT, LISTED: Primary | ICD-10-CM

## 2024-09-23 DIAGNOSIS — R25.2 CRAMPING OF HANDS: ICD-10-CM

## 2024-09-23 PROCEDURE — 99214 OFFICE O/P EST MOD 30 MIN: CPT | Performed by: INTERNAL MEDICINE

## 2024-09-23 ASSESSMENT — PAIN SCALES - GENERAL: PAINLEVEL: 0-NO PAIN

## 2024-09-23 NOTE — PROGRESS NOTES
"Subjective   Patient ID: Vance Silva \"Christine" is a 58 y.o. male who presents for No chief complaint on file..  HPI Doing really well.   Eating well.   Taking protein supplements.   No ascites.   Anemia much better.    Also taking green coffee.    MELd only 10.    Mild creatinine elevation.   Discomfort rlq at times possibly small hernia.    Still taking low dose diuretics and lactulose with low dose coreg.      Local neurologist non-committal on diagnosis of parkinsons.        Review of Systems  Right elbow pain with some cramping.   Should see PT and OT rather than surgery at this point.      Right inguinal pain with standing.   Likely small reducible hernia.      Objective   Physical Exam  Physical Exam  Constitutional:       Appearance: Normal appearance. Improved overall muscle mass.    Eyes:      General: No scleral icterus.  Cardiovascular:      Rate and Rhythm: Normal rate and regular rhythm.      Heart sounds: No murmur heard.     No gallop.   Pulmonary:      Effort: Pulmonary effort is normal.      Breath sounds: Mildly decreased  breath sounds at bases.     Abdominal:      General: Abdomen is flat. Bowel sounds are normal. There is no distension.      Palpations: Abdomen is soft. There is no fluid wave, hepatomegaly or splenomegaly.   Small inguinal hernia easy to reduce on the right.        Tenderness: There is no abdominal tenderness.   Musculoskeletal:      Cervical back: Normal range of motion. No tenderness.      Right lower leg: No edema.      Left lower leg: No edema.   Lymphadenopathy:      Cervical: No cervical adenopathy.   Skin:     Coloration: Skin is not jaundiced.   Neurological:      General: No focal deficit present.      Mental Status: She is alert.       Assessment/Plan     Doing very well.  MELD score 10.   Scans still show major cirrhotic changes without masses.  Good candidate for liver transplant.  Due for ultrasound.   AFP,4.    Can see PT OT for liver disease and mobility and " right elbow pain.    Continue current protein and coffee supplements as they may be helping him.  Continue current diuretics, iron,  lactulose and carvedolol.   Follow up with neurology.   Continue to exercise.   Follow up here in 3 months.           Mu Norris MD 09/23/24 11:47 AM

## 2024-11-12 ENCOUNTER — LAB (OUTPATIENT)
Dept: LAB | Facility: LAB | Age: 59
End: 2024-11-12
Payer: COMMERCIAL

## 2024-11-12 ENCOUNTER — TELEPHONE (OUTPATIENT)
Dept: TRANSPLANT | Facility: HOSPITAL | Age: 59
End: 2024-11-12

## 2024-11-12 DIAGNOSIS — K74.60 CIRRHOSIS OF LIVER WITH ASCITES, UNSPECIFIED HEPATIC CIRRHOSIS TYPE (MULTI): ICD-10-CM

## 2024-11-12 DIAGNOSIS — I85.00 PORTAL HYPERTENSION WITH ESOPHAGEAL VARICES (MULTI): ICD-10-CM

## 2024-11-12 DIAGNOSIS — K92.1 GASTROINTESTINAL HEMORRHAGE WITH MELENA: ICD-10-CM

## 2024-11-12 DIAGNOSIS — Z76.82 PRE-LIVER TRANSPLANT, LISTED: ICD-10-CM

## 2024-11-12 DIAGNOSIS — R18.8 CIRRHOSIS OF LIVER WITH ASCITES, UNSPECIFIED HEPATIC CIRRHOSIS TYPE (MULTI): ICD-10-CM

## 2024-11-12 DIAGNOSIS — K76.6 PORTAL HYPERTENSION WITH ESOPHAGEAL VARICES (MULTI): ICD-10-CM

## 2024-11-12 DIAGNOSIS — Z76.82 PRE-LIVER TRANSPLANT, LISTED: Primary | ICD-10-CM

## 2024-11-12 LAB
ALBUMIN SERPL BCP-MCNC: 4.7 G/DL (ref 3.4–5)
ALP SERPL-CCNC: 76 U/L (ref 33–136)
ALT SERPL W P-5'-P-CCNC: 42 U/L (ref 10–52)
ANION GAP SERPL CALC-SCNC: 10 MMOL/L (ref 10–20)
AST SERPL W P-5'-P-CCNC: 33 U/L (ref 9–39)
BASOPHILS # BLD AUTO: 0.01 X10*3/UL (ref 0–0.1)
BASOPHILS NFR BLD AUTO: 0.3 %
BILIRUB SERPL-MCNC: 1.1 MG/DL (ref 0–1.2)
BUN SERPL-MCNC: 13 MG/DL (ref 6–23)
CALCIUM SERPL-MCNC: 9.2 MG/DL (ref 8.6–10.3)
CHLORIDE SERPL-SCNC: 109 MMOL/L (ref 98–107)
CO2 SERPL-SCNC: 27 MMOL/L (ref 21–32)
CREAT SERPL-MCNC: 1 MG/DL (ref 0.5–1.3)
EGFRCR SERPLBLD CKD-EPI 2021: 86 ML/MIN/1.73M*2
EOSINOPHIL # BLD AUTO: 0.02 X10*3/UL (ref 0–0.7)
EOSINOPHIL NFR BLD AUTO: 0.6 %
ERYTHROCYTE [DISTWIDTH] IN BLOOD BY AUTOMATED COUNT: 14.4 % (ref 11.5–14.5)
GLUCOSE SERPL-MCNC: 90 MG/DL (ref 74–99)
HCT VFR BLD AUTO: 41.2 % (ref 41–52)
HGB BLD-MCNC: 13.9 G/DL (ref 13.5–17.5)
IMM GRANULOCYTES # BLD AUTO: 0.01 X10*3/UL (ref 0–0.7)
IMM GRANULOCYTES NFR BLD AUTO: 0.3 % (ref 0–0.9)
INR PPP: 1.1 (ref 0.9–1.1)
LYMPHOCYTES # BLD AUTO: 0.48 X10*3/UL (ref 1.2–4.8)
LYMPHOCYTES NFR BLD AUTO: 13.5 %
MCH RBC QN AUTO: 29.8 PG (ref 26–34)
MCHC RBC AUTO-ENTMCNC: 33.7 G/DL (ref 32–36)
MCV RBC AUTO: 88 FL (ref 80–100)
MONOCYTES # BLD AUTO: 0.27 X10*3/UL (ref 0.1–1)
MONOCYTES NFR BLD AUTO: 7.6 %
NEUTROPHILS # BLD AUTO: 2.77 X10*3/UL (ref 1.2–7.7)
NEUTROPHILS NFR BLD AUTO: 77.7 %
NRBC BLD-RTO: 0 /100 WBCS (ref 0–0)
PLATELET # BLD AUTO: 37 X10*3/UL (ref 150–450)
POTASSIUM SERPL-SCNC: 4.2 MMOL/L (ref 3.5–5.3)
PROT SERPL-MCNC: 7.2 G/DL (ref 6.4–8.2)
PROTHROMBIN TIME: 12.9 SECONDS (ref 9.8–12.8)
RBC # BLD AUTO: 4.67 X10*6/UL (ref 4.5–5.9)
SODIUM SERPL-SCNC: 142 MMOL/L (ref 136–145)
WBC # BLD AUTO: 3.6 X10*3/UL (ref 4.4–11.3)

## 2024-11-13 ENCOUNTER — DOCUMENTATION (OUTPATIENT)
Dept: TRANSPLANT | Facility: HOSPITAL | Age: 59
End: 2024-11-13
Payer: COMMERCIAL

## 2024-11-27 DIAGNOSIS — K21.00 GASTROESOPHAGEAL REFLUX DISEASE WITH ESOPHAGITIS WITHOUT HEMORRHAGE: ICD-10-CM

## 2024-12-04 RX ORDER — PANTOPRAZOLE SODIUM 40 MG/1
40 TABLET, DELAYED RELEASE ORAL
Qty: 90 TABLET | Refills: 1 | Status: SHIPPED | OUTPATIENT
Start: 2024-12-04

## 2024-12-10 ENCOUNTER — LAB (OUTPATIENT)
Dept: LAB | Facility: LAB | Age: 59
End: 2024-12-10
Payer: COMMERCIAL

## 2024-12-10 ENCOUNTER — TELEPHONE (OUTPATIENT)
Dept: TRANSPLANT | Facility: HOSPITAL | Age: 59
End: 2024-12-10

## 2024-12-10 DIAGNOSIS — Z76.82 PRE-LIVER TRANSPLANT, LISTED: Primary | ICD-10-CM

## 2024-12-10 DIAGNOSIS — R18.8 CIRRHOSIS OF LIVER WITH ASCITES, UNSPECIFIED HEPATIC CIRRHOSIS TYPE (MULTI): ICD-10-CM

## 2024-12-10 DIAGNOSIS — K76.6 PORTAL HYPERTENSION WITH ESOPHAGEAL VARICES (MULTI): ICD-10-CM

## 2024-12-10 DIAGNOSIS — R21 RASH: ICD-10-CM

## 2024-12-10 DIAGNOSIS — Z76.82 PRE-LIVER TRANSPLANT, LISTED: ICD-10-CM

## 2024-12-10 DIAGNOSIS — K74.60 CIRRHOSIS OF LIVER WITH ASCITES, UNSPECIFIED HEPATIC CIRRHOSIS TYPE (MULTI): ICD-10-CM

## 2024-12-10 DIAGNOSIS — I85.00 PORTAL HYPERTENSION WITH ESOPHAGEAL VARICES (MULTI): ICD-10-CM

## 2024-12-10 DIAGNOSIS — K92.1 GASTROINTESTINAL HEMORRHAGE WITH MELENA: ICD-10-CM

## 2024-12-10 LAB
ALBUMIN SERPL BCP-MCNC: 4.4 G/DL (ref 3.4–5)
ALP SERPL-CCNC: 87 U/L (ref 33–136)
ALT SERPL W P-5'-P-CCNC: 36 U/L (ref 10–52)
ANION GAP SERPL CALC-SCNC: 12 MMOL/L (ref 10–20)
AST SERPL W P-5'-P-CCNC: 29 U/L (ref 9–39)
BASOPHILS # BLD AUTO: 0.02 X10*3/UL (ref 0–0.1)
BASOPHILS NFR BLD AUTO: 0.5 %
BILIRUB SERPL-MCNC: 0.7 MG/DL (ref 0–1.2)
BUN SERPL-MCNC: 17 MG/DL (ref 6–23)
CALCIUM SERPL-MCNC: 8.8 MG/DL (ref 8.6–10.3)
CHLORIDE SERPL-SCNC: 107 MMOL/L (ref 98–107)
CO2 SERPL-SCNC: 23 MMOL/L (ref 21–32)
CREAT SERPL-MCNC: 0.99 MG/DL (ref 0.5–1.3)
EGFRCR SERPLBLD CKD-EPI 2021: 87 ML/MIN/1.73M*2
EOSINOPHIL # BLD AUTO: 0.03 X10*3/UL (ref 0–0.7)
EOSINOPHIL NFR BLD AUTO: 0.7 %
ERYTHROCYTE [DISTWIDTH] IN BLOOD BY AUTOMATED COUNT: 14.5 % (ref 11.5–14.5)
GLUCOSE SERPL-MCNC: 84 MG/DL (ref 74–99)
HCT VFR BLD AUTO: 42.4 % (ref 41–52)
HGB BLD-MCNC: 13.9 G/DL (ref 13.5–17.5)
IMM GRANULOCYTES # BLD AUTO: 0.01 X10*3/UL (ref 0–0.7)
IMM GRANULOCYTES NFR BLD AUTO: 0.2 % (ref 0–0.9)
INR PPP: 1.2 (ref 0.9–1.1)
LYMPHOCYTES # BLD AUTO: 0.47 X10*3/UL (ref 1.2–4.8)
LYMPHOCYTES NFR BLD AUTO: 11 %
MCH RBC QN AUTO: 29.6 PG (ref 26–34)
MCHC RBC AUTO-ENTMCNC: 32.8 G/DL (ref 32–36)
MCV RBC AUTO: 90 FL (ref 80–100)
MONOCYTES # BLD AUTO: 0.28 X10*3/UL (ref 0.1–1)
MONOCYTES NFR BLD AUTO: 6.6 %
NEUTROPHILS # BLD AUTO: 3.45 X10*3/UL (ref 1.2–7.7)
NEUTROPHILS NFR BLD AUTO: 81 %
NRBC BLD-RTO: 0 /100 WBCS (ref 0–0)
PLATELET # BLD AUTO: 42 X10*3/UL (ref 150–450)
POTASSIUM SERPL-SCNC: 3.9 MMOL/L (ref 3.5–5.3)
PROT SERPL-MCNC: 7.4 G/DL (ref 6.4–8.2)
PROTHROMBIN TIME: 13 SECONDS (ref 9.8–12.8)
RBC # BLD AUTO: 4.69 X10*6/UL (ref 4.5–5.9)
SODIUM SERPL-SCNC: 138 MMOL/L (ref 136–145)
WBC # BLD AUTO: 4.3 X10*3/UL (ref 4.4–11.3)

## 2024-12-10 PROCEDURE — 85025 COMPLETE CBC W/AUTO DIFF WBC: CPT

## 2024-12-10 PROCEDURE — 80053 COMPREHEN METABOLIC PANEL: CPT

## 2024-12-10 PROCEDURE — 85610 PROTHROMBIN TIME: CPT

## 2024-12-10 PROCEDURE — 36415 COLL VENOUS BLD VENIPUNCTURE: CPT

## 2024-12-10 NOTE — TELEPHONE ENCOUNTER
Spoke with patient to follow up on concern form rash/bleeding. Patient denies sob, n/v/d, fever, abdominal pain, dizziness - states I feel good other than this new development. Asked if he'd been scratching/itching and does not feel like he's been scratching. Dr. Norris notified - awaiting response. Patient currently at [facility] getting labs drawn.

## 2024-12-10 NOTE — TELEPHONE ENCOUNTER
Patient is calling asking about getting labs today.  Patient has marks of red color about 3-4 inches below his chest and 3-4 inches in length.  Please advise.

## 2024-12-10 NOTE — TELEPHONE ENCOUNTER
Spoke with hepatologist on service, Dr. Jacobson, regarding patient's concerns and reviewed images sent to coordinator. Patient notified that Dr. Jacobson advised awaiting lab results for further instruction or going to [facility] ED for immediate assessment. Patient states he already left [facility] so will just wait on results. Coordinator instructed patient to go to local ED if redness spreads, fever or pain develops. Patient verbalized understanding and agreement with plan.

## 2024-12-11 ENCOUNTER — TELEPHONE (OUTPATIENT)
Dept: TRANSPLANT | Facility: HOSPITAL | Age: 59
End: 2024-12-11
Payer: COMMERCIAL

## 2024-12-11 NOTE — TELEPHONE ENCOUNTER
Spoke with Dr. Norris who, after further review of imaging, would like patient to be evaluated for possible Shingles. VM left for patient requesting call back to schedule stat Derm appointment today/tomorrow, or advised patient to go to local urgent care so that he can be assessed. [place] also left vm for patient and patient's s/o requesting call back to schedule stat derm appointment.

## 2025-01-06 ENCOUNTER — OFFICE VISIT (OUTPATIENT)
Facility: HOSPITAL | Age: 60
End: 2025-01-06
Payer: COMMERCIAL

## 2025-01-06 VITALS
DIASTOLIC BLOOD PRESSURE: 85 MMHG | SYSTOLIC BLOOD PRESSURE: 125 MMHG | BODY MASS INDEX: 25.58 KG/M2 | OXYGEN SATURATION: 98 % | WEIGHT: 215.7 LBS | HEART RATE: 61 BPM | TEMPERATURE: 97.3 F

## 2025-01-06 DIAGNOSIS — Z76.82 PRE-LIVER TRANSPLANT, LISTED: ICD-10-CM

## 2025-01-06 PROCEDURE — 99214 OFFICE O/P EST MOD 30 MIN: CPT | Performed by: INTERNAL MEDICINE

## 2025-01-06 PROCEDURE — 3079F DIAST BP 80-89 MM HG: CPT | Performed by: INTERNAL MEDICINE

## 2025-01-06 PROCEDURE — 3074F SYST BP LT 130 MM HG: CPT | Performed by: INTERNAL MEDICINE

## 2025-01-06 ASSESSMENT — PAIN SCALES - GENERAL: PAINLEVEL_OUTOF10: 0-NO PAIN

## 2025-01-06 NOTE — PROGRESS NOTES
"Subjective   Patient ID: Vance Silva \"Christine" is a 59 y.o. male who presents for evaluation of liver on the liver transplant list.   .  HPIDoing very well.   Exercising getting plenty of protein in the diet.   No ascites or edema.   Rash has resolved.   Was not painful.   On carvedilol for variceal bleeding prophylaxis.    Still taking lasix 40 and aldactone 50 a day for fluid retention.    Iron supplements for PHG bleeding which is stable.    Mentally much clearer.   Able to walk and stand upright better.   Some improvement in muscle mass.       Review of Systems  No report of nausea vomiting or diarrhea.       Objective   Physical Exam  Physical Exam  Constitutional:       Appearance: Normal appearance.   Eyes:      General: No scleral icterus.  Cardiovascular:      Rate and Rhythm: Normal rate and regular rhythm.      Heart sounds: No murmur heard.     No gallop.   Pulmonary:      Effort: Pulmonary effort is normal.      Breath sounds: Normal breath sounds.   Abdominal:      General: Abdomen is flat. Bowel sounds are normal. There is no distension.      Palpations: Abdomen is soft. There is no fluid wave, hepatomegaly.  There is splenomegaly.      Tenderness: There is no abdominal tenderness.   Musculoskeletal:      Cervical back: Normal range of motion. No tenderness.      Right lower leg: No edema.      Left lower leg: No edema.   Lymphadenopathy:      Cervical: No cervical adenopathy.   Skin:     Coloration: Skin is not jaundiced.   Neurological:      General: No focal deficit present.      Mental Status: He is alert.       Assessment/Plan     Doing very well.    Continue current medications and supplements.   Would not do supplement infusions prior to or  after liver transplant.    Continue  grams of protein a day and light weight exercises.   Follow up in 3 months to discuss prognosis.   Current MELD only 8.            Mu Norris MD 01/06/25 11:58 AM   "

## 2025-01-06 NOTE — PATIENT INSTRUCTIONS
Protein consumption should be 60 to 80 g per day  Need repeat Liver US and Cardiac ECHO to update testing.  RTC:  3 months

## 2025-01-29 ENCOUNTER — TELEPHONE (OUTPATIENT)
Facility: HOSPITAL | Age: 60
End: 2025-01-29
Payer: COMMERCIAL

## 2025-01-29 ENCOUNTER — TELEPHONE (OUTPATIENT)
Facility: HOSPITAL | Age: 60
End: 2025-01-29

## 2025-01-29 NOTE — TELEPHONE ENCOUNTER
Call from Aleena requesting a prescription medication (Levicyn Gel) for patient's skin irritation/rash. Per Aleena patient will not go to doctor because of his current work schedule. Coordinator notified Aleena that this is not a liver related issue and therefore Dr. Norris may not prescribe the medication. Coordinator advised that patient be evaluated by derm due to recent skin issues and reminded Aleena that a referral was placed back in December and an appointment was made but patient canceled. Also reviewed need for echo and DUS. Angela will reach out to schedule.

## 2025-02-03 ENCOUNTER — APPOINTMENT (OUTPATIENT)
Dept: DERMATOLOGY | Facility: CLINIC | Age: 60
End: 2025-02-03
Payer: COMMERCIAL

## 2025-02-06 DIAGNOSIS — I85.00 PORTAL HYPERTENSION WITH ESOPHAGEAL VARICES (MULTI): ICD-10-CM

## 2025-02-06 DIAGNOSIS — K76.6 PORTAL HYPERTENSION WITH ESOPHAGEAL VARICES (MULTI): ICD-10-CM

## 2025-02-26 ENCOUNTER — APPOINTMENT (OUTPATIENT)
Dept: DERMATOLOGY | Facility: CLINIC | Age: 60
End: 2025-02-26
Payer: COMMERCIAL

## 2025-03-03 ENCOUNTER — TELEPHONE (OUTPATIENT)
Facility: HOSPITAL | Age: 60
End: 2025-03-03
Payer: COMMERCIAL

## 2025-03-03 NOTE — TELEPHONE ENCOUNTER
Called Aleena and Karyn was with her. They're fine with the testing at [facility] and coming to [facility] for the surgeon visit Wednesday

## 2025-03-04 ENCOUNTER — TELEPHONE (OUTPATIENT)
Facility: HOSPITAL | Age: 60
End: 2025-03-04
Payer: COMMERCIAL

## 2025-03-04 DIAGNOSIS — K74.60 CIRRHOSIS OF LIVER WITH ASCITES, UNSPECIFIED HEPATIC CIRRHOSIS TYPE (MULTI): ICD-10-CM

## 2025-03-04 DIAGNOSIS — Z76.82 PRE-LIVER TRANSPLANT, LISTED: Primary | ICD-10-CM

## 2025-03-04 DIAGNOSIS — R18.8 CIRRHOSIS OF LIVER WITH ASCITES, UNSPECIFIED HEPATIC CIRRHOSIS TYPE (MULTI): ICD-10-CM

## 2025-03-04 NOTE — TELEPHONE ENCOUNTER
Spoke to Karyn and reviewed plan for tomorrow's surgeon visit. Patient will come to [facility] immediately after he completes his Ct chest at [facility].

## 2025-03-05 ENCOUNTER — HOSPITAL ENCOUNTER (OUTPATIENT)
Dept: RADIOLOGY | Facility: CLINIC | Age: 60
Discharge: HOME | End: 2025-03-05
Payer: COMMERCIAL

## 2025-03-05 ENCOUNTER — TELEPHONE (OUTPATIENT)
Facility: HOSPITAL | Age: 60
End: 2025-03-05
Payer: COMMERCIAL

## 2025-03-05 ENCOUNTER — APPOINTMENT (OUTPATIENT)
Facility: HOSPITAL | Age: 60
End: 2025-03-05
Payer: COMMERCIAL

## 2025-03-05 ENCOUNTER — OFFICE VISIT (OUTPATIENT)
Facility: HOSPITAL | Age: 60
End: 2025-03-05
Payer: COMMERCIAL

## 2025-03-05 ENCOUNTER — TELEPHONE (OUTPATIENT)
Facility: HOSPITAL | Age: 60
End: 2025-03-05

## 2025-03-05 ENCOUNTER — NURSE ONLY (OUTPATIENT)
Facility: HOSPITAL | Age: 60
End: 2025-03-05
Payer: COMMERCIAL

## 2025-03-05 VITALS
SYSTOLIC BLOOD PRESSURE: 125 MMHG | TEMPERATURE: 96.7 F | OXYGEN SATURATION: 98 % | HEART RATE: 53 BPM | DIASTOLIC BLOOD PRESSURE: 81 MMHG

## 2025-03-05 DIAGNOSIS — K40.90 UNILATERAL INGUINAL HERNIA WITHOUT OBSTRUCTION OR GANGRENE, RECURRENCE NOT SPECIFIED: ICD-10-CM

## 2025-03-05 DIAGNOSIS — Z76.82 PRE-LIVER TRANSPLANT, LISTED: ICD-10-CM

## 2025-03-05 DIAGNOSIS — Z76.82 PRE-LIVER TRANSPLANT, LISTED: Primary | ICD-10-CM

## 2025-03-05 DIAGNOSIS — Z01.818 ENCOUNTER FOR PRE-TRANSPLANT EVALUATION FOR LIVER TRANSPLANT: ICD-10-CM

## 2025-03-05 DIAGNOSIS — R76.0 ABNORMAL ANTIBODY TITER: ICD-10-CM

## 2025-03-05 DIAGNOSIS — K72.10 END STAGE LIVER DISEASE (MULTI): ICD-10-CM

## 2025-03-05 DIAGNOSIS — I71.21 ANEURYSM OF ASCENDING AORTA WITHOUT RUPTURE (CMS-HCC): ICD-10-CM

## 2025-03-05 DIAGNOSIS — K74.60 CIRRHOSIS OF LIVER WITH ASCITES, UNSPECIFIED HEPATIC CIRRHOSIS TYPE (MULTI): ICD-10-CM

## 2025-03-05 DIAGNOSIS — R18.8 CIRRHOSIS OF LIVER WITH ASCITES, UNSPECIFIED HEPATIC CIRRHOSIS TYPE (MULTI): ICD-10-CM

## 2025-03-05 LAB
ALBUMIN SERPL BCP-MCNC: 4.7 G/DL (ref 3.4–5)
ALP SERPL-CCNC: 87 U/L (ref 33–136)
ALT SERPL W P-5'-P-CCNC: 38 U/L (ref 10–52)
ANION GAP SERPL CALC-SCNC: 11 MMOL/L (ref 10–20)
ANTIBODY SCREEN: NORMAL
AST SERPL W P-5'-P-CCNC: 29 U/L (ref 9–39)
BASOPHILS # BLD MANUAL: 0.03 X10*3/UL (ref 0–0.1)
BASOPHILS NFR BLD MANUAL: 0.9 %
BILIRUB SERPL-MCNC: 0.8 MG/DL (ref 0–1.2)
BUN SERPL-MCNC: 16 MG/DL (ref 6–23)
CALCIUM SERPL-MCNC: 9.4 MG/DL (ref 8.6–10.6)
CHLORIDE SERPL-SCNC: 108 MMOL/L (ref 98–107)
CO2 SERPL-SCNC: 25 MMOL/L (ref 21–32)
CREAT SERPL-MCNC: 1.05 MG/DL (ref 0.5–1.3)
EGFRCR SERPLBLD CKD-EPI 2021: 81 ML/MIN/1.73M*2
EOSINOPHIL # BLD MANUAL: 0 X10*3/UL (ref 0–0.7)
EOSINOPHIL NFR BLD MANUAL: 0 %
ERYTHROCYTE [DISTWIDTH] IN BLOOD BY AUTOMATED COUNT: 14.9 % (ref 11.5–14.5)
EST. AVERAGE GLUCOSE BLD GHB EST-MCNC: 91 MG/DL
GLUCOSE SERPL-MCNC: 95 MG/DL (ref 74–99)
HBA1C MFR BLD: 4.8 %
HCT VFR BLD AUTO: 41.5 % (ref 41–52)
HGB BLD-MCNC: 14 G/DL (ref 13.5–17.5)
IMM GRANULOCYTES # BLD AUTO: 0.01 X10*3/UL (ref 0–0.7)
IMM GRANULOCYTES NFR BLD AUTO: 0.3 % (ref 0–0.9)
INR PPP: 1.1 (ref 0.9–1.1)
LYMPHOCYTES # BLD MANUAL: 0.51 X10*3/UL (ref 1.2–4.8)
LYMPHOCYTES NFR BLD MANUAL: 13.9 %
MCH RBC QN AUTO: 30.2 PG (ref 26–34)
MCHC RBC AUTO-ENTMCNC: 33.7 G/DL (ref 32–36)
MCV RBC AUTO: 89 FL (ref 80–100)
MONOCYTES # BLD MANUAL: 0.06 X10*3/UL (ref 0.1–1)
MONOCYTES NFR BLD MANUAL: 1.7 %
NEUTS SEG # BLD MANUAL: 3.09 X10*3/UL (ref 1.2–7)
NEUTS SEG NFR BLD MANUAL: 83.5 %
NRBC BLD-RTO: 0 /100 WBCS (ref 0–0)
PLATELET # BLD AUTO: 38 X10*3/UL (ref 150–450)
POTASSIUM SERPL-SCNC: 4 MMOL/L (ref 3.5–5.3)
PROT SERPL-MCNC: 7.7 G/DL (ref 6.4–8.2)
PROTHROMBIN TIME: 12.2 SECONDS (ref 9.8–12.4)
RBC # BLD AUTO: 4.64 X10*6/UL (ref 4.5–5.9)
RBC MORPH BLD: ABNORMAL
SODIUM SERPL-SCNC: 140 MMOL/L (ref 136–145)
TOTAL CELLS COUNTED BLD: 115
WBC # BLD AUTO: 3.7 X10*3/UL (ref 4.4–11.3)

## 2025-03-05 PROCEDURE — 86901 BLOOD TYPING SEROLOGIC RH(D): CPT | Performed by: TRANSPLANT SURGERY

## 2025-03-05 PROCEDURE — 3074F SYST BP LT 130 MM HG: CPT | Performed by: TRANSPLANT SURGERY

## 2025-03-05 PROCEDURE — 71250 CT THORAX DX C-: CPT

## 2025-03-05 PROCEDURE — 3079F DIAST BP 80-89 MM HG: CPT | Performed by: TRANSPLANT SURGERY

## 2025-03-05 PROCEDURE — 83036 HEMOGLOBIN GLYCOSYLATED A1C: CPT | Performed by: TRANSPLANT SURGERY

## 2025-03-05 PROCEDURE — 85007 BL SMEAR W/DIFF WBC COUNT: CPT | Performed by: INTERNAL MEDICINE

## 2025-03-05 PROCEDURE — 85610 PROTHROMBIN TIME: CPT | Performed by: INTERNAL MEDICINE

## 2025-03-05 PROCEDURE — 80053 COMPREHEN METABOLIC PANEL: CPT | Performed by: INTERNAL MEDICINE

## 2025-03-05 PROCEDURE — 99214 OFFICE O/P EST MOD 30 MIN: CPT | Mod: 25 | Performed by: TRANSPLANT SURGERY

## 2025-03-05 PROCEDURE — 99214 OFFICE O/P EST MOD 30 MIN: CPT | Performed by: TRANSPLANT SURGERY

## 2025-03-05 PROCEDURE — 36415 COLL VENOUS BLD VENIPUNCTURE: CPT | Performed by: TRANSPLANT SURGERY

## 2025-03-05 PROCEDURE — 93975 VASCULAR STUDY: CPT | Mod: 59

## 2025-03-05 PROCEDURE — 85027 COMPLETE CBC AUTOMATED: CPT | Performed by: INTERNAL MEDICINE

## 2025-03-05 ASSESSMENT — ENCOUNTER SYMPTOMS
FEVER: 0
HALLUCINATIONS: 0
DIARRHEA: 0
SHORTNESS OF BREATH: 0
CHILLS: 0
DIZZINESS: 0
DYSURIA: 0
ABDOMINAL DISTENTION: 0
ABDOMINAL PAIN: 0
FREQUENCY: 0
WEAKNESS: 0
HEMATURIA: 0
COLOR CHANGE: 0
EYES NEGATIVE: 1
COUGH: 0
ADENOPATHY: 0
ARTHRALGIAS: 0
LIGHT-HEADEDNESS: 0
CONFUSION: 0
AGITATION: 0
CONSTIPATION: 0

## 2025-03-05 ASSESSMENT — PAIN SCALES - GENERAL: PAINLEVEL_OUTOF10: 0-NO PAIN

## 2025-03-05 NOTE — PATIENT INSTRUCTIONS
Plan:     Labs today - your coordinator will follow up if there are any concerns.     We will adjust your diuretics to help reduce your volume status. Your coordinator will follow up with you next week to check your volume status.     We will get you scheduled for a follow up in general surgery.

## 2025-03-05 NOTE — PROGRESS NOTES
Subjective   Karyn Silva is a 59 y.o. male who presents for follow up on the liver transplant list    60 yo male with EtOH cirrhosis current MELD 8 presented to the office for follow up while on the wait list. His MELD has improved from last year (~20s) to now 8 with well controlled decompensation.     He is on diuretics for ascites. He was recently travelling and was having a hard time to eat low sodium diet and was experiencing recurrent fluid issues. But most trouble some was a new hernia in the left groin, which was quite symptomatic        Review of Systems   Constitutional:  Negative for chills and fever.   HENT: Negative.  Negative for congestion.    Eyes: Negative.    Respiratory:  Negative for cough and shortness of breath.    Cardiovascular:  Negative for chest pain.   Gastrointestinal:  Negative for abdominal distention, abdominal pain, constipation and diarrhea.   Endocrine: Negative for cold intolerance and heat intolerance.   Genitourinary:  Negative for dysuria, frequency, hematuria and urgency.   Musculoskeletal:  Negative for arthralgias.   Skin:  Negative for color change.   Allergic/Immunologic: Negative for environmental allergies.   Neurological:  Negative for dizziness, weakness and light-headedness.   Hematological:  Negative for adenopathy.   Psychiatric/Behavioral:  Negative for agitation, confusion and hallucinations.         Objective   Vitals:    03/05/25 0911   BP: 125/81   Pulse: 53   Temp: 35.9 °C (96.7 °F)   SpO2: 98%       Physical Exam  Constitutional:       Appearance: Normal appearance.   HENT:      Head: Normocephalic and atraumatic.      Nose: Nose normal.   Eyes:      Pupils: Pupils are equal, round, and reactive to light.   Cardiovascular:      Rate and Rhythm: Normal rate.   Pulmonary:      Effort: Pulmonary effort is normal. No respiratory distress.   Abdominal:      General: There is no distension.      Palpations: Abdomen is soft. There is no mass.      Comments: No  "obvious ascites  Left inguinal hernia   Musculoskeletal:         General: No swelling. Normal range of motion.      Cervical back: Normal range of motion.   Skin:     General: Skin is warm and dry.   Neurological:      General: No focal deficit present.      Mental Status: He is alert and oriented to person, place, and time.   Psychiatric:         Mood and Affect: Mood normal.         Behavior: Behavior normal.          Lab Review    Blood Type: No results found for: \"ABORH\"    Lab Results   Component Value Date    WBC 4.3 (L) 12/10/2024    HGB 13.9 12/10/2024    HCT 42.4 12/10/2024    MCV 90 12/10/2024    PLT 42 (L) 12/10/2024     Lab Results   Component Value Date    GLUCOSE 95 2025    CALCIUM 9.4 2025     2025    K 4.0 2025    CO2 25 2025     (H) 2025    BUN 16 2025    CREATININE 1.05 2025     Lab Results   Component Value Date    ALT 38 2025    AST 29 2025    ALKPHOS 87 2025    BILITOT 0.8 2025     MELD 3.0: 7 at 3/5/2025  9:39 AM  MELD-Na: 8 at 3/5/2025  9:39 AM  Calculated from:  Serum Creatinine: 1.05 mg/dL at 3/5/2025  9:39 AM  Serum Sodium: 140 mmol/L (Using max of 137 mmol/L) at 3/5/2025  9:39 AM  Total Bilirubin: 0.8 mg/dL (Using min of 1 mg/dL) at 3/5/2025  9:39 AM  Serum Albumin: 4.7 g/dL (Using max of 3.5 g/dL) at 3/5/2025  9:39 AM  INR(ratio): 1.1 at 3/5/2025  9:39 AM  Age at listin years  Sex: Male at 3/5/2025  9:39 AM      Assessment/Plan    Diagnoses:   1. Pre-liver transplant, listed    2. Unilateral inguinal hernia without obstruction or gangrene, recurrence not specified    3. Encounter for pre-transplant evaluation for liver transplant    4. End stage liver disease (Multi)      Karyn Silva will remain active on the wait list. However his MELD is quite low  Will repeat MELD labs today for update  There is new left inguinal hernia which was quite symptomatic   CT chest today reviewed, showed moderate ascites " in the abdomen but clinically well compensated.  I think I would hold off on repair hernia as much as I can due to the high risk of recurrence and infection.  For now, we will optimize diuretics, wear hernia belt and obtain CT abdomen and pelvis  We can re-assess in a few weeks to follow up

## 2025-03-06 ENCOUNTER — TELEPHONE (OUTPATIENT)
Facility: HOSPITAL | Age: 60
End: 2025-03-06
Payer: COMMERCIAL

## 2025-03-06 ENCOUNTER — LAB (OUTPATIENT)
Dept: LAB | Facility: HOSPITAL | Age: 60
End: 2025-03-06
Payer: COMMERCIAL

## 2025-03-06 DIAGNOSIS — K40.90 UNILATERAL INGUINAL HERNIA, WITHOUT OBSTRUCTION OR GANGRENE, NOT SPECIFIED AS RECURRENT: ICD-10-CM

## 2025-03-06 DIAGNOSIS — D64.9 ANEMIA, UNSPECIFIED TYPE: ICD-10-CM

## 2025-03-06 DIAGNOSIS — R18.8 CIRRHOSIS OF LIVER WITH ASCITES, UNSPECIFIED HEPATIC CIRRHOSIS TYPE (MULTI): ICD-10-CM

## 2025-03-06 DIAGNOSIS — K74.60 CIRRHOSIS OF LIVER WITH ASCITES, UNSPECIFIED HEPATIC CIRRHOSIS TYPE (MULTI): ICD-10-CM

## 2025-03-06 DIAGNOSIS — Z76.82 AWAITING ORGAN TRANSPLANT STATUS: Primary | ICD-10-CM

## 2025-03-06 DIAGNOSIS — Z01.818 ENCOUNTER FOR PRE-TRANSPLANT EVALUATION FOR LIVER TRANSPLANT: ICD-10-CM

## 2025-03-06 LAB
ABO GROUP (TYPE) IN BLOOD: NORMAL
ANTIBODY SCREEN: NORMAL
BB ANTIBODY IDENTIFICATION: NORMAL
CASE #: NORMAL
RH FACTOR (ANTIGEN D): NORMAL

## 2025-03-06 PROCEDURE — 86901 BLOOD TYPING SEROLOGIC RH(D): CPT

## 2025-03-06 PROCEDURE — 86850 RBC ANTIBODY SCREEN: CPT

## 2025-03-06 PROCEDURE — 86900 BLOOD TYPING SEROLOGIC ABO: CPT

## 2025-03-06 PROCEDURE — 86880 COOMBS TEST DIRECT: CPT

## 2025-03-06 NOTE — TELEPHONE ENCOUNTER
VM left for patient with request to return to lab today/tomorrow for additional blood work to further evaluate ABO/antibodies.

## 2025-03-10 LAB
BB ANTIBODY IDENTIFICATION: NORMAL
CASE #: NORMAL

## 2025-03-11 LAB — PATH REV-IMMUNOHEMATOLOGY-PR30: NORMAL

## 2025-03-11 RX ORDER — CARVEDILOL 6.25 MG/1
6.25 TABLET ORAL 2 TIMES DAILY
Qty: 60 TABLET | Refills: 11 | Status: SHIPPED | OUTPATIENT
Start: 2025-03-11 | End: 2025-03-12 | Stop reason: SDUPTHER

## 2025-03-12 DIAGNOSIS — I85.00 PORTAL HYPERTENSION WITH ESOPHAGEAL VARICES (MULTI): ICD-10-CM

## 2025-03-12 DIAGNOSIS — K76.6 PORTAL HYPERTENSION WITH ESOPHAGEAL VARICES (MULTI): ICD-10-CM

## 2025-03-12 RX ORDER — CARVEDILOL 6.25 MG/1
6.25 TABLET ORAL 2 TIMES DAILY
Qty: 60 TABLET | Refills: 11 | Status: SHIPPED | OUTPATIENT
Start: 2025-03-12 | End: 2026-03-12

## 2025-03-19 LAB — DAT-POLYSPECIFIC: NORMAL

## 2025-04-03 ENCOUNTER — DOCUMENTATION (OUTPATIENT)
Dept: TRANSPLANT | Facility: HOSPITAL | Age: 60
End: 2025-04-03
Payer: COMMERCIAL

## 2025-04-03 NOTE — PROGRESS NOTES
Per EV MMO active.  Dony ARNOLD Is the new [employer] c/m.  Provided listing status to her per her request.

## 2025-04-07 ENCOUNTER — APPOINTMENT (OUTPATIENT)
Facility: HOSPITAL | Age: 60
End: 2025-04-07
Payer: COMMERCIAL

## 2025-04-16 ENCOUNTER — APPOINTMENT (OUTPATIENT)
Dept: GASTROENTEROLOGY | Facility: HOSPITAL | Age: 60
End: 2025-04-16
Payer: COMMERCIAL

## 2025-04-16 ENCOUNTER — LAB (OUTPATIENT)
Dept: LAB | Facility: HOSPITAL | Age: 60
End: 2025-04-16
Payer: COMMERCIAL

## 2025-04-16 DIAGNOSIS — K74.60 CIRRHOSIS OF LIVER WITH ASCITES, UNSPECIFIED HEPATIC CIRRHOSIS TYPE (MULTI): ICD-10-CM

## 2025-04-16 DIAGNOSIS — R18.8 CIRRHOSIS OF LIVER WITH ASCITES, UNSPECIFIED HEPATIC CIRRHOSIS TYPE (MULTI): ICD-10-CM

## 2025-04-16 DIAGNOSIS — Z76.82 PRE-LIVER TRANSPLANT, LISTED: ICD-10-CM

## 2025-04-21 ENCOUNTER — NURSE ONLY (OUTPATIENT)
Facility: HOSPITAL | Age: 60
End: 2025-04-21
Payer: COMMERCIAL

## 2025-04-21 ENCOUNTER — OFFICE VISIT (OUTPATIENT)
Facility: HOSPITAL | Age: 60
End: 2025-04-21
Payer: COMMERCIAL

## 2025-04-21 VITALS
BODY MASS INDEX: 24.57 KG/M2 | DIASTOLIC BLOOD PRESSURE: 81 MMHG | SYSTOLIC BLOOD PRESSURE: 130 MMHG | WEIGHT: 207.2 LBS | HEART RATE: 67 BPM | TEMPERATURE: 95.9 F | OXYGEN SATURATION: 95 %

## 2025-04-21 DIAGNOSIS — Z76.82 PRE-LIVER TRANSPLANT, LISTED: ICD-10-CM

## 2025-04-21 DIAGNOSIS — K72.10 END STAGE LIVER DISEASE (MULTI): ICD-10-CM

## 2025-04-21 LAB
25(OH)D3 SERPL-MCNC: 69 NG/ML (ref 30–100)
ALBUMIN SERPL BCP-MCNC: 4.9 G/DL (ref 3.4–5)
ALP SERPL-CCNC: 103 U/L (ref 33–136)
ALT SERPL W P-5'-P-CCNC: 37 U/L (ref 10–52)
ANION GAP SERPL CALC-SCNC: 16 MMOL/L (ref 10–20)
AST SERPL W P-5'-P-CCNC: 29 U/L (ref 9–39)
BASOPHILS # BLD AUTO: 0.03 X10*3/UL (ref 0–0.1)
BASOPHILS NFR BLD AUTO: 0.5 %
BILIRUB SERPL-MCNC: 0.8 MG/DL (ref 0–1.2)
BUN SERPL-MCNC: 15 MG/DL (ref 6–23)
CALCIUM SERPL-MCNC: 9.5 MG/DL (ref 8.6–10.6)
CHLORIDE SERPL-SCNC: 106 MMOL/L (ref 98–107)
CO2 SERPL-SCNC: 22 MMOL/L (ref 21–32)
CREAT SERPL-MCNC: 1.1 MG/DL (ref 0.5–1.3)
EGFRCR SERPLBLD CKD-EPI 2021: 76 ML/MIN/1.73M*2
EOSINOPHIL # BLD AUTO: 0.02 X10*3/UL (ref 0–0.7)
EOSINOPHIL NFR BLD AUTO: 0.3 %
ERYTHROCYTE [DISTWIDTH] IN BLOOD BY AUTOMATED COUNT: 14.3 % (ref 11.5–14.5)
EST. AVERAGE GLUCOSE BLD GHB EST-MCNC: 91 MG/DL
GLUCOSE SERPL-MCNC: 92 MG/DL (ref 74–99)
HBA1C MFR BLD: 4.8 % (ref ?–5.7)
HCT VFR BLD AUTO: 42.9 % (ref 41–52)
HGB BLD-MCNC: 14.2 G/DL (ref 13.5–17.5)
IMM GRANULOCYTES # BLD AUTO: 0.03 X10*3/UL (ref 0–0.7)
IMM GRANULOCYTES NFR BLD AUTO: 0.5 % (ref 0–0.9)
INR PPP: 1.1 (ref 0.9–1.1)
IRON SATN MFR SERPL: 28 % (ref 25–45)
IRON SERPL-MCNC: 97 UG/DL (ref 35–150)
LYMPHOCYTES # BLD AUTO: 0.53 X10*3/UL (ref 1.2–4.8)
LYMPHOCYTES NFR BLD AUTO: 8.8 %
MCH RBC QN AUTO: 29.8 PG (ref 26–34)
MCHC RBC AUTO-ENTMCNC: 33.1 G/DL (ref 32–36)
MCV RBC AUTO: 90 FL (ref 80–100)
MONOCYTES # BLD AUTO: 0.35 X10*3/UL (ref 0.1–1)
MONOCYTES NFR BLD AUTO: 5.8 %
NEUTROPHILS # BLD AUTO: 5.08 X10*3/UL (ref 1.2–7.7)
NEUTROPHILS NFR BLD AUTO: 84.1 %
NRBC BLD-RTO: 0 /100 WBCS (ref 0–0)
PLATELET # BLD AUTO: 68 X10*3/UL (ref 150–450)
POTASSIUM SERPL-SCNC: 3.9 MMOL/L (ref 3.5–5.3)
PROT SERPL-MCNC: 8.2 G/DL (ref 6.4–8.2)
PROTHROMBIN TIME: 12.3 SECONDS (ref 9.8–12.4)
RBC # BLD AUTO: 4.76 X10*6/UL (ref 4.5–5.9)
SODIUM SERPL-SCNC: 140 MMOL/L (ref 136–145)
TIBC SERPL-MCNC: 351 UG/DL (ref 240–445)
UIBC SERPL-MCNC: 254 UG/DL (ref 110–370)
WBC # BLD AUTO: 6 X10*3/UL (ref 4.4–11.3)

## 2025-04-21 PROCEDURE — 3079F DIAST BP 80-89 MM HG: CPT | Performed by: INTERNAL MEDICINE

## 2025-04-21 PROCEDURE — 83540 ASSAY OF IRON: CPT | Performed by: INTERNAL MEDICINE

## 2025-04-21 PROCEDURE — 99214 OFFICE O/P EST MOD 30 MIN: CPT | Performed by: INTERNAL MEDICINE

## 2025-04-21 PROCEDURE — 85610 PROTHROMBIN TIME: CPT | Performed by: INTERNAL MEDICINE

## 2025-04-21 PROCEDURE — 3075F SYST BP GE 130 - 139MM HG: CPT | Performed by: INTERNAL MEDICINE

## 2025-04-21 PROCEDURE — 85025 COMPLETE CBC W/AUTO DIFF WBC: CPT | Performed by: INTERNAL MEDICINE

## 2025-04-21 PROCEDURE — 82306 VITAMIN D 25 HYDROXY: CPT | Performed by: INTERNAL MEDICINE

## 2025-04-21 PROCEDURE — 83036 HEMOGLOBIN GLYCOSYLATED A1C: CPT | Performed by: INTERNAL MEDICINE

## 2025-04-21 PROCEDURE — 80053 COMPREHEN METABOLIC PANEL: CPT | Performed by: INTERNAL MEDICINE

## 2025-04-21 ASSESSMENT — PAIN SCALES - GENERAL: PAINLEVEL_OUTOF10: 0-NO PAIN

## 2025-04-21 NOTE — PATIENT INSTRUCTIONS
Labs today  Appointment with transplant surgery for potential hernia repair.  Social work vist  RTC in June with Dr. Norris

## 2025-04-21 NOTE — PROGRESS NOTES
"Subjective   Patient ID: Vance Silva \"Christine" is a 59 y.o. male who presents for No chief complaint on file..  HPI Has cirrhosis but has stabilized.   Drinking green tea extract.  Interested in exosmose (stem cells) treatments for organ healing.   No ascites or edema.   Does have inguinal hernia that is somewhat tender and not easy to reduce.  Suspect some of this is contributed to by ascites.    Recently had COVID.   Small erythema of perioral area.   May be strep will try triple antibiotic ointment.      Review of Systems  No report of nausea vomiting or diarrhea.    Good strength.      Objective   Physical Exam  Physical Exam  Constitutional:       Appearance: Normal appearance. Perioral erythema.   Could be strep or HSV.    Eyes:      General: No scleral icterus.  Cardiovascular:      Rate and Rhythm: Normal rate and regular rhythm.      Heart sounds: No murmur heard.     No gallop.   Pulmonary:      Effort: Pulmonary effort is normal.      Breath sounds: Normal breath sounds.   Abdominal:      General: Abdomen is flat. Bowel sounds are normal. There is no distension.      Palpations: Abdomen is soft. There is no fluid wave, hepatomegaly or splenomegaly. Minimal ascites.       Tenderness: There is no abdominal tenderness.   5-6 cm left inguinal femoral hernia.  Mildly tender.    Musculoskeletal:      Cervical back: Normal range of motion. No tenderness.      Right lower leg: No edema.      Left lower leg: No edema.   Lymphadenopathy:      Cervical: No cervical adenopathy.   Skin:     Coloration: Skin is not jaundiced.   Neurological:      General: No focal deficit present.      Mental Status: He  is alert.       Assessment/Plan     Should see transplant surgery about left hernia.    From liver perspective this can be repaired.   Would prefer without mesh.   Discussed possible liver decompensation after hernia repair which is possible (though he is listed currently with low MELD).   Liver function has improved " significantly over the past year and is stable for now. Could be related to some of the supplements he is taking but this is not clear.    Would like to see 3 years or more of stability prior to consider removing from transplant list.    I believe his liver is still at significant risk of rapid deterioration ( as it has in the past) and for this reason I would keep him on the transplant list.  I will see him again in a few months.     He can try triple antibiotic ointment for this and if this is not effective we can prescribe acyclovir.            Mu Norris MD 04/21/25 11:54 AM

## 2025-04-30 ENCOUNTER — DOCUMENTATION (OUTPATIENT)
Dept: TRANSPLANT | Facility: HOSPITAL | Age: 60
End: 2025-04-30
Payer: COMMERCIAL

## 2025-04-30 NOTE — PROGRESS NOTES
[facility] will be [facility]iring.  Emailed her back stating patient would be seen 05/05/25.

## 2025-05-05 ENCOUNTER — HOSPITAL ENCOUNTER (OUTPATIENT)
Dept: RADIOLOGY | Facility: CLINIC | Age: 60
Discharge: HOME | End: 2025-05-05
Payer: COMMERCIAL

## 2025-05-05 ENCOUNTER — OFFICE VISIT (OUTPATIENT)
Facility: HOSPITAL | Age: 60
End: 2025-05-05
Payer: COMMERCIAL

## 2025-05-05 ENCOUNTER — PREP FOR PROCEDURE (OUTPATIENT)
Facility: HOSPITAL | Age: 60
End: 2025-05-05
Payer: COMMERCIAL

## 2025-05-05 ENCOUNTER — APPOINTMENT (OUTPATIENT)
Facility: HOSPITAL | Age: 60
End: 2025-05-05
Payer: COMMERCIAL

## 2025-05-05 VITALS
BODY MASS INDEX: 23.67 KG/M2 | OXYGEN SATURATION: 98 % | SYSTOLIC BLOOD PRESSURE: 144 MMHG | HEART RATE: 62 BPM | WEIGHT: 199.6 LBS | DIASTOLIC BLOOD PRESSURE: 91 MMHG | TEMPERATURE: 97 F

## 2025-05-05 DIAGNOSIS — K40.90 NON-RECURRENT UNILATERAL INGUINAL HERNIA WITHOUT OBSTRUCTION OR GANGRENE: Primary | ICD-10-CM

## 2025-05-05 DIAGNOSIS — K74.60 CIRRHOSIS OF LIVER WITH ASCITES, UNSPECIFIED HEPATIC CIRRHOSIS TYPE (MULTI): ICD-10-CM

## 2025-05-05 DIAGNOSIS — R18.8 CIRRHOSIS OF LIVER WITH ASCITES, UNSPECIFIED HEPATIC CIRRHOSIS TYPE (MULTI): ICD-10-CM

## 2025-05-05 DIAGNOSIS — Z76.82 PRE-LIVER TRANSPLANT, LISTED: ICD-10-CM

## 2025-05-05 DIAGNOSIS — K40.90 UNILATERAL INGUINAL HERNIA WITHOUT OBSTRUCTION OR GANGRENE, RECURRENCE NOT SPECIFIED: ICD-10-CM

## 2025-05-05 DIAGNOSIS — K76.82 HEPATIC ENCEPHALOPATHY: ICD-10-CM

## 2025-05-05 PROCEDURE — 74176 CT ABD & PELVIS W/O CONTRAST: CPT

## 2025-05-05 PROCEDURE — 74176 CT ABD & PELVIS W/O CONTRAST: CPT | Performed by: RADIOLOGY

## 2025-05-05 ASSESSMENT — PAIN SCALES - GENERAL: PAINLEVEL_OUTOF10: 0-NO PAIN

## 2025-05-06 RX ORDER — LACTULOSE 10 G/15ML
SOLUTION ORAL; RECTAL
Qty: 7568 ML | Refills: 4 | Status: SHIPPED | OUTPATIENT
Start: 2025-05-06

## 2025-05-08 ENCOUNTER — DOCUMENTATION (OUTPATIENT)
Facility: HOSPITAL | Age: 60
End: 2025-05-08
Payer: COMMERCIAL

## 2025-05-08 PROBLEM — K40.90 NON-RECURRENT UNILATERAL INGUINAL HERNIA WITHOUT OBSTRUCTION OR GANGRENE: Status: ACTIVE | Noted: 2025-05-05

## 2025-05-08 NOTE — PROGRESS NOTES
Pt called requesting to speak with Marcelina Quesada, sent secure message and she will contact him directly.

## 2025-05-21 ENCOUNTER — DOCUMENTATION (OUTPATIENT)
Dept: TRANSPLANT | Facility: HOSPITAL | Age: 60
End: 2025-05-21
Payer: COMMERCIAL

## 2025-05-21 ENCOUNTER — ANESTHESIA EVENT (OUTPATIENT)
Dept: OPERATING ROOM | Facility: HOSPITAL | Age: 60
End: 2025-05-21
Payer: COMMERCIAL

## 2025-05-22 ENCOUNTER — HOSPITAL ENCOUNTER (OUTPATIENT)
Facility: HOSPITAL | Age: 60
LOS: 1 days | Discharge: HOME | End: 2025-05-23
Attending: TRANSPLANT SURGERY | Admitting: TRANSPLANT SURGERY
Payer: COMMERCIAL

## 2025-05-22 ENCOUNTER — ANESTHESIA (OUTPATIENT)
Dept: OPERATING ROOM | Facility: HOSPITAL | Age: 60
End: 2025-05-22
Payer: COMMERCIAL

## 2025-05-22 ENCOUNTER — SOCIAL WORK (OUTPATIENT)
Facility: HOSPITAL | Age: 60
End: 2025-05-22

## 2025-05-22 DIAGNOSIS — Z98.890 S/P LEFT INGUINAL HERNIA REPAIR: ICD-10-CM

## 2025-05-22 DIAGNOSIS — Z87.19 S/P LEFT INGUINAL HERNIA REPAIR: ICD-10-CM

## 2025-05-22 DIAGNOSIS — K40.90 NON-RECURRENT UNILATERAL INGUINAL HERNIA WITHOUT OBSTRUCTION OR GANGRENE: Primary | ICD-10-CM

## 2025-05-22 LAB
ABO GROUP (TYPE) IN BLOOD: NORMAL
ANION GAP SERPL CALC-SCNC: 13 MMOL/L (ref 10–20)
ANTIBODY SCREEN: NORMAL
BLOOD EXPIRATION DATE: NORMAL
BLOOD EXPIRATION DATE: NORMAL
BUN SERPL-MCNC: 20 MG/DL (ref 6–23)
CALCIUM SERPL-MCNC: 8.8 MG/DL (ref 8.6–10.6)
CHLORIDE SERPL-SCNC: 107 MMOL/L (ref 98–107)
CO2 SERPL-SCNC: 24 MMOL/L (ref 21–32)
CREAT SERPL-MCNC: 1.08 MG/DL (ref 0.5–1.3)
DISPENSE STATUS: NORMAL
DISPENSE STATUS: NORMAL
EGFRCR SERPLBLD CKD-EPI 2021: 78 ML/MIN/1.73M*2
ERYTHROCYTE [DISTWIDTH] IN BLOOD BY AUTOMATED COUNT: 14.6 % (ref 11.5–14.5)
GLUCOSE SERPL-MCNC: 119 MG/DL (ref 74–99)
HCT VFR BLD AUTO: 34.4 % (ref 41–52)
HGB BLD-MCNC: 11.7 G/DL (ref 13.5–17.5)
MCH RBC QN AUTO: 29.2 PG (ref 26–34)
MCHC RBC AUTO-ENTMCNC: 34 G/DL (ref 32–36)
MCV RBC AUTO: 86 FL (ref 80–100)
NRBC BLD-RTO: 0 /100 WBCS (ref 0–0)
PLATELET # BLD AUTO: 42 X10*3/UL (ref 150–450)
POTASSIUM SERPL-SCNC: 3.9 MMOL/L (ref 3.5–5.3)
PRODUCT BLOOD TYPE: 1700
PRODUCT BLOOD TYPE: 1700
PRODUCT CODE: NORMAL
PRODUCT CODE: NORMAL
RBC # BLD AUTO: 4.01 X10*6/UL (ref 4.5–5.9)
RH FACTOR (ANTIGEN D): NORMAL
SODIUM SERPL-SCNC: 140 MMOL/L (ref 136–145)
UNIT ABO: NORMAL
UNIT ABO: NORMAL
UNIT NUMBER: NORMAL
UNIT NUMBER: NORMAL
UNIT RH: NORMAL
UNIT RH: NORMAL
UNIT VOLUME: 208
UNIT VOLUME: 373
WBC # BLD AUTO: 4.3 X10*3/UL (ref 4.4–11.3)

## 2025-05-22 PROCEDURE — 85027 COMPLETE CBC AUTOMATED: CPT | Performed by: ANESTHESIOLOGY

## 2025-05-22 PROCEDURE — 36415 COLL VENOUS BLD VENIPUNCTURE: CPT

## 2025-05-22 PROCEDURE — 3700000001 HC GENERAL ANESTHESIA TIME - INITIAL BASE CHARGE: Performed by: TRANSPLANT SURGERY

## 2025-05-22 PROCEDURE — 2500000004 HC RX 250 GENERAL PHARMACY W/ HCPCS (ALT 636 FOR OP/ED): Mod: JZ | Performed by: TRANSPLANT SURGERY

## 2025-05-22 PROCEDURE — 2500000005 HC RX 250 GENERAL PHARMACY W/O HCPCS: Performed by: ANESTHESIOLOGY

## 2025-05-22 PROCEDURE — P9073 PLATELETS PHERESIS PATH REDU: HCPCS

## 2025-05-22 PROCEDURE — 36430 TRANSFUSION BLD/BLD COMPNT: CPT | Performed by: ANESTHESIOLOGY

## 2025-05-22 PROCEDURE — 7100000001 HC RECOVERY ROOM TIME - INITIAL BASE CHARGE: Performed by: TRANSPLANT SURGERY

## 2025-05-22 PROCEDURE — 2500000001 HC RX 250 WO HCPCS SELF ADMINISTERED DRUGS (ALT 637 FOR MEDICARE OP)

## 2025-05-22 PROCEDURE — 80048 BASIC METABOLIC PNL TOTAL CA: CPT | Performed by: ANESTHESIOLOGY

## 2025-05-22 PROCEDURE — 2500000004 HC RX 250 GENERAL PHARMACY W/ HCPCS (ALT 636 FOR OP/ED): Mod: JZ | Performed by: ANESTHESIOLOGY

## 2025-05-22 PROCEDURE — 2780000003 HC OR 278 NO HCPCS: Performed by: TRANSPLANT SURGERY

## 2025-05-22 PROCEDURE — 86901 BLOOD TYPING SEROLOGIC RH(D): CPT

## 2025-05-22 PROCEDURE — 3600000008 HC OR TIME - EACH INCREMENTAL 1 MINUTE - PROCEDURE LEVEL THREE: Performed by: TRANSPLANT SURGERY

## 2025-05-22 PROCEDURE — 49505 PRP I/HERN INIT REDUC >5 YR: CPT | Performed by: TRANSPLANT SURGERY

## 2025-05-22 PROCEDURE — 3600000003 HC OR TIME - INITIAL BASE CHARGE - PROCEDURE LEVEL THREE: Performed by: TRANSPLANT SURGERY

## 2025-05-22 PROCEDURE — C1781 MESH (IMPLANTABLE): HCPCS | Performed by: TRANSPLANT SURGERY

## 2025-05-22 PROCEDURE — 2500000005 HC RX 250 GENERAL PHARMACY W/O HCPCS: Mod: JZ | Performed by: TRANSPLANT SURGERY

## 2025-05-22 PROCEDURE — 1100000001 HC PRIVATE ROOM DAILY

## 2025-05-22 PROCEDURE — 86870 RBC ANTIBODY IDENTIFICATION: CPT

## 2025-05-22 PROCEDURE — 7100000002 HC RECOVERY ROOM TIME - EACH INCREMENTAL 1 MINUTE: Performed by: TRANSPLANT SURGERY

## 2025-05-22 PROCEDURE — A49505 PR REPAIR ING HERNIA,5+Y/O,REDUCIBL: Performed by: ANESTHESIOLOGY

## 2025-05-22 PROCEDURE — 2500000004 HC RX 250 GENERAL PHARMACY W/ HCPCS (ALT 636 FOR OP/ED)

## 2025-05-22 PROCEDURE — 2720000007 HC OR 272 NO HCPCS: Performed by: TRANSPLANT SURGERY

## 2025-05-22 PROCEDURE — 3700000002 HC GENERAL ANESTHESIA TIME - EACH INCREMENTAL 1 MINUTE: Performed by: TRANSPLANT SURGERY

## 2025-05-22 PROCEDURE — 88302 TISSUE EXAM BY PATHOLOGIST: CPT | Mod: TC,SUR | Performed by: TRANSPLANT SURGERY

## 2025-05-22 PROCEDURE — P9045 ALBUMIN (HUMAN), 5%, 250 ML: HCPCS | Mod: JZ

## 2025-05-22 PROCEDURE — 88302 TISSUE EXAM BY PATHOLOGIST: CPT | Performed by: STUDENT IN AN ORGANIZED HEALTH CARE EDUCATION/TRAINING PROGRAM

## 2025-05-22 PROCEDURE — 2500000004 HC RX 250 GENERAL PHARMACY W/ HCPCS (ALT 636 FOR OP/ED): Mod: JZ

## 2025-05-22 PROCEDURE — 99222 1ST HOSP IP/OBS MODERATE 55: CPT | Performed by: TRANSPLANT SURGERY

## 2025-05-22 DEVICE — BARD SOFT MESH, 3" X 6" (7.5 CM X 15 CM)
Type: IMPLANTABLE DEVICE | Site: INGUINAL | Status: FUNCTIONAL
Brand: BARD

## 2025-05-22 RX ORDER — FENTANYL CITRATE 50 UG/ML
INJECTION, SOLUTION INTRAMUSCULAR; INTRAVENOUS AS NEEDED
Status: DISCONTINUED | OUTPATIENT
Start: 2025-05-22 | End: 2025-05-22

## 2025-05-22 RX ORDER — HYDRALAZINE HYDROCHLORIDE 20 MG/ML
5 INJECTION INTRAMUSCULAR; INTRAVENOUS ONCE AS NEEDED
Status: DISCONTINUED | OUTPATIENT
Start: 2025-05-22 | End: 2025-05-22 | Stop reason: HOSPADM

## 2025-05-22 RX ORDER — ONDANSETRON HYDROCHLORIDE 2 MG/ML
4 INJECTION, SOLUTION INTRAVENOUS EVERY 8 HOURS PRN
Status: DISCONTINUED | OUTPATIENT
Start: 2025-05-22 | End: 2025-05-23 | Stop reason: HOSPADM

## 2025-05-22 RX ORDER — OXYCODONE HYDROCHLORIDE 5 MG/1
10 TABLET ORAL EVERY 4 HOURS PRN
Status: DISCONTINUED | OUTPATIENT
Start: 2025-05-22 | End: 2025-05-22 | Stop reason: HOSPADM

## 2025-05-22 RX ORDER — SODIUM CHLORIDE 0.9 G/100ML
INJECTION, SOLUTION IRRIGATION AS NEEDED
Status: DISCONTINUED | OUTPATIENT
Start: 2025-05-22 | End: 2025-05-22 | Stop reason: HOSPADM

## 2025-05-22 RX ORDER — ONDANSETRON 4 MG/1
4 TABLET, FILM COATED ORAL EVERY 8 HOURS PRN
Status: DISCONTINUED | OUTPATIENT
Start: 2025-05-22 | End: 2025-05-23 | Stop reason: HOSPADM

## 2025-05-22 RX ORDER — TRAMADOL HYDROCHLORIDE 50 MG/1
25 TABLET, FILM COATED ORAL EVERY 6 HOURS PRN
Status: DISCONTINUED | OUTPATIENT
Start: 2025-05-22 | End: 2025-05-23 | Stop reason: HOSPADM

## 2025-05-22 RX ORDER — FAMOTIDINE 10 MG/ML
INJECTION INTRAVENOUS AS NEEDED
Status: DISCONTINUED | OUTPATIENT
Start: 2025-05-22 | End: 2025-05-22

## 2025-05-22 RX ORDER — NORETHINDRONE AND ETHINYL ESTRADIOL 0.5-0.035
10 KIT ORAL ONCE AS NEEDED
Status: DISCONTINUED | OUTPATIENT
Start: 2025-05-22 | End: 2025-05-22 | Stop reason: HOSPADM

## 2025-05-22 RX ORDER — HYDROMORPHONE HYDROCHLORIDE 0.2 MG/ML
0.2 INJECTION INTRAMUSCULAR; INTRAVENOUS; SUBCUTANEOUS EVERY 5 MIN PRN
Status: DISCONTINUED | OUTPATIENT
Start: 2025-05-22 | End: 2025-05-22

## 2025-05-22 RX ORDER — CEFAZOLIN 1 G/1
INJECTION, POWDER, FOR SOLUTION INTRAVENOUS AS NEEDED
Status: DISCONTINUED | OUTPATIENT
Start: 2025-05-22 | End: 2025-05-22

## 2025-05-22 RX ORDER — LABETALOL HYDROCHLORIDE 5 MG/ML
5 INJECTION, SOLUTION INTRAVENOUS EVERY 10 MIN PRN
Status: DISCONTINUED | OUTPATIENT
Start: 2025-05-22 | End: 2025-05-22

## 2025-05-22 RX ORDER — METOCLOPRAMIDE HYDROCHLORIDE 5 MG/ML
10 INJECTION INTRAMUSCULAR; INTRAVENOUS ONCE AS NEEDED
Status: DISCONTINUED | OUTPATIENT
Start: 2025-05-22 | End: 2025-05-22 | Stop reason: HOSPADM

## 2025-05-22 RX ORDER — LACTULOSE 10 G/15ML
20 SOLUTION ORAL 3 TIMES DAILY
Status: DISCONTINUED | OUTPATIENT
Start: 2025-05-22 | End: 2025-05-23 | Stop reason: HOSPADM

## 2025-05-22 RX ORDER — LIDOCAINE HYDROCHLORIDE 10 MG/ML
0.1 INJECTION, SOLUTION INFILTRATION; PERINEURAL ONCE
Status: DISCONTINUED | OUTPATIENT
Start: 2025-05-22 | End: 2025-05-22 | Stop reason: HOSPADM

## 2025-05-22 RX ORDER — METOCLOPRAMIDE HYDROCHLORIDE 5 MG/ML
INJECTION INTRAMUSCULAR; INTRAVENOUS AS NEEDED
Status: DISCONTINUED | OUTPATIENT
Start: 2025-05-22 | End: 2025-05-22

## 2025-05-22 RX ORDER — LIDOCAINE HYDROCHLORIDE 10 MG/ML
0.1 INJECTION, SOLUTION INFILTRATION; PERINEURAL ONCE
Status: DISCONTINUED | OUTPATIENT
Start: 2025-05-22 | End: 2025-05-22

## 2025-05-22 RX ORDER — FENTANYL CITRATE 50 UG/ML
12.5 INJECTION, SOLUTION INTRAMUSCULAR; INTRAVENOUS EVERY 5 MIN PRN
Status: DISCONTINUED | OUTPATIENT
Start: 2025-05-22 | End: 2025-05-22 | Stop reason: HOSPADM

## 2025-05-22 RX ORDER — FUROSEMIDE 40 MG/1
40 TABLET ORAL DAILY
Status: DISCONTINUED | OUTPATIENT
Start: 2025-05-23 | End: 2025-05-23 | Stop reason: HOSPADM

## 2025-05-22 RX ORDER — LIDOCAINE HYDROCHLORIDE 20 MG/ML
INJECTION, SOLUTION INFILTRATION; PERINEURAL AS NEEDED
Status: DISCONTINUED | OUTPATIENT
Start: 2025-05-22 | End: 2025-05-22

## 2025-05-22 RX ORDER — FUROSEMIDE 10 MG/ML
INJECTION INTRAMUSCULAR; INTRAVENOUS AS NEEDED
Status: DISCONTINUED | OUTPATIENT
Start: 2025-05-22 | End: 2025-05-22

## 2025-05-22 RX ORDER — ONDANSETRON HYDROCHLORIDE 2 MG/ML
4 INJECTION, SOLUTION INTRAVENOUS ONCE AS NEEDED
Status: DISCONTINUED | OUTPATIENT
Start: 2025-05-22 | End: 2025-05-22

## 2025-05-22 RX ORDER — POLYMYXIN B 500000 [USP'U]/1
INJECTION, POWDER, LYOPHILIZED, FOR SOLUTION INTRAMUSCULAR; INTRATHECAL; INTRAVENOUS; OPHTHALMIC AS NEEDED
Status: DISCONTINUED | OUTPATIENT
Start: 2025-05-22 | End: 2025-05-22 | Stop reason: HOSPADM

## 2025-05-22 RX ORDER — GLYCOPYRROLATE 0.6MG/3ML
SYRINGE (ML) INTRAVENOUS AS NEEDED
Status: DISCONTINUED | OUTPATIENT
Start: 2025-05-22 | End: 2025-05-22

## 2025-05-22 RX ORDER — PROPOFOL 10 MG/ML
INJECTION, EMULSION INTRAVENOUS AS NEEDED
Status: DISCONTINUED | OUTPATIENT
Start: 2025-05-22 | End: 2025-05-22

## 2025-05-22 RX ORDER — NEOSTIGMINE METHYLSULFATE 1 MG/ML
INJECTION INTRAVENOUS AS NEEDED
Status: DISCONTINUED | OUTPATIENT
Start: 2025-05-22 | End: 2025-05-22

## 2025-05-22 RX ORDER — ONDANSETRON HYDROCHLORIDE 2 MG/ML
INJECTION, SOLUTION INTRAVENOUS AS NEEDED
Status: DISCONTINUED | OUTPATIENT
Start: 2025-05-22 | End: 2025-05-22

## 2025-05-22 RX ORDER — MIDAZOLAM HYDROCHLORIDE 1 MG/ML
INJECTION INTRAMUSCULAR; INTRAVENOUS AS NEEDED
Status: DISCONTINUED | OUTPATIENT
Start: 2025-05-22 | End: 2025-05-22

## 2025-05-22 RX ORDER — ALBUTEROL SULFATE 0.83 MG/ML
2.5 SOLUTION RESPIRATORY (INHALATION) ONCE
Status: DISCONTINUED | OUTPATIENT
Start: 2025-05-22 | End: 2025-05-22 | Stop reason: HOSPADM

## 2025-05-22 RX ORDER — ALBUMIN HUMAN 50 G/1000ML
SOLUTION INTRAVENOUS AS NEEDED
Status: DISCONTINUED | OUTPATIENT
Start: 2025-05-22 | End: 2025-05-22

## 2025-05-22 RX ORDER — ONDANSETRON HYDROCHLORIDE 2 MG/ML
4 INJECTION, SOLUTION INTRAVENOUS ONCE AS NEEDED
Status: DISCONTINUED | OUTPATIENT
Start: 2025-05-22 | End: 2025-05-22 | Stop reason: HOSPADM

## 2025-05-22 RX ORDER — HYDROMORPHONE HYDROCHLORIDE 1 MG/ML
INJECTION, SOLUTION INTRAMUSCULAR; INTRAVENOUS; SUBCUTANEOUS AS NEEDED
Status: DISCONTINUED | OUTPATIENT
Start: 2025-05-22 | End: 2025-05-22

## 2025-05-22 RX ORDER — OXYCODONE HYDROCHLORIDE 5 MG/1
5 TABLET ORAL EVERY 4 HOURS PRN
Status: DISCONTINUED | OUTPATIENT
Start: 2025-05-22 | End: 2025-05-22

## 2025-05-22 RX ORDER — PHENYLEPHRINE HCL IN 0.9% NACL 0.4MG/10ML
SYRINGE (ML) INTRAVENOUS AS NEEDED
Status: DISCONTINUED | OUTPATIENT
Start: 2025-05-22 | End: 2025-05-22

## 2025-05-22 RX ORDER — BUPIVACAINE HYDROCHLORIDE 2.5 MG/ML
INJECTION, SOLUTION EPIDURAL; INFILTRATION; INTRACAUDAL; PERINEURAL AS NEEDED
Status: DISCONTINUED | OUTPATIENT
Start: 2025-05-22 | End: 2025-05-22 | Stop reason: HOSPADM

## 2025-05-22 RX ORDER — NALOXONE HYDROCHLORIDE 0.4 MG/ML
0.2 INJECTION, SOLUTION INTRAMUSCULAR; INTRAVENOUS; SUBCUTANEOUS EVERY 5 MIN PRN
Status: DISCONTINUED | OUTPATIENT
Start: 2025-05-22 | End: 2025-05-23 | Stop reason: HOSPADM

## 2025-05-22 RX ORDER — SPIRONOLACTONE 50 MG/1
50 TABLET, FILM COATED ORAL DAILY
Status: DISCONTINUED | OUTPATIENT
Start: 2025-05-22 | End: 2025-05-23 | Stop reason: HOSPADM

## 2025-05-22 RX ORDER — FENTANYL CITRATE 50 UG/ML
50 INJECTION, SOLUTION INTRAMUSCULAR; INTRAVENOUS EVERY 5 MIN PRN
Status: DISCONTINUED | OUTPATIENT
Start: 2025-05-22 | End: 2025-05-22 | Stop reason: HOSPADM

## 2025-05-22 RX ORDER — SODIUM CHLORIDE, SODIUM LACTATE, POTASSIUM CHLORIDE, CALCIUM CHLORIDE 600; 310; 30; 20 MG/100ML; MG/100ML; MG/100ML; MG/100ML
100 INJECTION, SOLUTION INTRAVENOUS CONTINUOUS
Status: DISCONTINUED | OUTPATIENT
Start: 2025-05-22 | End: 2025-05-22

## 2025-05-22 RX ORDER — LABETALOL HYDROCHLORIDE 5 MG/ML
5 INJECTION, SOLUTION INTRAVENOUS ONCE AS NEEDED
Status: DISCONTINUED | OUTPATIENT
Start: 2025-05-22 | End: 2025-05-22 | Stop reason: HOSPADM

## 2025-05-22 RX ORDER — PANTOPRAZOLE SODIUM 40 MG/1
40 TABLET, DELAYED RELEASE ORAL
Status: DISCONTINUED | OUTPATIENT
Start: 2025-05-23 | End: 2025-05-23 | Stop reason: HOSPADM

## 2025-05-22 RX ORDER — TRAMADOL HYDROCHLORIDE 50 MG/1
50 TABLET, FILM COATED ORAL EVERY 6 HOURS PRN
Status: DISCONTINUED | OUTPATIENT
Start: 2025-05-22 | End: 2025-05-23 | Stop reason: HOSPADM

## 2025-05-22 RX ORDER — FENTANYL CITRATE 50 UG/ML
25 INJECTION, SOLUTION INTRAMUSCULAR; INTRAVENOUS EVERY 5 MIN PRN
Status: DISCONTINUED | OUTPATIENT
Start: 2025-05-22 | End: 2025-05-22 | Stop reason: HOSPADM

## 2025-05-22 RX ORDER — PROCHLORPERAZINE EDISYLATE 5 MG/ML
5 INJECTION INTRAMUSCULAR; INTRAVENOUS ONCE AS NEEDED
Status: DISCONTINUED | OUTPATIENT
Start: 2025-05-22 | End: 2025-05-22 | Stop reason: HOSPADM

## 2025-05-22 RX ORDER — POLYETHYLENE GLYCOL 3350 17 G/17G
17 POWDER, FOR SOLUTION ORAL DAILY PRN
Status: DISCONTINUED | OUTPATIENT
Start: 2025-05-22 | End: 2025-05-23 | Stop reason: HOSPADM

## 2025-05-22 RX ORDER — ALBUTEROL SULFATE 0.83 MG/ML
2.5 SOLUTION RESPIRATORY (INHALATION) ONCE AS NEEDED
Status: DISCONTINUED | OUTPATIENT
Start: 2025-05-22 | End: 2025-05-22

## 2025-05-22 RX ORDER — CARVEDILOL 6.25 MG/1
6.25 TABLET ORAL 2 TIMES DAILY
Status: DISCONTINUED | OUTPATIENT
Start: 2025-05-22 | End: 2025-05-23 | Stop reason: HOSPADM

## 2025-05-22 RX ORDER — ROCURONIUM BROMIDE 10 MG/ML
INJECTION, SOLUTION INTRAVENOUS AS NEEDED
Status: DISCONTINUED | OUTPATIENT
Start: 2025-05-22 | End: 2025-05-22

## 2025-05-22 RX ADMIN — DESMOPRESSIN ACETATE 28.41 MCG: 4 INJECTION, SOLUTION INTRAVENOUS; SUBCUTANEOUS at 07:40

## 2025-05-22 RX ADMIN — Medication 0.6 MG: at 09:54

## 2025-05-22 RX ADMIN — MIDAZOLAM HYDROCHLORIDE 2 MG: 2 INJECTION, SOLUTION INTRAMUSCULAR; INTRAVENOUS at 07:29

## 2025-05-22 RX ADMIN — SODIUM CHLORIDE, SODIUM LACTATE, POTASSIUM CHLORIDE, AND CALCIUM CHLORIDE: 600; 310; 30; 20 INJECTION, SOLUTION INTRAVENOUS at 07:29

## 2025-05-22 RX ADMIN — FAMOTIDINE 20 MG: 10 INJECTION, SOLUTION INTRAVENOUS at 08:51

## 2025-05-22 RX ADMIN — CEFAZOLIN 2 G: 1 INJECTION, POWDER, FOR SOLUTION INTRAMUSCULAR; INTRAVENOUS at 07:40

## 2025-05-22 RX ADMIN — FENTANYL CITRATE 100 MCG: 50 INJECTION, SOLUTION INTRAMUSCULAR; INTRAVENOUS at 07:30

## 2025-05-22 RX ADMIN — ALBUMIN HUMAN 250 ML: 0.05 INJECTION, SOLUTION INTRAVENOUS at 07:58

## 2025-05-22 RX ADMIN — LIDOCAINE HYDROCHLORIDE 90 MG: 20 INJECTION, SOLUTION INFILTRATION; PERINEURAL at 07:30

## 2025-05-22 RX ADMIN — HYDROMORPHONE HYDROCHLORIDE 0.5 MG: 1 INJECTION, SOLUTION INTRAMUSCULAR; INTRAVENOUS; SUBCUTANEOUS at 10:00

## 2025-05-22 RX ADMIN — NEOSTIGMINE METHYLSULFATE 5 MG: 1 INJECTION INTRAVENOUS at 09:54

## 2025-05-22 RX ADMIN — Medication 80 MCG: at 08:00

## 2025-05-22 RX ADMIN — ONDANSETRON 4 MG: 2 INJECTION, SOLUTION INTRAMUSCULAR; INTRAVENOUS at 09:53

## 2025-05-22 RX ADMIN — DESMOPRESSIN ACETATE 28.41 MCG: 4 INJECTION, SOLUTION INTRAVENOUS; SUBCUTANEOUS at 07:48

## 2025-05-22 RX ADMIN — ROCURONIUM BROMIDE 10 MG: 10 INJECTION, SOLUTION INTRAVENOUS at 08:35

## 2025-05-22 RX ADMIN — Medication 6 L/MIN: at 10:04

## 2025-05-22 RX ADMIN — HYDROMORPHONE HYDROCHLORIDE 0.5 MG: 1 INJECTION, SOLUTION INTRAMUSCULAR; INTRAVENOUS; SUBCUTANEOUS at 09:08

## 2025-05-22 RX ADMIN — Medication 160 MCG: at 07:49

## 2025-05-22 RX ADMIN — Medication 40 MCG: at 07:46

## 2025-05-22 RX ADMIN — ROCURONIUM BROMIDE 20 MG: 10 INJECTION, SOLUTION INTRAVENOUS at 08:10

## 2025-05-22 RX ADMIN — METOCLOPRAMIDE 10 MG: 5 INJECTION, SOLUTION INTRAMUSCULAR; INTRAVENOUS at 08:51

## 2025-05-22 RX ADMIN — PROPOFOL 200 MG: 10 INJECTION, EMULSION INTRAVENOUS at 07:31

## 2025-05-22 RX ADMIN — DEXAMETHASONE SODIUM PHOSPHATE 4 MG: 4 INJECTION INTRA-ARTICULAR; INTRALESIONAL; INTRAMUSCULAR; INTRAVENOUS; SOFT TISSUE at 07:56

## 2025-05-22 RX ADMIN — SPIRONOLACTONE 50 MG: 50 TABLET, FILM COATED ORAL at 20:40

## 2025-05-22 RX ADMIN — LACTULOSE 20 G: 20 SOLUTION ORAL at 20:40

## 2025-05-22 RX ADMIN — ROCURONIUM BROMIDE 60 MG: 10 INJECTION, SOLUTION INTRAVENOUS at 07:31

## 2025-05-22 RX ADMIN — FUROSEMIDE 20 MG: 10 INJECTION, SOLUTION INTRAMUSCULAR; INTRAVENOUS at 08:24

## 2025-05-22 RX ADMIN — CARVEDILOL 6.25 MG: 6.25 TABLET, FILM COATED ORAL at 20:40

## 2025-05-22 RX ADMIN — ROCURONIUM BROMIDE 20 MG: 10 INJECTION, SOLUTION INTRAVENOUS at 08:19

## 2025-05-22 ASSESSMENT — COGNITIVE AND FUNCTIONAL STATUS - GENERAL
MOBILITY SCORE: 24
DAILY ACTIVITIY SCORE: 24
MOBILITY SCORE: 24
DAILY ACTIVITIY SCORE: 24

## 2025-05-22 ASSESSMENT — PAIN SCALES - GENERAL
PAINLEVEL_OUTOF10: 0 - NO PAIN
PAIN_LEVEL: 0
PAINLEVEL_OUTOF10: 0 - NO PAIN

## 2025-05-22 ASSESSMENT — COLUMBIA-SUICIDE SEVERITY RATING SCALE - C-SSRS
6. HAVE YOU EVER DONE ANYTHING, STARTED TO DO ANYTHING, OR PREPARED TO DO ANYTHING TO END YOUR LIFE?: NO
1. IN THE PAST MONTH, HAVE YOU WISHED YOU WERE DEAD OR WISHED YOU COULD GO TO SLEEP AND NOT WAKE UP?: NO
6. HAVE YOU EVER DONE ANYTHING, STARTED TO DO ANYTHING, OR PREPARED TO DO ANYTHING TO END YOUR LIFE?: YES
2. HAVE YOU ACTUALLY HAD ANY THOUGHTS OF KILLING YOURSELF?: NO

## 2025-05-22 NOTE — BRIEF OP NOTE
"Date: 2025  OR Location: [facility] OR    Name: Vance Silva \"Christine", : 1965, Age: 59 y.o., MRN: [id number], Sex: male    Diagnosis  Pre-op Diagnosis      * Non-recurrent unilateral inguinal hernia without obstruction or gangrene [K40.90] Post-op Diagnosis     * Non-recurrent unilateral inguinal hernia without obstruction or gangrene [K40.90]     Procedures  REPAIR, HERNIA, INGUINAL  [id number] - PA RPR 1ST INGUN HRNA AGE 5 YRS/> REDUCIBLE      Surgeons      * Antony Wallace - Primary    Resident/Fellow/Other Assistant:  Surgeons and Role:     * Pia Pimentel MD - Assisting    Staff:   Circulator: Lynette Castroub Person: Federico    Anesthesia Staff: Anesthesiologist: Simran Olivier MD  C-AA: CATY Pedersen  Anesthesia Resident: Shoaib Vidal DO; Arslan Skinner DO    Procedure Summary  Anesthesia: General  ASA: III  Estimated Blood Loss: 20mL  Intra-op Medications:   Administrations occurring from 0645 to 1055 on 25:   Medication Name Total Dose   bupivacaine PF 0.25 % (Marcaine) 0.25 % (2.5 mg/mL) injection 30 mL   sodium chloride 0.9 % irrigation solution 1,000 mL   polymyxin B injection 1,000,000 Units   oxygen (O2) therapy 50 L   desmopressin (DDAVP) 28.4 mcg in sodium chloride 0.9% 50 mL IV 56.82 mcg   albumin human bottle 5% 250 mL   ceFAZolin (Ancef) vial 1 g 2 g   dexAMETHasone (Decadron) 4 mg/mL 4 mg   famotidine (Pepcid) 10 mg/mL 20 mg   fentaNYL (Sublimaze) injection 50 mcg/mL 100 mcg   furosemide (Lasix) 10 mg/mL 20 mg   glycopyrrolate (Robinul) 0.2 mg/mL injection SYRINGE 0.6 mg   HYDROmorphone (Dilaudid) injection 1 mg/mL 1 mg   LR bolus Cannot be calculated   lidocaine (Xylocaine) injection 2 % 90 mg   metoclopramide (Reglan) 5 mg/mL 10 mg   midazolam PF (Versed) injection 1 mg/mL 2 mg   neostigmine (Bloxiverz) 1 mg/mL injection 5 mg   ondansetron 2 mg/mL 4 mg   oxygen (O2) therapy 57.3 L   phenylephrine 40 mcg/mL syringe 10 mL 280 mcg   propofol (Diprivan) injection 10 " mg/mL 200 mg   rocuronium (ZeMuron) 50 mg/5 mL injection 110 mg              Anesthesia Record               Intraprocedure I/O Totals          Intake    PLATELETS 500.00 mL    desmopressin (DDAVP) 28.4 mcg in sodium chloride 0.9% 50 mL .12 mL    Total Intake 657.12 mL       Output    Urine 415 mL    Est. Blood Loss 20 mL    Total Output 435 mL       Net    Net Volume 222.12 mL          Specimen:   ID Type Source Tests Collected by Time   1 : LEFT INGUINAL HERNIA SAC AND LIPOMA Tissue HERNIA SAC SURGICAL PATHOLOGY EXAM Antony Wallace MD 5/22/2025 0904                  Findings: Open left inguinal hernia repair with mesh (Stoney style repair). Large hernia sac, ligated and reduced.     Complications:  None; patient tolerated the procedure well.     Disposition: PACU - hemodynamically stable.  Condition: stable  Specimens Collected:   ID Type Source Tests Collected by Time   1 : LEFT INGUINAL HERNIA SAC AND LIPOMA Tissue HERNIA SAC SURGICAL PATHOLOGY EXAM Antony Wallace MD 5/22/2025 0904     Attending Attestation: I was present and scrubbed for the entire procedure.    Antony Wallace  Phone Number: [phone]

## 2025-05-22 NOTE — ANESTHESIA POSTPROCEDURE EVALUATION
"Patient: Vance Silva \"Karyn\"    Procedure Summary       Date: 05/22/25 Room / Location: [facility] OR 15 / [facility] OR    Anesthesia Start: 0725 Anesthesia Stop: 1009    Procedure: REPAIR, HERNIA, INGUINAL (Left: Abdomen) Diagnosis:       Non-recurrent unilateral inguinal hernia without obstruction or gangrene      (Non-recurrent unilateral inguinal hernia without obstruction or gangrene [K40.90])    Surgeons: Antony Wallace MD Responsible Provider: Simran Olivier MD    Anesthesia Type: general ASA Status: 3            Anesthesia Type: general    Vitals Value Taken Time   /79 05/22/25 10:04   Temp 36.9 °C (98.4 °F) 05/22/25 10:04   Pulse 67 05/22/25 10:04   Resp 12 05/22/25 10:04   SpO2 100 05/22/25 10:09       Anesthesia Post Evaluation    Patient location during evaluation: PACU  Patient participation: complete - patient participated  Level of consciousness: awake  Pain score: 0  Pain management: adequate  Airway patency: patent  Cardiovascular status: acceptable and stable  Respiratory status: acceptable and face mask  Hydration status: acceptable  Postoperative Nausea and Vomiting: none        No notable events documented.    "

## 2025-05-22 NOTE — H&P
"History Of Present Illness  Vance Silva \"Christine" is a 59 y.o. male presenting with symptomatic left inguinal hernia    Patient is a 60 yo M with alcoholic cirrhosis who has been following by transplant surgery and hepatology.  His MELD has down trended from the 20s in 2024 to around 8-10 in 2025. He has been seen in clinic for left inguinal hernia that has been increasingly symptomatic. CT AP imaging showed that this is largely ascites containing. Discussed with patient the option for operative repair, but without mesh. He was also counseled on high risk for recurrence or infection. Patient elected to proceed with left primary inguinal hernia repair. No evidence of incarceration or strangulation .     ROS:  No headaches, lightheadedness, dizziness, chest pain, SOB, abd pain, diarrhea, constipation. Does have intermittent feeling of abd bloating. Does have painful left groin but without changes of overlying skin of bowel habits.     Past Medical History  Past Medical History:   Diagnosis Date    Acute kidney injury     Anemia     Ascites     Awareness under anesthesia     Chronic kidney disease     Cirrhosis (Multi)     Hepatitis C     Liver disease     Parkinson's disease     dx 2/16/2024    Seizure (Multi)     Sleep apnea     Traumatic brain injury (Multi)        Surgical History  Past Surgical History:   Procedure Laterality Date    ESOPHAGOGASTRODUODENOSCOPY      OTHER SURGICAL HISTORY      Multiple orthopedic surgeries    UMBILICAL HERNIA REPAIR  2021        Social History  He reports that he has never smoked. He has never used smokeless tobacco. He reports that he does not currently use alcohol. He reports current drug use. Drug: Marijuana.    Family History  No family history on file.     Allergies  Patient has no known allergies.     Last Recorded Vitals  Blood pressure 135/82, pulse 65, temperature 36.4 °C (97.5 °F), temperature source Temporal, resp. rate 18, height 1.956 m (6' 5\"), weight 94.7 kg (208 lb " "12.4 oz), SpO2 100%.    Physical Exam   General: laying in bed in NAD, appears stated age  HEENT: PERRL, iEOM, MMM, no scleral icterus  CV: RRR on peripheral palpation, cap refill < 3 sec  Resp breathing comfortably on room air  Abd: soft, ND, NT, notable left inguinal hernia that is soft and compressible, unable to reduce it as it re accumulates, no skin changes over the area. No rashes/wounds in left groin noted  MSK: MAEO x4  Neuro: AO x3, no focal deficits  Psych: normal mood and behavior      Relevant Results  No labs to review at this time, but type and screen pending    2 units of platelets on hold for OR    5/5/25 CT AP  IMPRESSION:  1.  Bilateral inguinal hernias, the left of which contains ascitic  fluid.  2. Cirrhotic morphology of the liver.  3. Moderate volume abdominopelvic ascites.  4. Splenomegaly with multiple upper abdominal collateral vessels,  findings which may be seen in the setting of portal hypertension  given aforementioned cirrhosis.  5. Additional findings as further detailed above.        Assessment & Plan  Non-recurrent unilateral inguinal hernia without obstruction or gangrene      Vance Silva \"Karyn\" is a 59 y.o. male with PMH  alcoholic cirrohsis c/b ascites ( not currently with ascites), anemia, hep C, CKD, parkinsons, sleep apnea, prior TBI without notable deficiets, presenting with symptomatic left inguinal hernia. Plan for left inguinal hernia repair , no mesh.     NPO  T&S pending  2 units platelets on hold  Attending will need to renard laterality while in pre-op  Electronic consent completed    Will discuss with attending Dr. Wallace.    Areli Aguero MD  PGY1 Abdominal Transplant Surgery  Pager [phone]  Available via secure chat        "

## 2025-05-22 NOTE — PROGRESS NOTES
"SW verified Pt's identity by confirming Pt's name, , address, and phone number listed on file.  SW met with Pt alone for psychosocial update via phone. Pt was pleasant and engaged. Pt has [facility] for insurance. Pt reported recent hernia surgery, which he was in the recovery room during this phone conversation. Pt declined interest in rescheduling. Pt's primary support remains mother Wanda as primary support, his girlfriend Aleena,  alternate supports of his father  Karyn, his daughter Harleen, Zoila, Marilyn. Pt reported supports remain adequate for post transplant care and FU support.  Pt described their mood as \"ironically immensely better\".  Pt denied any current MH treatments or concerns. SW offered pt psychotherapy resources, pt declined interest at this time.  Pt denied any current tobacco use, or alcohol use. Pt reported utilizes THC oil drops at times. SW discussed the inpatient transplant stay and the need for support to be a part of education session. SW also discussed the frequency of post op appointments - once a week surgeon/hepatologist visits and once a week labs. Pt expressed understanding. Pt is low psychosocial risk.     Plan: SW will follow up with Pt annually to monitor MH.    "

## 2025-05-22 NOTE — ANESTHESIA PROCEDURE NOTES
Airway  Date/Time: 5/22/2025 7:36 AM  Reason: elective    Airway not difficult    Staffing  Performed: resident   Authorized by: Simran Olivier MD    Performed by: Arslan Skinner DO  Patient location during procedure: OR    Patient Condition  Indications for airway management: anesthesia  Patient position: sniffing  Planned trial extubation  Sedation level: deep     Final Airway Details   Preoxygenated: yes  Final airway type: endotracheal airway  Successful airway: ETT  Cuffed: yes   Successful intubation technique: video laryngoscopy  Adjuncts used in placement: intubating stylet  Endotracheal tube insertion site: oral (Easy mask,  without Oral airway, with oral airway 500 TV)  Blade: Chava (Flores 4)  Blade size: #4  ETT size (mm): 7.5  Cormack-Lehane Classification: grade I - full view of glottis  Placement verified by: chest auscultation and capnometry   Measured from: lips  ETT to lips (cm): 24  Number of attempts at approach: 1

## 2025-05-22 NOTE — CARE PLAN
The clinical goals for the shift include Patient will remain HDS    Problem: Risk for Suicide  Goal: Accepts medications as prescribed/needed this shift  Outcome: Progressing  Goal: Identifies supports this shift  Outcome: Progressing  Goal: Makes needs known through verbalization or behaviors this shift  Outcome: Progressing  Goal: No self harm this shift  Outcome: Progressing  Goal: Read Safety Guidelines this shift  Outcome: Progressing  Goal: Complete Mental Health Safety Plan (psychiatry only) this shift  Outcome: Progressing     Problem: Pain - Adult  Goal: Verbalizes/displays adequate comfort level or baseline comfort level  Outcome: Progressing     Problem: Safety - Adult  Goal: Free from fall injury  Outcome: Progressing     Problem: Discharge Planning  Goal: Discharge to home or other facility with appropriate resources  Outcome: Progressing     Problem: Chronic Conditions and Co-morbidities  Goal: Patient's chronic conditions and co-morbidity symptoms are monitored and maintained or improved  Outcome: Progressing     Problem: Nutrition  Goal: Nutrient intake appropriate for maintaining nutritional needs  Outcome: Progressing

## 2025-05-22 NOTE — ANESTHESIA PREPROCEDURE EVALUATION
"Patient: Vance Silva \"Karyn\"    Procedure Information       Date/Time: 25 0645    Procedure: REPAIR, HERNIA, INGUINAL (Left: Abdomen)    Location: [facility] OR 15 / [facility]    Surgeons: Antony Wallace MD          Appropriately NPO. Risks/ benefits/ alternatives of anesthetic plan discussed. All questions invited and answered. Patient agrees to proceed as planned and consents to blood transfusion as well as all necessary procedures and invasive monitors.      Relevant Problems   Cardiac   (+) Hypertension      Neuro   (+) Alcohol related seizure (Multi)   (+) Chronic traumatic encephalopathy   (+) Inflammatory neuropathy (Multi)   (+) Lesion of ulnar nerve   (+) Peripheral neuropathy      GI   (+) Gastrointestinal hemorrhage with melena   (+) Secondary esophageal varices without bleeding (Multi)   (+) UGIB (upper gastrointestinal bleed)      Liver   (+) Alcoholic liver disease   (+) Cirrhosis of liver with ascites (Multi)   (+) Decompensated hepatic cirrhosis (Multi)      Hematology   (+) Anemia   (+) Anemia due to chronic blood loss   (+) Anemia, unspecified type   (+) Thrombocytopenia      Musculoskeletal   (+) Herniated lumbar intervertebral disc      ID   (+) Primary bacterial peritonitis (Multi)       Clinical information reviewed:   Tobacco  Allergies  Meds   Med Hx  Surg Hx   Fam Hx  Soc Hx        NPO Detail:  NPO/Void Status  Date of Last Liquid: 25  Time of Last Liquid:   Date of Last Solid: 25  Time of Last Solid:   Last Intake Type: Clear fluids         Physical Exam    Airway  Mallampati: II  TM distance: >3 FB  Neck ROM: full  Mouth openin finger widths     Cardiovascular   Rhythm: regular  Rate: normal     Dental    Pulmonary Breath sounds clear to auscultation     Abdominal            Anesthesia Plan    History of general anesthesia?: yes  History of complications of general anesthesia?: no    ASA 3     general     intravenous induction   Postoperative " pain plan includes opioids.  Anesthetic plan and risks discussed with patient.  Use of blood products discussed with patient who consented to blood products.    Plan discussed with resident.

## 2025-05-22 NOTE — ANESTHESIA PROCEDURE NOTES
Peripheral IV  Date/Time: 5/22/2025 7:40 AM      Placement  Needle size: 18 G  Laterality: left  Location: hand  Local anesthetic: none  Site prep: alcohol  Technique: anatomical landmarks  Attempts: 1

## 2025-05-23 ENCOUNTER — PHARMACY VISIT (OUTPATIENT)
Dept: PHARMACY | Facility: CLINIC | Age: 60
End: 2025-05-23
Payer: COMMERCIAL

## 2025-05-23 VITALS
DIASTOLIC BLOOD PRESSURE: 79 MMHG | OXYGEN SATURATION: 92 % | HEART RATE: 66 BPM | SYSTOLIC BLOOD PRESSURE: 115 MMHG | TEMPERATURE: 98.4 F | HEIGHT: 77 IN | RESPIRATION RATE: 18 BRPM | BODY MASS INDEX: 24.78 KG/M2 | WEIGHT: 209.88 LBS

## 2025-05-23 LAB
ALBUMIN SERPL BCP-MCNC: 4.1 G/DL (ref 3.4–5)
ALP SERPL-CCNC: 63 U/L (ref 33–136)
ALT SERPL W P-5'-P-CCNC: 34 U/L (ref 10–52)
ANION GAP SERPL CALC-SCNC: 13 MMOL/L (ref 10–20)
AST SERPL W P-5'-P-CCNC: 25 U/L (ref 9–39)
BB ANTIBODY IDENTIFICATION: NORMAL
BILIRUB DIRECT SERPL-MCNC: 0.2 MG/DL (ref 0–0.3)
BILIRUB SERPL-MCNC: 1.2 MG/DL (ref 0–1.2)
BUN SERPL-MCNC: 19 MG/DL (ref 6–23)
CALCIUM SERPL-MCNC: 9 MG/DL (ref 8.6–10.6)
CASE #: NORMAL
CHLORIDE SERPL-SCNC: 106 MMOL/L (ref 98–107)
CO2 SERPL-SCNC: 23 MMOL/L (ref 21–32)
CREAT SERPL-MCNC: 1.04 MG/DL (ref 0.5–1.3)
EGFRCR SERPLBLD CKD-EPI 2021: 82 ML/MIN/1.73M*2
ERYTHROCYTE [DISTWIDTH] IN BLOOD BY AUTOMATED COUNT: 14.7 % (ref 11.5–14.5)
GLUCOSE SERPL-MCNC: 91 MG/DL (ref 74–99)
HCT VFR BLD AUTO: 32.2 % (ref 41–52)
HGB BLD-MCNC: 10.8 G/DL (ref 13.5–17.5)
LABORATORY COMMENT REPORT: NORMAL
MCH RBC QN AUTO: 30.2 PG (ref 26–34)
MCHC RBC AUTO-ENTMCNC: 33.5 G/DL (ref 32–36)
MCV RBC AUTO: 90 FL (ref 80–100)
NRBC BLD-RTO: 0 /100 WBCS (ref 0–0)
PATH REPORT.FINAL DX SPEC: NORMAL
PATH REPORT.GROSS SPEC: NORMAL
PATH REPORT.RELEVANT HX SPEC: NORMAL
PATH REPORT.TOTAL CANCER: NORMAL
PATH REV-IMMUNOHEMATOLOGY-PR30: NORMAL
PHOSPHATE SERPL-MCNC: 3.3 MG/DL (ref 2.5–4.9)
PLATELET # BLD AUTO: 43 X10*3/UL (ref 150–450)
POTASSIUM SERPL-SCNC: 4.1 MMOL/L (ref 3.5–5.3)
PROT SERPL-MCNC: 6.7 G/DL (ref 6.4–8.2)
RBC # BLD AUTO: 3.58 X10*6/UL (ref 4.5–5.9)
SODIUM SERPL-SCNC: 138 MMOL/L (ref 136–145)
WBC # BLD AUTO: 4.5 X10*3/UL (ref 4.4–11.3)

## 2025-05-23 PROCEDURE — 82248 BILIRUBIN DIRECT: CPT

## 2025-05-23 PROCEDURE — 85027 COMPLETE CBC AUTOMATED: CPT

## 2025-05-23 PROCEDURE — 84100 ASSAY OF PHOSPHORUS: CPT

## 2025-05-23 PROCEDURE — 7100000011 HC EXTENDED STAY RECOVERY HOURLY - NURSING UNIT

## 2025-05-23 PROCEDURE — 2500000001 HC RX 250 WO HCPCS SELF ADMINISTERED DRUGS (ALT 637 FOR MEDICARE OP)

## 2025-05-23 PROCEDURE — 36415 COLL VENOUS BLD VENIPUNCTURE: CPT

## 2025-05-23 PROCEDURE — 99024 POSTOP FOLLOW-UP VISIT: CPT | Performed by: TRANSPLANT SURGERY

## 2025-05-23 PROCEDURE — 80053 COMPREHEN METABOLIC PANEL: CPT

## 2025-05-23 PROCEDURE — RXMED WILLOW AMBULATORY MEDICATION CHARGE

## 2025-05-23 RX ORDER — OXYCODONE HYDROCHLORIDE 5 MG/1
5 TABLET ORAL EVERY 6 HOURS PRN
Qty: 10 TABLET | Refills: 0 | Status: SHIPPED | OUTPATIENT
Start: 2025-05-23 | End: 2025-06-01 | Stop reason: HOSPADM

## 2025-05-23 RX ADMIN — CARVEDILOL 6.25 MG: 6.25 TABLET, FILM COATED ORAL at 09:00

## 2025-05-23 RX ADMIN — LACTULOSE 20 G: 20 SOLUTION ORAL at 09:00

## 2025-05-23 RX ADMIN — FUROSEMIDE 40 MG: 40 TABLET ORAL at 09:00

## 2025-05-23 RX ADMIN — PANTOPRAZOLE SODIUM 40 MG: 40 TABLET, DELAYED RELEASE ORAL at 06:16

## 2025-05-23 ASSESSMENT — PAIN SCALES - GENERAL
PAINLEVEL_OUTOF10: 0 - NO PAIN
PAINLEVEL_OUTOF10: 2

## 2025-05-23 ASSESSMENT — COGNITIVE AND FUNCTIONAL STATUS - GENERAL: DAILY ACTIVITIY SCORE: 24

## 2025-05-23 ASSESSMENT — PAIN - FUNCTIONAL ASSESSMENT: PAIN_FUNCTIONAL_ASSESSMENT: 0-10

## 2025-05-23 NOTE — CARE PLAN
The patient's goals for the shift include      The clinical goals for the shift include Patient will remain HDS      Problem: Risk for Suicide  Goal: Accepts medications as prescribed/needed this shift  Outcome: Progressing  Goal: Identifies supports this shift  Outcome: Progressing  Goal: Makes needs known through verbalization or behaviors this shift  Outcome: Progressing  Goal: No self harm this shift  Outcome: Progressing  Goal: Read Safety Guidelines this shift  Outcome: Progressing  Goal: Complete Mental Health Safety Plan (psychiatry only) this shift  Outcome: Progressing     Problem: Safety - Adult  Goal: Free from fall injury  Outcome: Progressing     Problem: Discharge Planning  Goal: Discharge to home or other facility with appropriate resources  Outcome: Progressing     Problem: Chronic Conditions and Co-morbidities  Goal: Patient's chronic conditions and co-morbidity symptoms are monitored and maintained or improved  Outcome: Progressing     Problem: Nutrition  Goal: Nutrient intake appropriate for maintaining nutritional needs  Outcome: Progressing     Problem: Fall/Injury  Goal: Not fall by end of shift  Outcome: Progressing  Goal: Be free from injury by end of the shift  Outcome: Progressing  Goal: Verbalize understanding of personal risk factors for fall in the hospital  Outcome: Progressing  Goal: Verbalize understanding of risk factor reduction measures to prevent injury from fall in the home  Outcome: Progressing  Goal: Use assistive devices by end of the shift  Outcome: Progressing  Goal: Pace activities to prevent fatigue by end of the shift  Outcome: Progressing

## 2025-05-23 NOTE — DISCHARGE INSTRUCTIONS
Your incision is closed with suture under the skin which will dissolve on its own. The incision is covered with skin glue which will peel off over a period of weeks    You may shower and let soapy water run over the incision. Pat dry - do not rub.     No lifting greater than 15 pounds for the next 4 weeks. May walk and perform daily activities of living, but should limit exertion to prevent the hernia from recurring.    Do not submerge in bath, hot tubs, or pools until the incision is fully healed in approximately 6 weeks. This is to prevent infection.    You are prescribed narcotic pain medications. Do not drive or handle heavy machinery while on prescription strength pain medications. You may take tylenol as needed around oxycodone. Do not exceed the recommended dose on the bottle.     Please follow up with Dr. Wallace in office in 1 week. Do not hesitate to call  his office with any questions or concerns.

## 2025-05-23 NOTE — OP NOTE
"REPAIR, HERNIA, INGUINAL (L) Operative Note     Date: 2025  OR Location: [facility] OR    Name: Vance Silva \"Karyn\", : 1965, Age: 59 y.o., MRN: [id number], Sex: male    Diagnosis  Pre-op Diagnosis      * Non-recurrent unilateral inguinal hernia without obstruction or gangrene [K40.90] Post-op Diagnosis     * Non-recurrent unilateral inguinal hernia without obstruction or gangrene [K40.90]     Procedures  REPAIR, HERNIA, INGUINAL  00103 - IA RPR 1ST INGUN HRNA AGE 5 YRS/> REDUCIBLE      Surgeons      * Antony Wallace - Primary    Resident/Fellow/Other Assistant:  Surgeons and Role:     * Pia Pimentel MD - Assisting    Staff:   Tanyaulator: Lynette Finley Person: Federico    Anesthesia Staff: Anesthesiologist: Simran Olivier MD  C-AA: CATY Pedersen  Anesthesia Resident: Shoaib Vidal DO; Arslan Skinner DO    Procedure Summary  Anesthesia: General  ASA: III  Estimated Blood Loss: 20 mL  Intra-op Medications:   Administrations occurring from 0645 to 1055 on 25:   Medication Name Total Dose   bupivacaine PF 0.25 % (Marcaine) 0.25 % (2.5 mg/mL) injection 30 mL   sodium chloride 0.9 % irrigation solution 1,000 mL   polymyxin B injection 1,000,000 Units   desmopressin (DDAVP) 28.4 mcg in sodium chloride 0.9% 50 mL IV 56.82 mcg   albumin human bottle 5% 250 mL   ceFAZolin (Ancef) vial 1 g 2 g   dexAMETHasone (Decadron) 4 mg/mL 4 mg   famotidine (Pepcid) 10 mg/mL 20 mg   fentaNYL (Sublimaze) injection 50 mcg/mL 100 mcg   furosemide (Lasix) 10 mg/mL 20 mg   glycopyrrolate (Robinul) 0.2 mg/mL injection SYRINGE 0.6 mg   HYDROmorphone (Dilaudid) injection 1 mg/mL 1 mg   LR bolus Cannot be calculated   lidocaine (Xylocaine) injection 2 % 90 mg   metoclopramide (Reglan) 5 mg/mL 10 mg   midazolam PF (Versed) injection 1 mg/mL 2 mg   neostigmine (Bloxiverz) 1 mg/mL injection 5 mg   ondansetron 2 mg/mL 4 mg   oxygen (O2) therapy 50 L   oxygen (O2) therapy 57.3 L   phenylephrine 40 mcg/mL syringe " "10 mL 280 mcg   propofol (Diprivan) injection 10 mg/mL 200 mg   rocuronium (ZeMuron) 50 mg/5 mL injection 110 mg              Anesthesia Record               Intraprocedure I/O Totals          Intake    PLATELETS 500.00 mL    desmopressin (DDAVP) 28.4 mcg in sodium chloride 0.9% 50 mL .12 mL    Total Intake 657.12 mL       Output    Urine 415 mL    Est. Blood Loss 20 mL    Total Output 435 mL       Net    Net Volume 222.12 mL          Specimen:   ID Type Source Tests Collected by Time   1 : LEFT INGUINAL HERNIA SAC AND LIPOMA Tissue HERNIA SAC SURGICAL PATHOLOGY EXAM Antony Wallace MD 5/22/2025 0904                 Drains and/or Catheters:   [REMOVED] Urethral Catheter 16 Fr. (Removed)       Tourniquet Times:         Implants:  Implants       Type Name Action Serial No.      Surgical Mesh Sling Implant MESH, SOFTMESH 3 X 6, FLAT - [id number] Implanted               Findings:   Large inguinal hernia ascite filled      Indications: Vance Silva \"Christine" is an 59 y.o. male who is having surgery for left inguinal hernia. He has a history of cirrhosis with decompensation. Recently, his liver has improved with significant less ascites. He was deemed stable for hernia repair    The patient was seen in the preoperative area. The risks, benefits, complications, treatment options, non-operative alternatives, expected recovery and outcomes were discussed with the patient. The possibilities of reaction to medication, pulmonary aspiration, injury to surrounding structures, bleeding, recurrent infection, the need for additional procedures, failure to diagnose a condition, and creating a complication requiring transfusion or operation were discussed with the patient. The patient concurred with the proposed plan, giving informed consent.  The site of surgery was properly noted/marked if necessary per policy. The patient has been actively warmed in preoperative area. Preoperative antibiotics have been ordered and given within 1 " hours of incision. Venous thrombosis prophylaxis have been ordered including bilateral sequential compression devices    Procedure Details:     The patient was brought to the operating room and placed in the supine position. After adequate general endotracheal anesthesia, the abdomen was prepped and draped in a sterile fashion. The patient was given antibiotics intravenously.  The inguinal ligament was identified from the pubic tubercle to the ASIS. One fingerbreadth above the pubic tubercle, a curvilinear incision was made. Dissection was then carried down with electro Bovie cautery through Navi’s fascia maintaining hemostasis. Several collateral veins were controlled with electrocautery. Once the external oblique was identified, external oblique was incised in the length of its fibers with a #15 blade scalpel. Metzenbaum scissors were then used to extend the incision in both directions opening up the external oblique down to the external ring. Of note, the external oblique fascia was very attenuated and thin. The cord, cord structures as well as hernia sac were freed up circumferentially and a Penrose drain was placed around it. Next, the hernia sac and content were identified and freed from the cord structure and surrounding tissue so it can be completely reduced. The sac was filled with ascitic fluid. Extreme care was taken to free it up completely without getting into it. Hemostasis was meticulously achieved given his history of decompensated cirrhosis and thrombocytopenia. We were able to completely free up the sac from the chord structures. We twisted the sac to reduce all the ascites and intra-abdominal content back in the abdomen and suture ligated with vicryl. It was then free from the surrounding tissue and completed reduced in to the internal ring. The defect was quite large so I did not think it would be feasible for primary suture repair without mesh. I plicated the floor to close this defect so it  would be flat for mesh placement.   Next, attention was made to placing a Prolene mesh to cover the floor. Using prolene, the mesh was sutured to the pubic tubercle medially along the inguinal ligament inferiorly and along the conjoint tendon superiorly making a slit for the cord structures. The internal ring was reconstructed by closing the mesh slits to the appropriate size. After satisfactory repair and hemostasis, the external oblique was then closed over the roof with a running 2-0 Vicryl suture. the Navi’s fascia was approximated with interrupted 3-0 Vicryl sutures. The skin was closed with a running subcuticular 4-0 Monocryl suture. Surgical glue with sterile dressings were applied. Testicles were checked afterwards. All counts were correct and I was present for the entire case.       Evidence of Infection: No   Complications:  None; patient tolerated the procedure well.    Disposition: PACU - hemodynamically stable.  Condition: stable     Attending Attestation: I was present and scrubbed for the entire procedure.    This case will qualify for modifier 22 for added complexity due to patient's status with decompensated cirrhosis. Extreme care needed for hemostasis and dividing collateral varices due to portal hypertension.    Antony Wallace  Phone Number: [phone]

## 2025-05-23 NOTE — PROGRESS NOTES
05/23/25 1059   Discharge Planning   Living Arrangements Spouse/significant other   Assistance Needed None   Type of Residence Private residence   Home or Post Acute Services None   Expected Discharge Disposition Home   Does the patient need discharge transport arranged? Yes   RoundTrip coordination needed? Yes   Has discharge transport been arranged? No   Financial Resource Strain   How hard is it for you to pay for the very basics like food, housing, medical care, and heating? Somewhat   Housing Stability   In the last 12 months, was there a time when you were not able to pay the mortgage or rent on time? N   Transportation Needs   In the past 12 months, has lack of transportation kept you from medical appointments or from getting medications? no       DC Planning:  Went in and met with the pt, confirmed demographics.     Transitional Care Coordination Progress Note:  Patient discussed during interdisciplinary rounds.     Plan per Medical/Surgical team:    Home Care choice for home going needs, [place] [address]  Home. No home going needs anticipated for this pt at this time.        Discharge disposition: Home. No home going needs anticipated for this pt at this time.      Potential Barriers: None    ADOD: 5/23    This TCC will continue to follow for home going needs and safe DC plan.      Latrice Marie. JAGDISH. TCC.

## 2025-05-23 NOTE — PROGRESS NOTES
"Pharmacy Medication History Review    Vance Silva \"Christine" is a 59 y.o. male admitted for Non-recurrent unilateral inguinal hernia without obstruction or gangrene. Pharmacy reviewed the patient's pugll-yx-mpfklxsji medications and allergies for accuracy.    Medications ADDED:  None  Medications CHANGED:  None  Medications REMOVED/NOT TAKING:   None     The list below reflects the updated PTA list.   Prior to Admission Medications   Prescriptions Last Dose Informant   carvedilol (Coreg) 6.25 mg tablet 5/21/2025 Self   Sig: Take 1 tablet (6.25 mg) by mouth 2 times a day.  Pt unsure of frequency BID vs QHS    ferrous sulfate, 325 mg ferrous sulfate, tablet 5/21/2025 Self   Sig: TAKE 1 TABLET BY MOUTH TWICE A DAY   furosemide (Lasix) 20 mg tablet 5/21/2025 Self   Sig: Take 2 tablets (40 mg) by mouth once daily.   lactulose 10 gram/15 mL solution 5/21/2025 Self   Sig: TAKE 30 ML (20 G) BY MOUTH 3 TIMES A DAY. TITRATE TO ACHIEVE GOAL 2-3 BOWEL MOVEMENTS DAILY   pantoprazole (ProtoNix) 40 mg EC tablet 5/21/2025 Self   Sig: TAKE 1 TABLET BY MOUTH ONCE DAILY IN THE MORNING. TAKE BEFORE MEALS. DO NOT CRUSH, CHEW, OR SPLIT   spironolactone (Aldactone) 50 mg tablet 5/21/2025 Self   Sig: Take 1 tablet (50 mg) by mouth once daily.      Facility-Administered Medications: None        The list below reflects the updated allergy list. Please review each documented allergy for additional clarification and justification.  Allergies  Reviewed by Morenita Anderson RN on 5/22/2025   No Known Allergies         Patient was unable to be assessed for M2B at discharge.     Sources:   Patient interview (moderate historian, unable to name medications, dose or frequency, however was able to provide that he uses medications for iron, a diuretic, a stomach medication and a medication to reduce ammonia)  [facility]  Care Everywhere  Chart Review  Medication Dispense History  Call to [facility], [address] [phone] for verification of last fill date of " "Carvedilol.    Additional Comments:  Patient states he also uses pea protein powder with raw coffee powder.    Jessi Burdick [facility]  Transitions of Care Pharmacist  05/23/25     Secure Chat preferred   If no response call [phone] or Vocera \"Med Rec\"    "

## 2025-05-23 NOTE — DISCHARGE SUMMARY
Discharge Diagnosis  Non-recurrent unilateral inguinal hernia without obstruction or gangrene    Issues Requiring Follow-Up  Follow up in one week with Dr. Wallace in office     Test Results Pending At Discharge  Pending Labs       Order Current Status    Path Review-Immunohematology In process            Hospital Course   Mr. Karyn Silva is a 58 y/o male with alcoholic cirrhosis who has been following by transplant surgery and hepatology presenting for elective repair of symptomatic left inguinal hernia on 5/22/2025. The hernia sac was filled with ascitic fluid which was drained and a mesh was placed. Patient tolerated the procedure well, however required platelets intraoperatively and was admitted for overnight observation. Ms. Silva tolerated a diet without nausea, was able to ambulate and void postoperatively and is medically stable for discharge home on 5/23/2025    Pertinent Physical Exam At Time of Discharge  Physical Exam  Constitutional:       Appearance: Normal appearance.   HENT:      Head: Normocephalic and atraumatic.      Mouth/Throat:      Mouth: Mucous membranes are moist.   Eyes:      General: No scleral icterus.     Extraocular Movements: Extraocular movements intact.   Cardiovascular:      Rate and Rhythm: Normal rate.      Pulses: Normal pulses.   Pulmonary:      Effort: Pulmonary effort is normal. No respiratory distress.   Abdominal:      General: There is distension.      Palpations: Abdomen is soft. There is no mass.      Tenderness: There is abdominal tenderness. There is no guarding.      Comments: Left groin incision well approximated and covered with dermabond. No drainage or erythema   Musculoskeletal:         General: Normal range of motion.      Cervical back: Normal range of motion.   Skin:     General: Skin is warm and dry.   Neurological:      General: No focal deficit present.      Mental Status: He is alert and oriented to person, place, and time.   Psychiatric:         Mood and  Affect: Mood normal.         Behavior: Behavior normal.     Home Medications     Medication List      START taking these medications    • oxyCODONE 5 mg immediate release tablet; Commonly known as: Roxicodone;   Take 1 tablet (5 mg) by mouth every 6 hours if needed for severe pain (7 -   10).     CONTINUE taking these medications    • carvedilol 6.25 mg tablet; Commonly known as: Coreg; Take 1 tablet (6.25   mg) by mouth 2 times a day.  • ferrous sulfate 325 mg (65 mg elemental) tablet; TAKE 1 TABLET BY MOUTH   TWICE A DAY  • furosemide 20 mg tablet; Commonly known as: Lasix; Take 2 tablets (40   mg) by mouth once daily.  • lactulose 20 gram/30 mL oral solution; TAKE 30 ML (20 G) BY MOUTH 3   TIMES A DAY. TITRATE TO ACHIEVE GOAL 2-3 BOWEL MOVEMENTS DAILY  • pantoprazole 40 mg EC tablet; Commonly known as: ProtoNix; TAKE 1 TABLET   BY MOUTH ONCE DAILY IN THE MORNING. TAKE BEFORE MEALS. DO NOT CRUSH, CHEW,   OR SPLIT  • spironolactone 50 mg tablet; Commonly known as: Aldactone; Take 1 tablet   (50 mg) by mouth once daily.       Outpatient Follow-Up  Future Appointments   Date Time Provider Department Center   5/29/2025  1:00 PM Antony Wallace MD [id number]   6/23/2025 10:20 AM Mu Norris MD [id number]       Siva Diaz MD

## 2025-05-25 ENCOUNTER — TELEPHONE (OUTPATIENT)
Facility: HOSPITAL | Age: 60
End: 2025-05-25

## 2025-05-25 NOTE — TELEPHONE ENCOUNTER
Patient paged Dr Wallace- got connected with the kidney transplant coordinator. Pt states he had hernia surgery with Dr Wallace and was discharged yesterday. Pt states he is still experiencing swelling and wanted to let Dr Wallace know. Pt states pain has gotten better and he is not taking pain meds. Pt states overall he feels better and has been moving around better as well. Pt states he will be in [place] on Tuesday and can stop in to be evaluated by Dr Wallace if Dr Wallace deemed that necessary. Pt has fuv scheduled 5/29. Message sent to Dr Wallace with update

## 2025-05-28 ENCOUNTER — TELEPHONE (OUTPATIENT)
Facility: HOSPITAL | Age: 60
End: 2025-05-28

## 2025-05-28 ENCOUNTER — TELEPHONE (OUTPATIENT)
Dept: SURGERY | Facility: CLINIC | Age: 60
End: 2025-05-28

## 2025-05-28 ENCOUNTER — HOSPITAL ENCOUNTER (INPATIENT)
Facility: HOSPITAL | Age: 60
LOS: 4 days | Discharge: HOME | DRG: 920 | End: 2025-06-01
Attending: EMERGENCY MEDICINE | Admitting: TRANSPLANT SURGERY
Payer: COMMERCIAL

## 2025-05-28 ENCOUNTER — OFFICE VISIT (OUTPATIENT)
Dept: SURGERY | Facility: CLINIC | Age: 60
End: 2025-05-28
Payer: COMMERCIAL

## 2025-05-28 ENCOUNTER — DOCUMENTATION (OUTPATIENT)
Dept: TRANSPLANT | Facility: HOSPITAL | Age: 60
End: 2025-05-28

## 2025-05-28 ENCOUNTER — HOSPITAL ENCOUNTER (OUTPATIENT)
Facility: HOSPITAL | Age: 60
Setting detail: SURGERY ADMIT
End: 2025-05-28
Attending: TRANSPLANT SURGERY | Admitting: TRANSPLANT SURGERY
Payer: COMMERCIAL

## 2025-05-28 ENCOUNTER — APPOINTMENT (OUTPATIENT)
Dept: RADIOLOGY | Facility: HOSPITAL | Age: 60
DRG: 920 | End: 2025-05-28
Payer: COMMERCIAL

## 2025-05-28 ENCOUNTER — DOCUMENTATION (OUTPATIENT)
Facility: HOSPITAL | Age: 60
End: 2025-05-28

## 2025-05-28 VITALS
HEIGHT: 77 IN | WEIGHT: 205 LBS | DIASTOLIC BLOOD PRESSURE: 105 MMHG | HEART RATE: 87 BPM | SYSTOLIC BLOOD PRESSURE: 150 MMHG | RESPIRATION RATE: 16 BRPM | BODY MASS INDEX: 24.21 KG/M2

## 2025-05-28 DIAGNOSIS — S30.1XXA HEMATOMA OF LEFT INGUINAL REGION: Primary | ICD-10-CM

## 2025-05-28 DIAGNOSIS — K40.90 HERNIA, INGUINAL, LEFT: Primary | ICD-10-CM

## 2025-05-28 LAB
ABO GROUP (TYPE) IN BLOOD: NORMAL
ALBUMIN SERPL BCP-MCNC: 4.3 G/DL (ref 3.4–5)
ALP SERPL-CCNC: 117 U/L (ref 33–136)
ALT SERPL W P-5'-P-CCNC: 39 U/L (ref 10–52)
ANION GAP SERPL CALC-SCNC: 16 MMOL/L (ref 10–20)
ANTIBODY SCREEN: NORMAL
APPEARANCE UR: CLEAR
APTT PPP: 29 SECONDS (ref 26–36)
AST SERPL W P-5'-P-CCNC: 29 U/L (ref 9–39)
BASOPHILS # BLD AUTO: 0.02 X10*3/UL (ref 0–0.1)
BASOPHILS NFR BLD AUTO: 0.5 %
BILIRUB SERPL-MCNC: 1.6 MG/DL (ref 0–1.2)
BILIRUB UR STRIP.AUTO-MCNC: NEGATIVE MG/DL
BLOOD EXPIRATION DATE: NORMAL
BUN SERPL-MCNC: 19 MG/DL (ref 6–23)
CALCIUM SERPL-MCNC: 9.4 MG/DL (ref 8.6–10.6)
CHLORIDE SERPL-SCNC: 100 MMOL/L (ref 98–107)
CO2 SERPL-SCNC: 22 MMOL/L (ref 21–32)
COLOR UR: NORMAL
CREAT SERPL-MCNC: 1.08 MG/DL (ref 0.5–1.3)
DISPENSE STATUS: NORMAL
EGFRCR SERPLBLD CKD-EPI 2021: 78 ML/MIN/1.73M*2
EOSINOPHIL # BLD AUTO: 0.01 X10*3/UL (ref 0–0.7)
EOSINOPHIL NFR BLD AUTO: 0.2 %
ERYTHROCYTE [DISTWIDTH] IN BLOOD BY AUTOMATED COUNT: 14 % (ref 11.5–14.5)
GLUCOSE SERPL-MCNC: 96 MG/DL (ref 74–99)
GLUCOSE UR STRIP.AUTO-MCNC: NORMAL MG/DL
HCT VFR BLD AUTO: 30.9 % (ref 41–52)
HGB BLD-MCNC: 10.9 G/DL (ref 13.5–17.5)
IMM GRANULOCYTES # BLD AUTO: 0.01 X10*3/UL (ref 0–0.7)
IMM GRANULOCYTES NFR BLD AUTO: 0.2 % (ref 0–0.9)
INR PPP: 1.3 (ref 0.9–1.1)
KETONES UR STRIP.AUTO-MCNC: NEGATIVE MG/DL
LACTATE SERPL-SCNC: 1.1 MMOL/L (ref 0.4–2)
LEUKOCYTE ESTERASE UR QL STRIP.AUTO: NEGATIVE
LYMPHOCYTES # BLD AUTO: 0.33 X10*3/UL (ref 1.2–4.8)
LYMPHOCYTES NFR BLD AUTO: 7.7 %
MCH RBC QN AUTO: 29.5 PG (ref 26–34)
MCHC RBC AUTO-ENTMCNC: 35.3 G/DL (ref 32–36)
MCV RBC AUTO: 84 FL (ref 80–100)
MONOCYTES # BLD AUTO: 0.29 X10*3/UL (ref 0.1–1)
MONOCYTES NFR BLD AUTO: 6.7 %
NEUTROPHILS # BLD AUTO: 3.64 X10*3/UL (ref 1.2–7.7)
NEUTROPHILS NFR BLD AUTO: 84.7 %
NITRITE UR QL STRIP.AUTO: NEGATIVE
NRBC BLD-RTO: 0 /100 WBCS (ref 0–0)
PH UR STRIP.AUTO: 7 [PH]
PLATELET # BLD AUTO: 45 X10*3/UL (ref 150–450)
POTASSIUM SERPL-SCNC: 3.6 MMOL/L (ref 3.5–5.3)
PRODUCT BLOOD TYPE: 6200
PRODUCT CODE: NORMAL
PROT SERPL-MCNC: 7.4 G/DL (ref 6.4–8.2)
PROT UR STRIP.AUTO-MCNC: NEGATIVE MG/DL
PROTHROMBIN TIME: 14.2 SECONDS (ref 9.8–12.4)
RBC # BLD AUTO: 3.69 X10*6/UL (ref 4.5–5.9)
RBC # UR STRIP.AUTO: NEGATIVE MG/DL
RH FACTOR (ANTIGEN D): NORMAL
SODIUM SERPL-SCNC: 134 MMOL/L (ref 136–145)
SP GR UR STRIP.AUTO: 1.03
UNIT ABO: NORMAL
UNIT NUMBER: NORMAL
UNIT RH: NORMAL
UNIT VOLUME: 280
UROBILINOGEN UR STRIP.AUTO-MCNC: NORMAL MG/DL
WBC # BLD AUTO: 4.3 X10*3/UL (ref 4.4–11.3)

## 2025-05-28 PROCEDURE — 86901 BLOOD TYPING SEROLOGIC RH(D): CPT

## 2025-05-28 PROCEDURE — 86870 RBC ANTIBODY IDENTIFICATION: CPT

## 2025-05-28 PROCEDURE — 2500000004 HC RX 250 GENERAL PHARMACY W/ HCPCS (ALT 636 FOR OP/ED)

## 2025-05-28 PROCEDURE — 99024 POSTOP FOLLOW-UP VISIT: CPT | Performed by: TRANSPLANT SURGERY

## 2025-05-28 PROCEDURE — 76870 US EXAM SCROTUM: CPT | Performed by: STUDENT IN AN ORGANIZED HEALTH CARE EDUCATION/TRAINING PROGRAM

## 2025-05-28 PROCEDURE — 73701 CT LOWER EXTREMITY W/DYE: CPT | Mod: LEFT SIDE | Performed by: RADIOLOGY

## 2025-05-28 PROCEDURE — 85025 COMPLETE CBC W/AUTO DIFF WBC: CPT

## 2025-05-28 PROCEDURE — 80053 COMPREHEN METABOLIC PANEL: CPT

## 2025-05-28 PROCEDURE — 2500000005 HC RX 250 GENERAL PHARMACY W/O HCPCS: Performed by: EMERGENCY MEDICINE

## 2025-05-28 PROCEDURE — 99285 EMERGENCY DEPT VISIT HI MDM: CPT | Performed by: EMERGENCY MEDICINE

## 2025-05-28 PROCEDURE — 2500000004 HC RX 250 GENERAL PHARMACY W/ HCPCS (ALT 636 FOR OP/ED): Mod: JZ

## 2025-05-28 PROCEDURE — 83605 ASSAY OF LACTIC ACID: CPT

## 2025-05-28 PROCEDURE — 74177 CT ABD & PELVIS W/CONTRAST: CPT | Mod: FOREIGN READ | Performed by: RADIOLOGY

## 2025-05-28 PROCEDURE — P9073 PLATELETS PHERESIS PATH REDU: HCPCS

## 2025-05-28 PROCEDURE — 1100000001 HC PRIVATE ROOM DAILY

## 2025-05-28 PROCEDURE — 73701 CT LOWER EXTREMITY W/DYE: CPT | Mod: LT

## 2025-05-28 PROCEDURE — 36430 TRANSFUSION BLD/BLD COMPNT: CPT

## 2025-05-28 PROCEDURE — 81003 URINALYSIS AUTO W/O SCOPE: CPT

## 2025-05-28 PROCEDURE — 36415 COLL VENOUS BLD VENIPUNCTURE: CPT

## 2025-05-28 PROCEDURE — 93971 EXTREMITY STUDY: CPT | Performed by: STUDENT IN AN ORGANIZED HEALTH CARE EDUCATION/TRAINING PROGRAM

## 2025-05-28 PROCEDURE — 96374 THER/PROPH/DIAG INJ IV PUSH: CPT | Mod: 59

## 2025-05-28 PROCEDURE — 85610 PROTHROMBIN TIME: CPT

## 2025-05-28 PROCEDURE — 99285 EMERGENCY DEPT VISIT HI MDM: CPT | Mod: 25 | Performed by: EMERGENCY MEDICINE

## 2025-05-28 PROCEDURE — 74177 CT ABD & PELVIS W/CONTRAST: CPT

## 2025-05-28 PROCEDURE — 51702 INSERT TEMP BLADDER CATH: CPT

## 2025-05-28 PROCEDURE — 2500000001 HC RX 250 WO HCPCS SELF ADMINISTERED DRUGS (ALT 637 FOR MEDICARE OP)

## 2025-05-28 PROCEDURE — 93975 VASCULAR STUDY: CPT

## 2025-05-28 PROCEDURE — 51798 US URINE CAPACITY MEASURE: CPT

## 2025-05-28 PROCEDURE — 2500000002 HC RX 250 W HCPCS SELF ADMINISTERED DRUGS (ALT 637 FOR MEDICARE OP, ALT 636 FOR OP/ED)

## 2025-05-28 PROCEDURE — 86077 PHYS BLOOD BANK SERV XMATCH: CPT

## 2025-05-28 PROCEDURE — 2500000005 HC RX 250 GENERAL PHARMACY W/O HCPCS

## 2025-05-28 PROCEDURE — 2550000001 HC RX 255 CONTRASTS: Performed by: EMERGENCY MEDICINE

## 2025-05-28 RX ORDER — LIDOCAINE HYDROCHLORIDE 20 MG/ML
1 JELLY TOPICAL ONCE
Status: DISCONTINUED | OUTPATIENT
Start: 2025-05-28 | End: 2025-05-29

## 2025-05-28 RX ORDER — SODIUM CHLORIDE, SODIUM LACTATE, POTASSIUM CHLORIDE, CALCIUM CHLORIDE 600; 310; 30; 20 MG/100ML; MG/100ML; MG/100ML; MG/100ML
100 INJECTION, SOLUTION INTRAVENOUS CONTINUOUS
Status: DISCONTINUED | OUTPATIENT
Start: 2025-05-28 | End: 2025-05-29

## 2025-05-28 RX ORDER — OXYCODONE HYDROCHLORIDE 5 MG/1
10 TABLET ORAL EVERY 4 HOURS PRN
Refills: 0 | Status: DISCONTINUED | OUTPATIENT
Start: 2025-05-28 | End: 2025-05-29

## 2025-05-28 RX ORDER — ONDANSETRON HYDROCHLORIDE 2 MG/ML
4 INJECTION, SOLUTION INTRAVENOUS EVERY 8 HOURS PRN
Status: DISCONTINUED | OUTPATIENT
Start: 2025-05-28 | End: 2025-06-01 | Stop reason: HOSPADM

## 2025-05-28 RX ORDER — ONDANSETRON 4 MG/1
4 TABLET, ORALLY DISINTEGRATING ORAL EVERY 8 HOURS PRN
Status: DISCONTINUED | OUTPATIENT
Start: 2025-05-28 | End: 2025-06-01 | Stop reason: HOSPADM

## 2025-05-28 RX ORDER — HYDROMORPHONE HYDROCHLORIDE 1 MG/ML
1 INJECTION, SOLUTION INTRAMUSCULAR; INTRAVENOUS; SUBCUTANEOUS ONCE
Status: COMPLETED | OUTPATIENT
Start: 2025-05-28 | End: 2025-05-28

## 2025-05-28 RX ORDER — CARVEDILOL 6.25 MG/1
6.25 TABLET ORAL 2 TIMES DAILY
Status: DISCONTINUED | OUTPATIENT
Start: 2025-05-28 | End: 2025-06-01 | Stop reason: HOSPADM

## 2025-05-28 RX ORDER — OXYCODONE HYDROCHLORIDE 5 MG/1
5 TABLET ORAL ONCE
Refills: 0 | Status: DISCONTINUED | OUTPATIENT
Start: 2025-05-28 | End: 2025-05-29

## 2025-05-28 RX ORDER — SPIRONOLACTONE 50 MG/1
50 TABLET, FILM COATED ORAL DAILY
Status: DISCONTINUED | OUTPATIENT
Start: 2025-05-28 | End: 2025-06-01 | Stop reason: HOSPADM

## 2025-05-28 RX ORDER — PANTOPRAZOLE SODIUM 40 MG/1
40 TABLET, DELAYED RELEASE ORAL
Status: DISCONTINUED | OUTPATIENT
Start: 2025-05-29 | End: 2025-06-01 | Stop reason: HOSPADM

## 2025-05-28 RX ORDER — LACTULOSE 10 G/15ML
10 SOLUTION ORAL 2 TIMES DAILY
Status: DISCONTINUED | OUTPATIENT
Start: 2025-05-28 | End: 2025-06-01 | Stop reason: HOSPADM

## 2025-05-28 RX ORDER — LIDOCAINE HYDROCHLORIDE 20 MG/ML
1 JELLY TOPICAL ONCE
Status: COMPLETED | OUTPATIENT
Start: 2025-05-28 | End: 2025-05-28

## 2025-05-28 RX ORDER — PHYTONADIONE 5 MG/1
5 TABLET ORAL ONCE
Status: COMPLETED | OUTPATIENT
Start: 2025-05-28 | End: 2025-05-28

## 2025-05-28 RX ORDER — FERROUS SULFATE 325(65) MG
1 TABLET ORAL 2 TIMES DAILY
Status: DISCONTINUED | OUTPATIENT
Start: 2025-05-28 | End: 2025-06-01 | Stop reason: HOSPADM

## 2025-05-28 RX ORDER — FUROSEMIDE 40 MG/1
40 TABLET ORAL DAILY
Status: DISCONTINUED | OUTPATIENT
Start: 2025-05-28 | End: 2025-06-01 | Stop reason: HOSPADM

## 2025-05-28 RX ADMIN — Medication 10 MG: at 18:28

## 2025-05-28 RX ADMIN — CARVEDILOL 6.25 MG: 6.25 TABLET, FILM COATED ORAL at 21:20

## 2025-05-28 RX ADMIN — IOHEXOL 80 ML: 350 INJECTION, SOLUTION INTRAVENOUS at 16:50

## 2025-05-28 RX ADMIN — ONDANSETRON 4 MG: 4 TABLET, ORALLY DISINTEGRATING ORAL at 22:38

## 2025-05-28 RX ADMIN — LIDOCAINE HYDROCHLORIDE 1 APPLICATION: 20 JELLY TOPICAL at 18:28

## 2025-05-28 RX ADMIN — SODIUM CHLORIDE, POTASSIUM CHLORIDE, SODIUM LACTATE AND CALCIUM CHLORIDE 100 ML/HR: 600; 310; 30; 20 INJECTION, SOLUTION INTRAVENOUS at 18:23

## 2025-05-28 RX ADMIN — PHYTONADIONE 5 MG: 5 TABLET ORAL at 21:20

## 2025-05-28 RX ADMIN — FERROUS SULFATE TAB 325 MG (65 MG ELEMENTAL FE) 1 TABLET: 325 (65 FE) TAB at 21:19

## 2025-05-28 RX ADMIN — HYDROMORPHONE HYDROCHLORIDE 1 MG: 1 INJECTION, SOLUTION INTRAMUSCULAR; INTRAVENOUS; SUBCUTANEOUS at 15:45

## 2025-05-28 ASSESSMENT — PAIN DESCRIPTION - DESCRIPTORS
DESCRIPTORS: THROBBING
DESCRIPTORS_2: BURNING

## 2025-05-28 ASSESSMENT — PAIN DESCRIPTION - ORIENTATION: ORIENTATION_2: LEFT

## 2025-05-28 ASSESSMENT — COLUMBIA-SUICIDE SEVERITY RATING SCALE - C-SSRS
2. HAVE YOU ACTUALLY HAD ANY THOUGHTS OF KILLING YOURSELF?: NO
6. HAVE YOU EVER DONE ANYTHING, STARTED TO DO ANYTHING, OR PREPARED TO DO ANYTHING TO END YOUR LIFE?: NO
1. IN THE PAST MONTH, HAVE YOU WISHED YOU WERE DEAD OR WISHED YOU COULD GO TO SLEEP AND NOT WAKE UP?: NO
6. HAVE YOU EVER DONE ANYTHING, STARTED TO DO ANYTHING, OR PREPARED TO DO ANYTHING TO END YOUR LIFE?: YES

## 2025-05-28 ASSESSMENT — PAIN - FUNCTIONAL ASSESSMENT
PAIN_FUNCTIONAL_ASSESSMENT: 0-10

## 2025-05-28 ASSESSMENT — PAIN SCALES - GENERAL
PAINLEVEL_OUTOF10: 9
PAINLEVEL_OUTOF10: 5 - MODERATE PAIN
PAINLEVEL_OUTOF10: 8

## 2025-05-28 ASSESSMENT — PAIN DESCRIPTION - PAIN TYPE
TYPE: ACUTE PAIN
TYPE: ACUTE PAIN

## 2025-05-28 ASSESSMENT — PAIN DESCRIPTION - PROGRESSION
CLINICAL_PROGRESSION: NOT CHANGED
CLINICAL_PROGRESSION: GRADUALLY IMPROVING

## 2025-05-28 ASSESSMENT — PAIN DESCRIPTION - LOCATION
LOCATION_2: GROIN
LOCATION: ABDOMEN
LOCATION: SCROTUM

## 2025-05-28 ASSESSMENT — PAIN DESCRIPTION - FREQUENCY: FREQUENCY: CONSTANT/CONTINUOUS

## 2025-05-28 ASSESSMENT — PAIN DESCRIPTION - ONSET: ONSET: ONGOING

## 2025-05-28 NOTE — TELEPHONE ENCOUNTER
Patient came to appointment with Dr. Wallace (5/28 2:00).    Dr. Wallace evaluated patient and said patient needed to present to the ER for evaluation.    Transportation came and took patient to the ER via wheelchair.

## 2025-05-28 NOTE — ED TRIAGE NOTES
Patient presents to the Emergency department with a chief complaint of testicular swelling, pain, discoloration, left leg discoloration and pain from his buttocks down to his knee. Patient was sent here for a CT and specialist evaluation. Patient had a left inguinal hernia repair done on 5/22.

## 2025-05-28 NOTE — PROGRESS NOTES
"Pharmacy Medication History Review    Vance Silva \"Christine" is a 59 y.o. male admitted for No Principal Problem: There is no principal problem currently on the Problem List. Please update the Problem List and refresh.. Pharmacy reviewed the patient's rmkyc-mt-fgodroxee medications and allergies for accuracy.    Medications ADDED:  None   Medications CHANGED:  None   Medications REMOVED:   None      The list below reflects the updated PTA list.   Prior to Admission Medications   Prescriptions Last Dose Informant   carvedilol (Coreg) 6.25 mg tablet  Self   Sig: Take 1 tablet (6.25 mg) by mouth 2 times a day.Last Rx filled 11/27/24 for 1 tablet daily at bedtime, 90 tablets for 90 days     ferrous sulfate, 325 mg ferrous sulfate, tablet  Self   Sig: TAKE 1 TABLET BY MOUTH TWICE A DAY Last Rx filled 11/21/24 180 tabs for 90 days.     furosemide (Lasix) 20 mg tablet  Self   Sig: Take 2 tablets (40 mg) by mouth once daily. Last filled 5/5/25 for 180 tablets for 90 days.     lactulose 10 gram/15 mL solution  Self   Sig: TAKE 30 ML (20 G) BY MOUTH 3 TIMES A DAY. TITRATE TO ACHIEVE GOAL 2-3 BOWEL MOVEMENTS DAILY Last filled 2/20/25    oxyCODONE (Roxicodone) 5 mg immediate release tablet     Sig: Take 1 tablet (5 mg) by mouth every 6 hours if needed for severe pain (7 - 10).   Patient not taking: Reported on 5/28/2025   pantoprazole (ProtoNix) 40 mg EC tablet  Self   Sig: TAKE 1 TABLET BY MOUTH ONCE DAILY IN THE MORNING. TAKE BEFORE MEALS. DO NOT CRUSH, CHEW, OR SPLIT Last filled 3/6/25 90 tablets for 90 days.    spironolactone (Aldactone) 50 mg tablet  Self   Sig: Take 1 tablet (50 mg) by mouth once daily. Ready for  at pharmacy. Filled 5/28/25 for 90 tabs for 90 days.        Facility-Administered Medications: None        The list below reflects the updated allergy list. Please review each documented allergy for additional clarification and justification.  Allergies  Reviewed by Ron Ness RN on 5/28/2025   No " "Known Allergies         Patient declines M2B at discharge.     Sources:   [facility]  Pharmacy dispense history  Patient Interview Patient refused [facility]  Chart Review  Care Everywhere  [facility] [phone] called to verify patient most recent fill history.     Additional Comments:  Patient refused [facility]. [facility] pharmacy was contacted to verify patients most recent fill history. All last fill dates were added accordingly.        Lorenza Hillman  Pharmacy Technician  05/28/25     Secure Chat preferred   If no response call [phone] or [employer] \"Med Rec\"   "

## 2025-05-28 NOTE — PROGRESS NOTES
"Incoming call from Pt SO, reports Pt coming to [facility] ED with c/o \"testicles the size of cantaloupes and turning black\", \"there's something going down the inside of his left leg from his butt to his knee\",  with increased pain, Pt s/p L inguinal hernia surgery 05/22.  "

## 2025-05-28 NOTE — PROGRESS NOTES
Verbal report to Dr Tatyana Dillon, aware Pt coming through triage with c/o enlarged testicles that are black in color and discoloration to inside L leg from L buttocks to L knee area with increased pain.  Pt s/p L inguinal hernia repair on 05/22, performed by Dr Antony Wallace.  Dr Wallace aware Pt is coming in and is currently on service for transplant surgery.

## 2025-05-28 NOTE — PROGRESS NOTES
"Subjective   Patient ID: Vance Silva \"Christine" is a 59 y.o. male who presents for post op appt.    HPI    S/p inguinal hernia repair 5/22/25.  Presented today with worsening pain and swelling    Review of Systems    Objective   Vitals:    05/28/25 1345   BP: (!) 150/105   Pulse: 87   Resp: 16       Physical Exam    Left inguinal hernia repair with hematoma formation  Tender, swollen and ecchymosis    Assessment/Plan     S/p hernia repair with hematoma    Plan for admission  Need urgent CT for evaluation  We arranged the patient to go to the ER  And plan for admission from there      "

## 2025-05-28 NOTE — H&P
"History Of Present Illness  Vance Silva \"Christine" is a 59 y.o. male with PMH of alcoholic cirrhosis and s/p open left inguinal hernia repair on 5/22/2025 presenting with swelling, pain, and ecchymosis of the groin. Patient reports appropriate postoperative pain until approximately 36 hours ago when swelling got worse and he develops bruising throughout the area. He reports a burning sensation along the left groin extending into the scrotum and down the posterior left leg. He denies fever, chills, chest pain, SOB, nausea, vomiting, abdominal pain, hematochezia, and constipation. He endorses urine passing only as a trickle and painful to initiate a stream. He has not been taking oxycodone for his pain.      Past Medical History  Medical History[1]    Surgical History  Surgical History[2]     Social History  He reports that he has never smoked. He has never used smokeless tobacco. He reports that he does not currently use alcohol. He reports current drug use. Drug: Marijuana.    Family History  Family History[3]     Allergies  Patient has no known allergies.    Review of Systems  ROS negative unless noted in HPI above     Physical Exam  Constitutional:       Appearance: Normal appearance.   HENT:      Head: Normocephalic and atraumatic.      Nose: Nose normal.      Mouth/Throat:      Mouth: Mucous membranes are moist.   Eyes:      General: No scleral icterus.     Extraocular Movements: Extraocular movements intact.   Cardiovascular:      Rate and Rhythm: Normal rate and regular rhythm.      Pulses: Normal pulses.   Pulmonary:      Effort: Pulmonary effort is normal. No respiratory distress.   Abdominal:      General: There is no distension.      Palpations: Abdomen is soft.      Tenderness: There is no abdominal tenderness.   Genitourinary:     Comments: Ecchymosis throughout groin worse on the left. Incision c/d/I covered with skin glue. Large area of edema near medial aspect of wound extending toward scrotum. " "    Musculoskeletal:         General: Normal range of motion.      Cervical back: Normal range of motion.   Skin:     General: Skin is warm and dry.      Capillary Refill: Capillary refill takes 2 to 3 seconds.   Neurological:      General: No focal deficit present.      Mental Status: He is alert and oriented to person, place, and time.   Psychiatric:         Mood and Affect: Mood normal.         Behavior: Behavior normal.          Last Recorded Vitals  Blood pressure 144/89, pulse 90, temperature 36.7 °C (98.1 °F), resp. rate 18, height 1.956 m (6' 5\"), weight 93 kg (205 lb), SpO2 98%.    Relevant Results        CT scan read pending    Scrotal US read pending     Assessment & Plan      Mr. Karyn Silva is a 58 y/o male with PMH of alcoholic cirrhosis s/p open left inguinal hernia repair on 5/22/2025 presenting with pain, swelling, and ecchymosis of the left groin and scrotum with raised area concerning for hematoma.     Plan:  - Admit to transplant surgery service  - CT scan and scrotal US pending  - Regular diet - may need to be NPO after midnight pending above imaging  - Cohen to be placed for urinary retention  - Further plans pending above imaging results    UPDATE:  - Plan for OR washout on 5/29/25  - 1 Pack platelets and 1 unit FFP to be given tonight  - 5mg PO vitamin K tonight  - 1 FFP and 1 pk Platelets on hold for OR  - NPO with maintenance IV fluids    Patient discussed with attending, Dr. Rodrigo Diaz MD  PGY 2 General Surgery          [1]   Past Medical History:  Diagnosis Date    Acute kidney injury     Anemia     Ascites     Awareness under anesthesia     Chronic kidney disease     Cirrhosis (Multi)     Hepatitis C     Liver disease     Parkinson's disease     dx 2/16/2024    Seizure (Multi)     Sleep apnea     Traumatic brain injury (Multi)    [2]   Past Surgical History:  Procedure Laterality Date    ESOPHAGOGASTRODUODENOSCOPY      OTHER SURGICAL HISTORY      Multiple orthopedic " surgeries    UMBILICAL HERNIA REPAIR  2021   [3] No family history on file.

## 2025-05-28 NOTE — ED PROVIDER NOTES
History of Present Illness     History provided by: Patient   Limitations to History: None   External Records Reviewed with Brief Summary: I reviewed Dr. Wallace general surgery note from earlier today where he was evaluated for postop evaluation status post inguinal hernia repair that was done on 5/22/2025.    HPI:  Vance Silva is a 59 y.o. male decompensated cirrhosis, portal hypertension, esophageal varices status post banding, ascites, history of GI bleeds with duodenal ulcer, traumatic injury, arthritis in addition to recent inguinal hernia repair presenting to the emergency department today with worsening groin pain, ecchymosis and burning in the testicle after the surgery.  He states that he has not been taking any pain medications at home is a recovering addict.  Has had worsening symptoms concerned that he may have an infection.  Subjective fevers at home, no chills.  No other associate signs or symptoms report this time.    Physical Exam   Triage vitals:  T 36.7 °C (98.1 °F)  HR 90  /89  RR 18  O2 98 % None (Room air)    General: Awake, alert, in no acute distress  Eyes: Gaze conjugate.  No scleral icterus or injection  HENT: Normo-cephalic, atraumatic. No stridor  CV: Regular rate, regular rhythm. Radial pulses 2+ bilaterally  Resp: Breathing non-labored, speaking in full sentences.  Clear to auscultation bilaterally  GI: Soft, non-distended, non-tender. No rebound or guarding.  : Chaperoned exam with nurse, significant ecchymosis around the testicles bilaterally in addition to the full groin has a incision with glue over the area left pelvic with significant hard mass underneath the incision site.  Ecchymosis that goes from the pelvic into the groin into the testicles and down into the left lower leg.  MSK/Extremities: No gross bony deformities. Moving all extremities  Skin: Warm. Appropriate color  Neuro: Alert. Oriented. Face symmetric. Speech is fluent.  Gross strength and sensation intact  in b/l UE and LEs  Psych: Appropriate mood and affect    Medical Decision Making & ED Course   Medical Decision Makin y.o. male with the above past medical history came into the emergency department for evaluation of postoperative pain in the testicle in addition to over the abdomen with ecchymosis that goes down into his leg.  Patient had surgery done on the  by transplant surgery, CT imaging labs and pain medication were ordered for the patient and transplant surgery was consulted who evaluated the patient at bedside.  Patient was also retaining urine on bladder scan, attempted to urinate however was not able to get significant urine out.  For this reason a Cohen catheter was placed.   CT scan showed postsurgical changes in the left inguinal region with a hematoma in the left inguinal region extending into the superior aspect of the left hemiscrotum.  There was a hydrocele that was noted as well in addition to scrotal wall edema.  There was concern for cellulitis that was may be developing.  Additional CT imaging in the ED course.  Transplant surgery recommended admission to their service for further management and monitoring.  Patient admitted in stable condition.  ----    Differential diagnoses considered include but are not limited to: Cellulitis versus postoperative complication, versus hematoma, worsening ecchymosis     Social Determinants of Health which Significantly Impact Care: None identified     EKG Independent Interpretation: See ED Course    Independent Result Review and Interpretation: See ED course    Chronic conditions affecting the patient's care: See HPI    The patient was discussed with the following consultants/services: Transplant surgery    Care Considerations: See [facility]    ED Course:  ED Course as of 05/28/25 1756   Wed May 28, 2025   1737 WBC(!): 4.3 [KD]   1755 IMPRESSION:  1.Postsurgical changes in the left inguinal region. Probable  hematoma in the left inguinal region extending  into the superior  aspect of the left hemiscrotum measuring approximately 6 cm x 4.6 cm x  8 cm.  2.Diffuse scrotal wall edema with small hydroceles.  Correlate  quickly for cellulitis.  3.Hepatic cirrhosis with evidence of portal hypertension and  moderate to large volume ascites.  4.Mild to moderate gastric wall thickening, nonspecific in the  setting of portal hypertension.  5.Mildly thickened small bowel loops throughout the abdomen and  pelvis with additional fluid-filled nondilated loops, nonspecific in  the setting of portal hypertension.  6.Mild diffuse colonic wall thickening. Findings may be secondary  to portal hypertension, although difficult to exclude mild colitis.  7.Colonic diverticulosis without findings of diverticulitis. [KD]   1756 HEMOGLOBIN(!): 10.9 [KD]      ED Course User Index  [KD] Kinsey Stark DO         Diagnoses as of 05/28/25 1756   Hematoma of left inguinal region     Disposition   As a result of their workup, the patient will require admission to the hospital.  The patient was informed of his diagnosis.  The patient was given the opportunity to ask questions and I answered them. The patient agreed to be admitted to the hospital.    Seen and discussed with ED Attending  Kinsey Stark DO, PGY-2  Emergency Medicine     Kinsey Stark DO  Resident  05/28/25 1756

## 2025-05-29 ENCOUNTER — ANESTHESIA (OUTPATIENT)
Dept: OPERATING ROOM | Facility: HOSPITAL | Age: 60
End: 2025-05-29
Payer: COMMERCIAL

## 2025-05-29 ENCOUNTER — APPOINTMENT (OUTPATIENT)
Dept: SURGERY | Facility: CLINIC | Age: 60
End: 2025-05-29
Payer: COMMERCIAL

## 2025-05-29 ENCOUNTER — ANESTHESIA EVENT (OUTPATIENT)
Dept: OPERATING ROOM | Facility: HOSPITAL | Age: 60
End: 2025-05-29
Payer: COMMERCIAL

## 2025-05-29 LAB
ALBUMIN SERPL BCP-MCNC: 3.7 G/DL (ref 3.4–5)
ALP SERPL-CCNC: 90 U/L (ref 33–136)
ALT SERPL W P-5'-P-CCNC: 29 U/L (ref 10–52)
ANION GAP SERPL CALC-SCNC: 13 MMOL/L (ref 10–20)
APTT PPP: 27 SECONDS (ref 26–36)
AST SERPL W P-5'-P-CCNC: 22 U/L (ref 9–39)
BB ANTIBODY IDENTIFICATION: NORMAL
BILIRUB DIRECT SERPL-MCNC: 0.3 MG/DL (ref 0–0.3)
BILIRUB SERPL-MCNC: 1.3 MG/DL (ref 0–1.2)
BLOOD EXPIRATION DATE: NORMAL
BUN SERPL-MCNC: 16 MG/DL (ref 6–23)
CALCIUM SERPL-MCNC: 8.6 MG/DL (ref 8.6–10.6)
CASE #: NORMAL
CHLORIDE SERPL-SCNC: 103 MMOL/L (ref 98–107)
CO2 SERPL-SCNC: 24 MMOL/L (ref 21–32)
CREAT SERPL-MCNC: 1.03 MG/DL (ref 0.5–1.3)
DISPENSE STATUS: NORMAL
EGFRCR SERPLBLD CKD-EPI 2021: 83 ML/MIN/1.73M*2
ERYTHROCYTE [DISTWIDTH] IN BLOOD BY AUTOMATED COUNT: 14 % (ref 11.5–14.5)
ERYTHROCYTE [DISTWIDTH] IN BLOOD BY AUTOMATED COUNT: 14.1 % (ref 11.5–14.5)
GLUCOSE BLD MANUAL STRIP-MCNC: 105 MG/DL (ref 74–99)
GLUCOSE BLD MANUAL STRIP-MCNC: 144 MG/DL (ref 74–99)
GLUCOSE BLD MANUAL STRIP-MCNC: 97 MG/DL (ref 74–99)
GLUCOSE SERPL-MCNC: 91 MG/DL (ref 74–99)
HCT VFR BLD AUTO: 29.2 % (ref 41–52)
HCT VFR BLD AUTO: 30.3 % (ref 41–52)
HGB BLD-MCNC: 10.2 G/DL (ref 13.5–17.5)
HGB BLD-MCNC: 9.5 G/DL (ref 13.5–17.5)
HOLD SPECIMEN: NORMAL
INR PPP: 1.2 (ref 0.9–1.1)
MAGNESIUM SERPL-MCNC: 1.99 MG/DL (ref 1.6–2.4)
MCH RBC QN AUTO: 29.8 PG (ref 26–34)
MCH RBC QN AUTO: 30.4 PG (ref 26–34)
MCHC RBC AUTO-ENTMCNC: 32.5 G/DL (ref 32–36)
MCHC RBC AUTO-ENTMCNC: 33.7 G/DL (ref 32–36)
MCV RBC AUTO: 90 FL (ref 80–100)
MCV RBC AUTO: 92 FL (ref 80–100)
NRBC BLD-RTO: 0 /100 WBCS (ref 0–0)
NRBC BLD-RTO: 0 /100 WBCS (ref 0–0)
PATH REV-IMMUNOHEMATOLOGY-PR30: NORMAL
PHOSPHATE SERPL-MCNC: 3.5 MG/DL (ref 2.5–4.9)
PLATELET # BLD AUTO: 40 X10*3/UL (ref 150–450)
PLATELET # BLD AUTO: 51 X10*3/UL (ref 150–450)
POTASSIUM SERPL-SCNC: 4 MMOL/L (ref 3.5–5.3)
PRODUCT BLOOD TYPE: 5100
PRODUCT BLOOD TYPE: 5100
PRODUCT BLOOD TYPE: 7300
PRODUCT CODE: NORMAL
PROT SERPL-MCNC: 6.4 G/DL (ref 6.4–8.2)
PROTHROMBIN TIME: 13.5 SECONDS (ref 9.8–12.4)
RBC # BLD AUTO: 3.19 X10*6/UL (ref 4.5–5.9)
RBC # BLD AUTO: 3.35 X10*6/UL (ref 4.5–5.9)
SODIUM SERPL-SCNC: 136 MMOL/L (ref 136–145)
UNIT ABO: NORMAL
UNIT NUMBER: NORMAL
UNIT RH: NORMAL
UNIT VOLUME: 206
UNIT VOLUME: 264
UNIT VOLUME: 322
WBC # BLD AUTO: 2.1 X10*3/UL (ref 4.4–11.3)
WBC # BLD AUTO: 2.3 X10*3/UL (ref 4.4–11.3)

## 2025-05-29 PROCEDURE — 7100000001 HC RECOVERY ROOM TIME - INITIAL BASE CHARGE: Performed by: TRANSPLANT SURGERY

## 2025-05-29 PROCEDURE — 3700000002 HC GENERAL ANESTHESIA TIME - EACH INCREMENTAL 1 MINUTE: Performed by: TRANSPLANT SURGERY

## 2025-05-29 PROCEDURE — 80053 COMPREHEN METABOLIC PANEL: CPT

## 2025-05-29 PROCEDURE — 2500000004 HC RX 250 GENERAL PHARMACY W/ HCPCS (ALT 636 FOR OP/ED): Mod: JZ

## 2025-05-29 PROCEDURE — 82248 BILIRUBIN DIRECT: CPT

## 2025-05-29 PROCEDURE — 36430 TRANSFUSION BLD/BLD COMPNT: CPT

## 2025-05-29 PROCEDURE — 82947 ASSAY GLUCOSE BLOOD QUANT: CPT

## 2025-05-29 PROCEDURE — 3600000003 HC OR TIME - INITIAL BASE CHARGE - PROCEDURE LEVEL THREE: Performed by: TRANSPLANT SURGERY

## 2025-05-29 PROCEDURE — 2720000007 HC OR 272 NO HCPCS: Performed by: TRANSPLANT SURGERY

## 2025-05-29 PROCEDURE — 85027 COMPLETE CBC AUTOMATED: CPT | Mod: PARLAB

## 2025-05-29 PROCEDURE — P9045 ALBUMIN (HUMAN), 5%, 250 ML: HCPCS | Mod: JZ | Performed by: ANESTHESIOLOGIST ASSISTANT

## 2025-05-29 PROCEDURE — 1100000001 HC PRIVATE ROOM DAILY

## 2025-05-29 PROCEDURE — 0J9C0ZZ DRAINAGE OF PELVIC REGION SUBCUTANEOUS TISSUE AND FASCIA, OPEN APPROACH: ICD-10-PCS | Performed by: TRANSPLANT SURGERY

## 2025-05-29 PROCEDURE — 2500000001 HC RX 250 WO HCPCS SELF ADMINISTERED DRUGS (ALT 637 FOR MEDICARE OP)

## 2025-05-29 PROCEDURE — P9037 PLATE PHERES LEUKOREDU IRRAD: HCPCS

## 2025-05-29 PROCEDURE — 10140 I&D HMTMA SEROMA/FLUID COLLJ: CPT | Performed by: TRANSPLANT SURGERY

## 2025-05-29 PROCEDURE — 2500000004 HC RX 250 GENERAL PHARMACY W/ HCPCS (ALT 636 FOR OP/ED): Mod: JZ | Performed by: ANESTHESIOLOGIST ASSISTANT

## 2025-05-29 PROCEDURE — P9017 PLASMA 1 DONOR FRZ W/IN 8 HR: HCPCS

## 2025-05-29 PROCEDURE — 36415 COLL VENOUS BLD VENIPUNCTURE: CPT | Mod: PARLAB

## 2025-05-29 PROCEDURE — 84100 ASSAY OF PHOSPHORUS: CPT

## 2025-05-29 PROCEDURE — 3700000001 HC GENERAL ANESTHESIA TIME - INITIAL BASE CHARGE: Performed by: TRANSPLANT SURGERY

## 2025-05-29 PROCEDURE — A10140 PR DRAINAGE OF HEMATOMA/FLUID: Performed by: STUDENT IN AN ORGANIZED HEALTH CARE EDUCATION/TRAINING PROGRAM

## 2025-05-29 PROCEDURE — 36415 COLL VENOUS BLD VENIPUNCTURE: CPT

## 2025-05-29 PROCEDURE — A10140 PR DRAINAGE OF HEMATOMA/FLUID: Performed by: ANESTHESIOLOGIST ASSISTANT

## 2025-05-29 PROCEDURE — 85610 PROTHROMBIN TIME: CPT | Mod: PARLAB

## 2025-05-29 PROCEDURE — 2500000004 HC RX 250 GENERAL PHARMACY W/ HCPCS (ALT 636 FOR OP/ED)

## 2025-05-29 PROCEDURE — 36430 TRANSFUSION BLD/BLD COMPNT: CPT | Performed by: ANESTHESIOLOGIST ASSISTANT

## 2025-05-29 PROCEDURE — 3600000008 HC OR TIME - EACH INCREMENTAL 1 MINUTE - PROCEDURE LEVEL THREE: Performed by: TRANSPLANT SURGERY

## 2025-05-29 PROCEDURE — 83735 ASSAY OF MAGNESIUM: CPT

## 2025-05-29 PROCEDURE — 2500000004 HC RX 250 GENERAL PHARMACY W/ HCPCS (ALT 636 FOR OP/ED): Mod: JZ | Performed by: TRANSPLANT SURGERY

## 2025-05-29 PROCEDURE — 2500000005 HC RX 250 GENERAL PHARMACY W/O HCPCS: Performed by: TRANSPLANT SURGERY

## 2025-05-29 PROCEDURE — 7100000002 HC RECOVERY ROOM TIME - EACH INCREMENTAL 1 MINUTE: Performed by: TRANSPLANT SURGERY

## 2025-05-29 PROCEDURE — 2500000005 HC RX 250 GENERAL PHARMACY W/O HCPCS: Performed by: ANESTHESIOLOGIST ASSISTANT

## 2025-05-29 PROCEDURE — 85027 COMPLETE CBC AUTOMATED: CPT

## 2025-05-29 RX ORDER — FENTANYL CITRATE 50 UG/ML
INJECTION, SOLUTION INTRAMUSCULAR; INTRAVENOUS AS NEEDED
Status: DISCONTINUED | OUTPATIENT
Start: 2025-05-29 | End: 2025-05-29

## 2025-05-29 RX ORDER — OXYCODONE HYDROCHLORIDE 5 MG/1
5 TABLET ORAL EVERY 4 HOURS PRN
Status: DISCONTINUED | OUTPATIENT
Start: 2025-05-29 | End: 2025-05-29 | Stop reason: HOSPADM

## 2025-05-29 RX ORDER — SODIUM CHLORIDE 0.9 G/100ML
INJECTION, SOLUTION IRRIGATION AS NEEDED
Status: DISCONTINUED | OUTPATIENT
Start: 2025-05-29 | End: 2025-05-29 | Stop reason: HOSPADM

## 2025-05-29 RX ORDER — OXYCODONE HYDROCHLORIDE 5 MG/1
5 TABLET ORAL EVERY 6 HOURS PRN
Refills: 0 | Status: DISCONTINUED | OUTPATIENT
Start: 2025-05-29 | End: 2025-06-01 | Stop reason: HOSPADM

## 2025-05-29 RX ORDER — OXYCODONE HYDROCHLORIDE 5 MG/1
10 TABLET ORAL EVERY 4 HOURS PRN
Refills: 0 | Status: DISCONTINUED | OUTPATIENT
Start: 2025-05-29 | End: 2025-06-01 | Stop reason: HOSPADM

## 2025-05-29 RX ORDER — PHENYLEPHRINE HCL IN 0.9% NACL 0.4MG/10ML
SYRINGE (ML) INTRAVENOUS AS NEEDED
Status: DISCONTINUED | OUTPATIENT
Start: 2025-05-29 | End: 2025-05-29

## 2025-05-29 RX ORDER — ROCURONIUM BROMIDE 10 MG/ML
INJECTION, SOLUTION INTRAVENOUS AS NEEDED
Status: DISCONTINUED | OUTPATIENT
Start: 2025-05-29 | End: 2025-05-29

## 2025-05-29 RX ORDER — MIDAZOLAM HYDROCHLORIDE 1 MG/ML
INJECTION INTRAMUSCULAR; INTRAVENOUS AS NEEDED
Status: DISCONTINUED | OUTPATIENT
Start: 2025-05-29 | End: 2025-05-29

## 2025-05-29 RX ORDER — SODIUM CHLORIDE, SODIUM GLUCONATE, SODIUM ACETATE, POTASSIUM CHLORIDE AND MAGNESIUM CHLORIDE 30; 37; 368; 526; 502 MG/100ML; MG/100ML; MG/100ML; MG/100ML; MG/100ML
INJECTION, SOLUTION INTRAVENOUS CONTINUOUS PRN
Status: DISCONTINUED | OUTPATIENT
Start: 2025-05-29 | End: 2025-05-29

## 2025-05-29 RX ORDER — HYDRALAZINE HYDROCHLORIDE 20 MG/ML
5 INJECTION INTRAMUSCULAR; INTRAVENOUS EVERY 30 MIN PRN
Status: DISCONTINUED | OUTPATIENT
Start: 2025-05-29 | End: 2025-05-29 | Stop reason: HOSPADM

## 2025-05-29 RX ORDER — ACETAMINOPHEN 325 MG/1
650 TABLET ORAL EVERY 6 HOURS PRN
Status: DISCONTINUED | OUTPATIENT
Start: 2025-05-29 | End: 2025-06-01 | Stop reason: HOSPADM

## 2025-05-29 RX ORDER — ALBUTEROL SULFATE 0.83 MG/ML
2.5 SOLUTION RESPIRATORY (INHALATION) ONCE AS NEEDED
Status: DISCONTINUED | OUTPATIENT
Start: 2025-05-29 | End: 2025-05-29 | Stop reason: HOSPADM

## 2025-05-29 RX ORDER — ONDANSETRON HYDROCHLORIDE 2 MG/ML
4 INJECTION, SOLUTION INTRAVENOUS ONCE AS NEEDED
Status: DISCONTINUED | OUTPATIENT
Start: 2025-05-29 | End: 2025-05-29 | Stop reason: HOSPADM

## 2025-05-29 RX ORDER — WATER 1 ML/ML
INJECTION IRRIGATION AS NEEDED
Status: DISCONTINUED | OUTPATIENT
Start: 2025-05-29 | End: 2025-05-29 | Stop reason: HOSPADM

## 2025-05-29 RX ORDER — LIDOCAINE HYDROCHLORIDE 20 MG/ML
INJECTION, SOLUTION INFILTRATION; PERINEURAL AS NEEDED
Status: DISCONTINUED | OUTPATIENT
Start: 2025-05-29 | End: 2025-05-29

## 2025-05-29 RX ORDER — GLYCOPYRROLATE 0.2 MG/ML
INJECTION INTRAMUSCULAR; INTRAVENOUS AS NEEDED
Status: DISCONTINUED | OUTPATIENT
Start: 2025-05-29 | End: 2025-05-29

## 2025-05-29 RX ORDER — MEPERIDINE HYDROCHLORIDE 25 MG/ML
12.5 INJECTION INTRAMUSCULAR; INTRAVENOUS; SUBCUTANEOUS EVERY 10 MIN PRN
Status: DISCONTINUED | OUTPATIENT
Start: 2025-05-29 | End: 2025-05-29 | Stop reason: HOSPADM

## 2025-05-29 RX ORDER — HYDROMORPHONE HYDROCHLORIDE 0.2 MG/ML
0.2 INJECTION INTRAMUSCULAR; INTRAVENOUS; SUBCUTANEOUS EVERY 5 MIN PRN
Status: DISCONTINUED | OUTPATIENT
Start: 2025-05-29 | End: 2025-05-29 | Stop reason: HOSPADM

## 2025-05-29 RX ORDER — LABETALOL HYDROCHLORIDE 5 MG/ML
5 INJECTION, SOLUTION INTRAVENOUS ONCE AS NEEDED
Status: DISCONTINUED | OUTPATIENT
Start: 2025-05-29 | End: 2025-05-29 | Stop reason: HOSPADM

## 2025-05-29 RX ORDER — DIPHENHYDRAMINE HYDROCHLORIDE 50 MG/ML
12.5 INJECTION, SOLUTION INTRAMUSCULAR; INTRAVENOUS ONCE AS NEEDED
Status: DISCONTINUED | OUTPATIENT
Start: 2025-05-29 | End: 2025-05-29 | Stop reason: HOSPADM

## 2025-05-29 RX ORDER — CEFAZOLIN 1 G/1
INJECTION, POWDER, FOR SOLUTION INTRAVENOUS AS NEEDED
Status: DISCONTINUED | OUTPATIENT
Start: 2025-05-29 | End: 2025-05-29

## 2025-05-29 RX ORDER — PROPOFOL 10 MG/ML
INJECTION, EMULSION INTRAVENOUS AS NEEDED
Status: DISCONTINUED | OUTPATIENT
Start: 2025-05-29 | End: 2025-05-29

## 2025-05-29 RX ORDER — SODIUM CHLORIDE, SODIUM LACTATE, POTASSIUM CHLORIDE, CALCIUM CHLORIDE 600; 310; 30; 20 MG/100ML; MG/100ML; MG/100ML; MG/100ML
100 INJECTION, SOLUTION INTRAVENOUS CONTINUOUS
Status: DISCONTINUED | OUTPATIENT
Start: 2025-05-29 | End: 2025-05-29 | Stop reason: HOSPADM

## 2025-05-29 RX ORDER — OXYCODONE HYDROCHLORIDE 5 MG/1
10 TABLET ORAL EVERY 4 HOURS PRN
Status: DISCONTINUED | OUTPATIENT
Start: 2025-05-29 | End: 2025-05-29 | Stop reason: HOSPADM

## 2025-05-29 RX ORDER — ALBUMIN HUMAN 50 G/1000ML
SOLUTION INTRAVENOUS AS NEEDED
Status: DISCONTINUED | OUTPATIENT
Start: 2025-05-29 | End: 2025-05-29

## 2025-05-29 RX ORDER — BUPIVACAINE HYDROCHLORIDE 2.5 MG/ML
INJECTION, SOLUTION EPIDURAL; INFILTRATION; INTRACAUDAL; PERINEURAL AS NEEDED
Status: DISCONTINUED | OUTPATIENT
Start: 2025-05-29 | End: 2025-05-29 | Stop reason: HOSPADM

## 2025-05-29 RX ORDER — LIDOCAINE HYDROCHLORIDE 40 MG/ML
SOLUTION TOPICAL AS NEEDED
Status: DISCONTINUED | OUTPATIENT
Start: 2025-05-29 | End: 2025-05-29

## 2025-05-29 RX ORDER — DROPERIDOL 2.5 MG/ML
0.62 INJECTION, SOLUTION INTRAMUSCULAR; INTRAVENOUS ONCE AS NEEDED
Status: DISCONTINUED | OUTPATIENT
Start: 2025-05-29 | End: 2025-05-29 | Stop reason: HOSPADM

## 2025-05-29 RX ADMIN — Medication 80 MCG: at 12:25

## 2025-05-29 RX ADMIN — OXYCODONE 10 MG: 5 TABLET ORAL at 10:49

## 2025-05-29 RX ADMIN — OXYCODONE 10 MG: 5 TABLET ORAL at 00:30

## 2025-05-29 RX ADMIN — MIDAZOLAM HYDROCHLORIDE 2 MG: 2 INJECTION, SOLUTION INTRAMUSCULAR; INTRAVENOUS at 12:06

## 2025-05-29 RX ADMIN — SODIUM CHLORIDE, POTASSIUM CHLORIDE, SODIUM LACTATE AND CALCIUM CHLORIDE 100 ML/HR: 600; 310; 30; 20 INJECTION, SOLUTION INTRAVENOUS at 00:30

## 2025-05-29 RX ADMIN — OXYCODONE 10 MG: 5 TABLET ORAL at 06:12

## 2025-05-29 RX ADMIN — FENTANYL CITRATE 50 MCG: 50 INJECTION, SOLUTION INTRAMUSCULAR; INTRAVENOUS at 12:14

## 2025-05-29 RX ADMIN — FERROUS SULFATE TAB 325 MG (65 MG ELEMENTAL FE) 1 TABLET: 325 (65 FE) TAB at 21:54

## 2025-05-29 RX ADMIN — FERROUS SULFATE TAB 325 MG (65 MG ELEMENTAL FE) 1 TABLET: 325 (65 FE) TAB at 09:11

## 2025-05-29 RX ADMIN — CEFAZOLIN 2 G: 1 INJECTION, POWDER, FOR SOLUTION INTRAMUSCULAR; INTRAVENOUS at 12:18

## 2025-05-29 RX ADMIN — ONDANSETRON 4 MG: 2 INJECTION, SOLUTION INTRAMUSCULAR; INTRAVENOUS at 12:56

## 2025-05-29 RX ADMIN — ALBUMIN HUMAN 500 ML: 0.05 INJECTION, SOLUTION INTRAVENOUS at 12:30

## 2025-05-29 RX ADMIN — LIDOCAINE HYDROCHLORIDE 60 MG: 20 INJECTION, SOLUTION INFILTRATION; PERINEURAL at 12:14

## 2025-05-29 RX ADMIN — SUGAMMADEX 200 MG: 100 INJECTION, SOLUTION INTRAVENOUS at 12:56

## 2025-05-29 RX ADMIN — ROCURONIUM BROMIDE 50 MG: 10 INJECTION, SOLUTION INTRAVENOUS at 12:14

## 2025-05-29 RX ADMIN — Medication 80 MCG: at 12:28

## 2025-05-29 RX ADMIN — PROPOFOL 150 MG: 10 INJECTION, EMULSION INTRAVENOUS at 12:14

## 2025-05-29 RX ADMIN — DEXAMETHASONE SODIUM PHOSPHATE 10 MG: 4 INJECTION INTRA-ARTICULAR; INTRALESIONAL; INTRAMUSCULAR; INTRAVENOUS; SOFT TISSUE at 12:18

## 2025-05-29 RX ADMIN — Medication 200 MCG: at 12:42

## 2025-05-29 RX ADMIN — GLYCOPYRROLATE 0.2 MG: 0.2 INJECTION INTRAMUSCULAR; INTRAVENOUS at 12:35

## 2025-05-29 RX ADMIN — SODIUM CHLORIDE, SODIUM GLUCONATE, SODIUM ACETATE, POTASSIUM CHLORIDE AND MAGNESIUM CHLORIDE: 526; 502; 368; 37; 30 INJECTION, SOLUTION INTRAVENOUS at 12:06

## 2025-05-29 RX ADMIN — OXYCODONE 5 MG: 5 TABLET ORAL at 18:32

## 2025-05-29 RX ADMIN — CARVEDILOL 6.25 MG: 6.25 TABLET, FILM COATED ORAL at 21:53

## 2025-05-29 RX ADMIN — LIDOCAINE HYDROCHLORIDE 4 ML: 40 SOLUTION TOPICAL at 12:16

## 2025-05-29 SDOH — ECONOMIC STABILITY: HOUSING INSECURITY: IN THE PAST 12 MONTHS, HOW MANY TIMES HAVE YOU MOVED WHERE YOU WERE LIVING?: 0

## 2025-05-29 SDOH — ECONOMIC STABILITY: HOUSING INSECURITY: AT ANY TIME IN THE PAST 12 MONTHS, WERE YOU HOMELESS OR LIVING IN A SHELTER (INCLUDING NOW)?: NO

## 2025-05-29 SDOH — SOCIAL STABILITY: SOCIAL INSECURITY
WITHIN THE LAST YEAR, HAVE YOU BEEN KICKED, HIT, SLAPPED, OR OTHERWISE PHYSICALLY HURT BY YOUR PARTNER OR EX-PARTNER?: PATIENT UNABLE TO ANSWER

## 2025-05-29 SDOH — ECONOMIC STABILITY: HOUSING INSECURITY: IN THE LAST 12 MONTHS, WAS THERE A TIME WHEN YOU WERE NOT ABLE TO PAY THE MORTGAGE OR RENT ON TIME?: PATIENT DECLINED

## 2025-05-29 SDOH — SOCIAL STABILITY: SOCIAL INSECURITY: DOES ANYONE TRY TO KEEP YOU FROM HAVING/CONTACTING OTHER FRIENDS OR DOING THINGS OUTSIDE YOUR HOME?: YES

## 2025-05-29 SDOH — HEALTH STABILITY: PHYSICAL HEALTH: ON AVERAGE, HOW MANY DAYS PER WEEK DO YOU ENGAGE IN MODERATE TO STRENUOUS EXERCISE (LIKE A BRISK WALK)?: 5 DAYS

## 2025-05-29 SDOH — ECONOMIC STABILITY: INCOME INSECURITY
IN THE PAST 12 MONTHS HAS THE ELECTRIC, GAS, OIL, OR WATER COMPANY THREATENED TO SHUT OFF SERVICES IN YOUR HOME?: PATIENT DECLINED

## 2025-05-29 SDOH — SOCIAL STABILITY: SOCIAL INSECURITY: WITHIN THE LAST YEAR, HAVE YOU BEEN HUMILIATED OR EMOTIONALLY ABUSED IN OTHER WAYS BY YOUR PARTNER OR EX-PARTNER?: YES

## 2025-05-29 SDOH — SOCIAL STABILITY: SOCIAL INSECURITY: ARE THERE ANY APPARENT SIGNS OF INJURIES/BEHAVIORS THAT COULD BE RELATED TO ABUSE/NEGLECT?: NO

## 2025-05-29 SDOH — ECONOMIC STABILITY: FOOD INSECURITY: HOW HARD IS IT FOR YOU TO PAY FOR THE VERY BASICS LIKE FOOD, HOUSING, MEDICAL CARE, AND HEATING?: NOT HARD AT ALL

## 2025-05-29 SDOH — ECONOMIC STABILITY: FOOD INSECURITY: WITHIN THE PAST 12 MONTHS, THE FOOD YOU BOUGHT JUST DIDN'T LAST AND YOU DIDN'T HAVE MONEY TO GET MORE.: PATIENT DECLINED

## 2025-05-29 SDOH — SOCIAL STABILITY: SOCIAL INSECURITY: HAVE YOU HAD ANY THOUGHTS OF HARMING ANYONE ELSE?: NO

## 2025-05-29 SDOH — SOCIAL STABILITY: SOCIAL INSECURITY: WITHIN THE LAST YEAR, HAVE YOU BEEN AFRAID OF YOUR PARTNER OR EX-PARTNER?: NO

## 2025-05-29 SDOH — ECONOMIC STABILITY: FOOD INSECURITY
WITHIN THE PAST 12 MONTHS, YOU WORRIED THAT YOUR FOOD WOULD RUN OUT BEFORE YOU GOT THE MONEY TO BUY MORE.: PATIENT DECLINED

## 2025-05-29 SDOH — SOCIAL STABILITY: SOCIAL INSECURITY: ABUSE: ADULT

## 2025-05-29 SDOH — SOCIAL STABILITY: SOCIAL INSECURITY: DO YOU FEEL UNSAFE GOING BACK TO THE PLACE WHERE YOU ARE LIVING?: NO

## 2025-05-29 SDOH — SOCIAL STABILITY: SOCIAL INSECURITY
WITHIN THE LAST YEAR, HAVE YOU BEEN RAPED OR FORCED TO HAVE ANY KIND OF SEXUAL ACTIVITY BY YOUR PARTNER OR EX-PARTNER?: NO

## 2025-05-29 SDOH — SOCIAL STABILITY: SOCIAL INSECURITY: DO YOU FEEL ANYONE HAS EXPLOITED OR TAKEN ADVANTAGE OF YOU FINANCIALLY OR OF YOUR PERSONAL PROPERTY?: YES

## 2025-05-29 SDOH — ECONOMIC STABILITY: HOUSING INSECURITY: DO YOU FEEL UNSAFE GOING BACK TO THE PLACE WHERE YOU LIVE?: NO

## 2025-05-29 SDOH — ECONOMIC STABILITY: TRANSPORTATION INSECURITY
IN THE PAST 12 MONTHS, HAS LACK OF TRANSPORTATION KEPT YOU FROM MEDICAL APPOINTMENTS OR FROM GETTING MEDICATIONS?: PATIENT DECLINED

## 2025-05-29 SDOH — SOCIAL STABILITY: SOCIAL INSECURITY: ARE YOU OR HAVE YOU BEEN THREATENED OR ABUSED PHYSICALLY, EMOTIONALLY, OR SEXUALLY BY ANYONE?: YES

## 2025-05-29 SDOH — HEALTH STABILITY: PHYSICAL HEALTH: ON AVERAGE, HOW MANY MINUTES DO YOU ENGAGE IN EXERCISE AT THIS LEVEL?: 30 MIN

## 2025-05-29 SDOH — SOCIAL STABILITY: SOCIAL INSECURITY: HAVE YOU HAD THOUGHTS OF HARMING ANYONE ELSE?: NO

## 2025-05-29 SDOH — SOCIAL STABILITY: SOCIAL INSECURITY: HAS ANYONE EVER THREATENED TO HURT YOUR FAMILY OR YOUR PETS?: YES

## 2025-05-29 ASSESSMENT — PAIN DESCRIPTION - DESCRIPTORS
DESCRIPTORS: ACHING
DESCRIPTORS: SORE
DESCRIPTORS: SORE
DESCRIPTORS: ACHING

## 2025-05-29 ASSESSMENT — COGNITIVE AND FUNCTIONAL STATUS - GENERAL
STANDING UP FROM CHAIR USING ARMS: A LITTLE
CLIMB 3 TO 5 STEPS WITH RAILING: A LITTLE
CLIMB 3 TO 5 STEPS WITH RAILING: A LITTLE
DAILY ACTIVITIY SCORE: 19
MOVING TO AND FROM BED TO CHAIR: A LITTLE
DRESSING REGULAR LOWER BODY CLOTHING: A LITTLE
WALKING IN HOSPITAL ROOM: A LITTLE
STANDING UP FROM CHAIR USING ARMS: A LITTLE
MOVING TO AND FROM BED TO CHAIR: A LITTLE
MOVING FROM LYING ON BACK TO SITTING ON SIDE OF FLAT BED WITH BEDRAILS: A LITTLE
WALKING IN HOSPITAL ROOM: A LITTLE
PERSONAL GROOMING: A LITTLE
TURNING FROM BACK TO SIDE WHILE IN FLAT BAD: A LITTLE
MOBILITY SCORE: 18
TOILETING: A LITTLE
HELP NEEDED FOR BATHING: A LITTLE
DRESSING REGULAR UPPER BODY CLOTHING: A LITTLE
MOVING FROM LYING ON BACK TO SITTING ON SIDE OF FLAT BED WITH BEDRAILS: A LITTLE
MOBILITY SCORE: 18
TURNING FROM BACK TO SIDE WHILE IN FLAT BAD: A LITTLE

## 2025-05-29 ASSESSMENT — COLUMBIA-SUICIDE SEVERITY RATING SCALE - C-SSRS
2. HAVE YOU ACTUALLY HAD ANY THOUGHTS OF KILLING YOURSELF?: NO
6. HAVE YOU EVER DONE ANYTHING, STARTED TO DO ANYTHING, OR PREPARED TO DO ANYTHING TO END YOUR LIFE?: NO
1. IN THE PAST MONTH, HAVE YOU WISHED YOU WERE DEAD OR WISHED YOU COULD GO TO SLEEP AND NOT WAKE UP?: NO
6. HAVE YOU EVER DONE ANYTHING, STARTED TO DO ANYTHING, OR PREPARED TO DO ANYTHING TO END YOUR LIFE?: NO

## 2025-05-29 ASSESSMENT — ACTIVITIES OF DAILY LIVING (ADL)
JUDGMENT_ADEQUATE_SAFELY_COMPLETE_DAILY_ACTIVITIES: YES
WALKS IN HOME: NEEDS ASSISTANCE
GROOMING: NEEDS ASSISTANCE
BATHING: NEEDS ASSISTANCE
LACK_OF_TRANSPORTATION: PATIENT DECLINED
FEEDING YOURSELF: NEEDS ASSISTANCE
DRESSING YOURSELF: NEEDS ASSISTANCE
ADEQUATE_TO_COMPLETE_ADL: YES
PATIENT'S MEMORY ADEQUATE TO SAFELY COMPLETE DAILY ACTIVITIES?: YES
HEARING - RIGHT EAR: FUNCTIONAL
HEARING - LEFT EAR: FUNCTIONAL
TOILETING: NEEDS ASSISTANCE

## 2025-05-29 ASSESSMENT — LIFESTYLE VARIABLES
AUDIT-C TOTAL SCORE: -1
SKIP TO QUESTIONS 9-10: 0
SUBSTANCE_ABUSE_PAST_12_MONTHS: NO
PRESCIPTION_ABUSE_PAST_12_MONTHS: NO
HOW OFTEN DO YOU HAVE 6 OR MORE DRINKS ON ONE OCCASION: NEVER
AUDIT-C TOTAL SCORE: -1
HOW OFTEN DO YOU HAVE A DRINK CONTAINING ALCOHOL: NEVER
HOW MANY STANDARD DRINKS CONTAINING ALCOHOL DO YOU HAVE ON A TYPICAL DAY: PATIENT DECLINED

## 2025-05-29 ASSESSMENT — PAIN SCALES - GENERAL
PAINLEVEL_OUTOF10: 7
PAINLEVEL_OUTOF10: 0 - NO PAIN
PAINLEVEL_OUTOF10: 6
PAINLEVEL_OUTOF10: 7
PAINLEVEL_OUTOF10: 5 - MODERATE PAIN
PAINLEVEL_OUTOF10: 7
PAINLEVEL_OUTOF10: 6
PAINLEVEL_OUTOF10: 9
PAINLEVEL_OUTOF10: 0 - NO PAIN
PAINLEVEL_OUTOF10: 0 - NO PAIN
PAINLEVEL_OUTOF10: 5 - MODERATE PAIN
PAINLEVEL_OUTOF10: 7
PAINLEVEL_OUTOF10: 7

## 2025-05-29 ASSESSMENT — PATIENT HEALTH QUESTIONNAIRE - PHQ9
SUM OF ALL RESPONSES TO PHQ9 QUESTIONS 1 & 2: 1
1. LITTLE INTEREST OR PLEASURE IN DOING THINGS: NOT AT ALL
2. FEELING DOWN, DEPRESSED OR HOPELESS: SEVERAL DAYS

## 2025-05-29 ASSESSMENT — PAIN DESCRIPTION - LOCATION
LOCATION: GROIN
LOCATION: GROIN

## 2025-05-29 NOTE — ANESTHESIA POSTPROCEDURE EVALUATION
"Patient: Vance Silva \"Karyn\"    Procedure Summary       Date: 05/29/25 Room / Location: [facility] OR 15 / Virtual [facility] OR    Anesthesia Start: 1206 Anesthesia Stop: 1309    Procedure: EVACUATION, HEMATOMA, PERINEUM (Left) Diagnosis:       Hematoma of left inguinal region      (Hematoma of left inguinal region [S30.1XXA])    Surgeons: Antony Wallace MD Responsible Provider: Tiffany Mccabe MD    Anesthesia Type: general ASA Status: 3            Anesthesia Type: general    Vitals Value Taken Time   /68 05/29/25 13:30   Temp 36.5 °C (97.7 °F) 05/29/25 13:30   Pulse 60 05/29/25 13:54   Resp 13 05/29/25 13:30   SpO2 98 % 05/29/25 13:54   Vitals shown include unfiled device data.    Anesthesia Post Evaluation    Patient location during evaluation: PACU  Patient participation: complete - patient participated  Level of consciousness: awake and alert  Pain management: adequate  Airway patency: patent  Cardiovascular status: acceptable  Respiratory status: acceptable  Hydration status: acceptable  Postoperative Nausea and Vomiting: none        No notable events documented.    "

## 2025-05-29 NOTE — ANESTHESIA PROCEDURE NOTES
Peripheral IV  Date/Time: 5/29/2025 12:24 PM      Placement  Needle size: 16 G  Laterality: left  Location: hand  Site prep: alcohol  Technique: anatomical landmarks  Attempts: 1

## 2025-05-29 NOTE — CARE PLAN
The patient's goals for the shift include    Problem: Pain - Adult  Goal: Verbalizes/displays adequate comfort level or baseline comfort level  Outcome: Progressing     Problem: Safety - Adult  Goal: Free from fall injury  Outcome: Progressing     Problem: Discharge Planning  Goal: Discharge to home or other facility with appropriate resources  Outcome: Progressing     Problem: Chronic Conditions and Co-morbidities  Goal: Patient's chronic conditions and co-morbidity symptoms are monitored and maintained or improved  Outcome: Progressing     Problem: Nutrition  Goal: Nutrient intake appropriate for maintaining nutritional needs  Outcome: Progressing       The clinical goals for the shift include pt will remain free from injury throughout shift

## 2025-05-29 NOTE — BRIEF OP NOTE
"Date: 2025  OR Location: [facility] OR    Name: Vance Silva \"Christine", : 1965, Age: 59 y.o., MRN: [id number], Sex: male    Diagnosis  Pre-op Diagnosis      * Hematoma of left inguinal region [S30.1XXA] Post-op Diagnosis     * Hematoma of left inguinal region [S30.1XXA]     Procedures  EVACUATION, HEMATOMA, PERINEUM  62203 - SD I&D HEMATOMA SEROMA/FLUID COLLECTION      Surgeons      * Antony Wallace - Primary    Resident/Fellow/Other Assistant:  Surgeons and Role:     * Lauren Pichardo MD - Resident - Assisting    Staff:   Circulator: Henry Castroub Person: Federico Mata Scrub: Kait  Relief Circulator: Kait    Anesthesia Staff: Anesthesiologist: Tiffany Mccabe MD  C-AA: CATY Oleary    Procedure Summary  Anesthesia: General  ASA: III  Estimated Blood Loss: 5mL  Intra-op Medications:   Administrations occurring from 1158 to 1338 on 25:   Medication Name Total Dose   sodium chloride 0.9 % irrigation solution 1,000 mL   ceFAZolin (Ancef) 1 g in sodium chloride 0.9 % 1,000 mL irrigation 1 g   sterile water irrigation solution 1,000 mL   bupivacaine PF 0.25 % (Marcaine) 0.25 % (2.5 mg/mL) injection 20 mL   albumin human bottle 5% 500 mL   ceFAZolin (Ancef) vial 1 g 2 g   dexAMETHasone (Decadron) 4 mg/mL 10 mg   electrolyte-A (Plasmalyte-A) Cannot be calculated   fentaNYL (Sublimaze) injection 50 mcg/mL 50 mcg   glycopyrrolate (Robinul) injection 0.2 mg   lidocaine (Xylocaine) injection 2 % 60 mg   lidocaine (LTA Kit) for intubation 4 mL   midazolam PF (Versed) injection 1 mg/mL 2 mg   phenylephrine 40 mcg/mL syringe 10 mL 360 mcg   propofol (Diprivan) injection 10 mg/mL 150 mg   rocuronium (ZeMuron) 50 mg/5 mL injection 50 mg   sugammadex (Bridion) 200 mg/2 mL injection 200 mg              Anesthesia Record               Intraprocedure I/O Totals          Intake    PLATELETS 250.00 mL    Total Intake 250 mL       Output    Urine 50 mL    Est. Blood Loss 5 mL    Total Output 55 mL "       Net    Net Volume 195 mL          Specimen: No specimens collected         Findings: dark red clot evacuated from groin; no violation of fascia noted  10fr flat drain placed in groin     Complications:  None; patient tolerated the procedure well.     Disposition: PACU - hemodynamically stable.  Condition: stable  Specimens Collected: No specimens collected  Attending Attestation:     Antony Wallace  Phone Number: [phone]

## 2025-05-29 NOTE — H&P
Please refer to complete H&P obtained yesterday.     Patient w recent open left inguinal hernia repair now with post-operative hematoma. RTOR today for wash out. Consent obtained; risks benefits expectations and alternatives to procedure explained.     Patient received 5mg po vitamin k, 1u ffp, and 1u platelet prior to OR.     D/w attending surgeon Dr. Wallace.     Hanna Pichardo Pgy2  Transplant Surgery [id number]

## 2025-05-29 NOTE — PROGRESS NOTES
05/29/25 1116   Discharge Planning   Living Arrangements Spouse/significant other   Support Systems Spouse/significant other   Assistance Needed None   Type of Residence Private residence   Home or Post Acute Services None   Expected Discharge Disposition Home   Does the patient need discharge transport arranged? Yes   RoundTrip coordination needed? Yes   Has discharge transport been arranged? No   Financial Resource Strain   How hard is it for you to pay for the very basics like food, housing, medical care, and heating? Not very   Housing Stability   In the last 12 months, was there a time when you were not able to pay the mortgage or rent on time? N   Transportation Needs   In the past 12 months, has lack of transportation kept you from medical appointments or from getting medications? no       DC Planning:  Went in and met with the pt, confirmed demographics.     Transitional Care Coordination Progress Note:  Patient discussed during interdisciplinary rounds.     Plan per Medical/Surgical team:    Home Care choice for home going needs, [facility]  Pt Needs: TBD.   Pt going to OR today.       Discharge disposition: TBD    Potential Barriers: None    ADOD: 6/2    This TCC will continue to follow for home going needs and safe DC plan.      Latrice Marie. JAGDISH. TCC.

## 2025-05-29 NOTE — ANESTHESIA PROCEDURE NOTES
Airway  Date/Time: 5/29/2025 12:16 PM  Reason: elective    Airway not difficult    Staffing  Performed: CATY and attending   Authorized by: Tiffany Mccabe MD    Performed by: CATY Oleary  Patient location during procedure: OR    Patient Condition  Indications for airway management: anesthesia and airway protection  Patient position: sniffing  No planned trial extubation  Sedation level: deep     Final Airway Details   Preoxygenated: yes  Final airway type: endotracheal airway  Successful airway: ETT  Cuffed: yes   Successful intubation technique: direct laryngoscopy  Adjuncts used in placement: intubating stylet  Blade: Chava  Blade size: #4  ETT size (mm): 7.5  Cormack-Lehane Classification: grade I - full view of glottis  Placement verified by: chest auscultation and capnometry   Measured from: teeth  ETT to teeth (cm): 22

## 2025-05-29 NOTE — ANESTHESIA PREPROCEDURE EVALUATION
"Patient: Vance Silva \"Karyn\"    Procedure Information       Date/Time: 05/29/25 1158    Procedure: EVACUATION, HEMATOMA, PERINEUM (Left)    Location: [facility] OR 15 / [facility] OR    Surgeons: Antony Wallace MD          Patient to receive 1 pack of plts and 1 unit FFP prior to the OR.             ALLERGIES:  No Known Allergies     MEDICAL HISTORY:  Past Medical History:   Diagnosis Date    Acute kidney injury     Alcohol related seizure (Multi)     Anemia     Ascites     Awareness under anesthesia     Chronic kidney disease     Cirrhosis (Multi)     H/O multiple concussions     Hepatitis C     Liver disease     Parkinson's disease     dx 2/16/2024    Peripheral neuropathy     Portal hypertension with esophageal varices (Multi)     Secondary esophageal varices without bleeding (Multi)     Seizure (Multi)     Sleep apnea     Splenomegaly     Thrombocytopenia     Traumatic brain injury (Multi)     UGIB (upper gastrointestinal bleed)         Relevant Problems   Cardiac   (+) Hypertension      Neuro   (+) Alcohol related seizure (Multi)   (+) Chronic traumatic encephalopathy   (+) Inflammatory neuropathy (Multi)   (+) Lesion of ulnar nerve   (+) Peripheral neuropathy      GI   (+) Gastrointestinal hemorrhage with melena   (+) Secondary esophageal varices without bleeding (Multi)   (+) UGIB (upper gastrointestinal bleed)      Liver   (+) Alcoholic liver disease   (+) Cirrhosis of liver with ascites (Multi)   (+) Decompensated hepatic cirrhosis (Multi)      Hematology   (+) Anemia   (+) Anemia due to chronic blood loss   (+) Anemia, unspecified type   (+) Thrombocytopenia      Musculoskeletal   (+) Herniated lumbar intervertebral disc      ID   (+) Primary bacterial peritonitis (Multi)      Musculoskeletal and Injuries   (+) Hematoma of left inguinal region        SURGICAL HISTORY:  Past Surgical History:   Procedure Laterality Date    COLONOSCOPY      ESOPHAGOGASTRODUODENOSCOPY      HERNIA REPAIR      OTHER " SURGICAL HISTORY      Multiple orthopedic surgeries    UMBILICAL HERNIA REPAIR          MEDICATIONS:  Current Outpatient Medications   Medication Instructions    carvedilol (COREG) 6.25 mg, oral, 2 times daily    ferrous sulfate 325 mg, oral, 2 times daily    furosemide (LASIX) 40 mg, oral, Daily    lactulose 10 gram/15 mL solution TAKE 30 ML (20 G) BY MOUTH 3 TIMES A DAY. TITRATE TO ACHIEVE GOAL 2-3 BOWEL MOVEMENTS DAILY    oxyCODONE (ROXICODONE) 5 mg, oral, Every 6 hours PRN    pantoprazole (PROTONIX) 40 mg, oral, Daily before breakfast, DO NOT CRUSH CHEW OR SPLIT    spironolactone (ALDACTONE) 50 mg, oral, Daily        VITALS:      2025     7:15 AM 2025     6:58 AM 2025     6:39 AM   Vitals   Systolic 123 121 119   Diastolic 76 77 74   BP Location  Left arm    Heart Rate   50   Temp 36.2 °C (97.2 °F) 36.3 °C (97.3 °F) 36.1 °C (97 °F)   Resp  16 16       LABS:   BMP   Lab Results   Component Value Date    GLUCOSE 91 2025    CALCIUM 8.6 2025     2025    K 4.0 2025    CO2 24 2025     2025    BUN 16 2025    CREATININE 1.03 2025   , LFT   Lab Results   Component Value Date    ALT 29 2025    AST 22 2025    ALKPHOS 90 2025    BILITOT 1.3 (H) 2025   , MELD MELD 3.0: 11 at 2025  5:26 AM  MELD-Na: 10 at 2025  5:26 AM  Calculated from:  Serum Creatinine: 1.03 mg/dL at 2025  5:26 AM  Serum Sodium: 136 mmol/L at 2025  5:26 AM  Total Bilirubin: 1.3 mg/dL at 2025  5:26 AM  Serum Albumin: 3.7 g/dL (Using max of 3.5 g/dL) at 2025  5:26 AM  INR(ratio): 1.3 at 2025  4:00 PM  Age at listin years  Sex: Male at 2025  5:26 AM  , CBC  Lab Results   Component Value Date    WBC 2.3 (L) 2025    HGB 9.5 (L) 2025    HCT 29.2 (L) 2025    MCV 92 2025    PLT 40 (LL) 2025    , Coags   Lab Results   Component Value Date/Time    PROTIME 14.2 (H) 2025 1600    INR 1.3  (H) 05/28/2025 1600    APTT 29 05/28/2025 1600   , A1C   Lab Results   Component Value Date    HGBA1C 4.8 04/21/2025       IMAGES:  EKG        No results found for this or any previous visit (from the past 4464 hours).   , ECHO         Transthoracic Echo (TTE) Complete With Contrast 02/10/2024    [facility], [address] [place] [address]  Tel [phone] and Fax [phone]    TRANSTHORACIC ECHOCARDIOGRAM REPORT      Patient Name:      BELGICA Tariq Physician:    [id number] Neris Garcia MD  Study Date:        2/10/2024            Ordering Provider:    [id number] BETO MCGREGOR  MRN/PID:           [id number]             Fellow:  Accession#:        [id number]         Nurse:                Ricarda Marie RN  Date of Birth/Age: 1965 / 58      Sonographer:          Soheila blackmon                                      RDCS  Gender:            M                    Additional Staff:  Height:            231.14 cm            Admit Date:           2/9/2024  Weight:            83.46 kg             Admission Status:     Inpatient -  Routine  BSA:               2.44 m2              Encounter#:           [id number]  Department Location:  [facility] Non  Invasive  Blood Pressure: 95 /62 mmHg    Study Type:    TRANSTHORACIC ECHO (TTE) COMPLETE  Diagnosis/ICD: Other ascites-R18.8  Indication:    Cirrhosis of liver with ascites, unspecified hepatic cirrhosis  type  CPT Code:      Echo Complete w Full Doppler-11124    Patient History:  Pertinent History: Decompensated cirrhosis complicated by portal hypertension,  esophageal varices s/p banding, PHG, ascites, history of GI  bleed from duodenal ulcer 11/2020, traumatic head injury,  multiple fractures, and arthritis.    Study Detail: The following Echo studies were performed: 2D, M-Mode, Doppler and  color flow. Technically challenging study due to body habitus and  prominent lung artifact. Definity used as a contrast agent  for  endocardial border definition. Total contrast used for this  procedure was 2 mL via IV push.      PHYSICIAN INTERPRETATION:  Left Ventricle: The left ventricular systolic function is normal. There are no regional wall motion abnormalities. The left ventricular cavity size is normal. Spectral Doppler shows a normal pattern of left ventricular diastolic filling. Technically difficult despite the use of echoconrast. Overall Grossly normal LV systolic function without obvious regional wall motion abnormalities.  Left Atrium: The left atrium is normal in size.  Right Ventricle: The right ventricle was not well visualized. Unable to determine right ventricular systolic function.  Right Atrium: The right atrium was not well visualized.  Aortic Valve: The aortic valve was not well visualized. There is minimal aortic valve cusp calcification. There is no evidence of aortic valve regurgitation. The peak instantaneous gradient of the aortic valve is 6.8 mmHg.  Mitral Valve: The mitral valve is normal in structure. There is mild mitral annular calcification. There is mild mitral valve regurgitation.  Tricuspid Valve: The tricuspid valve was not well visualized. There is trace tricuspid regurgitation. The right ventricular systolic pressure is unable to be estimated.  Pulmonic Valve: The pulmonic valve is not well visualized. The pulmonic valve regurgitation was not well visualized.  Pericardium: There is a trivial pericardial effusion.  Aorta: The aortic root is abnormal. There is mild dilatation of the aortic root.  Systemic Veins: The inferior vena cava size appears small. There is IVC inspiratory collapse greater than 50%.  In comparison to the previous echocardiogram(s): There are no prior studies on this patient for comparison purposes. No prior echocardiogram available for comparison.      CONCLUSIONS:  1. Left ventricular systolic function is normal.  2. Poorly visualized anatomical structures due to suboptimal  image quality.  3. Technically difficult despite the use of echoconrast. Overall Grossly normal LV systolic function without obvious regional wall motion abnormalities.  4. No prior echocardiogram available for comparison.    QUANTITATIVE DATA SUMMARY:  2D MEASUREMENTS:  Normal Ranges:  Ao Root d:     4.10 cm   (2.0-3.7cm)  LAs:           3.80 cm   (2.7-4.0cm)  IVSd:          1.10 cm   (0.6-1.1cm)  LVPWd:         1.00 cm   (0.6-1.1cm)  LVIDd:         4.70 cm   (3.9-5.9cm)  LVIDs:         3.10 cm  LV Mass Index: 72.2 g/m2  LV % FS        34.0 %    LA VOLUME:  Normal Ranges:  LA Vol A4C:        59.2 ml    (22+/-6mL/m2)  LA Vol A2C:        76.9 ml  LA Vol BP:         68.1 ml  LA Vol Index A4C:  24.3ml/m2  LA Vol Index A2C:  31.5 ml/m2  LA Vol Index BP:   27.9 ml/m2  LA Area A4C:       19.4 cm2  LA Area A2C:       22.3 cm2  LA Major Axis A4C: 5.4 cm  LA Major Axis A2C: 5.5 cm  LA Volume Index:   27.8 ml/m2    AORTA MEASUREMENTS:  Normal Ranges:  Ao Sinus, d: 4.00 cm (2.1-3.5cm)    LV SYSTOLIC FUNCTION BY 2D PLANIMETRY (MOD):  Normal Ranges:  EF-A2C View: 63.9 %    LV DIASTOLIC FUNCTION:  Normal Ranges:  MV Peak E:      0.68 m/s    (0.7-1.2 m/s)  MV Peak A:      0.63 m/s    (0.42-0.7 m/s)  E/A Ratio:      1.08        (1.0-2.2)  MV e'           0.12 m/s    (>8.0)  MV lateral e'   0.13 m/s  MV medial e'    0.10 m/s  MV A Dur:       129.50 msec  E/e' Ratio:     5.91        (<8.0)  MV DT:          268 msec    (150-240 msec)  PulmV Sys Harsha:  56.60 cm/s  PulmV Lei Harsha: 47.10 cm/s  PulmV S/D Harsha:  1.20    MITRAL VALVE:  Normal Ranges:  MV DT: 268 msec (150-240msec)    AORTIC VALVE:  Normal Ranges:  AoV Vmax:      1.30 m/s (<=1.7m/s)  AoV Peak P.8 mmHg (<20mmHg)  LVOT Max Harsha:  1.34 m/s (<=1.1m/s)  LVOT VTI:      25.00 cm  LVOT Diameter: 2.30 cm  (1.8-2.4cm)  AoV Area,Vmax: 4.28 cm2 (2.5-4.5cm2)      RIGHT VENTRICLE:  TAPSE: 20.9 mm  RV s'  0.11 m/s    Pulmonary Veins:  PulmV Lei Harsha: 47.10 cm/s  PulmV S/D Harsha:   1.20  PulmV Sys Harsha:  56.60 cm/s      [id number] Neris Garcia MD  Electronically signed on 2/10/2024 at 5:27:08 PM        ** Final **    , CARDIAC CATH      No results found for this or any previous visit from the past 730 days.   , CXR       XR chest 1 view 01/04/2024    Narrative  * * *Final Report* * *    DATE OF EXAM: Jan 4 2024  4:34PM    EGX   [id number]  -  XR CHEST 1V FRONTAL PORT  / ACCESSION #  [id number]    PROCEDURE REASON: Shortness of breath    * * * * Physician Interpretation * * * *    EXAMINATION:  CHEST RADIOGRAPH (PORTABLE SINGLE VIEW AP)    Exam Date/Time:  1/4/2024 4:34 PM  CLINICAL HISTORY: Shortness of breath, Chest pain  MQ:  [id number]_5  Comparison:  December 15, 2021    RESULT:    Lines, tubes, and devices:  None.    Lungs and pleura:  Lungs are well-inflated. No focal consolidation. No  pleural effusion or pneumothorax.    Cardiomediastinal silhouette:  Cardiac silhouette and mediastinal contour  are grossly unremarkable.    Other:  .    Impression  IMPRESSION:    No acute findings in the chest.                    : PSCB  Transcribe Date/Time: Jan 4 2024  4:39P    Dictated by : SAYDA BUI MD    This examination was interpreted and the report reviewed and  electronically signed by:  SAYDA BUI MD on Jan 4 2024  4:39PM  EST    , CT Head/Neck       CT head wo IV contrast 01/04/2024    Narrative  * * *Final Report* * *    DATE OF EXAM: Jan 4 2024  6:19PM    [facility]   0504  -  CT BRAIN WO IVCON   / ACCESSION #  [id number]    PROCEDURE REASON: Head trauma, coagulopathy (Age 19-64y)    * * * * Physician Interpretation * * * *    COMPARISON: None.    HISTORY:  Head and neck trauma.    TECHNIQUE: CT head and cervical spine without contrast.  MQ: [id number]    CT Radiation dose: Integrated Dose-length product (DLP) for this visit =  1275 mGy*cm.  CT Dose Reduction Employed: No dose reduction techniques were required    RESULT: MRI CERVICAL SPINE: Acute abnormality: None.    No trauma change.  Normal  skull base/C1-C2 articulation.  Decreased disc  height and endplate changes with disc osteophyte and disc bulges at C5-6  and C6/7 along with facet and uncovertebral joint degeneration indicating  cervical spondylosis.  Remaining discs are normal.  Additional left  greater than right facet osteoarthritis and uncovertebral joint  degeneration indicating spondylosis.  Mild positional levoscoliosis  without vertebral anomaly.  Normal alignment, vertebral height, bone  density, central canal, thecal sac and cord caliber.  No  fracture/dislocation.  Normal tissues.    C2 -- 3: Left joint degeneration with severe foramina narrowing.  Patent  canal and right foramina.    C3 -- 4: Joint degeneration with severe left and mild right foramina  narrowing.  Patent canal.    C4 -- 5: Patent canal and foramina.    C5 -- 6: Disc/joint degeneration.  Mild canal narrowing.  Severe left &  moderate right foramina narrowing.    C6 -- 7: Disc/joint degeneration.  Moderate canal narrowing.  Severe  bilateral foramina narrowing.    C7 -- T1: Patent canal and foramina.    HEAD CT: Acute abnormality: None.    No trauma change.  No acute intracranial disease.  Age expected sulci,  gyri, ventricles, CSF spaces and brain.  No acute infarct/hemorrhage,  mass effect or collections.  Normal bones & skull base.    Impression  IMPRESSION: No head or cervical spine acute trauma related change.    COUNTING REFERENCE: Superior cervical disc is taken as C2-3.  Structural  anomalies: None.          [facility]  Transcribe Date/Time: Jan 4 2024  6:23P    Dictated by : MOE SHAIKH MD    This examination was interpreted and the report reviewed and  electronically signed by:  MOE SHAIKH MD on Jan 4 2024  6:26PM  EST    , CT Chest        CT chest wo IV contrast 03/05/2025    Narrative  Interpreted By:  Guanako Andrade,  STUDY:  CT CHEST WO IV CONTRAST; 3/5/2025 8:01 am    INDICATION:  Surveillance aneurysm of ascending aorta without  rupture..    COMPARISON:  None.    ACCESSION NUMBER(S):  [id number]    ORDERING CLINICIAN:  SPENCER GUERRERO    TECHNIQUE:  The study was performed without intravenous contrast material as  requested. A helical acquisition data was obtained with multiplanar  reconstructions.    One or more of the following dose reduction techniques were used:  Automated exposure control Adjustment of the mA and/or kV according  to patient size, and/or use of iterative reconstruction technique.    FINDINGS:  Within the limits of this unenhanced study no hilar or mediastinal  lymphadenopathy is identified.   No infiltrates or effusions are  identified.  Mild emphysematous change of the pulmonary parenchyma is  present. Right middle lobe and lingular subsegmental atelectasis is  present.    There is enlargement of the ascending thoracic aorta with a maximum  diameter 4.4 cm currently compared to approximately 4.5 cm  previously. Main pulmonary artery is not enlarged. Coronary artery  calcification is present although the exam is not optimized for  assessment of the coronary arteries.    Chest Wall: No chest wall masses are identified.    Skeletal System: No fractures or destructive lesions are identified.    Upper Abdomen: There is a cirrhotic appearance of the liver. Spleen  is only partially imaged but appears enlarged. Upper abdominal  ascites is present. Upper abdominal varices are present.    Impression  1. Stable appearance of the ascending aortic aneurysm. 4.4 cm.  2. Cirrhosis, ascites, and portal venous hypertension.  3. Mild emphysematous change of the pulmonary parenchyma.    MACRO:  none    Signed by: Guanako Andrade 3/6/2025 3:10 PM  Dictation workstation:   [id number]   , CT Abdomin       CT abdomen pelvis w IV contrast 05/28/2025    Narrative  STUDY:  CT Abdomen and Pelvis with IV Contrast; 5/28/2025 5:00 PM  INDICATION:  Postsurgical pain, ecchymosis and swelling from left-sided inguinal  hernia repair.  COMPARISON:  CT  [facility] 5/5/2025.  ACCESSION NUMBER(S):  [id number]  ORDERING CLINICIAN:  FELICIA HEBERT  TECHNIQUE:  CT of the abdomen and pelvis was performed.  Contiguous axial images  were obtained at 3 mm slice thickness through the abdomen and pelvis.  Coronal and sagittal reconstructions at 3 mm slice thickness were  performed.  Omnipaque 350--75 mL was administered intravenously.  FINDINGS:  LOWER CHEST:  No cardiomegaly.  No pericardial effusion.  Lung bases are clear.    ABDOMEN:    LIVER:  There is fatty infiltration of the liver with cirrhotic morphology of  the liver.  No focal hepatic lesions.    BILE DUCTS:  No intrahepatic or extrahepatic biliary ductal dilatation.    GALLBLADDER:  The gallbladder is present without gallstones.  STOMACH:  Mild to moderate gastric wall thickening, nonspecific in the setting  of portal hypertension.    PANCREAS:  No masses or ductal dilatation.    SPLEEN:  Spleen is enlarged measuring 18 cm    ADRENAL GLANDS:  No thickening or nodules.    KIDNEYS AND URETERS:  Kidneys are normal in size and location.  No renal or ureteral  calculi.    PELVIS:    BLADDER:  No abnormalities identified.    REPRODUCTIVE ORGANS:  Diffuse scrotal wall edema with small hydroceles.    BOWEL:  Mildly thickened small bowel loops throughout the abdomen and pelvis  with additional fluid-filled nondilated loops, nonspecific in the  setting of portal hypertension.  Small enteroenteric intussusception  likely transient to the right of midline on image 82 involving a 2.8  cm segment.  Mild diffuse colonic wall thickening.  Colonic  diverticulosis without findings of diverticulitis.  Appendix is  normal.    VESSELS:  No abnormalities identified.  Abdominal aorta is normal in caliber.    PERITONEUM/RETROPERITONEUM/LYMPH NODES:  Moderate to large volume ascites  No pneumoperitoneum.  Small  fat-containing bilateral inguinal hernias.  No lymphadenopathy.    ABDOMINAL WALL:  Postsurgical changes in the left inguinal  region.  Probable hematoma  in the left inguinal region extending into the superior aspect of the  left hemiscrotum measuring approximately 6 cm x 4.6 cm x 8 cm..  SOFT TISSUES:  No abnormalities identified.    BONES:  No acute fracture or aggressive osseous lesion.  Retrolisthesis of L4  and L5 and L5 on S1.    Impression  1.Postsurgical changes in the left inguinal region. Probable  hematoma in the left inguinal region extending into the superior  aspect of the left hemiscrotum measuring approximately 6 cm x 4.6 cm x  8 cm.  2.Diffuse scrotal wall edema with small hydroceles.  Correlate  quickly for cellulitis.  3.Hepatic cirrhosis with evidence of portal hypertension and  moderate to large volume ascites.  4.Mild to moderate gastric wall thickening, nonspecific in the  setting of portal hypertension.  5.Mildly thickened small bowel loops throughout the abdomen and  pelvis with additional fluid-filled nondilated loops, nonspecific in  the setting of portal hypertension.  6.Mild diffuse colonic wall thickening. Findings may be secondary  to portal hypertension, although difficult to exclude mild colitis.  7.Colonic diverticulosis without findings of diverticulitis.  Signed by Rah Thompson MD    SOCIAL:  Social History     Tobacco Use   Smoking Status Never   Smokeless Tobacco Never      Social History     Substance and Sexual Activity   Alcohol Use Not Currently    Comment: recovering alcholic      Social History     Substance and Sexual Activity   Drug Use Yes    Types: Marijuana    Comment: edibles        NPO STATUS:  No data recorded    Clinical Areas Reviewed:   Tobacco  Allergies  Meds   Med Hx  Surg Hx    Soc Hx        Anesthesia Assessment:    Physical Exam    Airway  Mallampati: II  TM distance: >3 FB  Neck ROM: full     Cardiovascular - normal exam   Dental    Pulmonary - normal exam   Abdominal - normal exam           Anesthesia Plan    History of general anesthesia?: yes  History of complications  of general anesthesia?: no    ASA 3     general     intravenous induction   Postoperative pain plan includes opioids.  Anesthetic plan and risks discussed with patient.  Use of blood products discussed with patient who.    Plan discussed with CAA and attending.

## 2025-05-30 LAB
ERYTHROCYTE [DISTWIDTH] IN BLOOD BY AUTOMATED COUNT: 14.1 % (ref 11.5–14.5)
HCT VFR BLD AUTO: 28.8 % (ref 41–52)
HGB BLD-MCNC: 9.4 G/DL (ref 13.5–17.5)
MCH RBC QN AUTO: 30 PG (ref 26–34)
MCHC RBC AUTO-ENTMCNC: 32.6 G/DL (ref 32–36)
MCV RBC AUTO: 92 FL (ref 80–100)
NRBC BLD-RTO: 0 /100 WBCS (ref 0–0)
PLATELET # BLD AUTO: 53 X10*3/UL (ref 150–450)
RBC # BLD AUTO: 3.13 X10*6/UL (ref 4.5–5.9)
WBC # BLD AUTO: 3.1 X10*3/UL (ref 4.4–11.3)

## 2025-05-30 PROCEDURE — 36415 COLL VENOUS BLD VENIPUNCTURE: CPT

## 2025-05-30 PROCEDURE — RXMED WILLOW AMBULATORY MEDICATION CHARGE

## 2025-05-30 PROCEDURE — 85027 COMPLETE CBC AUTOMATED: CPT

## 2025-05-30 PROCEDURE — 2500000001 HC RX 250 WO HCPCS SELF ADMINISTERED DRUGS (ALT 637 FOR MEDICARE OP)

## 2025-05-30 PROCEDURE — 99024 POSTOP FOLLOW-UP VISIT: CPT | Performed by: TRANSPLANT SURGERY

## 2025-05-30 PROCEDURE — 1100000001 HC PRIVATE ROOM DAILY

## 2025-05-30 PROCEDURE — 94660 CPAP INITIATION&MGMT: CPT

## 2025-05-30 RX ORDER — LACTULOSE 10 G/15ML
10 SOLUTION ORAL; RECTAL 2 TIMES DAILY
Qty: 900 ML | Refills: 0 | Status: ON HOLD | OUTPATIENT
Start: 2025-05-30 | End: 2025-07-01

## 2025-05-30 RX ADMIN — CARVEDILOL 6.25 MG: 6.25 TABLET, FILM COATED ORAL at 21:07

## 2025-05-30 RX ADMIN — LACTULOSE 10 G: 20 SOLUTION ORAL at 21:02

## 2025-05-30 RX ADMIN — LACTULOSE 10 G: 20 SOLUTION ORAL at 14:13

## 2025-05-30 RX ADMIN — OXYCODONE 5 MG: 5 TABLET ORAL at 13:16

## 2025-05-30 RX ADMIN — FERROUS SULFATE TAB 325 MG (65 MG ELEMENTAL FE) 1 TABLET: 325 (65 FE) TAB at 10:01

## 2025-05-30 RX ADMIN — FERROUS SULFATE TAB 325 MG (65 MG ELEMENTAL FE) 1 TABLET: 325 (65 FE) TAB at 21:07

## 2025-05-30 RX ADMIN — PANTOPRAZOLE SODIUM 40 MG: 40 TABLET, DELAYED RELEASE ORAL at 06:26

## 2025-05-30 ASSESSMENT — COGNITIVE AND FUNCTIONAL STATUS - GENERAL
DRESSING REGULAR UPPER BODY CLOTHING: A LITTLE
CLIMB 3 TO 5 STEPS WITH RAILING: A LITTLE
TOILETING: A LITTLE
DRESSING REGULAR LOWER BODY CLOTHING: A LITTLE
MOBILITY SCORE: 24
MOBILITY SCORE: 23
DAILY ACTIVITIY SCORE: 20
HELP NEEDED FOR BATHING: A LITTLE
DAILY ACTIVITIY SCORE: 24

## 2025-05-30 ASSESSMENT — PAIN - FUNCTIONAL ASSESSMENT
PAIN_FUNCTIONAL_ASSESSMENT: 0-10

## 2025-05-30 ASSESSMENT — PAIN SCALES - GENERAL
PAINLEVEL_OUTOF10: 0 - NO PAIN
PAINLEVEL_OUTOF10: 5 - MODERATE PAIN

## 2025-05-30 ASSESSMENT — PAIN DESCRIPTION - LOCATION: LOCATION: GROIN

## 2025-05-30 ASSESSMENT — PAIN DESCRIPTION - DESCRIPTORS: DESCRIPTORS: ACHING;SORE;TENDER

## 2025-05-30 ASSESSMENT — PAIN DESCRIPTION - ORIENTATION: ORIENTATION: LEFT

## 2025-05-30 NOTE — CARE PLAN
The patient's goals for the shift include      The clinical goals for the shift include pt will remain free from injury throughout shift      Problem: Pain - Adult  Goal: Verbalizes/displays adequate comfort level or baseline comfort level  Outcome: Progressing     Problem: Safety - Adult  Goal: Free from fall injury  Outcome: Progressing     Problem: Discharge Planning  Goal: Discharge to home or other facility with appropriate resources  Outcome: Progressing     Problem: Chronic Conditions and Co-morbidities  Goal: Patient's chronic conditions and co-morbidity symptoms are monitored and maintained or improved  Outcome: Progressing     Problem: Nutrition  Goal: Nutrient intake appropriate for maintaining nutritional needs  Outcome: Progressing     Problem: Risk for Suicide  Goal: Accepts medications as prescribed/needed this shift  Outcome: Progressing  Goal: Identifies supports this shift  Outcome: Progressing  Goal: Makes needs known through verbalization or behaviors this shift  Outcome: Progressing  Goal: No self harm this shift  Outcome: Progressing  Goal: Read Safety Guidelines this shift  Outcome: Progressing  Goal: Complete Mental Health Safety Plan (psychiatry only) this shift  Outcome: Progressing

## 2025-05-30 NOTE — CARE PLAN
Problem: Pain - Adult  Goal: Verbalizes/displays adequate comfort level or baseline comfort level  Outcome: Progressing     Problem: Safety - Adult  Goal: Free from fall injury  Outcome: Progressing     Problem: Discharge Planning  Goal: Discharge to home or other facility with appropriate resources  Outcome: Progressing     Problem: Chronic Conditions and Co-morbidities  Goal: Patient's chronic conditions and co-morbidity symptoms are monitored and maintained or improved  Outcome: Progressing     Problem: Nutrition  Goal: Nutrient intake appropriate for maintaining nutritional needs  Outcome: Progressing     Problem: Risk for Suicide  Goal: Accepts medications as prescribed/needed this shift  Outcome: Progressing  Goal: Identifies supports this shift  Outcome: Progressing  Goal: Makes needs known through verbalization or behaviors this shift  Outcome: Progressing  Goal: No self harm this shift  Outcome: Progressing  Goal: Read Safety Guidelines this shift  Outcome: Progressing  Goal: Complete Mental Health Safety Plan (psychiatry only) this shift  Outcome: Progressing   The patient's goals for the shift include  rest    The clinical goals for the shift include pt will remain free from injury and HDS throughout shift    Over the shift, the patient did make progress toward the following goals.

## 2025-05-30 NOTE — HOSPITAL COURSE
"Vance Silva \"Christine" is a 59 y.o. male with PMH of alcoholic cirrhosis and s/p open left inguinal hernia repair on 5/22/2025 presenting with swelling, pain, and ecchymosis of the groin. Patient reports appropriate postoperative pain until 48 hours ago prior to presentation when swelling worsened and he developed associate ecchymosis extending to left hemiscrotum. He reports a burning sensation along the left groin extending into the scrotum and down the posterior left leg. He denied fever, chills, chest pain, SOB, nausea, vomiting, abdominal pain, hematochezia, and constipation. He has difficulty with initiating a stream of urine and did have dysuria.  Leone was placed on admission.     Additionally, on admission he was transfused with 1 platelet and FFP, given PO 5mg vitamin K. He underwent OR washout of hematoma on 5/29 with intra-op placement of 10 Fr flat drain in groin. He did not require an additional product transfusion. His leone was removed on 5/30 and he was able to pass trial of void. His pain was well controlled with PO meds. He tolerated his regular diet. He continued to receive his home lactulose, protonix, laxis, coreg, and iron tabs. His spironolactone was held.    He remained HDS. He was deemed appropriate for DC home on 6/1. His draine was removed at bedside on 6/1 and he will need his staples removed in 2 weeks in outpatient clinic with Dr. Wallace.   "

## 2025-05-30 NOTE — PROGRESS NOTES
"Transitional Care Coordinator Progress Note:     Unable to speak with patient Vance Silva \"Karyn\" is a 59 y.o. male on day 2 of admission presenting with Hematoma of left inguinal region as he is refusing to speak with TCC staff and SW. Per nurse, patient reports there is no second learner for [facility] [place]. TCC will continue to follow for additional support.       Ira Travis, RN, BSN. TCC     "

## 2025-05-30 NOTE — OP NOTE
"EVACUATION, HEMATOMA, PERINEUM (L) Operative Note     Date: 2025  OR Location: [facility] OR    Name: Vance Silva \"Karyn\", : 1965, Age: 59 y.o., MRN: [id number], Sex: male    Diagnosis  Pre-op Diagnosis      * Hematoma of left inguinal region [S30.1XXA] Post-op Diagnosis     * Hematoma of left inguinal region [S30.1XXA]     Procedures    Hematoma evacuation and washout from hernia repair    Surgeons      * Antony Wallace - Primary    Resident/Fellow/Other Assistant:  Surgeons and Role:     * Lauren Pichardo MD - Resident - Assisting    Staff:   Circulator: Henry  Scrub Person: Federico Mata Scrub: Kait  Relief Circulator: Kait    Anesthesia Staff: Anesthesiologist: Tiffany Mccabe MD  C-AA: CATY Oleary    Procedure Summary  Anesthesia: General  ASA: III  Estimated Blood Loss: 10 mL  Intra-op Medications:   Administrations occurring from 1158 to 1338 on 25:   Medication Name Total Dose   sodium chloride 0.9 % irrigation solution 1,000 mL   ceFAZolin (Ancef) 1 g in sodium chloride 0.9 % 1,000 mL irrigation 1 g   sterile water irrigation solution 1,000 mL   bupivacaine PF 0.25 % (Marcaine) 0.25 % (2.5 mg/mL) injection 20 mL   albumin human bottle 5% 500 mL   ceFAZolin (Ancef) vial 1 g 2 g   dexAMETHasone (Decadron) 4 mg/mL 10 mg   electrolyte-A (Plasmalyte-A) Cannot be calculated   fentaNYL (Sublimaze) injection 50 mcg/mL 50 mcg   glycopyrrolate (Robinul) injection 0.2 mg   lidocaine (Xylocaine) injection 2 % 60 mg   lidocaine (LTA Kit) for intubation 4 mL   midazolam PF (Versed) injection 1 mg/mL 2 mg   phenylephrine 40 mcg/mL syringe 10 mL 360 mcg   propofol (Diprivan) injection 10 mg/mL 150 mg   rocuronium (ZeMuron) 50 mg/5 mL injection 50 mg   sugammadex (Bridion) 200 mg/2 mL injection 200 mg            Drains and/or Catheters:   Closed/Suction Drain 1 LLQ Bulb 7 Fr. (Active)   Site Description Healing 25   Dressing Status Old drainage 25   Drainage " "Appearance Bloody 05/30/25 0954   Output (mL) 7.5 mL 05/30/25 0954         Findings:   Hematoma    Indications: Vance Silva \"Christine" is an 59 y.o. male who had left inguinal hernia but developed a hematoma.    The patient was seen in the preoperative area. The risks, benefits, complications, treatment options, non-operative alternatives, expected recovery and outcomes were discussed with the patient. The possibilities of reaction to medication, pulmonary aspiration, injury to surrounding structures, bleeding, recurrent infection, the need for additional procedures, failure to diagnose a condition, and creating a complication requiring transfusion or operation were discussed with the patient. The patient concurred with the proposed plan, giving informed consent.  The site of surgery was properly noted/marked if necessary per policy. The patient has been actively warmed in preoperative area. Preoperative antibiotics have been ordered and given within 1 hours of incision. Venous thrombosis prophylaxis have been ordered including bilateral sequential compression devices    Procedure Details:     The patient was prepped and draped in the abdomen in the usual sterile fashion. The prior incision was entered. The subcutaneous sutures removed to reach the subcutaneous space above the fascia. The hematoma was encountered here. We evacuated  ~100ml old blood. It extended into the left testicular space. The fascia was intact and the mesh was not exposed. We washed this area with antibiotics solution and positioned a 7F ANTONIO. The skin was closed using vicryl and stapler. All counts were correct.      Evidence of Infection: No   Complications:  None; patient tolerated the procedure well.    Disposition: PACU - hemodynamically stable.  Condition: stable         Attending Attestation: I was present and scrubbed for the entire procedure.    Antony Wallace  Phone Number: [phone]      "

## 2025-05-30 NOTE — PROGRESS NOTES
"Vance Silva \"Christine" is a 59 y.o. male on day 2 of admission presenting with Hematoma of left inguinal region.    Subjective   S/p washout       Objective   Vitals:    05/30/25 1001   BP: 122/78   Pulse: 55   Resp:    Temp:    SpO2:        Physical Exam  Constitutional:       Appearance: Normal appearance.   Eyes:      Pupils: Pupils are equal, round, and reactive to light.   Cardiovascular:      Rate and Rhythm: Normal rate.   Pulmonary:      Effort: Pulmonary effort is normal. No respiratory distress.   Abdominal:      General: There is no distension.      Palpations: Abdomen is soft.      Comments: Incision clean, dry, intact   Musculoskeletal:         General: Normal range of motion.      Right lower leg: No edema.      Left lower leg: No edema.   Skin:     General: Skin is warm and dry.   Neurological:      General: No focal deficit present.      Mental Status: He is alert and oriented to person, place, and time.   Psychiatric:         Mood and Affect: Mood normal.         Behavior: Behavior normal.         Last Recorded Vitals  Blood pressure 122/78, pulse 55, temperature 36 °C (96.8 °F), temperature source Temporal, resp. rate 18, height 1.956 m (6' 5\"), weight 94.2 kg (207 lb 10.8 oz), SpO2 97%.  Intake/Output last 3 Shifts:  I/O last 3 completed shifts:  In: 803 (8.5 mL/kg) [Blood:803]  Out: 2775 (29.5 mL/kg) [Urine:2700 (0.8 mL/kg/hr); Drains:70; Blood:5]  Weight: 94.2 kg     Relevant Results  Lab Results   Component Value Date    WBC 3.1 (L) 05/30/2025    HGB 9.4 (L) 05/30/2025    HCT 28.8 (L) 05/30/2025    MCV 92 05/30/2025    PLT 53 (L) 05/30/2025     Lab Results   Component Value Date    GLUCOSE 91 05/29/2025    CALCIUM 8.6 05/29/2025     05/29/2025    K 4.0 05/29/2025    CO2 24 05/29/2025     05/29/2025    BUN 16 05/29/2025    CREATININE 1.03 05/29/2025     Lab Results   Component Value Date    ALT 29 05/29/2025    AST 22 05/29/2025    ALKPHOS 90 05/29/2025    BILITOT 1.3 (H) 05/29/2025 "     MELD 3.0: 10 at 2025  6:25 PM  MELD-Na: 9 at 2025  6:25 PM  Calculated from:  Serum Creatinine: 1.03 mg/dL at 2025  5:26 AM  Serum Sodium: 136 mmol/L at 2025  5:26 AM  Total Bilirubin: 1.3 mg/dL at 2025  5:26 AM  Serum Albumin: 3.7 g/dL (Using max of 3.5 g/dL) at 2025  5:26 AM  INR(ratio): 1.2 at 2025  6:25 PM  Age at listin years  Sex: Male at 2025  6:25 PM      Assessment & Plan  Hematoma of left inguinal region    S/p Hernia repair and washout with hematoma  Dc leone  OOB  Advance diet  LFT stable    I spent 35 minutes in the professional and overall care of this patient.      Antony Wallace MD

## 2025-05-31 PROCEDURE — 2500000001 HC RX 250 WO HCPCS SELF ADMINISTERED DRUGS (ALT 637 FOR MEDICARE OP)

## 2025-05-31 PROCEDURE — 1100000001 HC PRIVATE ROOM DAILY

## 2025-05-31 RX ADMIN — CARVEDILOL 6.25 MG: 6.25 TABLET, FILM COATED ORAL at 20:09

## 2025-05-31 RX ADMIN — LACTULOSE 10 G: 20 SOLUTION ORAL at 20:10

## 2025-05-31 RX ADMIN — FERROUS SULFATE TAB 325 MG (65 MG ELEMENTAL FE) 1 TABLET: 325 (65 FE) TAB at 20:55

## 2025-05-31 RX ADMIN — CARVEDILOL 6.25 MG: 6.25 TABLET, FILM COATED ORAL at 08:31

## 2025-05-31 RX ADMIN — FUROSEMIDE 40 MG: 40 TABLET ORAL at 08:31

## 2025-05-31 RX ADMIN — FERROUS SULFATE TAB 325 MG (65 MG ELEMENTAL FE) 1 TABLET: 325 (65 FE) TAB at 08:31

## 2025-05-31 RX ADMIN — PANTOPRAZOLE SODIUM 40 MG: 40 TABLET, DELAYED RELEASE ORAL at 07:19

## 2025-05-31 RX ADMIN — LACTULOSE 10 G: 20 SOLUTION ORAL at 08:31

## 2025-05-31 ASSESSMENT — PAIN SCALES - GENERAL
PAINLEVEL_OUTOF10: 0 - NO PAIN
PAINLEVEL_OUTOF10: 3
PAINLEVEL_OUTOF10: 0 - NO PAIN

## 2025-05-31 ASSESSMENT — COGNITIVE AND FUNCTIONAL STATUS - GENERAL
MOBILITY SCORE: 24
DAILY ACTIVITIY SCORE: 24
MOBILITY SCORE: 24
DAILY ACTIVITIY SCORE: 24

## 2025-05-31 ASSESSMENT — PAIN - FUNCTIONAL ASSESSMENT
PAIN_FUNCTIONAL_ASSESSMENT: 0-10
PAIN_FUNCTIONAL_ASSESSMENT: 0-10

## 2025-05-31 NOTE — CARE PLAN
The patient's goals for the shift include      The clinical goals for the shift include Patient will remain HDS throughout shift    Over the shift, the patient did  Problem: Pain - Adult  Goal: Verbalizes/displays adequate comfort level or baseline comfort level  Outcome: Progressing  Flowsheets (Taken 5/31/2025 1625)  Verbalizes/displays adequate comfort level or baseline comfort level:   Encourage patient to monitor pain and request assistance   Assess pain using appropriate pain scale     Problem: Safety - Adult  Goal: Free from fall injury  Outcome: Progressing  Flowsheets (Taken 5/31/2025 1625)  Free from fall injury: Instruct family/caregiver on patient safety     Problem: Discharge Planning  Goal: Discharge to home or other facility with appropriate resources  Outcome: Progressing  Flowsheets (Taken 5/31/2025 1625)  Discharge to home or other facility with appropriate resources: Identify barriers to discharge with patient and caregiver     Problem: Chronic Conditions and Co-morbidities  Goal: Patient's chronic conditions and co-morbidity symptoms are monitored and maintained or improved  Outcome: Progressing  Flowsheets (Taken 5/31/2025 1625)  Care Plan - Patient's Chronic Conditions and Co-Morbidity Symptoms are Monitored and Maintained or Improved: Monitor and assess patient's chronic conditions and comorbid symptoms for stability, deterioration, or improvement     Problem: Risk for Suicide  Goal: Accepts medications as prescribed/needed this shift  Outcome: Progressing    make progress toward the following goals.

## 2025-05-31 NOTE — PROGRESS NOTES
05/31/25 0931   Discharge Planning   Expected Discharge Disposition Home H     Transitional Care Coordination Progress Note:  This nurse spoke with pt, attempted to obtain information for a secondary learner for drain care. Per patient he won't be able to answer that at this time. Pt stated he'll be the primary learner.  Stated he'll work on a secondary learners but endorsed that he's having personal issues within his personal life.  This nurse requested to follow up with pt this afternoon, pt declined.     [facility] notified that pt will be primary learner for drain care.    Addendum 1220: This nurse received a telephone call from patient who confirmed his friend will be visiting this afternoon, will discuss assistance with drain care at this time and update this nurse on name and contact for secondary learner.    Sussy Yan RN, BSN  Transitional Care Coordinator  Office: [phone]  Secure chat via [employer]

## 2025-06-01 ENCOUNTER — PHARMACY VISIT (OUTPATIENT)
Dept: PHARMACY | Facility: CLINIC | Age: 60
End: 2025-06-01
Payer: COMMERCIAL

## 2025-06-01 VITALS
OXYGEN SATURATION: 94 % | SYSTOLIC BLOOD PRESSURE: 116 MMHG | BODY MASS INDEX: 24.52 KG/M2 | HEART RATE: 58 BPM | WEIGHT: 207.67 LBS | DIASTOLIC BLOOD PRESSURE: 78 MMHG | RESPIRATION RATE: 16 BRPM | TEMPERATURE: 97.3 F | HEIGHT: 77 IN

## 2025-06-01 PROCEDURE — 2500000001 HC RX 250 WO HCPCS SELF ADMINISTERED DRUGS (ALT 637 FOR MEDICARE OP)

## 2025-06-01 PROCEDURE — 99238 HOSP IP/OBS DSCHRG MGMT 30/<: CPT | Performed by: TRANSPLANT SURGERY

## 2025-06-01 RX ADMIN — PANTOPRAZOLE SODIUM 40 MG: 40 TABLET, DELAYED RELEASE ORAL at 06:00

## 2025-06-01 RX ADMIN — FERROUS SULFATE TAB 325 MG (65 MG ELEMENTAL FE) 1 TABLET: 325 (65 FE) TAB at 08:15

## 2025-06-01 RX ADMIN — LACTULOSE 10 G: 20 SOLUTION ORAL at 08:15

## 2025-06-01 RX ADMIN — FUROSEMIDE 40 MG: 40 TABLET ORAL at 08:15

## 2025-06-01 ASSESSMENT — COGNITIVE AND FUNCTIONAL STATUS - GENERAL
DAILY ACTIVITIY SCORE: 24
MOBILITY SCORE: 24

## 2025-06-01 ASSESSMENT — PAIN SCALES - GENERAL: PAINLEVEL_OUTOF10: 0 - NO PAIN

## 2025-06-01 NOTE — CARE PLAN
The patient's goals for the shift include      The clinical goals for the shift include patient will remain safe throughout shift    Over the shift, the patient did   Problem: Pain - Adult  Goal: Verbalizes/displays adequate comfort level or baseline comfort level  Outcome: Met  Flowsheets (Taken 6/1/2025 6946)  Verbalizes/displays adequate comfort level or baseline comfort level: Encourage patient to monitor pain and request assistance   make progress toward the following goals.

## 2025-06-01 NOTE — CARE PLAN
Problem: Pain - Adult  Goal: Verbalizes/displays adequate comfort level or baseline comfort level  Outcome: Progressing     Problem: Safety - Adult  Goal: Free from fall injury  Outcome: Progressing     Problem: Discharge Planning  Goal: Discharge to home or other facility with appropriate resources  Outcome: Progressing     Problem: Chronic Conditions and Co-morbidities  Goal: Patient's chronic conditions and co-morbidity symptoms are monitored and maintained or improved  Outcome: Progressing     Problem: Nutrition  Goal: Nutrient intake appropriate for maintaining nutritional needs  Outcome: Progressing     Problem: Risk for Suicide  Goal: Accepts medications as prescribed/needed this shift  Outcome: Progressing  Goal: Identifies supports this shift  Outcome: Progressing  Goal: Makes needs known through verbalization or behaviors this shift  Outcome: Progressing  Goal: No self harm this shift  Outcome: Progressing  Goal: Read Safety Guidelines this shift  Outcome: Progressing  Goal: Complete Mental Health Safety Plan (psychiatry only) this shift  Outcome: Progressing   The patient's goals for the shift include      The clinical goals for the shift include Patient will remain HDS throughout shift

## 2025-06-01 NOTE — DISCHARGE INSTRUCTIONS
Discharge Instructions    Incisions  Your incisions are closed with staples and need to be removed 2-3 weeks after surgery in the office.   You may take a shower or bath starting 6/1/25.   Keep the incisions clean and dry.   You may (but do not have to) cover the incisions with gauze and tape.       Pain  Do not drive while taking stronger pain medications (Tramadol, Percocet, Norco, Oxycodone, etc).   You may drive once you feel comfortable without stronger pain medications and can drive without any significant distractions related to your pain or surgical sites.  You may alternate acetaminophen and ibuprofen for pain control, as long as you are able to take either medication.       Diet  Resume your normal diet. Your appetite may not fully return immediately.   Please drink plenty of fluids to maintain hydration.    Physical Activity  Limit physical activity that would involve stretching the incision(s) for 2 weeks.  No lifting over 10-15 lb or strenuous physical activity for at least 3 weeks after surgery. If there is pain with increased physical effort, please resume light duty restrictions.     Follow Up  Follow up with Dr. Antony Wallace for staple removal. Please contact Dr. Wallace's office with any questions or concerns.     Call Provider  If you are experiencing fever (100.4F or higher).  If you are experiencing significantly worsening pain, nausea or vomiting.  If the incision(s) appear reddened, swollen or are draining.  If you have very loose or watery bowel movements.  If you have any new concerning symptoms.

## 2025-06-01 NOTE — DISCHARGE SUMMARY
"Discharge Diagnosis  Hematoma of left inguinal region    Issues Requiring Follow-Up  Follow up clinic appointment with Dr. Wallace for staple removal    Drain was removed inpatient on 6/1/25    Test Results Pending At Discharge  Pending Labs       No current pending labs.            Hospital Course  Vance Silva \"Christine" is a 59 y.o. male with PMH of alcoholic cirrhosis and s/p open left inguinal hernia repair on 5/22/2025 presenting with swelling, pain, and ecchymosis of the groin. Patient reports appropriate postoperative pain until 48 hours ago prior to presentation when swelling worsened and he developed associate ecchymosis extending to left hemiscrotum. He reports a burning sensation along the left groin extending into the scrotum and down the posterior left leg. He denied fever, chills, chest pain, SOB, nausea, vomiting, abdominal pain, hematochezia, and constipation. He has difficulty with initiating a stream of urine and did have dysuria.  Leone was placed on admission.     Additionally, on admission he was transfused with 1 platelet and FFP, given PO 5mg vitamin K. He underwent OR washout of hematoma on 5/29 with intra-op placement of 10 Fr flat drain in groin. He did not require an additional product transfusion. His leone was removed on 5/30 and he was able to pass trial of void. His pain was well controlled with PO meds. He tolerated his regular diet. He continued to receive his home lactulose, protonix, laxis, coreg, and iron tabs. His spironolactone was held.    He remained HDS. He was deemed appropriate for DC home on 6/1. His draine was removed at bedside on 6/1 and he will need his staples removed in 2 weeks in outpatient clinic with Dr. Wallace.     Pertinent Physical Exam At Time of Discharge  Physical Exam  Constitutional:       General: He is not in acute distress.     Appearance: Normal appearance.   HENT:      Head: Normocephalic and atraumatic.      Mouth/Throat:      Mouth: Mucous membranes are " moist.   Eyes:      Extraocular Movements: Extraocular movements intact.   Cardiovascular:      Rate and Rhythm: Normal rate and regular rhythm.   Pulmonary:      Effort: Pulmonary effort is normal. No respiratory distress.   Abdominal:      Palpations: Abdomen is soft.      Tenderness: There is no abdominal tenderness.      Comments: Left groin incision closed with staples without erythema or drainage. Left groin drain removed at bedside and drain hole covered with gauze.    Genitourinary:     Comments: Improved edema and ecchymosis of groin region.   Musculoskeletal:         General: Normal range of motion.      Cervical back: Normal range of motion.   Skin:     General: Skin is warm and dry.      Capillary Refill: Capillary refill takes 2 to 3 seconds.   Neurological:      General: No focal deficit present.      Mental Status: He is alert and oriented to person, place, and time.   Psychiatric:         Mood and Affect: Mood normal.         Behavior: Behavior normal.     Home Medications     Medication List      CHANGE how you take these medications    • lactulose 20 gram/30 mL oral solution; Take 15 mL (10 g) by mouth 2   times a day.; What changed: See the new instructions.     CONTINUE taking these medications    • carvedilol 6.25 mg tablet; Commonly known as: Coreg; Take 1 tablet (6.25   mg) by mouth 2 times a day.  • ferrous sulfate 325 mg (65 mg elemental) tablet; TAKE 1 TABLET BY MOUTH   TWICE A DAY  • furosemide 20 mg tablet; Commonly known as: Lasix; Take 2 tablets (40   mg) by mouth once daily.  • pantoprazole 40 mg EC tablet; Commonly known as: ProtoNix; TAKE 1 TABLET   BY MOUTH ONCE DAILY IN THE MORNING. TAKE BEFORE MEALS. DO NOT CRUSH, CHEW,   OR SPLIT  • spironolactone 50 mg tablet; Commonly known as: Aldactone; Take 1 tablet   (50 mg) by mouth once daily.     STOP taking these medications    • oxyCODONE 5 mg immediate release tablet; Commonly known as: Roxicodone       Outpatient Follow-Up  Future  Appointments   Date Time Provider Department Center   6/23/2025 10:20 AM Mu Norris MD [id number]       Siva Diaz MD

## 2025-06-02 ENCOUNTER — DOCUMENTATION (OUTPATIENT)
Dept: TRANSPLANT | Facility: HOSPITAL | Age: 60
End: 2025-06-02
Payer: COMMERCIAL

## 2025-06-02 ENCOUNTER — HOME HEALTH ADMISSION (OUTPATIENT)
Dept: HOME HEALTH SERVICES | Facility: HOME HEALTH | Age: 60
End: 2025-06-02
Payer: COMMERCIAL

## 2025-06-02 NOTE — PROGRESS NOTES
Per email from [place] [facility] liver listing approval has been extended to 06/02/26.  Precert needed at [facility].

## 2025-06-11 ENCOUNTER — HOSPITAL ENCOUNTER (INPATIENT)
Facility: HOSPITAL | Age: 60
DRG: 377 | End: 2025-06-11
Attending: INTERNAL MEDICINE | Admitting: INTERNAL MEDICINE
Payer: COMMERCIAL

## 2025-06-11 ENCOUNTER — APPOINTMENT (OUTPATIENT)
Dept: RADIOLOGY | Facility: HOSPITAL | Age: 60
DRG: 377 | End: 2025-06-11
Payer: COMMERCIAL

## 2025-06-11 ENCOUNTER — OFFICE VISIT (OUTPATIENT)
Dept: SURGERY | Facility: CLINIC | Age: 60
End: 2025-06-11
Payer: COMMERCIAL

## 2025-06-11 VITALS — HEART RATE: 118 BPM | DIASTOLIC BLOOD PRESSURE: 73 MMHG | SYSTOLIC BLOOD PRESSURE: 124 MMHG

## 2025-06-11 DIAGNOSIS — K72.90 DECOMPENSATED HEPATIC CIRRHOSIS (MULTI): Primary | ICD-10-CM

## 2025-06-11 DIAGNOSIS — K74.60 DECOMPENSATED HEPATIC CIRRHOSIS (MULTI): Primary | ICD-10-CM

## 2025-06-11 PROBLEM — E86.0 DEHYDRATION: Status: ACTIVE | Noted: 2025-06-11

## 2025-06-11 PROCEDURE — 71045 X-RAY EXAM CHEST 1 VIEW: CPT

## 2025-06-11 PROCEDURE — 71275 CT ANGIOGRAPHY CHEST: CPT

## 2025-06-11 PROCEDURE — 99024 POSTOP FOLLOW-UP VISIT: CPT | Performed by: STUDENT IN AN ORGANIZED HEALTH CARE EDUCATION/TRAINING PROGRAM

## 2025-06-11 SDOH — SOCIAL STABILITY: SOCIAL INSECURITY: ARE YOU OR HAVE YOU BEEN THREATENED OR ABUSED PHYSICALLY, EMOTIONALLY, OR SEXUALLY BY ANYONE?: NO

## 2025-06-11 SDOH — SOCIAL STABILITY: SOCIAL NETWORK: HOW OFTEN DO YOU ATTEND MEETINGS OF THE CLUBS OR ORGANIZATIONS YOU BELONG TO?: PATIENT DECLINED

## 2025-06-11 SDOH — SOCIAL STABILITY: SOCIAL NETWORK
DO YOU BELONG TO ANY CLUBS OR ORGANIZATIONS SUCH AS CHURCH GROUPS, UNIONS, FRATERNAL OR ATHLETIC GROUPS, OR SCHOOL GROUPS?: PATIENT DECLINED

## 2025-06-11 SDOH — SOCIAL STABILITY: SOCIAL INSECURITY: ARE YOU MARRIED, WIDOWED, DIVORCED, SEPARATED, NEVER MARRIED, OR LIVING WITH A PARTNER?: LIVING WITH PARTNER

## 2025-06-11 SDOH — ECONOMIC STABILITY: FOOD INSECURITY: WITHIN THE PAST 12 MONTHS, THE FOOD YOU BOUGHT JUST DIDN'T LAST AND YOU DIDN'T HAVE MONEY TO GET MORE.: NEVER TRUE

## 2025-06-11 SDOH — SOCIAL STABILITY: SOCIAL INSECURITY: ARE THERE ANY APPARENT SIGNS OF INJURIES/BEHAVIORS THAT COULD BE RELATED TO ABUSE/NEGLECT?: NO

## 2025-06-11 SDOH — SOCIAL STABILITY: SOCIAL INSECURITY: WITHIN THE LAST YEAR, HAVE YOU BEEN AFRAID OF YOUR PARTNER OR EX-PARTNER?: PATIENT DECLINED

## 2025-06-11 SDOH — SOCIAL STABILITY: SOCIAL INSECURITY: HAVE YOU HAD ANY THOUGHTS OF HARMING ANYONE ELSE?: NO

## 2025-06-11 SDOH — SOCIAL STABILITY: SOCIAL NETWORK: IN A TYPICAL WEEK, HOW MANY TIMES DO YOU TALK ON THE PHONE WITH FAMILY, FRIENDS, OR NEIGHBORS?: PATIENT DECLINED

## 2025-06-11 SDOH — HEALTH STABILITY: MENTAL HEALTH
DO YOU FEEL STRESS - TENSE, RESTLESS, NERVOUS, OR ANXIOUS, OR UNABLE TO SLEEP AT NIGHT BECAUSE YOUR MIND IS TROUBLED ALL THE TIME - THESE DAYS?: TO SOME EXTENT

## 2025-06-11 SDOH — SOCIAL STABILITY: SOCIAL INSECURITY: ABUSE: ADULT

## 2025-06-11 SDOH — HEALTH STABILITY: PHYSICAL HEALTH: ON AVERAGE, HOW MANY DAYS PER WEEK DO YOU ENGAGE IN MODERATE TO STRENUOUS EXERCISE (LIKE A BRISK WALK)?: 5 DAYS

## 2025-06-11 SDOH — SOCIAL STABILITY: SOCIAL INSECURITY: DO YOU FEEL UNSAFE GOING BACK TO THE PLACE WHERE YOU ARE LIVING?: NO

## 2025-06-11 SDOH — ECONOMIC STABILITY: INCOME INSECURITY: IN THE PAST 12 MONTHS HAS THE ELECTRIC, GAS, OIL, OR WATER COMPANY THREATENED TO SHUT OFF SERVICES IN YOUR HOME?: NO

## 2025-06-11 SDOH — SOCIAL STABILITY: SOCIAL NETWORK: HOW OFTEN DO YOU GET TOGETHER WITH FRIENDS OR RELATIVES?: PATIENT DECLINED

## 2025-06-11 SDOH — SOCIAL STABILITY: SOCIAL INSECURITY
WITHIN THE LAST YEAR, HAVE YOU BEEN KICKED, HIT, SLAPPED, OR OTHERWISE PHYSICALLY HURT BY YOUR PARTNER OR EX-PARTNER?: PATIENT DECLINED

## 2025-06-11 SDOH — ECONOMIC STABILITY: FOOD INSECURITY: WITHIN THE PAST 12 MONTHS, YOU WORRIED THAT YOUR FOOD WOULD RUN OUT BEFORE YOU GOT THE MONEY TO BUY MORE.: NEVER TRUE

## 2025-06-11 SDOH — SOCIAL STABILITY: SOCIAL INSECURITY: HAS ANYONE EVER THREATENED TO HURT YOUR FAMILY OR YOUR PETS?: NO

## 2025-06-11 SDOH — HEALTH STABILITY: PHYSICAL HEALTH: ON AVERAGE, HOW MANY MINUTES DO YOU ENGAGE IN EXERCISE AT THIS LEVEL?: 30 MIN

## 2025-06-11 SDOH — SOCIAL STABILITY: SOCIAL NETWORK: HOW OFTEN DO YOU ATTEND CHURCH OR RELIGIOUS SERVICES?: PATIENT DECLINED

## 2025-06-11 SDOH — SOCIAL STABILITY: SOCIAL INSECURITY: HAVE YOU HAD THOUGHTS OF HARMING ANYONE ELSE?: NO

## 2025-06-11 SDOH — ECONOMIC STABILITY: HOUSING INSECURITY: DO YOU FEEL UNSAFE GOING BACK TO THE PLACE WHERE YOU LIVE?: NO

## 2025-06-11 SDOH — SOCIAL STABILITY: SOCIAL INSECURITY
ASK PARENT OR GUARDIAN: ARE THERE TIMES WHEN YOU, YOUR CHILD(REN), OR ANY MEMBER OF YOUR HOUSEHOLD FEEL UNSAFE, HARMED, OR THREATENED AROUND PERSONS WITH WHOM YOU KNOW OR LIVE?: NO

## 2025-06-11 SDOH — SOCIAL STABILITY: SOCIAL INSECURITY
WITHIN THE LAST YEAR, HAVE YOU BEEN HUMILIATED OR EMOTIONALLY ABUSED IN OTHER WAYS BY YOUR PARTNER OR EX-PARTNER?: PATIENT DECLINED

## 2025-06-11 SDOH — SOCIAL STABILITY: SOCIAL INSECURITY: WERE YOU ABLE TO COMPLETE ALL THE BEHAVIORAL HEALTH SCREENINGS?: YES

## 2025-06-11 SDOH — SOCIAL STABILITY: SOCIAL INSECURITY: DO YOU FEEL ANYONE HAS EXPLOITED OR TAKEN ADVANTAGE OF YOU FINANCIALLY OR OF YOUR PERSONAL PROPERTY?: NO

## 2025-06-11 SDOH — SOCIAL STABILITY: SOCIAL INSECURITY: DOES ANYONE TRY TO KEEP YOU FROM HAVING/CONTACTING OTHER FRIENDS OR DOING THINGS OUTSIDE YOUR HOME?: NO

## 2025-06-11 ASSESSMENT — ACTIVITIES OF DAILY LIVING (ADL)
FEEDING YOURSELF: NEEDS ASSISTANCE
BATHING: NEEDS ASSISTANCE
HEARING - RIGHT EAR: FUNCTIONAL
LACK_OF_TRANSPORTATION: NO
GROOMING: NEEDS ASSISTANCE
TOILETING: NEEDS ASSISTANCE
DRESSING YOURSELF: NEEDS ASSISTANCE
PATIENT'S MEMORY ADEQUATE TO SAFELY COMPLETE DAILY ACTIVITIES?: YES
ADEQUATE_TO_COMPLETE_ADL: YES
WALKS IN HOME: NEEDS ASSISTANCE
JUDGMENT_ADEQUATE_SAFELY_COMPLETE_DAILY_ACTIVITIES: YES
HEARING - LEFT EAR: FUNCTIONAL

## 2025-06-11 ASSESSMENT — ENCOUNTER SYMPTOMS
NERVOUS/ANXIOUS: 1
APPETITE CHANGE: 1
CHEST TIGHTNESS: 0
FATIGUE: 1
FEVER: 0
DIFFICULTY URINATING: 0
DECREASED CONCENTRATION: 1
VOMITING: 1
LIGHT-HEADEDNESS: 1
ABDOMINAL PAIN: 0
DIARRHEA: 1
ABDOMINAL DISTENTION: 1
SHORTNESS OF BREATH: 1
BLOOD IN STOOL: 0
WEAKNESS: 1
DYSURIA: 0
CHILLS: 1

## 2025-06-11 ASSESSMENT — COGNITIVE AND FUNCTIONAL STATUS - GENERAL
TOILETING: A LITTLE
PATIENT BASELINE BEDBOUND: NO
WALKING IN HOSPITAL ROOM: A LITTLE
DAILY ACTIVITIY SCORE: 22
HELP NEEDED FOR BATHING: A LITTLE
MOVING TO AND FROM BED TO CHAIR: A LITTLE
MOBILITY SCORE: 20
STANDING UP FROM CHAIR USING ARMS: A LITTLE
CLIMB 3 TO 5 STEPS WITH RAILING: A LITTLE

## 2025-06-11 ASSESSMENT — PAIN DESCRIPTION - DESCRIPTORS: DESCRIPTORS: ACHING;SORE;TENDER

## 2025-06-11 ASSESSMENT — LIFESTYLE VARIABLES
HOW MANY STANDARD DRINKS CONTAINING ALCOHOL DO YOU HAVE ON A TYPICAL DAY: PATIENT DECLINED
SUBSTANCE_ABUSE_PAST_12_MONTHS: YES
PRESCIPTION_ABUSE_PAST_12_MONTHS: NO
AUDIT-C TOTAL SCORE: -1
SKIP TO QUESTIONS 9-10: 0
HOW OFTEN DO YOU HAVE A DRINK CONTAINING ALCOHOL: PATIENT DECLINED
HOW OFTEN DO YOU HAVE 6 OR MORE DRINKS ON ONE OCCASION: PATIENT DECLINED
AUDIT-C TOTAL SCORE: -1

## 2025-06-11 ASSESSMENT — PATIENT HEALTH QUESTIONNAIRE - PHQ9
SUM OF ALL RESPONSES TO PHQ9 QUESTIONS 1 & 2: 0
1. LITTLE INTEREST OR PLEASURE IN DOING THINGS: NOT AT ALL
2. FEELING DOWN, DEPRESSED OR HOPELESS: NOT AT ALL

## 2025-06-11 ASSESSMENT — PAIN SCALES - GENERAL: PAINLEVEL_OUTOF10: 7

## 2025-06-11 ASSESSMENT — PAIN - FUNCTIONAL ASSESSMENT: PAIN_FUNCTIONAL_ASSESSMENT: 0-10

## 2025-06-11 NOTE — SIGNIFICANT EVENT
"SENIOR STAFFING NOTE    Please see the excellent intern note for further details.     Vance Silva \"Christine" is a 59 year old male with a history of alcoholic cirrhosis and s/p open left inguinal hernia repair on 5/22/2025 with washout of the hematoma on 5/29/25, TBI,  who presented to the clinic today for post-op follow-up. He states that over the past few days, he had not been feeling well with some nausea/vomiting and jaundice. He was noted to be tachycardic. He was directly admitted for dehydration management.     The patient states that he has been juicing and getting IV treatments.  After his surgery he noticed more bloating, so he cut down on his water intake and started a liver cleanse on Sunday or Monday.  One of the components was glutathione.  He then also had severe diarrhea, going about 25 times in the past 2 days.  His stools have been black and watery with no hematochezia.  He does take iron supplements and has been taking beet juice.  He had an episode of nausea and vomiting last night and this morning.  He did not notice blood, but did say it was red and pinkish which she attributes to the beet juice.  He says he feels cold when he goes to the bathroom.  He feels weak and unsteady on his feet which is new.  He endorses some lightheadedness and dizziness with standing.  He can also feel his heart beating quickly.  He states he has some abdominal swelling that is about the same.  He has some swelling in the genital region but no burning on urination.  He has has some pain at the surgical site.    Of note, he also states that he feels like he is having a heart attack and he cannot breathe when he is walking.     He has not taken Lasix for the past 3 days.  He did not take lactulose because of the significant number of bowel movements that he had.  He has been taking his Aldactone.    Visit Vitals  /81 (BP Location: Left arm, Patient Position: Lying)   Pulse 109   Temp 37 °C (98.6 °F)   Resp 19 " "  SpO2 100%   Smoking Status Never   His physical exam was most notable for tachycardia, lungs that were clear to auscultation, a soft abdomen, swelling in the left inguinal region with a clean incision site, and no peripheral edema. No asterixis or jaundice was noted on exam.     A/P:   Vance Silva \"Christine" is a 59 year old male with a history of alcoholic cirrhosis and s/p open left inguinal hernia repair on 2025 with washout of the hematoma on 25, TBI who presented as a direct admit from clinic for severe dehydration, likely secondary to significant GI losses with diarrhea and poor PO intake. Labs showing an PORFIRIO (likely pre-renal) and low bicarbonate.     #Dehydration  #Diarrhea  - Volume resuscitation with albumin  - Hold carvedilol, furosemide, lactulose, and spironolactone  - Encourage continued PO intake  - Infectious work-up. Source likely from cleanse, but will follow-up given recent hospitalization and leukopenia  - Will hold off on starting Imodium until infection ruled out  - Unclear what components make up the liver cleanse  - Will consider orthostatic vital signs, PT/OT as needed    #PORFIRIO  - Likely pre-renal in the setting of GI losses  Plan:  - Volume resuscitation with albumin  - Monitor creatinine with RFP  - Avoid nephrotoxins    #Cirrhosis  #Portal hypertensive gastropathy  #H/o esophageal varices s/p banding  MELD 3.0: 14 at 2025  5:21 PM  MELD-Na: 15 at 2025  5:21 PM  Calculated from:  Serum Creatinine: 1.48 mg/dL at 2025  5:21 PM  Serum Sodium: 137 mmol/L at 2025  5:21 PM  Total Bilirubin: 1.1 mg/dL at 2025  5:21 PM  Serum Albumin: 3.6 g/dL (Using max of 3.5 g/dL) at 2025  5:21 PM  INR(ratio): 1.5 at 2025  5:21 PM  Age at listin years  Sex: Male at 2025  5:21 PM  Plan  - Hold carvedilol  - Hold iron supplements   - Hold Lasix  - Hold lactulose given significant number of bowel movements  - Hold spironolactone  - Continue with " pantoprazole  - Ultrasound abdomen to assess liver and ascites  - AFP and PETh    #Open left inguinal hernia repair (5/22/25)  #Washout of hematoma (5/29/25)  - US groin/scrotum in the AM  - Can consider transplant surgery consult in the AM    Rest of plan per intern H&P.     Case discussed with Dr. Mix (GI fellow) and Dr. Jacobson.     Sky Cormier MD  Internal Medicine, PGY-2

## 2025-06-11 NOTE — H&P
"History Of Present Illness  Vance Silva is a 59 year old male with past medical history of cirrhosis complicated by portal hypertension and esophageal varices (followed by transplant and hepatology), hypertension, hyperlipidemia, TBI, and recent left inguinal hernia repair (5/22/25) complicated by post op hematoma s/p OR washout (5/29/22). He was discharged home on 6/1 and presented to post op follow up appointment 6/11 where he stated he was having nausea, vomiting jaundice, and was tachycardic to 110s. He was directly admitted for dehydration management.     Had labs done 5/29 during his surgical admission which was unremarkable, LFTs only notable for slightly elevated total bilirubin of 1.3. CBC remarkable for pancytopenia with anemia with hg 9-10 (prior was 13-14), leukopenia WBC 3.1, thrombocytopenia to 40-50s (did require transfusion during surgeries).     On presentation, blood pressure 125/81, , RR 19, O2 100% on room air, temp 98.6. Patient states he has been doing functional medicine and diet modifications for the last year which has helped improve his LFTs and MELD score. He used to get frequent fluid overload and ascites about 1 year ago, but decreasing his water intake and processes/fat food intake has helped improve this.     He was recently discharged home from surgery and felt like he was developing more bloating and abdominal fullness, so he stopped drinking water and started a \"liver cleanse\" and glutathione on Monday 6/9. He is not sure what is included in the liver cleanse. Yesterday he started having more frequent bowel movements, estimating around 25 in the last day (normally targets 3-5 with lactulose). He stopped taking the lactulose and lasix. When asked about other medications he stated he did not want to discuss, does not take a lot of medications and cannot remember the names. Says his stools are sometimes black/dark, but takes iron supplement and drinks a lot of beet " juice. Had one episode of vomiting last night and once this morning, pinkish in color, but also attributes to beet juice. Did not notice any karlene blood. Today has had decreased appetite so has not had much PO intake, but was eating normally yesterday. Feel weak and fatigues, believes due to his hydration. Also endorses shortness of breath with exertion. Endorses some chills after bowel movements, but denies fever. Denies urinary symptoms.     Patient follows with Dr. Norris for cirrhosis. Last visit was 04/2025 and noted that cirrhosis has stabilized. Had hernia at the time and was told to see transplant about repair. Liver function had been improving, discussed risk of rapid deterioration especially risk after surgery and would recommend he stay on transplant list for at least 3 more years.     Imaging  CT A/P 05/2025:   1. Postsurgical changes in the left inguinal region. Probable hematoma in the left inguinal region extending into the superior aspect of the left hemiscrotum measuring approximately 6 cm x 4.6 cm x 8 cm.  2. Diffuse scrotal wall edema with small hydroceles.  Correlate quickly for cellulitis.  3. Hepatic cirrhosis with evidence of portal hypertension and moderate to large volume ascites.  4. Mild to moderate gastric wall thickening, nonspecific in the setting of portal hypertension.  5. Mildly thickened small bowel loops throughout the abdomen and pelvis with additional fluid-filled nondilated loops, nonspecific in the setting of portal hypertension.  6. Mild diffuse colonic wall thickening. Findings may be secondary to portal hypertension, although difficult to exclude mild colitis.  7. Colonic diverticulosis without findings of diverticulitis.    Liver US 01/2025:   1. Cirrhotic hepatic morphology without gross lesions.  2. Patent hepatic vasculature.  3. Sequelae of portal hypertension including moderate ascites    Capsule endoscopy 3/2024: Portal hypertensive gastropathy. Non bleeding erosion in  mid jejunum, non-bleeding erosion in terminal ileum. No blood, angioectasias, strictures, or masses.    Enteroscopy 03/2024: two small and minimal grade I varices in esophagus, mild abnormal mucosa in the stomach.     Colonoscopy 02/2024: One 3 mm sessile polyp in the descending colon; performed cold forceps biopsy with complete removal. Multiple small and medium, scattered diverticula of moderate severity in the ascending colon, descending colon and sigmoid colon.      Past Medical History  He has a past medical history of Acute kidney injury, Alcohol related seizure (Multi), Anemia, Ascites, Awareness under anesthesia, Chronic kidney disease, Cirrhosis (Multi), H/O multiple concussions, Hepatitis C, Liver disease, Parkinson's disease, Peripheral neuropathy, Portal hypertension with esophageal varices (Multi), Secondary esophageal varices without bleeding (Multi), Seizure (Multi), Sleep apnea, Splenomegaly, Thrombocytopenia, Traumatic brain injury (Multi), and UGIB (upper gastrointestinal bleed).    Surgical History  He has a past surgical history that includes Esophagogastroduodenoscopy; Umbilical hernia repair (2021); Other surgical history; Hernia repair; and Colonoscopy.     Social History  He reports that he has never smoked. He has never used smokeless tobacco. He reports that he does not currently use alcohol. He reports current drug use. Drug: Marijuana.    Family History  Family History[1]     Allergies  Patient has no known allergies.    Review of Systems   Constitutional:  Positive for appetite change, chills and fatigue. Negative for fever.   Respiratory:  Positive for shortness of breath. Negative for chest tightness.    Cardiovascular:  Negative for chest pain and leg swelling.   Gastrointestinal:  Positive for abdominal distention, diarrhea and vomiting. Negative for abdominal pain and blood in stool.   Genitourinary:  Positive for scrotal swelling. Negative for difficulty urinating and dysuria.    Neurological:  Positive for weakness and light-headedness.   Psychiatric/Behavioral:  Positive for decreased concentration. The patient is nervous/anxious.        Physical Exam  Constitutional:       General: He is not in acute distress.  HENT:      Head: Normocephalic and atraumatic.      Mouth/Throat:      Mouth: Mucous membranes are moist.   Eyes:      General: No scleral icterus.     Pupils: Pupils are equal, round, and reactive to light.   Cardiovascular:      Rate and Rhythm: Regular rhythm. Tachycardia present.      Heart sounds: Murmur heard.   Pulmonary:      Effort: No respiratory distress.      Breath sounds: Normal breath sounds.      Comments: Some increased WOB with talking  Abdominal:      General: There is distension.      Tenderness: There is no abdominal tenderness. There is no guarding.      Comments: Mild distention, no fluid wave   Genitourinary:     Comments: Mild edema of groin and scrotum, minimal bruising, clean dry surgical site with staples, no drainage, mild erythema surrounding surgical site  Musculoskeletal:      Right lower leg: No edema.      Left lower leg: No edema.   Skin:     General: Skin is warm.      Coloration: Skin is not jaundiced.   Neurological:      General: No focal deficit present.      Mental Status: He is alert and oriented to person, place, and time.   Psychiatric:      Comments: Anxious mood         Last Recorded Vitals  /81 (BP Location: Left arm, Patient Position: Lying)   Pulse 109   Temp 37 °C (98.6 °F)   Resp 19   SpO2 100%     Relevant Results  Scheduled medications  Scheduled Medications[2]  Continuous medications  Continuous Medications[3]  PRN medications  PRN Medications[4]         Assessment/Plan   Assessment & Plan  Dehydration    Vance Silva is a 59 year old male with past medical history of cirrhosis complicated by portal hypertension and esophageal varices (followed by transplant and hepatology), hypertension, hyperlipidemia,  TBI, and recent left inguinal hernia repair (5/22/25) complicated by post op hematoma s/p OR washout (5/29/22). Since discharge on 6/1, he noticed more bloating and decreased his PO intake. Started a liver cleanse leading to frequent loose stools and dehydration. With immunocompromised state (pancytopenia), recent hospitalization, and stool frequency, will also work up infectious causes although likely attributed to recent diet changes. Will give albumin for fluid resuscitation, monitor Cr, electrolytes, MELD labs, and hemoglobin. Will encourage PO intake.    #Dehydration   #Diarrhea   #Tachycardia   - will check RFP for Cr and electrolytes   - will give albumin for rehydration   - Will check C diff and stool pathogen panel   - If any increase in WBC will plan to do infectious work up with [facility]   - Will get EKG for tachycardia   - will plan to check orthostatic vitals tomorrow (lightheaded with standing)   - consider PT/OT eval tomorrow     #Cirrhosis   - Hold home lactulose, coreg, lasix and aldactone in setting of volume depletion   - Will check daily MELD labs   - Check AFP and Peth   - abdominal us     #Inguinal hernia repair c/b hematoma   - Groin ultrasound   - Will call transplant surgery in the morning     #Anemia   ::history of esophageal varices, reporting dark stools and pinkish vomit   - hold home iron supplement   - will check hemoglobin level   - if any concern for bleeding will plan to start IV PPI BID and ocreotide     #Shortness of breath   - will order chest xray     #Anxiousness   - atarax 25 mg q6 PRN     F: albumin PRN (cirrhosis)   E: PRN   N: regular, 2-3 g sodium   A: PIV   DVT: SCDs, pending hgb for chemical ppx   GI: pantoprazole 40 mg daily     Code status: full code  NOK: none     Larisa Burton MD  PGY1 Neurology        [1] No family history on file.  [2] carvedilol, 6.25 mg, oral, BID  ferrous sulfate, 1 tablet, oral, BID  [Held by provider] furosemide, 40 mg, oral, Daily  [Held by  provider] lactulose, 10 g, oral, BID  [START ON 6/12/2025] pantoprazole, 40 mg, oral, Daily before breakfast  [Held by provider] spironolactone, 50 mg, oral, Daily  [3]    [4]

## 2025-06-11 NOTE — CARE PLAN
The patient's goals for the shift include      The clinical goals for the shift include Patient will remain safe this shift      Problem: Pain - Adult  Goal: Verbalizes/displays adequate comfort level or baseline comfort level  Outcome: Progressing     Problem: Safety - Adult  Goal: Free from fall injury  Outcome: Progressing     Problem: Discharge Planning  Goal: Discharge to home or other facility with appropriate resources  Outcome: Progressing     Problem: Chronic Conditions and Co-morbidities  Goal: Patient's chronic conditions and co-morbidity symptoms are monitored and maintained or improved  Outcome: Progressing     Problem: Nutrition  Goal: Nutrient intake appropriate for maintaining nutritional needs  Outcome: Progressing

## 2025-06-11 NOTE — PROGRESS NOTES
"Subjective   Patient ID: Vance Silva \"Christine" is a 59 y.o. male who presents for post op appt.    HPI  Mr Silva is a 59yM with decompensated AUD cirrhosis on Liver transplant waitlist. He underwent open left inguinal hernia repair with mesh on 5/22/25. This was complicated by bleeding and required return to the operating room for hematoma washout on 5/29/25.     He returns today for post-op follow-up visit. C/o Left groin swelling. Staples in place. No obvious signs of infection on exam. Palpable induration noted at incision.    He is currently on a \"liver detox\" which consists of alternative medicine and vegetable juices, per his report. He reports this works by diuresis and diarrhea - he has had over 20 watery BM over last 24 hours. He reports not feeling well in clinic today. He appears to be in mild distress and is tachycardic to 118 in clinic    Review of Systems  Constitutional:  Fatigue  HENT: Negative for congestion.    Eyes: Negative for vision changes  Respiratory:  Negative for cough and shortness of breath.    Cardiovascular:  Negative for chest pain.   Gastrointestinal:  Positive for abdominal distention, abdominal pain,  and diarrhea   Endocrine: Negative for cold intolerance and heat intolerance.   Genitourinary:  Negative for dysuria, frequency, hematuria and urgency. Positive for left groin swelling  Musculoskeletal:  Negative for arthralgias.   Skin:  Negative for color change.   Allergic/Immunologic: Negative for environmental allergies.   Neurological:  Negative for dizziness, weakness and light-headedness.   Psychiatric/Behavioral:  Negative for agitation, confusion and hallucinations.        Objective   Physical Exam  Vitals:    06/11/25 1210   BP: 124/73   Pulse: (!) 118       General: Alert and oriented to time, place and person. Mild distress  ENT: unremarkable  Cardiovascular: normotensive, tachycardic  Respiratory: no signs of labored breathing on room air  Abdomen/ GI: Left inguinal " "hernai repair incision C/D/I, staples in place   Extremity: BLE trace edema -  Skin: no rash      Assessment/Plan   59yM with decompensated hepatic cirrhosis due to AUD, recently underwent elective open Left inguinal hernia repair with mesh  Presents for post-op outpatient follow-up and found to be tachycardic likely dehydrated from alternative therapy at home in attempt to \"liver detox\"    Recommend going to ER directly from clinic vs Direct admit for rehydration and further work-up   Patient would like to avoid ER due to wait times.    D/w Hepatology colleague on-call and will plan to admit for Dehydration work-up and medical management    Will obtain a non-urgent left inguino-scrotal US    Patient is in complete agreement with this plan    Palmer Farris MD, DABS  Transplant & Hepatobiliary Surgery    "

## 2025-06-12 ENCOUNTER — ANESTHESIA (OUTPATIENT)
Dept: GASTROENTEROLOGY | Facility: HOSPITAL | Age: 60
End: 2025-06-12
Payer: COMMERCIAL

## 2025-06-12 ENCOUNTER — APPOINTMENT (OUTPATIENT)
Dept: GASTROENTEROLOGY | Facility: HOSPITAL | Age: 60
DRG: 377 | End: 2025-06-12
Payer: COMMERCIAL

## 2025-06-12 ENCOUNTER — APPOINTMENT (OUTPATIENT)
Dept: RADIOLOGY | Facility: HOSPITAL | Age: 60
DRG: 377 | End: 2025-06-12
Payer: COMMERCIAL

## 2025-06-12 ENCOUNTER — ANESTHESIA EVENT (OUTPATIENT)
Dept: GASTROENTEROLOGY | Facility: HOSPITAL | Age: 60
End: 2025-06-12
Payer: COMMERCIAL

## 2025-06-12 PROCEDURE — 7100000010 HC PHASE TWO TIME - EACH INCREMENTAL 1 MINUTE

## 2025-06-12 PROCEDURE — 43244 EGD VARICES LIGATION: CPT | Performed by: STUDENT IN AN ORGANIZED HEALTH CARE EDUCATION/TRAINING PROGRAM

## 2025-06-12 PROCEDURE — 93975 VASCULAR STUDY: CPT

## 2025-06-12 PROCEDURE — C1751 CATH, INF, PER/CENT/MIDLINE: HCPCS

## 2025-06-12 PROCEDURE — 76700 US EXAM ABDOM COMPLETE: CPT

## 2025-06-12 PROCEDURE — 3700000001 HC GENERAL ANESTHESIA TIME - INITIAL BASE CHARGE

## 2025-06-12 PROCEDURE — 2720000007 HC OR 272 NO HCPCS

## 2025-06-12 PROCEDURE — 7100000009 HC PHASE TWO TIME - INITIAL BASE CHARGE

## 2025-06-12 PROCEDURE — 3700000002 HC GENERAL ANESTHESIA TIME - EACH INCREMENTAL 1 MINUTE

## 2025-06-12 PROCEDURE — A43244 PR EDG BAND LIGATION ESOPHGEAL/GASTRIC VARICES: Performed by: STUDENT IN AN ORGANIZED HEALTH CARE EDUCATION/TRAINING PROGRAM

## 2025-06-12 PROCEDURE — 36410 VNPNXR 3YR/> PHY/QHP DX/THER: CPT

## 2025-06-12 PROCEDURE — 2500000004 HC RX 250 GENERAL PHARMACY W/ HCPCS (ALT 636 FOR OP/ED)

## 2025-06-12 PROCEDURE — 2780000003 HC OR 278 NO HCPCS

## 2025-06-12 RX ORDER — LIDOCAINE HCL/PF 100 MG/5ML
SYRINGE (ML) INTRAVENOUS AS NEEDED
Status: DISCONTINUED | OUTPATIENT
Start: 2025-06-12 | End: 2025-06-12

## 2025-06-12 RX ORDER — PROPOFOL 10 MG/ML
INJECTION, EMULSION INTRAVENOUS AS NEEDED
Status: DISCONTINUED | OUTPATIENT
Start: 2025-06-12 | End: 2025-06-12

## 2025-06-12 RX ORDER — PHENYLEPHRINE HYDROCHLORIDE 10 MG/ML
INJECTION INTRAVENOUS AS NEEDED
Status: DISCONTINUED | OUTPATIENT
Start: 2025-06-12 | End: 2025-06-12

## 2025-06-12 RX ORDER — FENTANYL CITRATE 50 UG/ML
INJECTION, SOLUTION INTRAMUSCULAR; INTRAVENOUS AS NEEDED
Status: DISCONTINUED | OUTPATIENT
Start: 2025-06-12 | End: 2025-06-12

## 2025-06-12 RX ADMIN — PROPOFOL 20 MG: 10 INJECTION, EMULSION INTRAVENOUS at 16:47

## 2025-06-12 RX ADMIN — FENTANYL CITRATE 25 MCG: 50 INJECTION, SOLUTION INTRAMUSCULAR; INTRAVENOUS at 16:45

## 2025-06-12 RX ADMIN — SODIUM CHLORIDE: 9 INJECTION, SOLUTION INTRAVENOUS at 16:39

## 2025-06-12 RX ADMIN — PROPOFOL 20 MG: 10 INJECTION, EMULSION INTRAVENOUS at 16:46

## 2025-06-12 RX ADMIN — LIDOCAINE HYDROCHLORIDE 100 MG: 20 INJECTION INTRAVENOUS at 16:44

## 2025-06-12 RX ADMIN — PROPOFOL 125 MCG/KG/MIN: 10 INJECTION, EMULSION INTRAVENOUS at 16:45

## 2025-06-12 RX ADMIN — PHENYLEPHRINE HYDROCHLORIDE 240 MCG: 10 INJECTION INTRAVENOUS at 16:50

## 2025-06-12 RX ADMIN — PROPOFOL 20 MG: 10 INJECTION, EMULSION INTRAVENOUS at 16:44

## 2025-06-12 ASSESSMENT — PAIN SCALES - GENERAL
PAINLEVEL_OUTOF10: 0 - NO PAIN
PAINLEVEL_OUTOF10: 8
PAINLEVEL_OUTOF10: 3
PAINLEVEL_OUTOF10: 0 - NO PAIN
PAINLEVEL_OUTOF10: 5 - MODERATE PAIN
PAINLEVEL_OUTOF10: 0 - NO PAIN
PAINLEVEL_OUTOF10: 8
PAIN_LEVEL: 0

## 2025-06-12 ASSESSMENT — COGNITIVE AND FUNCTIONAL STATUS - GENERAL
DAILY ACTIVITIY SCORE: 23
WALKING IN HOSPITAL ROOM: A LITTLE
MOBILITY SCORE: 20
MOVING TO AND FROM BED TO CHAIR: A LITTLE
TOILETING: A LITTLE
STANDING UP FROM CHAIR USING ARMS: A LITTLE
CLIMB 3 TO 5 STEPS WITH RAILING: A LITTLE

## 2025-06-12 ASSESSMENT — PAIN - FUNCTIONAL ASSESSMENT
PAIN_FUNCTIONAL_ASSESSMENT: 0-10

## 2025-06-12 ASSESSMENT — PAIN DESCRIPTION - LOCATION: LOCATION: ABDOMEN

## 2025-06-12 NOTE — CONSULTS
"Reason For Consult  Eval left groin following inguinal hernia repair and washout given currently admission for GIB    History Of Present Illness  Karyn Silva is a 59 y.o. male presenting with GIB.     Vance Silva \"Karyn\" is a 59 y.o. male with PMH of decompensated alcoholic cirrhosis and s/p open left inguinal hernia repair on 5/22/2025 and left groin washout 5/29/25. He was DC home on 6/1. He was seen in txp clinic on 6/11 where he was noted to be jaundiced, fatigued, dehydrated , tachycardic, N/V, also had ongoing swelling of left groin.     MELD 17 on 6/12/25.    Since D/C he stated that he had been  doing a self-prescribed \"liver detox\" to decrease his fluid weight. This included alternative medicines and juices. He had upwards of 20 water Bms last 24 hours ( having atleast 5 episodes of diarrhea each day prior to this). They are black and watery in mater.  He did also have 1 episode of nonbiliuos pink tinged emesis at home.. he stopped taking laculose as he was having diarrhea. He was stppped taking his lasix as well. No fevers or chills.     He was sent to ED from clinic, then admitted to GI/ hepatology service. Patient noted to be tachy later in the evening and Hgb dropped to 5 from 9. Repeat 4.4 after 1 unit. Patient planned for additional units of blood. Received 2 additional units and incremented to 5.    Review of Systems  No headaches, chest pain, SOB. Did have some dizziness and lightheadedness. Had small volume emesis at home which was pink tinged, but did not recur.   Having black water diarrhea which are now thicker but still tarry.      Past Medical History  He has a past medical history of Acute kidney injury, Alcohol related seizure (Multi), Anemia, Ascites, Awareness under anesthesia, Chronic kidney disease, Cirrhosis (Multi), H/O multiple concussions, Hepatitis C, Liver disease, Parkinson's disease, Peripheral neuropathy, Portal hypertension with esophageal varices (Multi), Secondary " "esophageal varices without bleeding (Multi), Seizure (Multi), Sleep apnea, Splenomegaly, Thrombocytopenia, Traumatic brain injury (Multi), and UGIB (upper gastrointestinal bleed).    Surgical History  He has a past surgical history that includes Esophagogastroduodenoscopy; Umbilical hernia repair (2021); Other surgical history; Hernia repair; and Colonoscopy.   Open left inguinal hernia repair 5/22/25  Left groin washout for hematoma 5/29/25      Social History  He reports that he has never smoked. He has never used smokeless tobacco. He reports that he does not currently use alcohol. He reports current drug use. Drug: Marijuana.    Family History  No family history on file.     Allergies  Patient has no known allergies.     Last Recorded Vitals  Blood pressure 100/71, pulse 104, temperature 36.7 °C (98.1 °F), temperature source Tympanic, resp. rate 18, height 1.956 m (6' 5.01\"), weight 94.9 kg (209 lb 3.2 oz), SpO2 99%.    Physical Exam  General: in NAD, sleeping on arrival but woken up easily  HEENT: PERRL, iEOM, mild scleral icterus, dry mucous membranes  CV: RRR on peripheral palpation, cap refill < 3 sec  Resp: on room air  Abd: soft, ND, NT  : Left groin with stapled incision which is c/d/I. There is a palpable, compressible hematoma in the left groin that extends partway into scrotum. Echymosis on left scrotum in various stages of healing  MSK: MAEO x4  Neuro: no focal deficits, mentating at his baseline.  Psych: normal mood and behavior    IMPRESSION:  1. No intraluminal contrast extravasation to suggest an active GI  bleed.  2. Diffuse wall thickening of the small and large bowel loops  throughout the abdomen consistent with panenterocolitis, favored to  be infectious or inflammatory in etiology. Alternatively, given  cirrhotic morphology of the liver and sequelae of portal venous  hypertension, bowel wall thickening and anemia may be secondary to  portal hypertensive enterocolopathy.  3. Moderate size " "left inguinal hernia with dense heterogenous fluid  collection measuring 5.1 X 4.3 cm, likely representing acute to  subacute hematoma. No contrast extravasation to suggest large volume  active bleeding.  4. Cirrhotic morphology of the liver, along with splenomegaly and  large volume simple attenuating abdominopelvic ascites.  5. Aneurysmal dilation of the ascending aorta measuring 4.1 cm.        LABS:  Results from last 7 days   Lab Units 06/12/25  0420 06/11/25  2054 06/11/25  1721   WBC AUTO x10*3/uL 18.7* 18.8* 29.5*   HEMOGLOBIN g/dL 5.2* 4.4* 5.3*   HEMATOCRIT % 15.7* 13.7* 16.3*   PLATELETS AUTO x10*3/uL 115* 123* 223   NEUTROS PCT AUTO % 85.1 88.4 89.0   LYMPHS PCT AUTO % 6.8 5.8 4.7   MONOS PCT AUTO % 6.9 4.8 4.9   EOS PCT AUTO % 0.0 0.0 0.0   5.2 labs after 2 units pRBC    Results from last 7 days   Lab Units 06/11/25  1721   INR  1.5*     Results from last 7 days   Lab Units 06/11/25  1721   SODIUM mmol/L 137   POTASSIUM mmol/L 3.8   CHLORIDE mmol/L 105   CO2 mmol/L 16*   BUN mg/dL 75*   CREATININE mg/dL 1.48*   CALCIUM mg/dL 8.6   PROTEIN TOTAL g/dL 6.1*   BILIRUBIN TOTAL mg/dL 1.1   ALK PHOS U/L 56   ALT U/L 32   AST U/L 30   GLUCOSE mg/dL 132*     Results from last 7 days   Lab Units 06/11/25  1721   BILIRUBIN TOTAL mg/dL 1.1   BILIRUBIN DIRECT mg/dL 0.2             Assessment/Plan     Vance Silva \"Karyn\" is a 59 y.o. male with PMH of decompensated alcoholic cirrhosis and s/p open left inguinal hernia repair on 5/22/2025 and left groin washout 5/29/25. He was admitted 6/11 for dehydration, tachycardia, weakness.   Patient needs aggressive resuscitation and more intensive monitoring for treatment of his GIB. Unclear that the pink tinged spit up was bloody emesis but would recommend NG tube placement if unclear upper vs lower GIB or if he develops hematemesis. Patient hgb drop is not likely related to left groin/scrotal hematoma as this\a appears to be ongoing for nearly 2 weeks. This cannot explain " the 4 point hgb drop. Additionally reassuring that there Is no acitve bleed Identifiable on CTA ( in groin or intraluminal).     - no acute intervention indicated  - recommend transfer to higher level of care for appropriate resuscitation and HD monitoring  - will follow. Will defer remainder of care to primary team    D/w attending Dr. Sally Aguero MD  PGY1 Abdominal Transplant Surgery  Pager [phone]  Available via secure chat

## 2025-06-12 NOTE — CARE PLAN
The patient's goals for the shift include      The clinical goals for the shift include Pt to be free from falls      Problem: Pain - Adult  Goal: Verbalizes/displays adequate comfort level or baseline comfort level  Outcome: Progressing     Problem: Safety - Adult  Goal: Free from fall injury  Outcome: Progressing     Problem: Nutrition  Goal: Nutrient intake appropriate for maintaining nutritional needs  Outcome: Progressing

## 2025-06-12 NOTE — HOSPITAL COURSE
"Vance Olivas” Shira is a 59 year old male with past medical history of cirrhosis complicated by portal hypertension and esophageal varices (followed by transplant and hepatology), hypertension, hyperlipidemia, TBI, and recent left inguinal hernia repair (5/22/25) complicated by post op hematoma s/p OR washout (5/29/22) admitted for melena and hypovolemia in setting of frequent diarrhea and acute on chronic inguinal hematoma, also found to have C diff.      Patient reports that he was recently discharged home from surgery 5/2025 and felt like he was developing more bloating and abdominal fullness, so he stopped drinking water and started a \"liver cleanse\" and glutathione on Monday 6/9. He is not sure what is included in the liver cleanse. Says his stools are sometimes black/dark, but takes iron supplement and drinks a lot of beet juice. Patient follows with Dr. Norris for cirrhosis. Last visit was 04/2025 and noted that cirrhosis has stabilized. Had hernia at the time and was told to see transplant about repair. Liver function had been improving, discussed risk of rapid deterioration especially risk after surgery and would recommend he stay on transplant list for at least 3 more years.     Upon arrival to [facility], Hb 5.5 on admission, dropped to 4.4 overnight 6/12. Given 3 units . Moderate size left inguinal hernia with dense heterogenous fluid collection measuring 5.1 X 4.3 cm, likely representing acute to subacute hematoma. No contrast extravasation to suggest large volume active bleeding. Given reported melena and history of esophageal varices, patient was placed on IV PPI, ceftriaxone and octreotide gtt and received intermittent boluses of fluid. C diff collected given diarrhea, recent hospitalizations, and came back positive, so patient started on PO Vanc. Transplant surgery consulted for inguinal hernia complicated by hematoma, no acute intervention for now but will monitor peripherally. EGD 6/12 showed Two " columns of grade II varices in the lower third of the esophagus; there was stigmata of recent hemorrhage with red shyam sign; placed 2 bands successfully, resulting in partial eradication. Urology consulted 6/13 for inguinal hematoma and IR consulted 6/13 for diagnostic paracentesis.      To Do:   - Paracentesis expected on 6/16. Follow up paracentesis studies   - Can restart home diuretics and beta blocker if tolerating half doses well  - Continue with ceftriaxone for SBP for now. Can switch to PO ciprofloxacin on discharge (total course 5 days, end date 6/16)   - Monitor CBC

## 2025-06-12 NOTE — ANESTHESIA PREPROCEDURE EVALUATION
"Patient: Vance Silva \"Karyn\"    Procedure Information       Date/Time: 06/12/25 1620    Scheduled providers: Peña Chauhan MD; Floyd Alonso DO    Procedure: EGD    Location: [facility]            Relevant Problems   Cardiac   (+) Hypertension      Neuro   (+) Alcohol related seizure (Multi)   (+) Chronic traumatic encephalopathy   (+) Inflammatory neuropathy (Multi)   (+) Lesion of ulnar nerve   (+) Peripheral neuropathy      GI   (+) Gastrointestinal hemorrhage with melena   (+) Secondary esophageal varices without bleeding (Multi)   (+) UGIB (upper gastrointestinal bleed)      Liver   (+) Alcoholic liver disease   (+) Cirrhosis of liver with ascites (Multi)   (+) Decompensated hepatic cirrhosis (Multi)      Hematology   (+) Anemia   (+) Anemia due to chronic blood loss   (+) Anemia, unspecified type   (+) Thrombocytopenia      Musculoskeletal   (+) Herniated lumbar intervertebral disc      ID   (+) Primary bacterial peritonitis (Multi)       Clinical information reviewed:    Allergies  Meds               NPO Detail:  NPO/Void Status  Date of Last Liquid: 06/11/25  Time of Last Liquid: 0000  Date of Last Solid: 06/11/25  Time of Last Solid: 1830  Last Intake Type: Food  Time of Last Void: 1300         Physical Exam    Airway  Mallampati: III  TM distance: >3 FB  Neck ROM: full  Mouth opening: 3 or more finger widths     Cardiovascular - normal exam   Dental - normal exam     Pulmonary - normal examBreath sounds clear to auscultation     Abdominal - normal exam           Anesthesia Plan    History of general anesthesia?: yes  History of complications of general anesthesia?: no    ASA 3     MAC     intravenous induction   Anesthetic plan and risks discussed with patient.    Plan discussed with CAA.      "

## 2025-06-12 NOTE — PROGRESS NOTES
Transitional Care Coordination Progress Note:  Patient discussed during interdisciplinary rounds.   Team members present: IVAN TRUONG  Plan per Medical/Surgical team: Dehydration  Payor: MMO  Discharge disposition: Home  Potential Barriers: none  ADOD: 2-3 days  Met with patient at bedside, provided introduction of self and role. Patient refused admission assessment at this time. Will continue to follow for discharge planing needs.     Xiao OROZCON, RN  Transitional Care Coordinator (TCC)  [phone]

## 2025-06-12 NOTE — NURSING NOTE
"Pt refused this nurse to draw CBC d/t he did not want to be \"stuck\" in certain areas. He did allow phlebotomy to draw him.  "

## 2025-06-12 NOTE — NURSING NOTE
1300 pt arrived to the floor in stable condition, pt A&Ox4 with c/o 8/10 pain in abdomen pt oriented to room safety precautions in place call light within reach

## 2025-06-12 NOTE — POST-PROCEDURE NOTE
Pre-Procedure Checklist:  Emergent Line Insertion: No  Type of Line to be Placed: Midline PowerGlide  Consent Obtained: Yes  Emergency Medication Necessary: No  Patient Identified with 2 Independent Identifiers: Yes  Review of Allergies, Anticoagulation, Relevant Labs, ECG/Telemetry: Yes  Risks/Benefits/Alternatives Discussed with Patient/POA/Legal Representative: Yes  Stop Sign on Door: Yes  Time Out Performed: Yes  Catheter Exchange: No    Positioning Checklist:  All People, Including Patient, in the Room with Cap and Mask: Yes  Fluoroscopy Used to Identify Vessel and Guide Insertion: No   Sterile Cover Used: Yes  Full Barrier Precautions Followed (Mask, Cap, Gown, Gloves): Yes  Hands Washed: Yes  Monitors Attached with Sound Alarms On: No  Full Body Sterile Drape (Head-to-Toe) Used to Cover Patient: Yes  Trendelenburg Position (For IJ and Subclavian): No  CHG Skin Prep Used and Allowed to Air Dry to Skin Procedure: Yes    Procedure Checklist:  Blood Aspirated From All Lumens, All Ports Subsequently Flushed: Yes  Catheter Caps Placed on All Lumens; Lumens Clamped: Yes  Maintain Guidewire Control Throughout, Ensuring Guidewire Removal: Yes  Maintain Sterile Field Throughout Insertion: Yes  Catheter Secured: Yes  Confirmatory Test of Venous Placement: Non-Pulsatile Blood    Post Procedure Checklist:  Date and Time Written on Dressing: Yes  Sharp and Wire Count and Safe Disposal of all Sharps/Wires: Yes  Sterile Dressing Applied Per Protocol: Yes  X-ray Ordered or ECG Image: N/A    PICC Insertion Details:  Size (Fr): 18g  Lumen Type: single lumen PowerGlide  Catheter to Vein Ratio Less Than 50%: Yes  Total Length (cm): 8  External Length (cm): 0   Orientation: left upper arm   Location: brachial   Site Prep: Chlorohexidine; Usual sterile procedure followed  Local Anesthetic: Injectable/Subcutaneous  Indication: IV medications. Per bedside staff, failed PIV attempts  Insertion Team Members in the Room: Nurse,  LPN  Initial Extremity Circumference (cm): 27  Insertion Attempts: 1  Patient Tolerance: Tolerated Well, Age Appropriate  Comfort Measures: Subcutaneous anesthetic; Verbal  Procedure Location: Bedside  Safety Measures: Patient specific safety measures addressed with RN  Estimated Blood Loss (mL): 0  Vessel Fully Compressible Proximally and Distally to Insertion Site: Yes  Brisk Blood Return Obtained and Line Draws Easily: Yes  Tip Location: axillary vein  Line Confirmation: non-pulsatile blood return  Lot #: [id number]  : BD  PICC Line Exp Date: 08/31/2025  Securement: Stat Lock  Post Procedure Checklist: Handoff with RN; Obtain all new IV tubing prior to use; Bed at lowest level and wheels locked; Line discharge information at bedside.  Additional Details: Line was inserted using Modified Seldinger's Technique.   Placed by: Viktoria Triana RN

## 2025-06-12 NOTE — SIGNIFICANT EVENT
Rapid Response Nurse Note: Rapid Response    Pager time: 402  Arrival time: 412  Event end time: 500  Location: [facility]  [] Triage by phone or secure messaging    Rapid response initiated by:  [] Rapid response RN [] Family [] Nursing Supervisor [] Physician   [] RADAR auto page [] Sepsis auto-page [x] RN [] RT   [] NP/PA [] Other:     Primary reason for call:   [] BAT [] New CPAP/BiPAP [] Bleeding [] Change in mental status   [x] Chest pain [] Code blue [] FiO2 >/= 50% [] HR </= 40 bpm   [] HR >/= 130 bpm [] Hyperglycemia [] Hypoglycemia [] RADAR    [] RR </= 8 bpm [] RR >/= 30 bpm [x] SBP </= 90 mmHg [] SpO2 < 90%   [] Seizure [] Sepsis [] Shortness of breath  [x] Staff concern: H&H low     Initial VS and/or RADAR VS: T 36.8 °C; ; RR 20; /70 ; SPO2 99% on 2L NC.    Providers present at bedside (if applicable): bedside RN, primary MD, RT, Rapid RN's    Name of ICU Provider contacted (if applicable): NACR    Interventions:  [] None [] ABG/VBG [] Assist w/ICU transfer [] BAT paged    [] Bag mask [x] Blood [] Cardioversion [] Code Blue   [] Code blue for intubation [] Code status changed [] Chest x-ray [x] EKG   [] IV fluid/bolus [] KUB x-ray [x] Labs/cultures [] Medication   [] Nebulizer treatment [] NIPPV (CPAP/BiPAP) [] Oxygen [] Oral airway   [x] Peripheral IV-attemptedx1 [] Palliative care consult [] CT/MRI [] Sepsis protocol    [] Suctioned [] Other:     Outcome:  [] Coded and  [] Code blue for intubation [] Coded and transferred to ICU []  on division   [x] Remained on division-until tele bed available [] Remained on division + additional monitoring [] Remained in ED [] Transferred to ED   [] Transferred to ICU [] Transferred to inpatient status [] Transferred for interventions (procedure) [] Transferred to ICU stepdown    [] Transferred to surgery [x] To transfer to telemetry once bed available [] Sepsis protocol [] STEMI protocol   [] Stroke protocol [x] Bedside nurse instructed to  page rapid response for any concerns or acute change in condition/VS     Additional Comments: RAPID called for <BP & <H&H & chest pain; upon my arrival, pt awake in bed but c/o generalized pain especially in his ABD; pt's ABD distended & tender with bruising/hematoma noted in both inner thigh & groin areas; border area marked with a sharpie to assess if bleeding is extending; pt describes CP worse with inspiration & activity as well as feeling SOB despite POX=% on 2L NC (for comfort per pt); pt currently 1/2 way through with 2nd unit of RBC's with a third ordered; GI consulted & following; GI plans to scope pt once H&H are higher; MD to place transfer order for tele for closer monitoring given slight tachycardia, low H&H & then this recent hypotensive episode; repeat BP's WNL (& HR ; EKG done; PIV attempted x 1 but unsuccessful however labs obtained & sent; called & requested #3 unit of RBC's from blood bank STAT; bed assignment called to notify them of need for tele bed; pt to move to tele once bed is available; RN encouraged to call with any further concerns/issues

## 2025-06-12 NOTE — SIGNIFICANT EVENT
Rapid Response Respiratory Therapy Note: Rapid    Start Time: 0402  End Time: 0425  Location: [facility]    Initial Vital Signs and O2: HR: 107    RR: 20     Type of O2: 2L NC    SpO2: 99%  Breath Sounds: clear, diminished     Respiratory Concerns:  [x]  None []  Increased WOB []  Shallow respirations []  Irregular Respirations   []  Tachypnea []  Bradypnea []  Oxygen desaturation []  Cyanosis   []  Poor Secretion Clearance []  Impaired/Weak Cough []  Copious Secretions []  Thick Secretions   []  Inability to Protect Airway []  Apnea []  Respiratory Arrest []  Shortness of Breath   [] Hemodynamic Instability []  Asymmetric Chest Rise []  Adventitious Breath Sounds []  Abnormal CXR   []  Aspiration []  Other:       Interventions:  [x]  None []  ABG/VBG  []  Assist w/ICU transfer []  Bag-mask ventilation   []  Bronchial Hygiene []  Trach care/Suction []  ETCO2 []  CPR   []  Assist Intubation []  Venti Mask/NRB []  Chest x-ray []  CT/MRI transport    []  High Flow Therapy []  Igel  []  Nasal Airway []  NT Suctioning   []  Nebulizer treatment []  NIPPV (CPAP/BiPAP) []  Oxygen  Type:  FiO2:  Liter Flow:  SpO2:  []  Oral Airway   []  Oral Suctioning []  Other:      Outcome:  [x]  Maintain on Division []  Transferred to ICU []  Transferred to ED [x]  Bedside nurse instructed to page Rapid Response for any concerns or acute change in condition/VS     Final Vital Signs and O2: HR:    RR:    Type of O2:     SpO2:    Breath Sounds:     Additional Comments:   Rapid called for hypotensive episode & possible GI bleed. Respiratory status stable at this time.

## 2025-06-12 NOTE — CONSULTS
"Nutrition Initial Assessment:   Nutrition Assessment    Reason for Assessment: Admission nursing screening    Patient is a 59 y.o. male initially seen in txp clinic on 6/11 where he was noted to be jaundiced, fatigued, dehydrated , tachycardic, N/V, also had ongoing swelling of left groin. Admitted for dehydration management and concern for GIB.      PMH: decompensated alcoholic cirrhosis, CKD, Hep C, Parkinson's Disease, esophageal varices, recently s/p open left inguinal hernia repair on 5/22/2025 and left groin washout 5/29/25 - discharged home on 6/1.     Nutrition History:  Food and Nutrient History: RDN introduced self to pt and he imediately stated \"I do not care, I do not want to talk, I cannot focus because of my dementia, I am not eating anything right now so I do not want to discuss anything until they figure that out and my procedure\". RDN stated ok and that we can come back when he is ready, but he said \"no\" and \"I do not care to talk to you\" again. Per [facility] review: it is documented that since his discharge (6/1/25) from his recent hernia repair, he was noticing fluid wt gain so he restricted fluid intake and started a \"liver detox/cleanse\". It is not noted what the cleanse was. It is reported that during this time frame he was having upwards of 20 liquid bowel movements; therefore, stopped taking his Lactulose.  Vitamin/Herbal Supplement Use: Unknown at this time (unable to ask pt)       Anthropometrics:  Height: 195.6 cm (6' 5.01\")   Weight: 94.9 kg (209 lb 3.2 oz)   BMI (Calculated): 24.8  IBW/kg (Dietitian Calculated): 94.5 kg  Percent of IBW: 100 %    Weight History:   Wt Readings from Last 15 Encounters:   06/11/25 94.9 kg (209 lb 3.2 oz)   05/29/25 94.2 kg (207 lb 10.8 oz)   05/28/25 93 kg (205 lb)   05/23/25 95.2 kg (209 lb 14.1 oz)   05/05/25 90.5 kg (199 lb 9.6 oz)   04/21/25 94 kg (207 lb 3.2 oz)   01/06/25 97.8 kg (215 lb 11.2 oz)   09/23/24 96.5 kg (212 lb 12.8 oz)   06/24/24 94 kg (207 lb " 4.8 oz)   05/09/24 99 kg (218 lb 4.8 oz)   04/08/24 89.5 kg (197 lb 6.4 oz)   03/12/24 88.8 kg (195 lb 12.3 oz)   03/12/24 89.4 kg (197 lb 1.5 oz)   03/11/24 91 kg (200 lb 11.2 oz)   03/01/24 82.3 kg (181 lb 6.4 oz)     Weight Change %:  Significant Weight Loss: No    Nutrition Focused Physical Exam Findings:  Defer All Reason: Pt refused interview/assessment    Nutrition Significant Labs:  CBC Trend:   Results from last 7 days   Lab Units 06/12/25  1110 06/12/25  0420 06/11/25 2054 06/11/25  1721   WBC AUTO x10*3/uL 12.4* 18.7* 18.8* 29.5*   RBC AUTO x10*6/uL 2.14* 1.66* 1.44* 1.75*   HEMOGLOBIN g/dL 6.5* 5.2* 4.4* 5.3*   HEMATOCRIT % 19.7* 15.7* 13.7* 16.3*   MCV fL 92 95 95 93   PLATELETS AUTO x10*3/uL 64* 115* 123* 223    , BMP Trend:   Results from last 7 days   Lab Units 06/12/25  0549 06/11/25  1721   GLUCOSE mg/dL 136* 132*   CALCIUM mg/dL 8.8 8.6   SODIUM mmol/L 136 137   POTASSIUM mmol/L 3.8 3.8   CO2 mmol/L 22 16*   CHLORIDE mmol/L 105 105   BUN mg/dL 80* 75*   CREATININE mg/dL 1.40* 1.48*    , A1C:  Lab Results   Component Value Date    HGBA1C 4.8 04/21/2025   , BG POCT trend:    , Renal Lab Trend:   Results from last 7 days   Lab Units 06/12/25  0549 06/11/25  1721   POTASSIUM mmol/L 3.8 3.8   PHOSPHORUS mg/dL 3.5 3.0   SODIUM mmol/L 136 137   MAGNESIUM mg/dL 2.04 1.78   EGFR mL/min/1.73m*2 57* 53*   BUN mg/dL 80* 75*   CREATININE mg/dL 1.40* 1.48*      Scheduled medications  Scheduled Medications[1]  Continuous medications  Continuous Medications[2]    Dietary Orders (From admission, onward)       Start     Ordered    06/12/25 1226  NPO Diet Except: Sips with meds; Effective now  Diet effective now        Question:  Except:  Answer:  Sips with meds    06/12/25 1227    06/11/25 1647  May Participate in Room Service  ( ROOM SERVICE MAY PARTICIPATE)  Once        Question:  .  Answer:  Yes    06/11/25 1646                     Estimated Needs:   Total Energy Estimated Needs in 24 hours (kCal):   (4210-2971)  Method for Estimating Needs: kcal/kg  Total Protein Estimated Needs in 24 Hours (g): 100 g  Method for Estimating 24 Hour Protein Needs: 1.2+g/kg  Total Fluid Estimated Needs in 24 Hours (mL):  (per team)        Nutrition Diagnosis   Malnutrition Diagnosis  Patient has Malnutrition Diagnosis:  (Unable to determine at this time)            Nutrition Interventions/Recommendations      No interventions at this time as pt is NPO and refused assessment.     This service will continue to follow.     Please reconsult sooner as needed       Nutrition Monitoring and Evaluation   Additional Plans: Initiation of diet    Goal Status:  (None given no intervention at this time)    Time Spent (min): 45 minutes            [1] [Held by provider] carvedilol, 6.25 mg, oral, BID  cefTRIAXone, 1 g, intravenous, q24h  [Held by provider] ferrous sulfate, 1 tablet, oral, BID  [Held by provider] furosemide, 40 mg, oral, Daily  lactated Ringer's, 1,000 mL, intravenous, Once  [Held by provider] lactulose, 10 g, oral, BID  lidocaine, 5 mL, infiltration, Once  pantoprazole, 40 mg, intravenous, BID  [Held by provider] spironolactone, 50 mg, oral, Daily  vancomycin, 125 mg, oral, 4x daily     [2] octreotide, 50 mcg/hr, Last Rate: Stopped (06/12/25 1110)

## 2025-06-12 NOTE — NURSING NOTE
RRT called for patient due to pt c/o chest pain and SOB during second unit of PRBC's patient bp 86/55 map 65 EKG completed labs drawn x2 unable to get a second iv source. More labs are needed patient is c/o increased anxiety unable to obtain more labs as well.

## 2025-06-12 NOTE — ANESTHESIA POSTPROCEDURE EVALUATION
"Patient: Vance Silva \"Karyn\"    Procedure Summary       Date: 06/12/25 Room / Location: [facility]    Anesthesia Start: 1639 Anesthesia Stop: 1704    Procedure: EGD Diagnosis:       Decompensated hepatic cirrhosis (Multi)      Anemia, unspecified type    Scheduled Providers: Peña Chauhan MD; Floyd Alonso DO Responsible Provider: Floyd Alonso DO    Anesthesia Type: MAC ASA Status: 3            Anesthesia Type: MAC    Vitals Value Taken Time   /68 06/12/25 17:06   Temp 36.8 °C (98.2 °F) 06/12/25 17:06   Pulse 74 06/12/25 17:06   Resp 20 06/12/25 17:06   SpO2 99 % 06/12/25 17:06       Anesthesia Post Evaluation    Patient location during evaluation: PACU  Patient participation: complete - patient participated  Level of consciousness: awake  Pain score: 0  Pain management: adequate  Multimodal analgesia pain management approach  Airway patency: patent  Two or more strategies used to mitigate risk of obstructive sleep apnea  Cardiovascular status: acceptable  Respiratory status: face mask  Hydration status: acceptable  Postoperative Nausea and Vomiting: none        There were no known notable events for this encounter.    " Addended by: James Brown on: 3/2/2021 04:18 PM     Modules accepted: Orders

## 2025-06-12 NOTE — PROGRESS NOTES
"  Internal Medicine Progress Note     Vance Silva is a 59 year old male with past medical history of cirrhosis complicated by portal hypertension and esophageal varices (followed by transplant and hepatology), hypertension, hyperlipidemia, TBI, and recent left inguinal hernia repair (5/22/25) complicated by post op hematoma s/p OR washout (5/29/22) admitted for hypovolemia in setting of frequent diarrhea and acute on chronic inguinal hematoma.     Subjective     Patient seen and evaluated at bedside. RR called overnight for hypotension and chest pain, trop negative and BP improved without intervention. Patient reports that he still feels lightheaded and dizzy this AM after receiving 3 units of blood and reports significant tenderness in area of L groin.       Medications     Medications:   Scheduled medications  Scheduled Medications[1]  Continuous medications  Continuous Medications[2]  PRN medications  PRN Medications[3]     Objective     Vitals  Visit Vitals  BP 93/63   Pulse 87   Temp 36.8 °C (98.2 °F)   Resp 18   Ht 1.956 m (6' 5.01\")   Wt 94.9 kg (209 lb 3.2 oz)   SpO2 98%   BMI 24.80 kg/m²   Smoking Status Never   BSA 2.27 m²       Intake/Output Summary (Last 24 hours) at 6/12/2025 0954  Last data filed at 6/12/2025 0521  Gross per 24 hour   Intake 1740 ml   Output --   Net 1740 ml       Physical Exam  Constitutional:       Appearance: He is normal weight.   Cardiovascular:      Rate and Rhythm: Normal rate and regular rhythm.      Pulses: Normal pulses.      Heart sounds: Normal heart sounds.   Pulmonary:      Effort: Pulmonary effort is normal.      Breath sounds: Normal breath sounds.   Abdominal:      General: Abdomen is flat. There is distension.      Palpations: Abdomen is soft.   Neurological:      General: No focal deficit present.      Mental Status: He is alert and oriented to person, place, and time. Mental status is at baseline.            Labs  Lab Results   Component Value Date    WBC " 18.7 (H) 06/12/2025    HGB 5.2 (LL) 06/12/2025    HCT 15.7 (L) 06/12/2025    MCV 95 06/12/2025     (L) 06/12/2025      Lab Results   Component Value Date    GLUCOSE 136 (H) 06/12/2025    CALCIUM 8.8 06/12/2025     06/12/2025    K 3.8 06/12/2025    CO2 22 06/12/2025     06/12/2025    BUN 80 (H) 06/12/2025    CREATININE 1.40 (H) 06/12/2025      Lab Results   Component Value Date    ALT 51 06/12/2025    AST 39 06/12/2025    ALKPHOS 43 06/12/2025    BILITOT 1.1 06/12/2025        Imaging  === 06/11/25 ===    XR CHEST 1 VIEW    - Impression -  1.  No evidence of acute cardiopulmonary process.    I personally reviewed the images/study and I agree with the findings  as stated by Dr. Srinivasa Reyes.    MACRO:  None    Signed by: Shelia Johnson 6/12/2025 9:16 AM  Dictation workstation:   [id number]   === 06/11/25 ===    CT ANGIO CHEST ABDOMEN PELVIS    - Impression -  1. No intraluminal contrast extravasation to suggest an active GI  bleed.  2. Diffuse wall thickening of the small and large bowel loops  throughout the abdomen consistent with panenterocolitis, favored to  be infectious or inflammatory in etiology. Alternatively, given  cirrhotic morphology of the liver and sequelae of portal venous  hypertension, bowel wall thickening and anemia may be secondary to  portal hypertensive enterocolopathy.  3. Moderate size left inguinal hernia with dense heterogenous fluid  collection measuring 5.1 X 4.3 cm, likely representing acute to  subacute hematoma. No contrast extravasation to suggest large volume  active bleeding.  4. Cirrhotic morphology of the liver, along with splenomegaly and  large volume simple attenuating abdominopelvic ascites. Suspect small  rectal varices.  5. Aneurysmal dilation of the ascending aorta measuring 4.1 cm.    I personally reviewed the image(s)/study and resident interpretation.  I agree with the findings as stated by resident Jaydon Davis.  Data analyzed and images  interpreted at [facility], [place], [place].    Signed by: Jovanni Paul 6/12/2025 7:54 AM  Dictation workstation:   [id number]         Assessment/Plan     Vance Silva is a 59 year old male with past medical history of cirrhosis complicated by portal hypertension and esophageal varices (followed by transplant and hepatology), hypertension, hyperlipidemia, TBI, and recent left inguinal hernia repair (5/22/25) complicated by post op hematoma s/p OR washout (5/29/22) admitted for melena and hypovolemia in setting of frequent diarrhea and acute on chronic inguinal hematoma, also found to have C diff.        #Hypovolemia in setting of frequent diarrhea  #Melena c/f upper GI bleed   #Symptomatic anemia   - history of esophageal varices   ::Hb 5.5 on admission, dropped to 4.4 overnight 6/12. Given 3 units RBCs overnight   :: given 50 mg albumin 6/11   :: CT angio abdomen 6/12 -  No intraluminal contrast extravasation to suggest an active GI bleed. Diffuse wall thickening of the small and large bowel loops throughout the abdomen consistent with panenterocolitis, favored to be infectious or inflammatory in etiology. Alternatively, given cirrhotic morphology of the liver and sequelae of portal venous hypertension, bowel wall thickening and anemia may be secondary to portal hypertensive enterocolopathy. Moderate size left inguinal hernia with dense heterogenous fluid collection measuring 5.1 X 4.3 cm, likely representing acute to subacute hematoma. No contrast extravasation to suggest large volume active bleeding.    Plan:   - Continue with IV PPI, ceftriaxone and octreotide gtt for now   - bolus with another 1L LR 6/12   - Follow up post transfusion CBC. If Hb < 7.5, will transfuse with another unit of blood   - Plan for EGD 6/12, follow up     #C diff colitis   - Continue with PO Vancomycin      #Cirrhosis   - Hold home lactulose, coreg, lasix and aldactone in setting of  volume depletion   - Will check daily MELD labs        #Inguinal hernia repair c/b hematoma     Plan:   - Follow up inguinal US   - transplant surgery consulted, no acute intervention for now. Follow up further recs         #Anxiousness   - atarax 25 mg q6 PRN      F: PRN  E: PRN   N: NPO   A: PIV   DVT: SCDs, pending hgb for chemical ppx   GI: pantoprazole 40 mg daily      Code status: full code  NOK: none     Patient and plan discussed with attending physician.              Josef Bansal DO  Department of Internal Medicine, PGY-1      Josef Bansal DO           [1] [Held by provider] carvedilol, 6.25 mg, oral, BID  cefTRIAXone, 1 g, intravenous, q24h  [Held by provider] ferrous sulfate, 1 tablet, oral, BID  [Held by provider] furosemide, 40 mg, oral, Daily  [Held by provider] lactulose, 10 g, oral, BID  pantoprazole, 40 mg, intravenous, BID  [Held by provider] spironolactone, 50 mg, oral, Daily  vancomycin, 125 mg, oral, 4x daily  [2] octreotide, 50 mcg/hr, Last Rate: 50 mcg/hr (06/12/25 0857)  [3] PRN medications: hydrOXYzine HCL

## 2025-06-12 NOTE — SIGNIFICANT EVENT
Rapid response called at 0400 for hypotension (BP 86/55), chest pain, SOB. On arrival, /70, , RR 20, SpO2 99% on RA. Pt complaining of lightheadedness, mild chest pain, diffuse abdominal pain; felt he needed to have a bowel movement but refusing to use bedpan.     Second unit of pRBCs was being given. EKG w sinus tachycardia. CBC was ordered and showed Hgb 5.2 (previously 4.4). Troponin 28, lactate 2.9. Pt placed on 2L NC for comfort (saturating %). Repeat BP was 110/70 and remained stable in subsequent readings. Pt currently only with 1 PIV, attempted to place additional but he was unable to tolerate and stated he would prefer to try again later.     Plan:  - 3rd unit of blood ordered; pt will need to be transfused to hgb > 7 for EGD in AM.  - Continue close monitoring of BP given c/f ongoing GIB  - Telemetry monitoring ordered; will need transfer to unit with telemetry    Waldemar Hernandez MD  Internal Medicine PGY-1

## 2025-06-13 ASSESSMENT — COGNITIVE AND FUNCTIONAL STATUS - GENERAL
MOBILITY SCORE: 24
DAILY ACTIVITIY SCORE: 24

## 2025-06-13 ASSESSMENT — PAIN SCALES - GENERAL
PAINLEVEL_OUTOF10: 3
PAINLEVEL_OUTOF10: 0 - NO PAIN

## 2025-06-13 ASSESSMENT — ACTIVITIES OF DAILY LIVING (ADL): LACK_OF_TRANSPORTATION: NO

## 2025-06-13 ASSESSMENT — PAIN - FUNCTIONAL ASSESSMENT: PAIN_FUNCTIONAL_ASSESSMENT: 0-10

## 2025-06-13 NOTE — PROGRESS NOTES
TRANSPLANT SURGERY PROGRESS NOTE  Assessment/Plan   Vance Silva is 59 y.o. male with history of decompensated alcoholic cirrhosis and is s/p open left inguinal hernia repair on 5/22/2025 and left groin washout 5/29/25. He was admitted 6/11 for dehydration, tachycardia, weakness. He was found to be profoundly anemic due to likely upper GI bleed and underwent EGD with EV clipping 6/12. He has remained HDS. Transplant surgery consulted due to groin hematoma.     The patient has no compressive symptoms or threat to the overlying skin on exam of the left groin hematoma. The hematoma is stable for multiple weeks according to the patient, is not of sufficient size to explain his degree of acute anemia, and does not require any surgical intervention. He has staples in place that should be removed in clinic within the next two weeks.     Plan:  No acute surgical intervention indicated for the post-operative hematoma  Will request follow up in the surgical clinic for wound check and staple removal within the next two weeks  Will sign off, please page with questions/concerns    D/w attending, Dr. Sally Degroot MD, PhD  Vascular Surgery, PGY3  [id number]      Subjective   NAEO, states groin looks the same today as it has for the past couple weeks, and does not endorse any appreciable pain of the groin    Objective   Vitals:  Heart Rate:  [59-78]   Temp:  [36.1 °C (96.9 °F)-36.9 °C (98.4 °F)]   Resp:  [12-20]   BP: ()/(58-72)   SpO2:  [97 %-99 %]     Exam:  Constitutional: No acute distress, resting comfortably  Neuro:  AOx3, grossly intact  ENMT: moist mucous membranes  CV: no tachycardia  Pulm: non-labored on room air  GI: soft, non-tender, non-distended. Left groin soft mass with overlying ecchymosis but no skin breakdown or ischemia. Staples in place over the surgical incision  Skin: warm and dry  Musculoskeletal: moving all extremities  Extremities: WWP      Labs:  Results from last 7 days   Lab Units  06/13/25  1010 06/13/25  0022 06/12/25  1829   WBC AUTO x10*3/uL 5.7 5.6 8.8   HEMOGLOBIN g/dL 7.1* 6.6* 6.9*   PLATELETS AUTO x10*3/uL 49* 53* 59*      Results from last 7 days   Lab Units 06/13/25  0529 06/12/25  0549 06/11/25  1721   SODIUM mmol/L 139 136 137   POTASSIUM mmol/L 3.5 3.8 3.8   CHLORIDE mmol/L 108* 105 105   CO2 mmol/L 22 22 16*   BUN mg/dL 50* 80* 75*   CREATININE mg/dL 1.25 1.40* 1.48*   GLUCOSE mg/dL 115* 136* 132*   MAGNESIUM mg/dL 2.26 2.04 1.78   PHOSPHORUS mg/dL 3.2 3.5 3.0      Results from last 7 days   Lab Units 06/13/25  0530 06/12/25  0549 06/11/25  1721 06/11/25  1721   INR  1.3* 1.6*  --  1.5*   PROTIME seconds 14.1* 17.2*  --  17.0*   APTT seconds 25* 27   < >  --     < > = values in this interval not displayed.

## 2025-06-13 NOTE — CARE PLAN
The patient's goals for the shift include      The clinical goals for the shift include Pt will be HDS by the end of shift      Problem: Safety - Adult  Goal: Free from fall injury  Outcome: Progressing

## 2025-06-13 NOTE — CONSULTS
[facility]  Consult Note    Vance Silva  [id number]  1965 (59 y.o.)    Reason for Consult: Concern for Left testicular ischemia 2/2 left groin hematoma compression    ==================  Assessment/Plan   Vance Silva is a 59 y.o.male with PMHx of cirrhosis c/b portal HTN and esophageal varices, HTN, TBI, and recent left inguinal hernia s/p repair 5/22/2025 c/b left inguinal hematoma s/p washout and drain placement 6/1 by the transplant team (both surgeries) who was admitted for dehydration 2/2 diarrhea. He had scrotal US for stable known left inguinal hematoma with report concerning for reduced blood flow to the left testicle for which urology service was consulted.      Patient was seen a bedside, report stable left inguinal swelling. Denied increase in size or worsening pain. His pain is manageable if he is not moving. Denied fever or urinary symptoms. Denied testicular pain.       =================     Problem List:  1. Dehydration        2. Decompensated hepatic cirrhosis (Multi)  Esophagogastroduodenoscopy (EGD) w Banding    Esophagogastroduodenoscopy (EGD) w Banding      3. Anemia, unspecified type  Esophagogastroduodenoscopy (EGD) w Banding    Esophagogastroduodenoscopy (EGD) w Banding      4. Portal hypertension with esophageal varices (Multi)             Past Medical History:  Medical History[1]    Past Surgical History:  Surgical History[2]     Allergies  Allergies[3]     Medications:  Medications Ordered Prior to Encounter[4]     Social History:  Patient  reports that he has never smoked. He has never used smokeless tobacco. He reports that he does not currently use alcohol. He reports current drug use. Drug: Marijuana.         Family History:  Family History[5]    ROS: 12 system review was completed and is negative with the exception of those signs and symptoms noted in the history of present illness: A 12 system review was completed and is negative with the exception of those signs  and symptoms noted in the history of present illness.    PHYSICAL EXAM:  Visit Vitals  /67   Pulse 59   Temp 36.7 °C (98.1 °F)   Resp 17       Constitutional: Well-developed and well-nourished. No distress.    Head: Normocephalic and atraumatic.    Neck: Normal range of motion.     Pulmonary/Chest: Effort normal. No respiratory distress.   Abdominal: Non-distended.  : circumcised phallus with orthotopic meatus. Both testis were palpated in the scrotum without tenderness. Normal testicular lie. No scrotal swelling in both sides. Large tense inguinal swelling consistent with US report of 9x4cm collection. The left testicle was pulled to the scrotal neck likely due to the hematoma compression on the cord. No crepitus, induration or fluctuation. Left inguinal incision c/d/I with staples to air. Ecchymoses along left inguinal canal  Integumentary: No rash or lesions visualized.  Musculoskeletal: Normal range of motion.    Neurological: Alert and oriented.  Psychiatric: Normal mood and affect. Thought content normal.         Laboratory Review:  Lab Results   Component Value Date    BUN 50 (H) 06/13/2025    CREATININE 1.25 06/13/2025    EGFR 66 06/13/2025     06/13/2025    K 3.5 06/13/2025     (H) 06/13/2025    CO2 22 06/13/2025    CALCIUM 8.0 (L) 06/13/2025      Lab Results   Component Value Date    WBC 5.7 06/13/2025    RBC 2.33 (L) 06/13/2025    HGB 7.1 (L) 06/13/2025    HCT 21.6 (L) 06/13/2025    MCV 93 06/13/2025    MCH 30.5 06/13/2025    MCHC 32.9 06/13/2025    RDW 17.8 (H) 06/13/2025    PLT 49 (L) 06/13/2025        Lab Results   Component Value Date    PSA 6.5 (H) 02/23/2024        Assessment:  Vance Silva is a 59 y.o.male with PMHx of cirrhosis c/b portal HTN and esophageal varices, HTN, TBI, and recent left inguinal hernia s/p repair 5/22/2025 c/b left inguinal hematoma s/p washout and drain placement 6/1 by the transplant team (both surgeries) who was admitted for dehydration 2/2 diarrhea  and anemia with hemoglobin of 5.5 received 3u RBC. He had scrotal US for stable known left inguinal hematoma with report concerning for reduced blood flow to the left testicle for which urology service was consulted.      Patient was seen a bedside, report stable left inguinal swelling. Denied increase in size or worsening pain. His pain is manageable if he is not moving. Denied fever or urinary symptoms. Denied testicular pain.       His exam was not concerning for acute testicular infarction. No testicular pain or tenderness on exam. No scrotal swelling or abnormalities. US with reduced blood flow to the left testicle which is expected with the hematoma compression but it is no acute and he has this swelling unchanged/slight decreased in size for weeks.  This has been a chronic process 2/2 hematoma following IHR. Expected collateral flow to provide additional blood supply in setting of tardus parvus flow of testicular artery.     Impression: chronic decreased testicular blood flow due to left groin hematoma compression      Recommendations:  - No acute urological intervention, as this appears to be a chronic process without acute testicular infarction.   - Conservative management for pain, including scrotal support and ice packs prn   - There is no need for repeat scrotal imaging unless there is a significant clinical change in scrotal exam or significant increase in pain  - Discussed pathophysiology of spermatic cord compression and effect on testicular function in both acute and chronic scenarios. Given normal appearance of left testicle and normal appearance and flow of contralateral testicle low concern for acute hypogonadism 2/2 this active issue.   - Urology will sign off. Please do not hesitate to call or page for any question or  concern    Seen with chief resident Dr. Tiwari and attending Dr. Jose Thacker MD  Urology PGY-3  Pager: [phone]     Edits by Juan R Tiwari MD          [1]   Past Medical  History:  Diagnosis Date    Acute kidney injury     Alcohol related seizure (Multi)     Anemia     Ascites     Awareness under anesthesia     Chronic kidney disease     Cirrhosis (Multi)     H/O multiple concussions     Hepatitis C     Liver disease     Parkinson's disease     dx 2/16/2024    Peripheral neuropathy     Portal hypertension with esophageal varices (Multi)     Secondary esophageal varices without bleeding (Multi)     Seizure (Multi)     Sleep apnea     Splenomegaly     Thrombocytopenia     Traumatic brain injury (Multi)     UGIB (upper gastrointestinal bleed)    [2]   Past Surgical History:  Procedure Laterality Date    COLONOSCOPY      ESOPHAGOGASTRODUODENOSCOPY      HERNIA REPAIR      OTHER SURGICAL HISTORY      Multiple orthopedic surgeries    UMBILICAL HERNIA REPAIR  2021   [3] No Known Allergies  [4]   No current facility-administered medications on file prior to encounter.     Current Outpatient Medications on File Prior to Encounter   Medication Sig Dispense Refill    carvedilol (Coreg) 6.25 mg tablet Take 1 tablet (6.25 mg) by mouth 2 times a day. 60 tablet 11    ferrous sulfate, 325 mg ferrous sulfate, tablet TAKE 1 TABLET BY MOUTH TWICE A  tablet 3    furosemide (Lasix) 20 mg tablet Take 2 tablets (40 mg) by mouth once daily. 60 tablet 11    lactulose 20 gram/30 mL oral solution Take 15 mL (10 g) by mouth 2 times a day. 900 mL 0    pantoprazole (ProtoNix) 40 mg EC tablet TAKE 1 TABLET BY MOUTH ONCE DAILY IN THE MORNING. TAKE BEFORE MEALS. DO NOT CRUSH, CHEW, OR SPLIT 90 tablet 1    spironolactone (Aldactone) 50 mg tablet Take 1 tablet (50 mg) by mouth once daily. 30 tablet 11   [5] No family history on file.     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 Patient s/p right frontal EVD and right pterional craniotomy with clipping of a ruptured right PCOM aneurysm on 3/26. Course complicated by EVD tract hemorrhage, new right basal ganglia/corona radiata infarct, hydrocephalus with ICP crisis, vasospasm requiring IA milrinone/verapimil, and new widespread ischemic strokes. Patient also found to have HFrEF (now recovered), bradycardia (resolved), b/l soleal DVT. s/p PEG placement initially complicated by gastric wall hematoma    # SAH s/p ruptured PCOM aneurysm with left hemiparesis, left esther inattention, dysphagia, cognitive impairment  - s/p right pterional craniotomy and clipping of a ruptured right PCOM aneurysm on 3/26  -  vasospasm s/p cerebral angiography and IA milrinone and verapamil  - Staple removed 4/30  - CTH (3/29): EVD tract acute hemorrhage and new focus of infarction with hemorrhage in the right basal ganglia/corona radiata  - MRI (4/8): Multifocal acute to subacute infarction. This extensively involves each MCA posterior distribution, the right basal ganglia, the anterior parasagittal vertex CINDY distribution and an additional smaller lesion in the right cerebellum  - Continue comprehensive rehab program of PT/OT/SLP - 3 hours a day, 5 days a week  - patio pass in WC with staff and/or family present at all times  - Precautions: Fall, Aspiration, Seizure, PEG, VTE bLE    # Bradycardia  - was on theophyline, dc'd  - resolved  -  HR 86-90 improved 5/3    # HFrEF  - Initial Echo (3/26) with severe cardiomyopathy, EF 36%, regional wall motion abnormality  - Resolved on repeat TTE performed 4/9/24, normal EF 66% with no WMA.  - /76 - 132/65) 5/3    # HLD  -  lipitor 40 mg QHS    # B/l DVT   - Left soleal DVT first noted 3/28, right soleal DVT fist noted 4/3  - Duplex (4/23): persistent bilateral soleal vein DVT  - Continue Lovenox 40 mg QD  - Surveillance duplex 4/30: There is persistence of DVT identified within the left soleal vein. On the current evaluation DVT is also evident within the left gastrocnemius vein, new. Previously identified right soleal DVT is no longer visualized  - Discussed with hospitalist given new DVT gastroc vein; not candidate for full dose AC due to SAh. Continue PPX dose now, surveillance dopplers weekly  - NSGY: no full dose AC for at least 4 weeks from surgery  - Next doppler: 5/7    # Hypernatremia  - PEG flushes free water 200 cc Q6H  - Na 141 4/30.--> 141 5/2  - BMP 5/6    # Gastric Hematoma post PEG placement  -  PEG placement attempted 4/19 for persistent dysphagia, but was aborted due to growing hematoma noted in gastric wall on endoscopy after trocar placement  - CT Abdomen and Pelvis w/ IV Cont showed gastric wall hematoma without intraluminal or extraluminal hemorrhage  - PEG placed 4/23  - Hgb 9.7 4/29--> 9.8 5/2 stable.   - CBC 5/6    # Pain  - Tylenol 650 mg Q6H PRN     # GI / Bowel  - Senna qHS  - Miralax BID  - bisacodyl 5 mg QHS  - GI ppx: protonix 40 mg QD    #  / Bladder  - Continue cardura 2 mg QHS     # Skin / Pressure injury  - left toe scabs, right lateral foot callus   - podiatry consult appreciated 5/2. Callus shaved down, no additional treatment necessary    # Diet/Dysphagia:  - PEG placed on 4/23.   - Diet Consistency: Jevity tube feeds --> advanced to pureed solids thin liquids with ensure plus supplementation 5/2 post  MBS  - Supplements: MVI  - BUn/Cr 17/0.51 4/29--> 15/0.44 4/30--> 12/0.61 5/2. BMP 5/6    # DVT prophylaxis:   - Lovenox 40 mg QD    # Case discussed in IDT rounds 5/1 (initial)  - incontinent, lives in elevator apt with ramp access, lives with son and other family close by. NPO with PEG, severe receptive and expressive language deficits with fluent aphasia, limited verbal output, unreliable yes/no and difficulty following 1 step commands, poor attention, max assist bADLs, left esther inattention, severe apraxia all tasks, bed mobility and transfers max assist-total assist,   - goals: 24 x 7 assist on dc, min-mod assist self care tasks and transfers, min assist transfers and ambulation  - family training  - target: 5/17 home with caregiver assist and home PT OT SLP    # LABS  CBC CMP 5/6      Outpatient Follow-up:  Perlita Varela  Neurosurgery  [address] [place] [address], [address]  [place] [address]  Phone: [phone]  Fax: [phone]  Follow Up Time:      Code Status/Emergency Contact:     54 y/o F with no known PMH, h/o fell one month prior to admission, who was transferred from [place] to [facility] on 3/26/24 with diffuse SAH w/ small IVH, 2/2 ruptured posterolaterally projecting 2.9 x 2.4 x 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 Patient s/p right frontal EVD and right pterional craniotomy with clipping of a ruptured right PCOM aneurysm on 3/26. Course complicated by EVD tract hemorrhage, new right basal ganglia/corona radiata infarct, hydrocephalus with ICP crisis, vasospasm requiring IA milrinone/verapimil, and new widespread ischemic strokes. Patient also found to have HFrEF (now recovered), bradycardia (resolved), b/l soleal DVT. s/p PEG placement initially complicated by gastric wall hematoma    # SAH s/p ruptured PCOM aneurysm with left hemiparesis, left esther inattention, dysphagia, cognitive impairment  - s/p right pterional craniotomy and clipping of a ruptured right PCOM aneurysm on 3/26  -  vasospasm s/p cerebral angiography and IA milrinone and verapamil  - Staple removed 4/30  - CTH (3/29): EVD tract acute hemorrhage and new focus of infarction with hemorrhage in the right basal ganglia/corona radiata  - MRI (4/8): Multifocal acute to subacute infarction. This extensively involves each MCA posterior distribution, the right basal ganglia, the anterior parasagittal vertex CINDY distribution and an additional smaller lesion in the right cerebellum  - Continue comprehensive rehab program of PT/OT/SLP - 3 hours a day, 5 days a week  - patio pass in WC with staff and/or family present at all times  - Precautions: Fall, Aspiration, Seizure, PEG, VTE bLE    # Bradycardia  - was on theophyline, dc'd  - resolved  -  HR 86-90 improved 5/3    # HFrEF  - Initial Echo (3/26) with severe cardiomyopathy, EF 36%, regional wall motion abnormality  - Resolved on repeat TTE performed 4/9/24, normal EF 66% with no WMA.  - /76 - 132/65) 5/3    # HLD  -  lipitor 40 mg QHS    # B/l DVT   - Left soleal DVT first noted 3/28, right soleal DVT fist noted 4/3  - Duplex (4/23): persistent bilateral soleal vein DVT  - Surveillance duplex 4/30: There is persistence of DVT identified within the left soleal vein. On the current evaluation DVT is also evident within the left gastrocnemius vein, new. Previously identified right soleal DVT is no longer visualized  - Discussed with hospitalist given new DVT gastroc vein; not candidate for full dose AC due to SAh. Continue PPX dose now, surveillance dopplers weekly  - NSGY: no full dose AC for at least 4 weeks from surgery  - Continue Lovenox 40 mg QD  - Next doppler: 5/7    # Hypernatremia  - PEG flushes free water 200 cc Q6H  - Na 141 4/30.--> 141 5/2  - BMP 5/6    # Gastric Hematoma post PEG placement  -  PEG placement attempted 4/19 for persistent dysphagia, but was aborted due to growing hematoma noted in gastric wall on endoscopy after trocar placement  - CT Abdomen and Pelvis w/ IV Cont showed gastric wall hematoma without intraluminal or extraluminal hemorrhage  - PEG placed 4/23  - Hgb 9.7 4/29--> 9.8 5/2 stable.   - CBC 5/6    # Pain  - Tylenol 650 mg Q6H PRN     # GI / Bowel  - Senna qHS  - Miralax BID  - bisacodyl 5 mg QHS  - GI ppx: protonix 40 mg QD    #  / Bladder  - Continue cardura 2 mg QHS     # Skin / Pressure injury  - left toe scabs, right lateral foot callus   - podiatry consult appreciated 5/2. Callus shaved down, no additional treatment necessary    # Diet/Dysphagia:  - PEG placed on 4/23.   - Diet Consistency: Jevity tube feeds --> advanced to pureed solids thin liquids with ensure plus supplementation 5/2 post  MBS  - Supplements: MVI  - BUn/Cr 17/0.51 4/29--> 15/0.44 4/30--> 12/0.61 5/2. BMP 5/6    # DVT prophylaxis:   - Lovenox 40 mg QD    # Case discussed in IDT rounds 5/1 (initial)  - incontinent, lives in elevator apt with ramp access, lives with son and other family close by. NPO with PEG, severe receptive and expressive language deficits with fluent aphasia, limited verbal output, unreliable yes/no and difficulty following 1 step commands, poor attention, max assist bADLs, left esther inattention, severe apraxia all tasks, bed mobility and transfers max assist-total assist,   - goals: 24 x 7 assist on dc, min-mod assist self care tasks and transfers, min assist transfers and ambulation  - family training  - target: 5/17 home with caregiver assist and home PT OT SLP    # LABS  CBC CMP 5/6  doppler 5/7    Outpatient Follow-up:  Perlita Varela  Neurosurgery  [address] [place] [address], [address]  [place] [address]  Phone: [phone]  Fax: [phone]  Follow Up Time:      Code Status/Emergency Contact:

## 2025-06-13 NOTE — PROGRESS NOTES
"  Internal Medicine Progress Note     Vance Silva is a 59 year old male with past medical history of cirrhosis complicated by portal hypertension and esophageal varices (followed by transplant and hepatology), hypertension, hyperlipidemia, TBI, and recent left inguinal hernia repair (5/22/25) complicated by post op hematoma s/p OR washout (5/29/22) admitted for hypovolemia in setting of frequent diarrhea and acute on chronic inguinal hematoma.     Subjective     Patient seen and evaluated at bedside. He reports significant improvement in his lightheadedness and dizziness. He reports that he still having some intermittent loose stools but overall it has gotten better as well. Has been able to tolerate PO diet. Patient denies any shortness of breath, abdominal pain, or chest pain. However, he reports pain in his left groin.         Medications     Medications:   Scheduled medications  Scheduled Medications[1]  Continuous medications  Continuous Medications[2]  PRN medications  PRN Medications[3]     Objective     Vitals  Visit Vitals  /67   Pulse 59   Temp 36.7 °C (98.1 °F)   Resp 17   Ht 1.956 m (6' 5.01\")   Wt 88.1 kg (194 lb 3.6 oz)   SpO2 97%   BMI 23.03 kg/m²   Smoking Status Never   BSA 2.19 m²       Intake/Output Summary (Last 24 hours) at 6/13/2025 1002  Last data filed at 6/13/2025 0000  Gross per 24 hour   Intake 4190.85 ml   Output 0 ml   Net 4190.85 ml       Physical Exam  Constitutional:       Appearance: He is normal weight.   Cardiovascular:      Rate and Rhythm: Normal rate and regular rhythm.      Pulses: Normal pulses.      Heart sounds: Normal heart sounds.   Pulmonary:      Effort: Pulmonary effort is normal.      Breath sounds: Normal breath sounds.   Abdominal:      General: Abdomen is flat. There is distension.      Palpations: Abdomen is soft.   Neurological:      General: No focal deficit present.      Mental Status: He is alert and oriented to person, place, and time. Mental " status is at baseline.            Labs  Lab Results   Component Value Date    WBC 5.6 06/13/2025    HGB 6.6 (L) 06/13/2025    HCT 19.6 (L) 06/13/2025    MCV 90 06/13/2025    PLT 53 (L) 06/13/2025      Lab Results   Component Value Date    GLUCOSE 115 (H) 06/13/2025    CALCIUM 8.0 (L) 06/13/2025     06/13/2025    K 3.5 06/13/2025    CO2 22 06/13/2025     (H) 06/13/2025    BUN 50 (H) 06/13/2025    CREATININE 1.25 06/13/2025      Lab Results   Component Value Date    ALT 50 06/13/2025    AST 33 06/13/2025    ALKPHOS 39 06/13/2025    BILITOT 1.0 06/13/2025        Imaging  === 06/11/25 ===    XR CHEST 1 VIEW    - Impression -  1.  No evidence of acute cardiopulmonary process.    I personally reviewed the images/study and I agree with the findings  as stated by Dr. Srinivasa Reyes.    MACRO:  None    Signed by: Shelia Johnson 6/12/2025 9:16 AM  Dictation workstation:   [id number]   === 06/11/25 ===    CT ANGIO CHEST ABDOMEN PELVIS    - Impression -  1. No intraluminal contrast extravasation to suggest an active GI  bleed.  2. Diffuse wall thickening of the small and large bowel loops  throughout the abdomen consistent with panenterocolitis, favored to  be infectious or inflammatory in etiology. Alternatively, given  cirrhotic morphology of the liver and sequelae of portal venous  hypertension, bowel wall thickening and anemia may be secondary to  portal hypertensive enterocolopathy.  3. Moderate size left inguinal hernia with dense heterogenous fluid  collection measuring 5.1 X 4.3 cm, likely representing acute to  subacute hematoma. No contrast extravasation to suggest large volume  active bleeding.  4. Cirrhotic morphology of the liver, along with splenomegaly and  large volume simple attenuating abdominopelvic ascites. Suspect small  rectal varices.  5. Aneurysmal dilation of the ascending aorta measuring 4.1 cm.    I personally reviewed the image(s)/study and resident interpretation.  I agree with the  findings as stated by resident Jaydon Davis.  Data analyzed and images interpreted at [facility], [place], [place].    Signed by: Jovanni Paul 6/12/2025 7:54 AM  Dictation workstation:   [id number]         Assessment/Plan     Vance Silva is a 59 year old male with past medical history of cirrhosis complicated by portal hypertension and esophageal varices (followed by transplant and hepatology), hypertension, hyperlipidemia, TBI, and recent left inguinal hernia repair (5/22/25) complicated by post op hematoma s/p OR washout (5/29/22) admitted for melena in setting of Upper GI bleed s/p EGD 6/12 and acute on chronic inguinal hematoma, also found to have C diff.        #Hypovolemia in setting of frequent diarrhea  #Melena c/f upper GI bleed   #Symptomatic anemia   - history of esophageal varices   ::Hb 5.5 on admission, dropped to 4.4 overnight 6/12. Given 3 units RBCs overnight   :: given 50 mg albumin 6/11   :: CT angio abdomen 6/12 -  No intraluminal contrast extravasation to suggest an active GI bleed. Diffuse wall thickening of the small and large bowel loops throughout the abdomen consistent with panenterocolitis, favored to be infectious or inflammatory in etiology. Alternatively, given cirrhotic morphology of the liver and sequelae of portal venous hypertension, bowel wall thickening and anemia may be secondary to portal hypertensive enterocolopathy. Moderate size left inguinal hernia with dense heterogenous fluid collection measuring 5.1 X 4.3 cm, likely representing acute to subacute hematoma. No contrast extravasation to suggest large volume active bleeding.  :: EGD 6/12 - Two columns of grade II varices in the lower third of the esophagus; there was stigmata of recent hemorrhage with red shyam sign; placed 2 bands successfully, resulting in partial eradication      Plan:   - Continue with IV PPI (transition from BID to daily), ceftriaxone and  octreotide gtt for now   - Continue with octretride gtt for total of 48 hours. After discontinued, can switch back to carvedilol 6.25 mg BID   - Continue with ceftriaxone for SBP for now. Can switch to PO ciprofloxacin on discharge (total course 5 days, end date 6/16)   - IR consult for diagnostic paracentesis with studies to evaluate for SBP   - Continue to monitor CBC BID.  If Hb < 7.5, will transfuse with another unit of blood      #C diff colitis   - Continue with PO Vancomycin   - CTM bowel movements      #Cirrhosis   - Hold home lactulose, coreg, lasix and aldactone in setting of volume depletion   - Will check daily MELD labs     #Inguinal hernia repair c/b hematoma   :: scrotum US 6/12 - Asymmetric reduced arterial flow in the left testicle with tardus parvus waveforms consistent with a component of arterial ischemia. Complex multi-septated hypoechoic structure in the left inguinal canal measuring up to 9.0 cm, previously 7.7 cm on ultrasound dated 05/28/2025, which likely represents a hematoma    Plan:   - Urology consulted, follow up        #Anxiousness   - atarax 25 mg q6 PRN      F: PRN  E: PRN   N: NPO   A: PIV   DVT: SCDs, pending hgb for chemical ppx   GI: pantoprazole 40 mg daily      Code status: full code  NOK: none     Patient and plan discussed with attending physician.              Josef Bansal DO  Department of Internal Medicine, PGY-1      Joesf Bansal DO           [1] [Held by provider] carvedilol, 6.25 mg, oral, BID  cefTRIAXone, 1 g, intravenous, q24h  [Held by provider] ferrous sulfate, 1 tablet, oral, BID  [Held by provider] furosemide, 40 mg, oral, Daily  [Held by provider] lactulose, 10 g, oral, BID  lidocaine, 5 mL, infiltration, Once  melatonin, 6 mg, oral, Nightly  pantoprazole, 40 mg, intravenous, BID  [Held by provider] spironolactone, 50 mg, oral, Daily  vancomycin, 125 mg, oral, 4x daily     [2] octreotide, 50 mcg/hr, Last Rate: 50 mcg/hr (06/12/25 1804)     [3] PRN  medications: acetaminophen, hydrOXYzine HCL, oxyCODONE

## 2025-06-13 NOTE — PROGRESS NOTES
06/13/25 1227   Discharge Planning   Living Arrangements Spouse/significant other   Support Systems Spouse/significant other;Family members   Assistance Needed independent with ADL's   Type of Residence Private residence   Home or Post Acute Services None   Expected Discharge Disposition Home   Does the patient need discharge transport arranged? No   Financial Resource Strain   How hard is it for you to pay for the very basics like food, housing, medical care, and heating? Not very   Housing Stability   In the last 12 months, was there a time when you were not able to pay the mortgage or rent on time? N   In the past 12 months, how many times have you moved where you were living? 0   At any time in the past 12 months, were you homeless or living in a shelter (including now)? N   Transportation Needs   In the past 12 months, has lack of transportation kept you from medical appointments or from getting medications? no   In the past 12 months, has lack of transportation kept you from meetings, work, or from getting things needed for daily living? No       Plan per Medical/Surgical team: Per team, patient will be scheduled for a paracentesis today. He will also be started on Octreotide and if no bleeding can discharge on Monday.    Demographics/Insurance: verified.  Living Environment: Patient lives with his significant other. He is up and independent.    Home Care: denies  PCP: Dr. Mu Norris  Pharmacy: [facility]   DME: denies  Falls: denies  Dialysis: denies  Social Work Needs: denies any financial or social work needs.  Transportation at discharge: family will provide transportation home    Potential Barriers: none  Discharge Disposition: home  ADOD:  1-3 days      Gemma Diaz RN, BSN  Transitional Care Coordinator

## 2025-06-14 ASSESSMENT — PAIN DESCRIPTION - LOCATION: LOCATION: ABDOMEN

## 2025-06-14 ASSESSMENT — COGNITIVE AND FUNCTIONAL STATUS - GENERAL
DAILY ACTIVITIY SCORE: 24
MOBILITY SCORE: 24
DAILY ACTIVITIY SCORE: 24
MOBILITY SCORE: 24

## 2025-06-14 ASSESSMENT — PAIN DESCRIPTION - DESCRIPTORS: DESCRIPTORS: OTHER (COMMENT)

## 2025-06-14 ASSESSMENT — PAIN - FUNCTIONAL ASSESSMENT: PAIN_FUNCTIONAL_ASSESSMENT: 0-10

## 2025-06-14 ASSESSMENT — PAIN SCALES - GENERAL
PAINLEVEL_OUTOF10: 0 - NO PAIN
PAINLEVEL_OUTOF10: 8

## 2025-06-14 ASSESSMENT — PAIN SCALES - WONG BAKER: WONGBAKER_NUMERICALRESPONSE: NO HURT

## 2025-06-14 NOTE — PROGRESS NOTES
"  Internal Medicine Progress Note     Vance Silva is a 59 year old male with past medical history of cirrhosis complicated by portal hypertension and esophageal varices (followed by transplant and hepatology), hypertension, hyperlipidemia, TBI, and recent left inguinal hernia repair (5/22/25) complicated by post op hematoma s/p OR washout (5/29/22) admitted for hypovolemia in setting of frequent diarrhea and acute on chronic inguinal hematoma.     Subjective     Patient reported that he is still passing dark colored stool with last BM being this AM. He denied any symptoms of lightheadedness, dizziness, melena, hematochezia, chest pain, SOB, N/V, abdominal pain or any other symptoms.     Medications     Medications:   Scheduled medications  Scheduled Medications[1]  Continuous medications  Continuous Medications[2]  PRN medications  PRN Medications[3]     Objective     Vitals  Visit Vitals  /79   Pulse 67   Temp 36.2 °C (97.2 °F)   Resp 16   Ht 1.956 m (6' 5.01\")   Wt 88.1 kg (194 lb 3.6 oz)   SpO2 96%   BMI 23.03 kg/m²   Smoking Status Never   BSA 2.19 m²       Intake/Output Summary (Last 24 hours) at 6/14/2025 0935  Last data filed at 6/14/2025 0402  Gross per 24 hour   Intake 3041.84 ml   Output --   Net 3041.84 ml       Physical Exam  Constitutional:       Appearance: He is normal weight.   Cardiovascular:      Rate and Rhythm: Normal rate and regular rhythm.      Pulses: Normal pulses.      Heart sounds: Normal heart sounds.   Pulmonary:      Effort: Pulmonary effort is normal.      Breath sounds: Normal breath sounds.   Abdominal:      General: Abdomen is flat. There is distension.      Palpations: Abdomen is soft.   Neurological:      General: No focal deficit present.      Mental Status: He is alert and oriented to person, place, and time. Mental status is at baseline.            Labs  Lab Results   Component Value Date    WBC 7.6 06/14/2025    HGB 8.7 (L) 06/14/2025    HCT 27.2 (L) " 06/14/2025    MCV 95 06/14/2025    PLT 68 (L) 06/14/2025      Lab Results   Component Value Date    GLUCOSE 118 (H) 06/14/2025    CALCIUM 8.2 (L) 06/14/2025     06/14/2025    K 3.9 06/14/2025    CO2 23 06/14/2025     06/14/2025    BUN 36 (H) 06/14/2025    CREATININE 1.25 06/14/2025      Lab Results   Component Value Date    ALT 54 (H) 06/14/2025    AST 26 06/14/2025    ALKPHOS 49 06/14/2025    BILITOT 0.9 06/14/2025        Imaging  === 06/11/25 ===    XR CHEST 1 VIEW    - Impression -  1.  No evidence of acute cardiopulmonary process.    I personally reviewed the images/study and I agree with the findings  as stated by Dr. Srinivasa Reyes.    MACRO:  None    Signed by: Shelia Johnson 6/12/2025 9:16 AM  Dictation workstation:   [id number]   === 06/11/25 ===    CT ANGIO CHEST ABDOMEN PELVIS    - Impression -  1. No intraluminal contrast extravasation to suggest an active GI  bleed.  2. Diffuse wall thickening of the small and large bowel loops  throughout the abdomen consistent with panenterocolitis, favored to  be infectious or inflammatory in etiology. Alternatively, given  cirrhotic morphology of the liver and sequelae of portal venous  hypertension, bowel wall thickening and anemia may be secondary to  portal hypertensive enterocolopathy.  3. Moderate size left inguinal hernia with dense heterogenous fluid  collection measuring 5.1 X 4.3 cm, likely representing acute to  subacute hematoma. No contrast extravasation to suggest large volume  active bleeding.  4. Cirrhotic morphology of the liver, along with splenomegaly and  large volume simple attenuating abdominopelvic ascites. Suspect small  rectal varices.  5. Aneurysmal dilation of the ascending aorta measuring 4.1 cm.    I personally reviewed the image(s)/study and resident interpretation.  I agree with the findings as stated by resident Jaydon Davis.  Data analyzed and images interpreted at [facility],  [place], [place].    Signed by: Jovanni Paul 6/12/2025 7:54 AM  Dictation workstation:   [id number]        Assessment/Plan     Vance Silva is a 59 year old male with past medical history of cirrhosis complicated by portal hypertension and esophageal varices (followed by transplant and hepatology), hypertension, hyperlipidemia, TBI, and recent left inguinal hernia repair (5/22/25) complicated by post op hematoma s/p OR washout (5/29/22) admitted for melena in setting of Upper GI bleed s/p EGD 6/12 and acute on chronic inguinal hematoma, also found to have C diff.        #Hypovolemia in setting of frequent diarrhea  #Melena c/f upper GI bleed   #Symptomatic anemia   - history of esophageal varices   ::Hb 5.5 on admission, dropped to 4.4 overnight 6/12. Given 3 units RBCs overnight   :: given 50 mg albumin 6/11   :: CT angio abdomen 6/12 -  No intraluminal contrast extravasation to suggest an active GI bleed. Diffuse wall thickening of the small and large bowel loops throughout the abdomen consistent with panenterocolitis, favored to be infectious or inflammatory in etiology. Alternatively, given cirrhotic morphology of the liver and sequelae of portal venous hypertension, bowel wall thickening and anemia may be secondary to portal hypertensive enterocolopathy. Moderate size left inguinal hernia with dense heterogenous fluid collection measuring 5.1 X 4.3 cm, likely representing acute to subacute hematoma. No contrast extravasation to suggest large volume active bleeding.  :: EGD 6/12 - Two columns of grade II varices in the lower third of the esophagus; there was stigmata of recent hemorrhage with red shyam sign; placed 2 bands successfully, resulting in partial eradication      Plan:   - Continue with IV PPI (transition from BID to daily), ceftriaxone and octreotide gtt for now   - Continue with octretride gtt for total of 72 hours. After discontinued, can switch back to carvedilol 6.25 mg BID   -  Continue with ceftriaxone for SBP for now. Can switch to PO ciprofloxacin on discharge (total course 5 days, end date 6/16)   - IR consult for diagnostic paracentesis with studies to evaluate for SBP, will likely occur on monday  - Continue to monitor CBC BID.  If Hb < 7.0, will transfuse with another unit of blood, if continues to bleed will consider Colon +/- capsule endoscopy     #C diff colitis   - Continue with PO Vancomycin for total of 10 days  - CTM bowel movements      #Cirrhosis   - Hold home lactulose, coreg, lasix and aldactone in setting of volume depletion, will discuss resumption once Octreotide is stopped and HD stability is maintained   - Will check daily MELD labs     #Inguinal hernia repair c/b hematoma   :: scrotum US 6/12 - Asymmetric reduced arterial flow in the left testicle with tardus parvus waveforms consistent with a component of arterial ischemia. Complex multi-septated hypoechoic structure in the left inguinal canal measuring up to 9.0 cm, previously 7.7 cm on ultrasound dated 05/28/2025, which likely represents a hematoma    Plan:   - Urology consulted,-NTD       #Anxiousness   - atarax 25 mg q6 PRN      F: PRN  E: PRN   N: 2-3g NA  A: PIV   DVT: SCDs, pending hgb for chemical ppx   GI: pantoprazole 40 mg daily      Code status: full code  NOK:     Wanda Silva (Mother)  [phone] (Mobile)       Patient and plan discussed with attending physician.              Marbin Slater MD  Department of Internal Medicine, PGY-3      Marbin Slater MD           [1] [Held by provider] carvedilol, 6.25 mg, oral, BID  cefTRIAXone, 1 g, intravenous, q24h  [Held by provider] ferrous sulfate, 1 tablet, oral, BID  [Held by provider] furosemide, 40 mg, oral, Daily  [Held by provider] lactulose, 10 g, oral, BID  lidocaine, 5 mL, infiltration, Once  melatonin, 6 mg, oral, Nightly  pantoprazole, 40 mg, intravenous, BID  [Held by provider] spironolactone, 50 mg, oral, Daily  vancomycin, 125 mg, oral, 4x  daily     [2] octreotide, 50 mcg/hr     [3] PRN medications: acetaminophen, hydrOXYzine HCL, oxyCODONE

## 2025-06-14 NOTE — SIGNIFICANT EVENT
Vance Silva is 59 y.o. male with history of decompensated alcoholic cirrhosis and is s/p open left inguinal hernia repair on 5/22/2025 and left groin washout 5/29/25. He was admitted 6/11 for dehydration, tachycardia, weakness. He was found to be profoundly anemic due to likely upper GI bleed and underwent EGD with EV clipping 6/12.      Patient last known hgb 7.1 from this am. 2 follow up cb[facility] draawn and both specimens lost by lab. When patient asked again for another specimen he stated he wanted to be left alone for the night and refused lab draws and any blood products for tonight.     Plan to follow up am cbc and transfuse based on that lab. He is HDS.

## 2025-06-14 NOTE — CARE PLAN
The patient's goals for the shift include      The clinical goals for the shift include Patient safety and diet will be maintained during shift    Problem: Pain - Adult  Goal: Verbalizes/displays adequate comfort level or baseline comfort level  Outcome: Progressing     Problem: Safety - Adult  Goal: Free from fall injury  Outcome: Progressing     Problem: Discharge Planning  Goal: Discharge to home or other facility with appropriate resources  Outcome: Progressing     Problem: Chronic Conditions and Co-morbidities  Goal: Patient's chronic conditions and co-morbidity symptoms are monitored and maintained or improved  Outcome: Progressing     Problem: Nutrition  Goal: Nutrient intake appropriate for maintaining nutritional needs  Outcome: Progressing     Problem: Fall/Injury  Goal: Not fall by end of shift  Outcome: Progressing  Goal: Be free from injury by end of the shift  Outcome: Progressing  Goal: Verbalize understanding of personal risk factors for fall in the hospital  Outcome: Progressing  Goal: Verbalize understanding of risk factor reduction measures to prevent injury from fall in the home  Outcome: Progressing  Goal: Use assistive devices by end of the shift  Outcome: Progressing  Goal: Pace activities to prevent fatigue by end of the shift  Outcome: Progressing     Problem: Pain  Goal: Takes deep breaths with improved pain control throughout the shift  Outcome: Progressing  Goal: Turns in bed with improved pain control throughout the shift  Outcome: Progressing  Goal: Walks with improved pain control throughout the shift  Outcome: Progressing  Goal: Performs ADL's with improved pain control throughout shift  Outcome: Progressing  Goal: Participates in PT with improved pain control throughout the shift  Outcome: Progressing  Goal: Free from opioid side effects throughout the shift  Outcome: Progressing  Goal: Free from acute confusion related to pain meds throughout the shift  Outcome: Progressing      Problem: Skin  Goal: Decreased wound size/increased tissue granulation at next dressing change  Outcome: Progressing  Goal: Participates in plan/prevention/treatment measures  Outcome: Progressing  Flowsheets (Taken 6/14/2025 0401)  Participates in plan/prevention/treatment measures: Elevate heels  Goal: Prevent/manage excess moisture  Outcome: Progressing  Goal: Prevent/minimize sheer/friction injuries  Outcome: Progressing  Goal: Promote/optimize nutrition  Outcome: Progressing  Goal: Promote skin healing  Outcome: Progressing

## 2025-06-15 ASSESSMENT — COGNITIVE AND FUNCTIONAL STATUS - GENERAL
DAILY ACTIVITIY SCORE: 24
MOBILITY SCORE: 24

## 2025-06-15 ASSESSMENT — PAIN SCALES - GENERAL
PAINLEVEL_OUTOF10: 0 - NO PAIN
PAINLEVEL_OUTOF10: 0 - NO PAIN

## 2025-06-15 NOTE — CARE PLAN
The patient's goals for the shift include      The clinical goals for the shift include Pt will be safe and free from injury during this shift    Over the shift, the patient did not make progress toward the following goals. Barriers to progression include   Problem: Pain - Adult  Goal: Verbalizes/displays adequate comfort level or baseline comfort level  Outcome: Progressing     Problem: Safety - Adult  Goal: Free from fall injury  Outcome: Progressing

## 2025-06-15 NOTE — PROGRESS NOTES
"  Internal Medicine Progress Note     Vance Silva is a 59 year old male with past medical history of cirrhosis complicated by portal hypertension and esophageal varices (followed by transplant and hepatology), hypertension, hyperlipidemia, TBI, and recent left inguinal hernia repair (5/22/25) complicated by post op hematoma s/p OR washout (5/29/22) admitted for hypovolemia in setting of frequent diarrhea and acute on chronic inguinal hematoma.     Subjective     Today, the patient reports brown-colored stools with no evidence of blood. He was requesting breaks from his IV drip. No abdominal pain, melena, nausea, or vomiting reported this morning. He was able to walk around today.     Medications     Medications:   Scheduled medications  Scheduled Medications[1]  Continuous medications  Continuous Medications[2]  PRN medications  PRN Medications[3]     Objective     Vitals  Visit Vitals  /87   Pulse 91   Temp 36.3 °C (97.3 °F)   Resp 16   Ht 1.956 m (6' 5.01\")   Wt 88.1 kg (194 lb 3.6 oz)   SpO2 94%   BMI 23.03 kg/m²   Smoking Status Never   BSA 2.19 m²       Intake/Output Summary (Last 24 hours) at 6/15/2025 1001  Last data filed at 6/15/2025 0846  Gross per 24 hour   Intake 410 ml   Output --   Net 410 ml       Physical Exam  Constitutional:       Appearance: He is normal weight.   HENT:      Head: Normocephalic and atraumatic.   Eyes:      Extraocular Movements: Extraocular movements intact.   Cardiovascular:      Rate and Rhythm: Normal rate and regular rhythm.      Pulses: Normal pulses.      Heart sounds: Normal heart sounds.   Pulmonary:      Effort: Pulmonary effort is normal.      Breath sounds: Normal breath sounds.   Abdominal:      General: Abdomen is flat. There is distension.      Palpations: Abdomen is soft.      Tenderness: There is no abdominal tenderness.   Musculoskeletal:      Right lower leg: No edema.      Left lower leg: No edema.   Skin:     General: Skin is warm and dry.      " Coloration: Skin is not jaundiced.   Neurological:      General: No focal deficit present.      Mental Status: He is alert and oriented to person, place, and time. Mental status is at baseline.            Labs  Lab Results   Component Value Date    WBC 4.3 (L) 06/15/2025    HGB 8.2 (L) 06/15/2025    HCT 25.8 (L) 06/15/2025    MCV 97 06/15/2025    PLT 59 (L) 06/15/2025      Lab Results   Component Value Date    GLUCOSE 108 (H) 06/15/2025    CALCIUM 7.8 (L) 06/15/2025     (L) 06/15/2025    K 3.5 06/15/2025    CO2 21 06/15/2025     06/15/2025    BUN 28 (H) 06/15/2025    CREATININE 1.23 06/15/2025      Lab Results   Component Value Date    ALT 41 06/15/2025    AST 21 06/15/2025    ALKPHOS 49 06/15/2025    BILITOT 0.8 06/15/2025        Imaging  === 06/11/25 ===    XR CHEST 1 VIEW    - Impression -  1.  No evidence of acute cardiopulmonary process.    I personally reviewed the images/study and I agree with the findings  as stated by Dr. Srinivasa Reyes.    MACRO:  None    Signed by: Shelia Johnson 6/12/2025 9:16 AM  Dictation workstation:   [id number]   === 06/11/25 ===    CT ANGIO CHEST ABDOMEN PELVIS    - Impression -  1. No intraluminal contrast extravasation to suggest an active GI  bleed.  2. Diffuse wall thickening of the small and large bowel loops  throughout the abdomen consistent with panenterocolitis, favored to  be infectious or inflammatory in etiology. Alternatively, given  cirrhotic morphology of the liver and sequelae of portal venous  hypertension, bowel wall thickening and anemia may be secondary to  portal hypertensive enterocolopathy.  3. Moderate size left inguinal hernia with dense heterogenous fluid  collection measuring 5.1 X 4.3 cm, likely representing acute to  subacute hematoma. No contrast extravasation to suggest large volume  active bleeding.  4. Cirrhotic morphology of the liver, along with splenomegaly and  large volume simple attenuating abdominopelvic ascites. Suspect  small  rectal varices.  5. Aneurysmal dilation of the ascending aorta measuring 4.1 cm.    I personally reviewed the image(s)/study and resident interpretation.  I agree with the findings as stated by resident Jaydon Davis.  Data analyzed and images interpreted at [facility], [place], [place].    Signed by: Jovanni Paul 6/12/2025 7:54 AM  Dictation workstation:   [id number]        Assessment/Plan     Vance Silva is a 59 year old male with past medical history of cirrhosis complicated by portal hypertension and esophageal varices (followed by transplant and hepatology), hypertension, hyperlipidemia, TBI, and recent left inguinal hernia repair (5/22/25) complicated by post op hematoma s/p OR washout (5/29/22) admitted for melena in setting of Upper GI bleed s/p EGD 6/12 and acute on chronic inguinal hematoma, also found to have C diff.      06/15/25 Updates:  -Finish octreotide today  -Continue 5-day course of ceftriaxone  -Restart Lasix 20 and spironolactone 50 today  -Paracentesis likely tomorrow  -Switch to PO pantoprazole  -Will give one time dose of Coreg 6.125 mg tonight if vitals remain stable today      #Hypovolemia in setting of frequent diarrhea  #Melena c/f upper GI bleed   #Symptomatic anemia   :: History of esophageal varices   :: Hb 5.5 on admission, dropped to 4.4 overnight 6/12. Given 3 units RBCs overnight   :: Given 50 mg albumin 6/11   :: CT angio abdomen 6/12 - No intraluminal contrast extravasation to suggest an active GI bleed. Diffuse wall thickening of the small and large bowel loops throughout the abdomen consistent with panenterocolitis, favored to be infectious or inflammatory in etiology. Alternatively, given cirrhotic morphology of the liver and sequelae of portal venous hypertension, bowel wall thickening and anemia may be secondary to portal hypertensive enterocolopathy. Moderate size left inguinal hernia with dense heterogenous  fluid collection measuring 5.1 X 4.3 cm, likely representing acute to subacute hematoma. No contrast extravasation to suggest large volume active bleeding.  :: EGD 6/12 - Two columns of grade II varices in the lower third of the esophagus; there was stigmata of recent hemorrhage with red shyam sign; placed 2 bands successfully, resulting in partial eradication  Plan:   - IR consult for diagnostic paracentesis with studies to evaluate for SBP, will likely occur on Monday  - Continue to monitor CBC daily.  If Hb < 7.0, will transfuse with another unit of blood, if continues to bleed will consider Colon +/- capsule endoscopy   - Finish octreotide today  - Continue 5-day course of ceftriaxone for SBP. Can switch to PO ciprofloxacin on discharge (total course 5 days, end date 6/16)  - Restart Lasix 20 and spironolactone 50 today  - Switch to PO pantoprazole 40 mg daily    #C diff colitis   - Continue with PO Vancomycin for total of 10 days (end date 6/22)  - CTM bowel movements      #Cirrhosis   - Held home lactulose, coreg, lasix and aldactone in setting of volume depletion  - Will check daily MELD labs   - Restart spironolactone 50 mg and PO Lasix 20 mg today. See if patient is able to tolerate  - Will give one time dose of Coreg 6.125 mg tonight if vitals remain stable today    #Inguinal hernia repair c/b hematoma   :: Scrotum US 6/12 - Asymmetric reduced arterial flow in the left testicle with tardus parvus waveforms consistent with a component of arterial ischemia. Complex multi-septated hypoechoic structure in the left inguinal canal measuring up to 9.0 cm, previously 7.7 cm on ultrasound dated 05/28/2025, which likely represents a hematoma  Plan:   - Urology consulted, NTD     #Anxiousness   - Atarax 25 mg q6 PRN      F: PRN  E: PRN   N: 2-3g NA  A: PIV   DVT: SCDs  GI: Pantoprazole 40 mg daily      Code status: full code  NOK: Wanda Silva (Mother)[phone] (Mobile)      Patient and plan discussed with attending  physician.            Queen JAYDEN Saunders MD         [1] [Held by provider] carvedilol, 6.25 mg, oral, BID  cefTRIAXone, 1 g, intravenous, q24h  [Held by provider] ferrous sulfate, 1 tablet, oral, BID  furosemide, 20 mg, oral, Once  [Held by provider] furosemide, 40 mg, oral, Daily  lactulose, 10 g, oral, BID  lidocaine, 5 mL, infiltration, Once  melatonin, 6 mg, oral, Nightly  [START ON 6/16/2025] pantoprazole, 40 mg, oral, Daily before breakfast  [Held by provider] spironolactone, 50 mg, oral, Daily  spironolactone, 50 mg, oral, Once  vancomycin, 125 mg, oral, 4x daily     [2]    [3] PRN medications: acetaminophen, hydrOXYzine HCL, oxyCODONE

## 2025-06-16 ENCOUNTER — APPOINTMENT (OUTPATIENT)
Dept: RADIOLOGY | Facility: HOSPITAL | Age: 60
DRG: 377 | End: 2025-06-16
Payer: COMMERCIAL

## 2025-06-16 ENCOUNTER — PHARMACY VISIT (OUTPATIENT)
Dept: PHARMACY | Facility: CLINIC | Age: 60
End: 2025-06-16
Payer: COMMERCIAL

## 2025-06-16 PROCEDURE — RXMED WILLOW AMBULATORY MEDICATION CHARGE

## 2025-06-16 PROCEDURE — 49083 ABD PARACENTESIS W/IMAGING: CPT

## 2025-06-16 ASSESSMENT — PAIN SCALES - GENERAL
PAINLEVEL_OUTOF10: 0 - NO PAIN
PAINLEVEL_OUTOF10: 2

## 2025-06-16 NOTE — NURSING NOTE
Patient discharged to home today. He verbalized an understanding of the AVS instructions after review with this nurse. His midline IV was removed with tip intact. His belongings were packed. Meds to bed prescriptions were delivered from [facility] pharmacy. He also had a prescription for Protonix sent to his local [facility] pharmacy in case he does not have any at home. He stated his  was transporting him home.

## 2025-06-16 NOTE — CARE PLAN
The patient's goals for the shift include      The clinical goals for the shift include Patient will be safe throughout the shift.      Problem: Pain - Adult  Goal: Verbalizes/displays adequate comfort level or baseline comfort level  Outcome: Progressing     Problem: Safety - Adult  Goal: Free from fall injury  Outcome: Progressing     Problem: Discharge Planning  Goal: Discharge to home or other facility with appropriate resources  Outcome: Progressing     Problem: Chronic Conditions and Co-morbidities  Goal: Patient's chronic conditions and co-morbidity symptoms are monitored and maintained or improved  Outcome: Progressing     Problem: Fall/Injury  Goal: Not fall by end of shift  Outcome: Progressing  Goal: Be free from injury by end of the shift  Outcome: Progressing  Goal: Verbalize understanding of personal risk factors for fall in the hospital  Outcome: Progressing  Goal: Verbalize understanding of risk factor reduction measures to prevent injury from fall in the home  Outcome: Progressing  Goal: Use assistive devices by end of the shift  Outcome: Progressing  Goal: Pace activities to prevent fatigue by end of the shift  Outcome: Progressing

## 2025-06-16 NOTE — DISCHARGE SUMMARY
Discharge Diagnosis  Dehydration     Issues Requiring Follow-Up    hepatology: decompensated cirrhosis   follow up with dr fuller in clinic on 6/23  repeat MELD labs ordered for Friday 6/20  home diuretics: decreased lasix from 40 mg to 20 mg, continued ferny   50 mg   varices: s/p EGD with banding on 6/12, should have repeat in 4 wks; continuing carvedilol 6.25 mg bid    ascites: s/p 5 day course empiric CTX (finished 6/16), last para on day of discharge 6/16/25 w/ 2 L off and studies -ve for SBP    primary care   C diff colitis - continuing PO vancomycin through 6/22/25  placed referral for post hospital follow up     general surgery: s/p hernia repair  routine follow up, last saw 6/11/25    Discharge Meds     Medication List      START taking these medications     vancomycin 125 mg capsule; Commonly known as: Vancocin; Take 1 capsule   (125 mg) by mouth 4 times a day for 24 doses.     CHANGE how you take these medications     furosemide 20 mg tablet; Commonly known as: Lasix; Take 1 tablet (20 mg)   by mouth once daily.; What changed: how much to take     CONTINUE taking these medications     carvedilol 6.25 mg tablet; Commonly known as: Coreg; Take 1 tablet (6.25   mg) by mouth 2 times a day.   ferrous sulfate 325 mg (65 mg elemental) tablet; TAKE 1 TABLET BY MOUTH   TWICE A DAY   lactulose 20 gram/30 mL oral solution; Take 15 mL (10 g) by mouth 2   times a day.   pantoprazole 40 mg EC tablet; Commonly known as: ProtoNix; Take 1 tablet   (40 mg) by mouth once daily in the morning. Take before meals. DO NOT   CRUSH CHEW OR SPLIT   spironolactone 50 mg tablet; Commonly known as: Aldactone; Take 1 tablet   (50 mg) by mouth once daily.       Test Results Pending At Discharge  Pending Labs       Order Current Status    CBC and Auto Differential Collected (06/13/25 0787)    CBC In process    Path Review-Immunohematology In process    Sterile Fluid Culture/Smear Preliminary result            Hospital Course  Vance  "Deisy” Shira is a 59 year old male with past medical history of cirrhosis complicated by portal hypertension and esophageal varices (followed by transplant and hepatology), hypertension, hyperlipidemia, TBI, and recent left inguinal hernia repair (5/22/25) complicated by post op hematoma s/p OR washout (5/29/22) admitted for melena and hypovolemia in setting of frequent diarrhea and acute on chronic inguinal hematoma, also found to have C diff.      Patient reports that he was recently discharged home from surgery 5/2025 and felt like he was developing more bloating and abdominal fullness, so he stopped drinking water and started a \"liver cleanse\" and glutathione on Monday 6/9. He is not sure what is included in the liver cleanse. Says his stools are sometimes black/dark, but takes iron supplement and drinks a lot of beet juice. Patient follows with Dr. Norris for cirrhosis. Last visit was 04/2025 and noted that cirrhosis has stabilized. Had hernia at the time and was told to see transplant about repair. Liver function had been improving, discussed risk of rapid deterioration especially risk after surgery and would recommend he stay on transplant list for at least 3 more years.     Upon arrival to [facility], Hb 5.5 on admission, dropped to 4.4 overnight 6/12. Given 3 units . Moderate size left inguinal hernia with dense heterogenous fluid collection measuring 5.1 X 4.3 cm, likely representing acute to subacute hematoma. No contrast extravasation to suggest large volume active bleeding. Given reported melena and history of esophageal varices, patient was placed on IV PPI, ceftriaxone and octreotide gtt and received intermittent boluses of fluid. C diff collected given diarrhea, recent hospitalizations, and came back positive, so patient started on PO Vanc. Transplant surgery consulted for inguinal hernia complicated by hematoma, no acute intervention for now but will monitor peripherally. EGD 6/12 showed Two columns of " grade II varices in the lower third of the esophagus; there was stigmata of recent hemorrhage with red shyam sign; placed 2 bands successfully, resulting in partial eradication. Urology consulted 6/13 for inguinal hematoma - NTD. Para done 6/16, took off 2L, studies -ve for SBP. Discharged on 6/16 with repeat labs ordered for 6/20 and follow up in transplant clinic with Dr. Norris on 6/23.     Pertinent Physical Exam At Time of Discharge  See progress note from day of discharge.    Outpatient Follow-Up  Future Appointments   Date Time Provider Department Center   6/16/2025  4:15 PM [facility] [place] [facility] NP ULTRASOUND IR PORTABLE [facility] [place] [facility]   6/23/2025 10:20 AM Mu Norris MD [id number]         Opal Lopez MD  PGY1, Internal Medicine

## 2025-06-16 NOTE — PROGRESS NOTES
Social Work Note:    MICHAEL spoke with the patient's medical team who reported that the patient should be medically ready for discharge.  Patient should return home with no skilled needs.  MICHAEL will continue to follow  RAFAEL Menjivar, CONYS

## 2025-06-16 NOTE — PROGRESS NOTES
"  Internal Medicine Progress Note     Vance Sneha Silva is a 59 year old male with past medical history of cirrhosis complicated by portal hypertension and esophageal varices (followed by transplant and hepatology), hypertension, hyperlipidemia, TBI, and recent left inguinal hernia repair (5/22/25) complicated by post op hematoma s/p OR washout (5/29/22) admitted for hypovolemia in setting of frequent diarrhea and acute on chronic inguinal hematoma.     Subjective     No acute events overnight, pt doing ok this morning resting comfortably in bed. Awaiting paracentesis.    Medications     Medications:   Scheduled medications  Scheduled Medications[1]  Continuous medications  Continuous Medications[2]  PRN medications  PRN Medications[3]     Objective     Vitals  Visit Vitals  /87   Pulse 67   Temp 36.4 °C (97.5 °F)   Resp 15   Ht 1.956 m (6' 5.01\")   Wt 88.1 kg (194 lb 3.6 oz)   SpO2 98%   BMI 23.03 kg/m²   Smoking Status Never   BSA 2.19 m²       Intake/Output Summary (Last 24 hours) at 6/16/2025 0704  Last data filed at 6/15/2025 1707  Gross per 24 hour   Intake 480 ml   Output --   Net 480 ml       Physical Exam  Constitutional:       Appearance: He is normal weight.   HENT:      Head: Normocephalic and atraumatic.   Eyes:      Extraocular Movements: Extraocular movements intact.   Cardiovascular:      Rate and Rhythm: Normal rate and regular rhythm.      Pulses: Normal pulses.      Heart sounds: Normal heart sounds.   Pulmonary:      Effort: Pulmonary effort is normal.      Breath sounds: Normal breath sounds.   Abdominal:      General: Abdomen is flat. There is distension.      Palpations: Abdomen is soft.      Tenderness: There is no abdominal tenderness.   Musculoskeletal:      Right lower leg: No edema.      Left lower leg: No edema.   Skin:     General: Skin is warm and dry.      Coloration: Skin is not jaundiced.   Neurological:      General: No focal deficit present.      Mental Status: He is " alert and oriented to person, place, and time. Mental status is at baseline.            Labs  Lab Results   Component Value Date    WBC 4.3 (L) 06/15/2025    HGB 8.2 (L) 06/15/2025    HCT 25.8 (L) 06/15/2025    MCV 97 06/15/2025    PLT 59 (L) 06/15/2025      Lab Results   Component Value Date    GLUCOSE 108 (H) 06/15/2025    CALCIUM 7.8 (L) 06/15/2025     (L) 06/15/2025    K 3.5 06/15/2025    CO2 21 06/15/2025     06/15/2025    BUN 28 (H) 06/15/2025    CREATININE 1.23 06/15/2025      Lab Results   Component Value Date    ALT 41 06/15/2025    AST 21 06/15/2025    ALKPHOS 49 06/15/2025    BILITOT 0.8 06/15/2025        Updated Imaging  no new imaging      Assessment/Plan     Vanec Silva is a 59 year old male with past medical history of cirrhosis complicated by portal hypertension and esophageal varices (followed by transplant and hepatology), hypertension, hyperlipidemia, TBI, and recent left inguinal hernia repair (5/22/25) complicated by post op hematoma s/p OR washout (5/29/22) admitted for melena in setting of Upper GI bleed s/p EGD 6/12 and acute on chronic inguinal hematoma, also found to have C diff.      06/16/25 Updates:  -Continue 5-day course of ceftriaxone while inpatient, can switch to PO if discharging  -continue lasix 20 and spironolactone 50 today  -carvedilol 6.25 mg bid  -follow up para studies     #Hypovolemia in setting of frequent diarrhea  #Melena c/f upper GI bleed   #Symptomatic anemia   :: History of esophageal varices   :: Hb 5.5 on admission, dropped to 4.4 overnight 6/12. Given 3 units RBCs overnight   :: Given 50 mg albumin 6/11   :: CT angio abdomen 6/12 - No intraluminal contrast extravasation to suggest an active GI bleed. Diffuse wall thickening of the small and large bowel loops throughout the abdomen consistent with panenterocolitis, favored to be infectious or inflammatory in etiology. Alternatively, given cirrhotic morphology of the liver and sequelae of  portal venous hypertension, bowel wall thickening and anemia may be secondary to portal hypertensive enterocolopathy. Moderate size left inguinal hernia with dense heterogenous fluid collection measuring 5.1 X 4.3 cm, likely representing acute to subacute hematoma. No contrast extravasation to suggest large volume active bleeding.  :: EGD 6/12 - Two columns of grade II varices in the lower third of the esophagus; there was stigmata of recent hemorrhage with red shyam sign; placed 2 bands successfully, resulting in partial eradication  Plan:   - IR consult for diagnostic paracentesis with studies to evaluate for SBP, will likely occur on Monday  - continue to monitor CBC daily.  If Hb < 7.0, will transfuse with another unit of blood, if continues to bleed will consider Colon +/- capsule endoscopy   - Continue 5-day course of ceftriaxone for SBP. Can switch to PO ciprofloxacin on discharge (total course 5 days, end date 6/17)  - continue Lasix 20 and spironolactone 50 today  - continue PO pantoprazole 40 mg daily    #C diff colitis   - Continue with PO Vancomycin for total of 10 days (end date 6/22)  - CTM bowel movements      #Cirrhosis   - Held home lactulose, coreg, lasix and aldactone in setting of volume depletion  - Will check daily MELD labs   - Restart spironolactone 50 mg and PO Lasix 20 mg today. See if patient is able to tolerate  - Will give one time dose of Coreg 6.125 mg tonight if vitals remain stable today    #Inguinal hernia repair c/b hematoma   :: Scrotum US 6/12 - Asymmetric reduced arterial flow in the left testicle with tardus parvus waveforms consistent with a component of arterial ischemia. Complex multi-septated hypoechoic structure in the left inguinal canal measuring up to 9.0 cm, previously 7.7 cm on ultrasound dated 05/28/2025, which likely represents a hematoma  Plan:   - Urology consulted, NTD     #Anxiousness   - Atarax 25 mg q6 PRN      F: PRN  E: PRN   N: 2-3g NA  A: PIV   DVT:  SCDs  GI: Pantoprazole 40 mg daily      Code status: full code  NOK: Wanda Silva (Mother)[phone] (Mobile)      Patient and plan discussed with attending physician.        Opal Lopez MD  PGY1, Internal Medicine         [1] [Held by provider] carvedilol, 6.25 mg, oral, BID  cefTRIAXone, 1 g, intravenous, q24h  [Held by provider] ferrous sulfate, 1 tablet, oral, BID  [Held by provider] furosemide, 40 mg, oral, Daily  lactulose, 10 g, oral, BID  lidocaine, 5 mL, infiltration, Once  melatonin, 6 mg, oral, Nightly  pantoprazole, 40 mg, oral, Daily before breakfast  [Held by provider] spironolactone, 50 mg, oral, Daily  vancomycin, 125 mg, oral, 4x daily     [2]    [3] PRN medications: acetaminophen, hydrOXYzine HCL, oxyCODONE

## 2025-06-16 NOTE — POST-PROCEDURE NOTE
INTERVENTIONAL RADIOLOGY ADVANCED PRACTICE PROCEDURE  [facility]    A time out was performed and Right Hemiabdomen was examined with US and appropriate entry point was confirmed and marked.   The patient was prepped and draped in a sterile manner, 1% lidocaine was used to anesthesize the skin and subcutaneous tissue.   A 5F Centesis needle was then introduced through the skin into the peritoneal space, the centesis catheter was then threaded without difficulty.   2000 ml of yellow fluid was removed without difficulty. The catheter was then removed.   No immediate complications were noted during and immediately following the procedure.    1-2L limit per primary team

## 2025-06-16 NOTE — CARE PLAN
The clinical goals for the shift include Pt will remain safe and free from injury throughout shift      Problem: Pain - Adult  Goal: Verbalizes/displays adequate comfort level or baseline comfort level  Outcome: Progressing     Problem: Safety - Adult  Goal: Free from fall injury  Outcome: Progressing     Problem: Discharge Planning  Goal: Discharge to home or other facility with appropriate resources  Outcome: Progressing     Problem: Chronic Conditions and Co-morbidities  Goal: Patient's chronic conditions and co-morbidity symptoms are monitored and maintained or improved  Outcome: Progressing     Problem: Fall/Injury  Goal: Not fall by end of shift  Outcome: Progressing  Goal: Be free from injury by end of the shift  Outcome: Progressing  Goal: Verbalize understanding of personal risk factors for fall in the hospital  Outcome: Progressing  Goal: Verbalize understanding of risk factor reduction measures to prevent injury from fall in the home  Outcome: Progressing  Goal: Use assistive devices by end of the shift  Outcome: Progressing  Goal: Pace activities to prevent fatigue by end of the shift  Outcome: Progressing     Problem: Pain  Goal: Takes deep breaths with improved pain control throughout the shift  Outcome: Progressing  Goal: Turns in bed with improved pain control throughout the shift  Outcome: Progressing  Goal: Walks with improved pain control throughout the shift  Outcome: Progressing  Goal: Performs ADL's with improved pain control throughout shift  Outcome: Progressing  Goal: Participates in PT with improved pain control throughout the shift  Outcome: Progressing  Goal: Free from opioid side effects throughout the shift  Outcome: Progressing  Goal: Free from acute confusion related to pain meds throughout the shift  Outcome: Progressing     Problem: Skin  Goal: Decreased wound size/increased tissue granulation at next dressing change  Outcome: Progressing  Goal: Participates in  plan/prevention/treatment measures  Outcome: Progressing  Goal: Prevent/manage excess moisture  Outcome: Progressing  Goal: Prevent/minimize sheer/friction injuries  Outcome: Progressing  Goal: Promote/optimize nutrition  Outcome: Progressing  Goal: Promote skin healing  Outcome: Progressing

## 2025-06-16 NOTE — DISCHARGE INSTRUCTIONS
"Allison Woodard \"Karyn\" Shira,     It was a pleasure caring for you! You were admitted to [facility] ([facility] on 6/11/2025 for an upper GI bleed. While you were here you had an upper endoscopy with the GI specialists which showed dilated vessels called varices in your esophagus. This was the source of your bleeding. The varices were banded and you improved significantly. Please come to the emergency department if you develop any worsening symptoms.     For you to do:  Please take all of your medications as prescribed.   Continue Vancomycin through 6/22/25.   Decrease your Furosemide dose to 20 mg daily.  All other medications are listed elsewhere in the discharge papers.    Please follow up with your specialists:   Dr. Norris in liver clinic 6/23/25  You need repeat EGD in 1 month: 7/12/2025  You need repeat blood work in 1 week. Please discuss the results of these labs with Dr. Norris.   Please follow up with your primary care provider.   A referral has been placed for you to establish primary care through .     Thank you for letting us take part in your care,   Your  medical team  "

## 2025-06-17 ENCOUNTER — DOCUMENTATION (OUTPATIENT)
Facility: HOSPITAL | Age: 60
End: 2025-06-17
Payer: COMMERCIAL

## 2025-06-17 ENCOUNTER — HOSPITAL ENCOUNTER (EMERGENCY)
Facility: HOSPITAL | Age: 60
Discharge: HOME | End: 2025-06-17
Attending: EMERGENCY MEDICINE
Payer: COMMERCIAL

## 2025-06-17 ENCOUNTER — CLINICAL SUPPORT (OUTPATIENT)
Dept: EMERGENCY MEDICINE | Facility: HOSPITAL | Age: 60
End: 2025-06-17
Payer: COMMERCIAL

## 2025-06-17 ENCOUNTER — TELEPHONE (OUTPATIENT)
Facility: HOSPITAL | Age: 60
End: 2025-06-17
Payer: COMMERCIAL

## 2025-06-17 VITALS
WEIGHT: 200 LBS | SYSTOLIC BLOOD PRESSURE: 131 MMHG | BODY MASS INDEX: 23.62 KG/M2 | HEART RATE: 65 BPM | OXYGEN SATURATION: 99 % | DIASTOLIC BLOOD PRESSURE: 74 MMHG | HEIGHT: 77 IN | RESPIRATION RATE: 16 BRPM

## 2025-06-17 DIAGNOSIS — Z48.89 ENCOUNTER FOR POSTOPERATIVE WOUND CARE: Primary | ICD-10-CM

## 2025-06-17 DIAGNOSIS — K74.60 CIRRHOSIS OF LIVER WITH ASCITES, UNSPECIFIED HEPATIC CIRRHOSIS TYPE (MULTI): ICD-10-CM

## 2025-06-17 DIAGNOSIS — R18.8 CIRRHOSIS OF LIVER WITH ASCITES, UNSPECIFIED HEPATIC CIRRHOSIS TYPE (MULTI): ICD-10-CM

## 2025-06-17 LAB
ANION GAP BLDV CALCULATED.4IONS-SCNC: 12 MMOL/L (ref 10–25)
BASE EXCESS BLDV CALC-SCNC: -3.6 MMOL/L (ref -2–3)
BODY TEMPERATURE: 37 DEGREES CELSIUS
CA-I BLDV-SCNC: 1.17 MMOL/L (ref 1.1–1.33)
CHLORIDE BLDV-SCNC: 104 MMOL/L (ref 98–107)
GLUCOSE BLDV-MCNC: 91 MG/DL (ref 74–99)
HCO3 BLDV-SCNC: 21.5 MMOL/L (ref 22–26)
HCT VFR BLD EST: 29 % (ref 41–52)
HGB BLDV-MCNC: 9.6 G/DL (ref 13.5–17.5)
LACTATE BLDV-SCNC: 0.8 MMOL/L (ref 0.4–2)
OXYHGB MFR BLDV: 21.6 % (ref 45–75)
PCO2 BLDV: 38 MM HG (ref 41–51)
PH BLDV: 7.36 PH (ref 7.33–7.43)
PO2 BLDV: 20 MM HG (ref 35–45)
POTASSIUM BLDV-SCNC: 4 MMOL/L (ref 3.5–5.3)
SAO2 % BLDV: 22 % (ref 45–75)
SODIUM BLDV-SCNC: 133 MMOL/L (ref 136–145)

## 2025-06-17 PROCEDURE — 99285 EMERGENCY DEPT VISIT HI MDM: CPT | Performed by: EMERGENCY MEDICINE

## 2025-06-17 PROCEDURE — 84132 ASSAY OF SERUM POTASSIUM: CPT

## 2025-06-17 PROCEDURE — 93005 ELECTROCARDIOGRAM TRACING: CPT

## 2025-06-17 ASSESSMENT — LIFESTYLE VARIABLES
HAVE YOU EVER FELT YOU SHOULD CUT DOWN ON YOUR DRINKING: NO
EVER FELT BAD OR GUILTY ABOUT YOUR DRINKING: NO
EVER HAD A DRINK FIRST THING IN THE MORNING TO STEADY YOUR NERVES TO GET RID OF A HANGOVER: NO
TOTAL SCORE: 0
HAVE PEOPLE ANNOYED YOU BY CRITICIZING YOUR DRINKING: NO

## 2025-06-17 ASSESSMENT — PAIN - FUNCTIONAL ASSESSMENT: PAIN_FUNCTIONAL_ASSESSMENT: 0-10

## 2025-06-17 ASSESSMENT — PAIN SCALES - GENERAL: PAINLEVEL_OUTOF10: 0 - NO PAIN

## 2025-06-17 NOTE — ED TRIAGE NOTES
PT on liver transplant list. Recently had hernia repair May 29 having bleeding from LLQ incision site. Pt also had endoscopy showing bleeding and was admitted and discharged yesterday. Pt states he is otherwise feeling good

## 2025-06-17 NOTE — CONSULTS
Reason For Consult  Bleeding from prior incision     History Of Present Illness  Karyn Silva is a 59 y.o. male presenting with bleeding from prior incision. PMH of decompensated alcoholic cirrhosis and s/p open left inguinal hernia repair on 5/22/2025 and left groin washout 5/29/25. He was DC home on 6/1. He was discharged from hospital yesterday where he was admitted for GIB. He has a known hematoma from prior repair. Now it appears that hematoma is draining, but there is no active bleeding. He reports he was instructed to present to the hospital if he had any bleeding from anywhere. He reports there was a small amount of blood that drained at home. He otherwise feels well and reports he had a very restful night at home.        Past Medical History  He has a past medical history of Acute kidney injury, Alcohol related seizure (Multi), Anemia, Ascites, Awareness under anesthesia, Chronic kidney disease, Cirrhosis (Multi), H/O multiple concussions, Hepatitis C, Liver disease, Parkinson's disease, Peripheral neuropathy, Portal hypertension with esophageal varices (Multi), Secondary esophageal varices without bleeding (Multi), Seizure (Multi), Sleep apnea, Splenomegaly, Thrombocytopenia, Traumatic brain injury (Multi), and UGIB (upper gastrointestinal bleed).    Surgical History  He has a past surgical history that includes Esophagogastroduodenoscopy; Umbilical hernia repair (2021); Other surgical history; Hernia repair; and Colonoscopy.     Social History  He reports that he has never smoked. He has never used smokeless tobacco. He reports that he does not currently use alcohol. He reports current drug use. Drug: Marijuana.    Family History  Family History[1]     Allergies  Patient has no known allergies.    Review of Systems  A complete, 10-point review of systems was completed and negative except as noted above.        Physical Exam  Constitutional: no acute distress  Neuro: awake, alert, oriented; no gross  "deficits noted   HEENT: No deformities, no scleral icterus   Cardiac: regular rate  Pulmonary: unlabored respirations on room air  Abdomen: soft, non-tender, distended. Prior tap site with bandage.   Skin: warm and dry; wounds: left inguinal incision with staples, underlying hematoma softer than prior, there is scant amount of liquefied hematoma able to be expressed from the most medial aspect of the incision which is otherwise healed completely. There is no purulent drainage. There is no ischemic skin changes or surrounding erythema  Extremities: no peripheral edema noted  MSK: motor and sensory intact in all extremities       Last Recorded Vitals  Blood pressure 120/88, pulse 56, resp. rate 16, height 1.956 m (6' 5\"), weight 90.7 kg (200 lb), SpO2 99%.    Relevant Results  Current Medications[2]    Lab Review   Recent Labs     06/16/25  0551 06/15/25  0652 06/14/25  0649 06/13/25  0529 06/12/25  0549   * 135* 138 139 136   K 3.4* 3.5 3.9 3.5 3.8   BUN 29* 28* 36* 50* 80*   CREATININE 1.30 1.23 1.25 1.25 1.40*   EGFR 63 67 66 66 57*   MG 2.19 2.19 2.22 2.26 2.04     Recent Labs     06/16/25  0551 06/15/25  0652 06/14/25  0649 06/13/25  0529 06/12/25  0549   ALBUMIN 3.9 3.6 3.8 3.3* 3.7   ALKPHOS 58 49 49 39 43   ALT 39 41 54* 50 51   AST 23 21 26 33 39   BILITOT 0.9 0.8 0.9 1.0 1.1     Recent Labs     06/16/25  0551 06/15/25  0652 06/14/25  1824 06/14/25  0649 06/13/25  1822   WBC 6.9 4.3* 8.2 7.6 4.9   HGB 8.9* 8.2* 9.0* 8.7* 7.5*   HCT 26.3* 25.8* 27.2* 27.2* 22.9*   PLT 77* 59* 79* 68* 49*   MCV 93 97 93 95 94     Recent Labs     06/16/25  0551 06/15/25  0652 06/14/25  0649 06/13/25  0530 06/12/25  0549   INR 1.2* 1.2* 1.2* 1.3* 1.6*     PTT - 6/16/2025:  5:51 AM  1.2   13.1 26     No results for input(s): \"CHOL\", \"LDLF\", \"HDL\", \"TRIG\" in the last 53989 hours.  Lab Results   Component Value Date    HGBA1C 4.8 04/21/2025     Lab Results   Component Value Date    TSH 3.41 05/06/2024          Assessment/Plan " "    Vance Silva is 59 y.o. male with history of decompensated alcoholic cirrhosis and is s/p open left inguinal hernia repair on 5/22/2025 and left groin washout 5/29/25. He was admitted 6/11 for dehydration, tachycardia, weakness. He was found to be profoundly anemic due to likely upper GI bleed and underwent EGD with EV clipping 6/12. He has remained HDS. Transplant surgery consulted due to groin hematoma in the prior admission. He was discharged yesterday after paracentesis. Today there was \"bleeding\" from his groin incision which on exam is liquefied hematoma; there are no signs/symptoms of superficial skin or deep infection of the hematoma.     - no need for surgical intervention for the post-operative hematoma that is liquefying and draining as expected; no clear indication for hospital admission at this time  - keep surrounding skin clean and dry; ABD given to cover the area that is draining and facilitate keeping the skin dry  - will be seen in transplant clinic for wound check next week as scheduled    Discussed with attending surgeon, Dr. Amaury Degroot MD, PhD  Vascular Surgery, PGY3  Transplant Surgery, [id number]           [1] No family history on file.  [2] No current facility-administered medications for this encounter.    Current Outpatient Medications:     carvedilol (Coreg) 6.25 mg tablet, Take 1 tablet (6.25 mg) by mouth 2 times a day., Disp: 60 tablet, Rfl: 11    ferrous sulfate, 325 mg ferrous sulfate, tablet, TAKE 1 TABLET BY MOUTH TWICE A DAY, Disp: 180 tablet, Rfl: 3    furosemide (Lasix) 20 mg tablet, Take 1 tablet (20 mg) by mouth once daily., Disp: 30 tablet, Rfl: 11    lactulose 20 gram/30 mL oral solution, Take 15 mL (10 g) by mouth 2 times a day., Disp: 900 mL, Rfl: 0    pantoprazole (ProtoNix) 40 mg EC tablet, Take 1 tablet (40 mg) by mouth once daily in the morning. Take before meals. DO NOT CRUSH CHEW OR SPLIT, Disp: 90 tablet, Rfl: 0    spironolactone (Aldactone) 50 " mg tablet, Take 1 tablet (50 mg) by mouth once daily., Disp: 30 tablet, Rfl: 11    vancomycin (Vancocin) 125 mg capsule, Take 1 capsule (125 mg) by mouth 4 times a day for 24 doses., Disp: 24 capsule, Rfl: 0

## 2025-06-17 NOTE — PROGRESS NOTES
"This SW received this email from pt's daughter Zoila on pt's behalf as pt's daughter reported she is main coordinator of pt's post transplant care if he is transplanted.     \"It was a pleasure meeting you yesterday at the hospital. Thank you for taking your time to come answer questions and explain the liver transplant coordination process to me regarding my dad’s, Karyn Silva’s, support system and recovery process.     Can you please send me the contact information for Natalya?     I will be sending over an updated list of primary and secondary people that will be part of the recovery support system as I am trying to finalize the plan. I will be coordinating this effort to keep everything organized and simplify all communication for everyone involved!\"    Plan: SW provided response to pt's daughter Zoila of pt's current pre-liver transplant coordinator and also forwarded message information to pt's coordinator and SW that will be on duty for any clinical utilization needs.   "

## 2025-06-17 NOTE — TELEPHONE ENCOUNTER
Aleena MERINO stating he is coming to the ER, called ambulance, is having abdominal distention,bleeding from the groin, and is yellow

## 2025-06-17 NOTE — ED PROVIDER NOTES
Emergency Department Provider Note        History of Present Illness     CC: Wound Check     HPI:  Vance Silva is a 59 year old male with past medical history of cirrhosis complicated by portal hypertension and esophageal varices (followed by transplant and hepatology), hypertension, hyperlipidemia, TBI, and recent left inguinal hernia repair (5/22/25) complicated by post op hematoma s/p OR washout (5/29/22) who presents to the emergency department for wound check.  Patient has been dealing with a hematoma in his left groin from his prior surgical site.  States he has been followed every time he is in the hospital.  Typically gets ultrasounds looking at it size.  Overall has been getting better since his surgery however is taking some time to resolve.  He was discharged from the hospital yesterday and is doing well.  Went home however woke up this morning with significant serosanguineous drainage from the medial portion of the wound.  States that his entire dressing was soaked.  States that he was told if it ever drained he should get it checked out immediately and therefore presents for evaluation.  Otherwise feels very well states that this is the best he has felt in weeks.  Denies any fevers or chills.  Denies any significant pain or worsening pain.  Denies any lightheadedness or dizziness.  No other symptoms at this time.    Limitations to history: None  Independent historian(s): None  Records Reviewed: Recent available ED and inpatient notes reviewed in EMR.  Discharge summary from yesterday.  Patient had been admitted for melena and hypovolemia.    PMHx/PSHx:  Per HPI.   - has a past medical history of Acute kidney injury, Alcohol related seizure (Multi), Anemia, Ascites, Awareness under anesthesia, Chronic kidney disease, Cirrhosis (Multi), H/O multiple concussions, Hepatitis C, Liver disease, Parkinson's disease, Peripheral neuropathy, Portal hypertension with esophageal varices (Multi), Secondary  esophageal varices without bleeding (Multi), Seizure (Multi), Sleep apnea, Splenomegaly, Thrombocytopenia, Traumatic brain injury (Multi), and UGIB (upper gastrointestinal bleed).  - has a past surgical history that includes Esophagogastroduodenoscopy; Umbilical hernia repair (2021); Other surgical history; Hernia repair; and Colonoscopy.    Medications:  Reviewed in EMR. See EMR for complete list of medications and doses.    Allergies:  Patient has no known allergies.    Social History:  - Tobacco:  reports that he has never smoked. He has never used smokeless tobacco.   - Alcohol:  reports that he does not currently use alcohol.   - Illicit Drugs:  reports current drug use. Drug: Marijuana.     ROS:  Per HPI.       Physical Exam     Triage Vitals:  T    HR 56  /88  RR 16  O2 99 %      General: Patient resting comfortably in bed, no acute distress, breathing easily, overall well appearing, and appropriately conversational without confusion or gross mental status changes.  Head: Normocephalic. Atraumatic.  Neck:  FROM. No gross masses.   Eyes: EOMI. No scleral icterus or injection.  ENT: Moist mucous membranes, no apparent trauma or lesions.  CV: Regular rhythm. No murmurs, rubs, gallops appreciated. 2+ radial pulses bilaterally.  Resp: Clear to auscultation bilaterally. No respiratory distress.   GI: Soft, distended.  No tenderness with palpation.     : There is approximately 6 or 7 cm surgical scar in the left lower quadrant and groin area.  Well-approximated with staples.  Mildly erythematous but no signs of drainage.  No warmth.  There is firmness directly below the surgical wound.  There is evidence of mild drainage towards the medial aspect of the wound that appears serosanguineous.  No significant tenderness on palpation.  EXT: No peripheral edema, contusions, or wounds.  Skin: Warm and dry, no rashes or lesions.  Neuro: Alert and oriented.  No focal neurological deficits.  Equal motor strength in  bilateral upper and lower extremities.  Sensation intact throughout.  Speech fluent.  Psych: Appropriate mood and behavior, converses and responds appropriately.      Medical Decision Making & ED Course     Labs:   Labs Reviewed   BLOOD GAS VENOUS FULL PANEL UNSOLICITED - Abnormal       Result Value    POCT pH, Venous 7.36      POCT pCO2, Venous 38 (*)     POCT pO2, Venous 20 (*)     POCT SO2, Venous 22 (*)     POCT Oxy Hemoglobin, Venous 21.6 (*)     POCT Hematocrit Calculated, Venous 29.0 (*)     POCT Sodium, Venous 133 (*)     POCT Potassium, Venous 4.0      POCT Chloride, Venous 104      POCT Ionized Calicum, Venous 1.17      POCT Glucose, Venous 91      POCT Lactate, Venous 0.8      POCT Base Excess, Venous -3.6 (*)     POCT HCO3 Calculated, Venous 21.5 (*)     POCT Hemoglobin, Venous 9.6 (*)     POCT Anion Gap, Venous 12.0      Patient Temperature 37.0          Imaging:   No orders to display        EK: 44: Rate of 57 bpm, sinus bradycardia, leftward axis, normal intervals, no evidence of ST segment elevations or depressions, no pattern T wave abnormalities.  Overall low voltage EKG.    MDM:  This is a 59-year-old male who presents to the emergency department for wound check.  He is hemodynamically stable and in no distress.  Vital signs are within normal limits.  He appears very well.  Physical exam as above.  Transplant surgery evaluated the patient at bedside.  There is no lactate elevation on VBG.  No signs of sepsis.  No signs of significant bleeding, just serosanguineous drainage.  Transplant surgery feels his drainage is secondary to the hematoma starting to drain.  No need for intervention or admission.  They recommend local wound care with ABD pads and keeping the area clean.  He will follow-up with trauma surgery.  Patient is agreeable with this plan.  He is appropriate for discharge home at this time.  Return precautions discussed.  Patient expressed understanding and agreed with the plan.   Remained stable and is discharged in good condition.      ED Course:  ED Course as of 06/17/25 1153   Tue Jun 17, 2025   1103 POCT Lactate, Venous: 0.8 [VM]      ED Course User Index  [VM] Jose Monterroso DO         Diagnoses as of 06/17/25 1153   Encounter for postoperative wound care   Cirrhosis of liver with ascites, unspecified hepatic cirrhosis type (Multi)       Independent Result Review and Interpretation: Relevant laboratory and radiographic results were reviewed and independently interpreted by myself.  As necessary, they are commented on in the ED Course.    Social Determinants Limiting Care:  None identified      Patient seen by and discussed with the attending emergency medicine physician.       Disposition    Discharge    Jose Monterroso DO   Emergency Medicine PGY-3  [facility]      Procedures      Procedures ? SmartLinks last updated 6/17/2025 11:53 AM          Jose Monterroso DO  Resident  06/17/25 1200

## 2025-06-18 ENCOUNTER — TELEPHONE (OUTPATIENT)
Facility: HOSPITAL | Age: 60
End: 2025-06-18
Payer: COMMERCIAL

## 2025-06-18 NOTE — TELEPHONE ENCOUNTER
Daughter(Zoila) called and wanted to know if patient could get a home health care referral, states has been having difficulty caring for self after hernia repair and wanted to know if this can be done. Please advise

## 2025-06-19 ENCOUNTER — HOSPITAL ENCOUNTER (EMERGENCY)
Facility: HOSPITAL | Age: 60
Discharge: HOME | End: 2025-06-19
Attending: EMERGENCY MEDICINE
Payer: COMMERCIAL

## 2025-06-19 VITALS
SYSTOLIC BLOOD PRESSURE: 124 MMHG | BODY MASS INDEX: 24.21 KG/M2 | RESPIRATION RATE: 20 BRPM | DIASTOLIC BLOOD PRESSURE: 84 MMHG | WEIGHT: 205 LBS | HEIGHT: 77 IN | HEART RATE: 78 BPM | TEMPERATURE: 97.9 F | OXYGEN SATURATION: 99 %

## 2025-06-19 DIAGNOSIS — R09.A2 FOREIGN BODY SENSATION IN THROAT: Primary | ICD-10-CM

## 2025-06-19 PROCEDURE — 96372 THER/PROPH/DIAG INJ SC/IM: CPT | Performed by: SURGERY

## 2025-06-19 PROCEDURE — 2500000004 HC RX 250 GENERAL PHARMACY W/ HCPCS (ALT 636 FOR OP/ED): Performed by: SURGERY

## 2025-06-19 PROCEDURE — 99284 EMERGENCY DEPT VISIT MOD MDM: CPT | Performed by: EMERGENCY MEDICINE

## 2025-06-19 PROCEDURE — 2500000005 HC RX 250 GENERAL PHARMACY W/O HCPCS: Performed by: SURGERY

## 2025-06-19 PROCEDURE — 2500000001 HC RX 250 WO HCPCS SELF ADMINISTERED DRUGS (ALT 637 FOR MEDICARE OP): Performed by: SURGERY

## 2025-06-19 RX ORDER — PANTOPRAZOLE SODIUM 40 MG/10ML
40 INJECTION, POWDER, LYOPHILIZED, FOR SOLUTION INTRAVENOUS ONCE
Status: DISCONTINUED | OUTPATIENT
Start: 2025-06-19 | End: 2025-06-19 | Stop reason: HOSPADM

## 2025-06-19 RX ORDER — LIDOCAINE HYDROCHLORIDE 20 MG/ML
15 SOLUTION OROPHARYNGEAL ONCE
Status: COMPLETED | OUTPATIENT
Start: 2025-06-19 | End: 2025-06-19

## 2025-06-19 RX ORDER — ALUMINUM HYDROXIDE, MAGNESIUM HYDROXIDE, AND SIMETHICONE 1200; 120; 1200 MG/30ML; MG/30ML; MG/30ML
30 SUSPENSION ORAL ONCE
Status: COMPLETED | OUTPATIENT
Start: 2025-06-19 | End: 2025-06-19

## 2025-06-19 RX ADMIN — LIDOCAINE HYDROCHLORIDE 15 ML: 20 SOLUTION ORAL at 05:00

## 2025-06-19 RX ADMIN — ALUMINUM HYDROXIDE, MAGNESIUM HYDROXIDE, AND DIMETHICONE 30 ML: 200; 20; 200 SUSPENSION ORAL at 05:06

## 2025-06-19 RX ADMIN — GLUCAGON 1 MG: KIT at 05:00

## 2025-06-19 ASSESSMENT — PAIN SCALES - GENERAL: PAINLEVEL_OUTOF10: 5 - MODERATE PAIN

## 2025-06-19 ASSESSMENT — PAIN DESCRIPTION - LOCATION: LOCATION: THROAT

## 2025-06-19 ASSESSMENT — PAIN - FUNCTIONAL ASSESSMENT: PAIN_FUNCTIONAL_ASSESSMENT: 0-10

## 2025-06-19 NOTE — ED PROVIDER NOTES
Chief Complaint   Patient presents with    Sore Throat    Swallowed Foreign Body     HPI:   Vance Silva is an 59 y.o.male with past medical history of cirrhosis complicated by portal hypertension and esophageal varices (followed by transplant and hepatology), hypertension, hyperlipidemia, TBI, and recent left inguinal hernia repair (5/22/25) complicated by post op hematoma s/p OR washout (5/29/22) who presents to the emergency department for concerns of a foreign body stuck in his throat.  Patient explains that this evening at 10 PM about 6 hours ago he swallowed 1000 mg vitamin C pill and states that it is stuck in his throat.  He explains that he feels that the pill is caught in the middle of his esophagus.  He has been able to drink water and keep it down without any vomiting.  Denies any fever chills or sweats.    RX Allergies[1]:  Medical History[2]  Surgical History[3]  Family History[4]     Physical Exam  Vitals and nursing note reviewed.   Constitutional:       General: He is not in acute distress.     Appearance: He is well-developed.   HENT:      Head: Normocephalic and atraumatic.      Right Ear: Tympanic membrane normal.      Left Ear: Tympanic membrane normal.      Mouth/Throat:      Mouth: Mucous membranes are moist.      Pharynx: Uvula midline. No pharyngeal swelling, oropharyngeal exudate or posterior oropharyngeal erythema.      Tonsils: No tonsillar exudate or tonsillar abscesses. 0 on the right. 0 on the left.   Eyes:      Conjunctiva/sclera: Conjunctivae normal.   Cardiovascular:      Rate and Rhythm: Normal rate and regular rhythm.      Heart sounds: No murmur heard.     No friction rub.   Pulmonary:      Effort: Pulmonary effort is normal. No respiratory distress.      Breath sounds: Normal breath sounds.   Abdominal:      Palpations: Abdomen is soft.      Tenderness: There is no abdominal tenderness.   Musculoskeletal:         General: No swelling.      Cervical back: Neck supple.   Skin:      General: Skin is warm and dry.      Capillary Refill: Capillary refill takes less than 2 seconds.   Neurological:      General: No focal deficit present.      Mental Status: He is alert and oriented to person, place, and time.   Psychiatric:         Mood and Affect: Mood normal.        VS: As documented in the triage note and EMR flowsheet from this visit were reviewed.    Medical Decision Making: This is a 59-year-old male presenting to the ED for complaint of a pill stuck in his throat.  He does have a history of esophageal strictures and esophageal varices.  He explains the pill got stuck about 6 hours prior to arrival and he reports a severe foreign body sensation.  He is repeatedly requesting that we remove the foreign body.  He would like us to stick her fingers down his throat to remove it.  He is requesting the Heimlich.  He does still have staples in his abdomen from his abdominal surgery on 5/22/2025 that he explains was bleeding and his transplant team needed to be notified.  His urine surgical incision was not bleeding on my evaluation there was dried blood present.  Patient was  initially given a cup of water and was able to tolerate p.o. he had no episodes of vomiting.  He was given glucagon to assist in passage which was unsuccessful.  He was also given a GI cocktail.  I did consult with Dr. mSith with gastroenterology who explained that emergent endoscopy was not indicated at this time as he was able to tolerate p.o.  Patient was signed out to incoming doctor Giorgi for disposition and potential transfer for foreign body removal.    Diagnoses as of 06/19/25 0616   Foreign body in esophagus, initial encounter     Counseling: Spoke with the patient and discussed today´s findings, in addition to providing specific details for the plan of care and expected course.  Patient was given the opportunity to ask questions. Educated on the common potential side effects of medications prescribed.    I advised the  patient that the emergency evaluation and treatment provided today doesn't end their need for medical care. It is very important that they follow-up with their primary care provider or other specialist as instructed.    The plan of care was mutually agreed upon with the patient. The patient and/or family were given the opportunity to ask questions. All questions asked today in the ED were answered to the best of my ability with today's information.    This report was transcribed using voice recognition software.  Every effort was made to ensure accuracy, however, inadvertently computerized transcription errors may be present.         [1] No Known Allergies  [2]   Past Medical History:  Diagnosis Date    Acute kidney injury     Alcohol related seizure (Multi)     Anemia     Ascites     Awareness under anesthesia     Chronic kidney disease     Cirrhosis (Multi)     H/O multiple concussions     Hepatitis C     Liver disease     Parkinson's disease     dx 2/16/2024    Peripheral neuropathy     Portal hypertension with esophageal varices (Multi)     Secondary esophageal varices without bleeding (Multi)     Seizure (Multi)     Sleep apnea     Splenomegaly     Thrombocytopenia     Traumatic brain injury (Multi)     UGIB (upper gastrointestinal bleed)    [3]   Past Surgical History:  Procedure Laterality Date    COLONOSCOPY      ESOPHAGOGASTRODUODENOSCOPY      HERNIA REPAIR      OTHER SURGICAL HISTORY      Multiple orthopedic surgeries    UMBILICAL HERNIA REPAIR  2021   [4] No family history on file.       Georgi Espino PA-C  06/19/25 0619

## 2025-06-19 NOTE — ED TRIAGE NOTES
Patient had endoscopy approx 10 days ago, patient state he took a vitamin C pill last night and it is stuck in his throat, patient states it hasn't moved in 5 hours.

## 2025-06-20 ENCOUNTER — LAB (OUTPATIENT)
Dept: LAB | Facility: HOSPITAL | Age: 60
End: 2025-06-20
Payer: COMMERCIAL

## 2025-06-20 DIAGNOSIS — K74.60 CIRRHOSIS OF LIVER WITH ASCITES, UNSPECIFIED HEPATIC CIRRHOSIS TYPE (MULTI): ICD-10-CM

## 2025-06-20 DIAGNOSIS — R18.8 CIRRHOSIS OF LIVER WITH ASCITES, UNSPECIFIED HEPATIC CIRRHOSIS TYPE (MULTI): ICD-10-CM

## 2025-06-20 DIAGNOSIS — R18.8 OTHER ASCITES: ICD-10-CM

## 2025-06-20 DIAGNOSIS — K74.60 UNSPECIFIED CIRRHOSIS OF LIVER (MULTI): Primary | ICD-10-CM

## 2025-06-20 LAB
ALBUMIN SERPL BCP-MCNC: 4.1 G/DL (ref 3.4–5)
ALP SERPL-CCNC: 88 U/L (ref 33–136)
ALT SERPL W P-5'-P-CCNC: 29 U/L (ref 10–52)
ANION GAP SERPL CALC-SCNC: 14 MMOL/L (ref 10–20)
APTT PPP: 28 SECONDS (ref 26–36)
AST SERPL W P-5'-P-CCNC: 24 U/L (ref 9–39)
BASOPHILS # BLD AUTO: 0.04 X10*3/UL (ref 0–0.1)
BASOPHILS NFR BLD AUTO: 0.5 %
BILIRUB DIRECT SERPL-MCNC: 0.3 MG/DL (ref 0–0.3)
BILIRUB SERPL-MCNC: 1.1 MG/DL (ref 0–1.2)
BUN SERPL-MCNC: 15 MG/DL (ref 6–23)
BURR CELLS BLD QL SMEAR: NORMAL
CALCIUM SERPL-MCNC: 8.5 MG/DL (ref 8.6–10.3)
CHLORIDE SERPL-SCNC: 107 MMOL/L (ref 98–107)
CO2 SERPL-SCNC: 18 MMOL/L (ref 21–32)
CREAT SERPL-MCNC: 1.18 MG/DL (ref 0.5–1.3)
DACRYOCYTES BLD QL SMEAR: NORMAL
EGFRCR SERPLBLD CKD-EPI 2021: 70 ML/MIN/1.73M*2
EOSINOPHIL # BLD AUTO: 0.02 X10*3/UL (ref 0–0.7)
EOSINOPHIL NFR BLD AUTO: 0.3 %
ERYTHROCYTE [DISTWIDTH] IN BLOOD BY AUTOMATED COUNT: 19.5 % (ref 11.5–14.5)
GLUCOSE SERPL-MCNC: 85 MG/DL (ref 74–99)
HCT VFR BLD AUTO: 34.9 % (ref 41–52)
HGB BLD-MCNC: 11 G/DL (ref 13.5–17.5)
IMM GRANULOCYTES # BLD AUTO: 0.03 X10*3/UL (ref 0–0.7)
IMM GRANULOCYTES NFR BLD AUTO: 0.4 % (ref 0–0.9)
INR PPP: 1.2 (ref 0.9–1.1)
LYMPHOCYTES # BLD AUTO: 0.59 X10*3/UL (ref 1.2–4.8)
LYMPHOCYTES NFR BLD AUTO: 7.9 %
MCH RBC QN AUTO: 30.6 PG (ref 26–34)
MCHC RBC AUTO-ENTMCNC: 31.5 G/DL (ref 32–36)
MCV RBC AUTO: 97 FL (ref 80–100)
MONOCYTES # BLD AUTO: 0.52 X10*3/UL (ref 0.1–1)
MONOCYTES NFR BLD AUTO: 7 %
NEUTROPHILS # BLD AUTO: 6.27 X10*3/UL (ref 1.2–7.7)
NEUTROPHILS NFR BLD AUTO: 83.9 %
NRBC BLD-RTO: 0 /100 WBCS (ref 0–0)
OVALOCYTES BLD QL SMEAR: NORMAL
PHOSPHATE SERPL-MCNC: 3 MG/DL (ref 2.5–4.9)
PLATELET # BLD AUTO: 116 X10*3/UL (ref 150–450)
POLYCHROMASIA BLD QL SMEAR: NORMAL
POTASSIUM SERPL-SCNC: 4 MMOL/L (ref 3.5–5.3)
PROT SERPL-MCNC: 7.1 G/DL (ref 6.4–8.2)
PROTHROMBIN TIME: 12.9 SECONDS (ref 9.8–12.4)
RBC # BLD AUTO: 3.6 X10*6/UL (ref 4.5–5.9)
RBC MORPH BLD: NORMAL
SODIUM SERPL-SCNC: 135 MMOL/L (ref 136–145)
WBC # BLD AUTO: 7.5 X10*3/UL (ref 4.4–11.3)

## 2025-06-20 PROCEDURE — 85610 PROTHROMBIN TIME: CPT

## 2025-06-20 PROCEDURE — 80053 COMPREHEN METABOLIC PANEL: CPT

## 2025-06-20 PROCEDURE — 85025 COMPLETE CBC W/AUTO DIFF WBC: CPT

## 2025-06-20 PROCEDURE — 85730 THROMBOPLASTIN TIME PARTIAL: CPT

## 2025-06-20 PROCEDURE — 84100 ASSAY OF PHOSPHORUS: CPT

## 2025-06-20 PROCEDURE — 82248 BILIRUBIN DIRECT: CPT

## 2025-06-20 PROCEDURE — 36415 COLL VENOUS BLD VENIPUNCTURE: CPT

## 2025-06-23 ENCOUNTER — COMMITTEE REVIEW (OUTPATIENT)
Dept: TRANSPLANT | Facility: HOSPITAL | Age: 60
End: 2025-06-23
Payer: COMMERCIAL

## 2025-06-23 ENCOUNTER — OFFICE VISIT (OUTPATIENT)
Facility: HOSPITAL | Age: 60
End: 2025-06-23
Payer: COMMERCIAL

## 2025-06-23 ENCOUNTER — SOCIAL WORK (OUTPATIENT)
Facility: HOSPITAL | Age: 60
End: 2025-06-23
Payer: COMMERCIAL

## 2025-06-23 ENCOUNTER — HOSPITAL ENCOUNTER (OUTPATIENT)
Dept: RADIOLOGY | Facility: HOSPITAL | Age: 60
Discharge: HOME | End: 2025-06-23
Payer: COMMERCIAL

## 2025-06-23 VITALS
HEART RATE: 74 BPM | SYSTOLIC BLOOD PRESSURE: 113 MMHG | DIASTOLIC BLOOD PRESSURE: 76 MMHG | WEIGHT: 207.4 LBS | BODY MASS INDEX: 24.59 KG/M2 | OXYGEN SATURATION: 95 % | TEMPERATURE: 97.7 F

## 2025-06-23 VITALS
DIASTOLIC BLOOD PRESSURE: 76 MMHG | TEMPERATURE: 97.7 F | HEART RATE: 74 BPM | WEIGHT: 207.4 LBS | BODY MASS INDEX: 24.59 KG/M2 | SYSTOLIC BLOOD PRESSURE: 113 MMHG | OXYGEN SATURATION: 95 %

## 2025-06-23 VITALS — OXYGEN SATURATION: 97 % | SYSTOLIC BLOOD PRESSURE: 113 MMHG | HEART RATE: 59 BPM | DIASTOLIC BLOOD PRESSURE: 79 MMHG

## 2025-06-23 DIAGNOSIS — Z76.82 PRE-LIVER TRANSPLANT, LISTED: ICD-10-CM

## 2025-06-23 DIAGNOSIS — R18.8 CIRRHOSIS OF LIVER WITH ASCITES, UNSPECIFIED HEPATIC CIRRHOSIS TYPE (MULTI): ICD-10-CM

## 2025-06-23 DIAGNOSIS — K74.60 CIRRHOSIS OF LIVER WITH ASCITES, UNSPECIFIED HEPATIC CIRRHOSIS TYPE (MULTI): ICD-10-CM

## 2025-06-23 DIAGNOSIS — R18.8 REFRACTORY ASCITES: ICD-10-CM

## 2025-06-23 DIAGNOSIS — Z76.82 PRE-LIVER TRANSPLANT, LISTED: Primary | ICD-10-CM

## 2025-06-23 DIAGNOSIS — D50.0 IRON DEFICIENCY ANEMIA SECONDARY TO BLOOD LOSS (CHRONIC): ICD-10-CM

## 2025-06-23 LAB
CLARITY FLD: CLEAR
COLOR FLD: YELLOW
EOSINOPHIL NFR FLD MANUAL: NORMAL %
LYMPHOCYTES NFR FLD MANUAL: 27 %
MONOS+MACROS NFR FLD MANUAL: 50 %
NEUTROPHILS NFR FLD MANUAL: 23 %
PROT FLD-MCNC: 1 G/DL
RBC # FLD AUTO: <2000 /UL
TOTAL CELLS COUNTED FLD: 100
WBC # FLD AUTO: 58 /UL

## 2025-06-23 PROCEDURE — 89051 BODY FLUID CELL COUNT: CPT | Performed by: NURSE PRACTITIONER

## 2025-06-23 PROCEDURE — 99024 POSTOP FOLLOW-UP VISIT: CPT | Performed by: SURGERY

## 2025-06-23 PROCEDURE — P9047 ALBUMIN (HUMAN), 25%, 50ML: HCPCS | Performed by: NURSE PRACTITIONER

## 2025-06-23 PROCEDURE — 99214 OFFICE O/P EST MOD 30 MIN: CPT | Performed by: INTERNAL MEDICINE

## 2025-06-23 PROCEDURE — 3074F SYST BP LT 130 MM HG: CPT | Performed by: INTERNAL MEDICINE

## 2025-06-23 PROCEDURE — 3078F DIAST BP <80 MM HG: CPT | Performed by: INTERNAL MEDICINE

## 2025-06-23 PROCEDURE — 2500000004 HC RX 250 GENERAL PHARMACY W/ HCPCS (ALT 636 FOR OP/ED): Performed by: NURSE PRACTITIONER

## 2025-06-23 PROCEDURE — 99214 OFFICE O/P EST MOD 30 MIN: CPT | Mod: 24 | Performed by: SURGERY

## 2025-06-23 PROCEDURE — 84157 ASSAY OF PROTEIN OTHER: CPT | Performed by: NURSE PRACTITIONER

## 2025-06-23 PROCEDURE — 87205 SMEAR GRAM STAIN: CPT | Performed by: NURSE PRACTITIONER

## 2025-06-23 PROCEDURE — 49083 ABD PARACENTESIS W/IMAGING: CPT

## 2025-06-23 RX ORDER — FUROSEMIDE 20 MG/1
40 TABLET ORAL DAILY
Qty: 30 TABLET | Refills: 11 | Status: SHIPPED | OUTPATIENT
Start: 2025-06-23

## 2025-06-23 RX ORDER — SPIRONOLACTONE 50 MG/1
100 TABLET, FILM COATED ORAL DAILY
Qty: 60 TABLET | Refills: 11 | Status: SHIPPED | OUTPATIENT
Start: 2025-06-23 | End: 2026-06-23

## 2025-06-23 RX ORDER — ALBUMIN HUMAN 250 G/1000ML
100 SOLUTION INTRAVENOUS ONCE
Status: COMPLETED | OUTPATIENT
Start: 2025-06-23 | End: 2025-06-23

## 2025-06-23 RX ADMIN — ALBUMIN HUMAN 100 G: 0.25 SOLUTION INTRAVENOUS at 14:25

## 2025-06-23 ASSESSMENT — PAIN SCALES - GENERAL
PAINLEVEL_OUTOF10: 0-NO PAIN
PAINLEVEL_OUTOF10: 0-NO PAIN

## 2025-06-23 NOTE — PROGRESS NOTES
"Subjective   Patient ID: Vance Silva \"Christine" is a 59 y.o. male who presents for follow up on transplant list.    .  HPIRecent hernia repair with issues afterwards including hematoma and the development of ascites.    Also had GI bleeding with large varices with red color sign that were banded.   Recent decrease of diuretics with increasing ascites.   Some muscle wasting.   Had C. Diff colitis cleared with oral vancomycin.       Review of Systems  No report of nausea vomiting or diarrhea. Development of ascites.       Objective   Physical Exam  Physical Exam  Constitutional:       Appearance: Normal appearance.   Eyes:      General: No scleral icterus.  Cardiovascular:      Rate and Rhythm: Normal rate and regular rhythm.      Heart sounds: No murmur heard.     No gallop.   Pulmonary:      Effort: Pulmonary effort is normal.      Breath sounds: Normal breath sounds.   Abdominal:      General: Abdomen is flat. Bowel sounds are normal. There is  distension. 2-3+ ascites with fluid wave.       Palpations: Abdomen is soft. There is a fluid wave, hepatomegaly or splenomegaly.      Tenderness: There is no abdominal tenderness.   4-6 cm nodule in left inguinal area.   With dressing.    Musculoskeletal:      Cervical back: Normal range of motion. No tenderness.      Right lower leg: No edema.      Left lower leg: No edema.   Lymphadenopathy:      Cervical: No cervical adenopathy.   Skin:     Coloration: Skin is not jaundiced.   Neurological:      General: No focal deficit present.      Mental Status: He  is alert.   No asterixis.           Assessment/Plan     Awaiting liver transplant..   Hernia repair with nodular hematoma in left inguinal area.   For ascites will increase lasix to 40 mg a day and aldactone to 150 mg a day.  Ok to sign papers for visiting nurse.   Daughter Kahlil will have apartment here in [place] to help take care of him after transplant.    Continue to monitor liver tests and renal function.    " Paracentisis as needed.   Medically acceptable candidate for transplant at this time.    Will need to keep monitoring varices and band as necessary.                Mu Norris MD 06/23/25 11:02 AM

## 2025-06-23 NOTE — POST-PROCEDURE NOTE
INTERVENTIONAL RADIOLOGY ADVANCED PRACTICE PROCEDURE  [facility]    A time out was performed and Left Hemiabdomen was examined with US and appropriate entry point was confirmed and marked.   The patient was prepped and draped in a sterile manner, 1% lidocaine was used to anesthesize the skin and subcutaneous tissue.   A 5F Centesis needle was then introduced through the skin into the peritoneal space, the centesis catheter was then threaded without difficulty.   8000 ml of yellow fluid was removed without difficulty. The catheter was then removed.   No immediate complications were noted during and immediately following the procedure.

## 2025-06-23 NOTE — PROGRESS NOTES
Vance Silva is a 59 y.o. male on wait list for liver txp; recent inguinal hernia repair complicated by post-op bleed/hematoma and worsening ascites    Objective   Gen: A+OX3; NAD  HEENT: PERRL, sclera anicteric, MMM  Cardiac: RRR  Chest: Normal inspiratory effort  Abdomen: S/NT/+ascites  Ext: No LE edema    Last Recorded Vitals  Visit Vitals  /76   Pulse 74   Temp 36.5 °C (97.7 °F) (Temporal)      Assessment/Plan   Principal Problem:    ESLD    - Reviewed current MELD and given very large size may have large liver that will not fit other higher MELD candidates  - Working on moving to closer apt and finalizing caregiver plan; many family members have called in to confirm support but do live out of state and will also use dedicated hired nursing support  - Patient agrees given current symptoms, if offer does become available, it is time to proceed with liver txp    I spent 30 minutes in the professional and overall care of this patient, including immunosuppression management.    Rina Mosley MD, PHD, MPH, FACS  Chief, Transplant and Hepatobiliary Surgery

## 2025-06-23 NOTE — COMMITTEE REVIEW
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     Committee Members:   Annemarie Castorena RDN, LD   Radha Ramos   Pharmacy Alivia Hardin PharmD   Psychology Payton Melara, PhD   Quality Jazlyn Watson, JAGDISH    Aleena Dudley [facility]   Transplant Raquel Sanchez RN; Kenny Contreras Jr., RN; Erika Sandoval RN   Transplant Hepatology Alexis Sears MD; Chip Lora MD; Mali Bingham, SILVIO-CNP; Mu Norris MD; Arslan Ferguson MD; Stephen Thompson MD; Hesham Jacobson MD   Transplant Surgery Guanako Zavala MD; Palmer Farris MD; Juliette Parker MD; Rina Farias MD; Antony Wallace MD       Eligibility:  None     Relative contraindications:  None     Absolute contraindications:  None         Committee Review Decision:    The candidate's evaluation was presented and discussed at the Transplant Multidisciplinary Selection Conference. After review of the candidate's diagnosis and the evaluations of the multidisciplinary team members, the committee made the following recommendations:     Continue current status: Continue current status , discussed with patient today in clinic with Dr. Norris & Dr. Mann that he is actively listed and notify him of organ offers for transplant.    Patient attests to being fully vaccinated against Hepatitis B: Yes            Do we have records uploaded into Epic? Yes    Lab Results   Component Value Date    HEPBSAB <3.1 01/31/2024       Immunization History   Administered Date(s) Administered Comments    None   Deferred Date(s) Deferred Comments    Hepatitis B vaccine, adult *Check Product/Dose* 05/09/2024 Patient Decision         I attest to the committee's decision for this patient.

## 2025-06-23 NOTE — PATIENT INSTRUCTIONS
Increase spirolactone to 100 mg daily  Increase Furosemide to 40 mg daily  LABS: Friday  RTC: 2 months with Dr. Jacobson

## 2025-06-24 NOTE — PROGRESS NOTES
"SW met with Pt and Pt's girlfriend, Aleena, in an exam room to discuss Pt's support system and post-transplant plan. Pt was agitated and defensive during the visit. Pt's girlfriend, Aleena, attempted to call Pt's mother to allow her to listen to this visit, but Pt asked for the phone call to be ended. SW attempted to review the information Pt's daughter, Zoila, provided to Pt's coordinator via email, but Pt stated angrily, \"The only thing that matters is what I say.\" Pt's girlfriend confirmed that she, Pt's mother, and Pt's friend, Guanako, remain local and willing to help Pt through the process. SW inquired if Pt's children would still be able to come into town post-transplant, and Pt shared he \"does not have the answers SW is looking for.\" Pt reported his supports often are unable to recall information from the team about what is needed, and Pt shared he \"needs more time and clarity before answering these questions.\" Pt's girlfriend reported Pt is looking into finding a place in [facility] to stay post-transplant. SW asked Pt directly about this, and Pt only stared at SW in response. SW inquired if Pt and Pt's girlfriend had any other questions/concerns, and both denied at this time. SW provided contact information.    Plan: SW to speak with Pt at a later time to attempt to confirm Pt's support plan.   "

## 2025-06-25 DIAGNOSIS — Z76.82 PRE-LIVER TRANSPLANT, LISTED: ICD-10-CM

## 2025-06-26 ENCOUNTER — TELEPHONE (OUTPATIENT)
Dept: HOME HEALTH SERVICES | Facility: HOME HEALTH | Age: 60
End: 2025-06-26
Payer: COMMERCIAL

## 2025-06-26 NOTE — TELEPHONE ENCOUNTER
Pt must be HOMEBOUND if services will be covered by CMS. Please amend orders and resubmit if patient is indeed homebound. If not, the patient will need to go outpatient for services.     Thank you,  Intake department

## 2025-06-27 ENCOUNTER — HOME HEALTH ADMISSION (OUTPATIENT)
Dept: HOME HEALTH SERVICES | Facility: HOME HEALTH | Age: 60
End: 2025-06-27
Payer: COMMERCIAL

## 2025-06-27 LAB
BACTERIA FLD CULT: NORMAL
GRAM STN SPEC: NORMAL
GRAM STN SPEC: NORMAL

## 2025-06-27 NOTE — TELEPHONE ENCOUNTER
Dr. Mu RAUSCH Post      Home Care received a referral  6/26/25  for Vance . Upon review we will accept this referral .  Home Care  apologize for any miscommunication    We will follow up with the patient .     Thank you     JACQUELINE TRUJILLO LPN     Referral

## 2025-06-29 ENCOUNTER — HOME CARE VISIT (OUTPATIENT)
Dept: HOME HEALTH SERVICES | Facility: HOME HEALTH | Age: 60
End: 2025-06-29
Payer: COMMERCIAL

## 2025-06-29 VITALS
HEIGHT: 77 IN | HEART RATE: 70 BPM | SYSTOLIC BLOOD PRESSURE: 108 MMHG | TEMPERATURE: 98.1 F | DIASTOLIC BLOOD PRESSURE: 62 MMHG | BODY MASS INDEX: 21.96 KG/M2 | OXYGEN SATURATION: 98 % | WEIGHT: 186 LBS | RESPIRATION RATE: 20 BRPM

## 2025-06-29 PROCEDURE — G0299 HHS/HOSPICE OF RN EA 15 MIN: HCPCS

## 2025-06-29 ASSESSMENT — ENCOUNTER SYMPTOMS
PAIN LOCATION - PAIN FREQUENCY: CONSTANT
PERSON REPORTING PAIN: PATIENT
SUBJECTIVE PAIN PROGRESSION: GRADUALLY IMPROVING
LOWEST PAIN SEVERITY IN PAST 24 HOURS: 0/10
PAIN: 1
DIARRHEA: 1
PAIN SEVERITY GOAL: 0/10
PAIN LOCATION: GROIN
APPETITE LEVEL: FAIR
ABDOMINAL PAIN: 1
HIGHEST PAIN SEVERITY IN PAST 24 HOURS: 3/10
PAIN LOCATION - PAIN SEVERITY: 3/10
PAIN LOCATION - PAIN QUALITY: ACHY
STOOL FREQUENCY: DAILY

## 2025-06-29 ASSESSMENT — ACTIVITIES OF DAILY LIVING (ADL)
OASIS_M1830: 00
ENTERING_EXITING_HOME: SUPERVISION

## 2025-07-01 ENCOUNTER — TELEPHONE (OUTPATIENT)
Facility: HOSPITAL | Age: 60
End: 2025-07-01
Payer: COMMERCIAL

## 2025-07-02 ENCOUNTER — OFFICE VISIT (OUTPATIENT)
Dept: SURGERY | Facility: CLINIC | Age: 60
End: 2025-07-02
Payer: COMMERCIAL

## 2025-07-02 ENCOUNTER — HOSPITAL ENCOUNTER (OUTPATIENT)
Dept: RADIOLOGY | Facility: HOSPITAL | Age: 60
Discharge: HOME | End: 2025-07-02
Payer: COMMERCIAL

## 2025-07-02 VITALS
HEIGHT: 77 IN | BODY MASS INDEX: 22.2 KG/M2 | RESPIRATION RATE: 16 BRPM | WEIGHT: 188 LBS | HEART RATE: 65 BPM | SYSTOLIC BLOOD PRESSURE: 98 MMHG | DIASTOLIC BLOOD PRESSURE: 61 MMHG

## 2025-07-02 VITALS
SYSTOLIC BLOOD PRESSURE: 110 MMHG | DIASTOLIC BLOOD PRESSURE: 79 MMHG | OXYGEN SATURATION: 98 % | RESPIRATION RATE: 17 BRPM | TEMPERATURE: 98.4 F | HEART RATE: 56 BPM

## 2025-07-02 DIAGNOSIS — K70.31 ALCOHOLIC CIRRHOSIS OF LIVER WITH ASCITES (MULTI): Primary | ICD-10-CM

## 2025-07-02 DIAGNOSIS — R18.8 CIRRHOSIS OF LIVER WITH ASCITES, UNSPECIFIED HEPATIC CIRRHOSIS TYPE (MULTI): ICD-10-CM

## 2025-07-02 DIAGNOSIS — K74.60 CIRRHOSIS OF LIVER WITH ASCITES, UNSPECIFIED HEPATIC CIRRHOSIS TYPE (MULTI): ICD-10-CM

## 2025-07-02 PROCEDURE — C1729 CATH, DRAINAGE: HCPCS

## 2025-07-02 PROCEDURE — 49083 ABD PARACENTESIS W/IMAGING: CPT

## 2025-07-02 PROCEDURE — 7100000010 HC PHASE TWO TIME - EACH INCREMENTAL 1 MINUTE

## 2025-07-02 PROCEDURE — P9047 ALBUMIN (HUMAN), 25%, 50ML: HCPCS

## 2025-07-02 PROCEDURE — 7100000009 HC PHASE TWO TIME - INITIAL BASE CHARGE

## 2025-07-02 PROCEDURE — 2500000004 HC RX 250 GENERAL PHARMACY W/ HCPCS (ALT 636 FOR OP/ED)

## 2025-07-02 PROCEDURE — 2720000007 HC OR 272 NO HCPCS

## 2025-07-02 PROCEDURE — C1769 GUIDE WIRE: HCPCS

## 2025-07-02 RX ORDER — ALBUMIN HUMAN 250 G/1000ML
62.5 SOLUTION INTRAVENOUS ONCE
Status: COMPLETED | OUTPATIENT
Start: 2025-07-02 | End: 2025-07-02

## 2025-07-02 RX ADMIN — ALBUMIN HUMAN 62.5 G: 0.25 SOLUTION INTRAVENOUS at 09:15

## 2025-07-02 ASSESSMENT — PAIN SCALES - GENERAL
PAINLEVEL_OUTOF10: 0-NO PAIN
PAINLEVEL_OUTOF10: 0 - NO PAIN
PAINLEVEL_OUTOF10: 0 - NO PAIN

## 2025-07-02 ASSESSMENT — PAIN - FUNCTIONAL ASSESSMENT
PAIN_FUNCTIONAL_ASSESSMENT: 0-10
PAIN_FUNCTIONAL_ASSESSMENT: 0-10

## 2025-07-02 NOTE — POST-PROCEDURE NOTE
Interventional Radiology Brief Postprocedure Note - Paracentesis    Attending: Miki Coley MD    Assistant: Tasha Aleman MD    Diagnosis: Hepatic cirrhosis    Description of procedure: Under US guidance, a large pocket of fluid in the right abdomen was identified. The skin was prepped in usual sterile fashion and local lidocaine injected. The peritoneal fluid pocket was accessed with a Yueh, then subsequently exchanged over a wire for a pigtail catheter. A total of 8L yellow serous fluid was removed and patient was administered 62.5g IV albumin.     Anesthesia:  Local    Complications: None    Estimated Blood Loss: minimal    Medications (Filter: Administrations occurring from 0836 to 0939 on 07/02/25) As of 07/02/25 0939      None          No specimens collected    See detailed result report with images in PACS.    The patient tolerated the procedure well without incident or complication and is in stable condition.     Tasha Aleman MD  Vascular & Interventional Radiology  IR pager: [phone]     NON-Urgent on call weekends and after hours weekdays (5pm - 5am) IR pager: [phone]  Urgent & emergent on call weekends and after hours weekdays (5pm-7am) IR pager: [phone]

## 2025-07-02 NOTE — DISCHARGE INSTRUCTIONS
You received moderate sedation:  - Do not drive a car, or operate any machinery or power tools of any kind.  - Do not drink any alcoholic drinks.  - Do not take any over the counter medications that may cause drowsiness.  - Do not make any important decisions or sign any legal documents.  - You need to have a responsible adult accompany you home.  - You may resume your normal diet.  - We strongly suggest that a responsible adult be with you for the rest of the day and also during the night. This is for your protection and safety.     For questions related to your procedure:  Please call [phone] between the hours of 7:00am-5:00pm Monday through Friday.  Please call [phone] after 5:00pm and on weekends and holidays.     In the event of an emergency call 911 or go to your nearest emergency room.

## 2025-07-02 NOTE — PRE-PROCEDURE NOTE
Interventional Radiology Preprocedure Note    Indication for procedure: The encounter diagnosis was Cirrhosis of liver with ascites, unspecified hepatic cirrhosis type (Multi).    Relevant review of systems: NA    Relevant Labs:   Lab Results   Component Value Date    CREATININE 1.18 06/20/2025    EGFR 70 06/20/2025    INR 1.2 (H) 06/20/2025    PROTIME 12.9 (H) 06/20/2025       Planned Sedation/Anesthesia: Local anesthesia    Airway assessment: normal    Directed physical examination:    GEN: NAD, A&Ox3  CARD: RRR, normal S1S2, no MRG  PULM: CTAB  ABD: NTND  MSK: Full ROM  NEURO: Grossly intact      Mallampati: I (soft palate, uvula, fauces, and tonsillar pillars visible)    ASA Score: ASA 2 - Patient with mild systemic disease with no functional limitations    Benefits, risks and alternatives of procedure and planned sedation have been discussed with the patient and/or their representative. All questions answered and they agree to proceed.     Tasha Aleman MD  Vascular & Interventional Radiology  IR pager: [phone]     NON-Urgent on call weekends and after hours weekdays (5pm - 5am) IR pager: [phone]  Urgent & emergent on call weekends and after hours weekdays (5pm-7am) IR pager: [phone]

## 2025-07-02 NOTE — PROGRESS NOTES
Subjective   Patient ID: Karyn Silva is a 59 y.o. male.    HPI    For staples removal     Review of Systems    Objective   Vitals:    07/02/25 1031   BP: 98/61   Pulse: 65   Resp: 16       Physical Exam    Staples removed in the left groin hernia  Still mild edema  +ascites    Assessment/Plan     Staples removed from hernia surgery  Listed for liver transplant

## 2025-07-03 ENCOUNTER — HOME CARE VISIT (OUTPATIENT)
Dept: HOME HEALTH SERVICES | Facility: HOME HEALTH | Age: 60
End: 2025-07-03
Payer: COMMERCIAL

## 2025-07-03 VITALS
SYSTOLIC BLOOD PRESSURE: 124 MMHG | TEMPERATURE: 98.1 F | RESPIRATION RATE: 16 BRPM | DIASTOLIC BLOOD PRESSURE: 70 MMHG | HEART RATE: 72 BPM | OXYGEN SATURATION: 98 %

## 2025-07-03 PROCEDURE — G0299 HHS/HOSPICE OF RN EA 15 MIN: HCPCS

## 2025-07-03 SDOH — ECONOMIC STABILITY: GENERAL

## 2025-07-03 ASSESSMENT — ENCOUNTER SYMPTOMS
APPETITE LEVEL: FAIR
STOOL FREQUENCY: DAILY

## 2025-07-03 ASSESSMENT — ACTIVITIES OF DAILY LIVING (ADL): MONEY MANAGEMENT (EXPENSES/BILLS): INDEPENDENT

## 2025-07-09 DIAGNOSIS — Z76.82 PRE-LIVER TRANSPLANT, LISTED: ICD-10-CM

## 2025-07-10 ENCOUNTER — APPOINTMENT (OUTPATIENT)
Dept: RADIOLOGY | Facility: HOSPITAL | Age: 60
End: 2025-07-10
Payer: COMMERCIAL

## 2025-07-10 ENCOUNTER — HOME CARE VISIT (OUTPATIENT)
Dept: HOME HEALTH SERVICES | Facility: HOME HEALTH | Age: 60
End: 2025-07-10
Payer: COMMERCIAL

## 2025-07-11 ENCOUNTER — HOSPITAL ENCOUNTER (OUTPATIENT)
Dept: RADIOLOGY | Facility: HOSPITAL | Age: 60
Discharge: HOME | End: 2025-07-11
Payer: COMMERCIAL

## 2025-07-11 VITALS — OXYGEN SATURATION: 98 % | HEART RATE: 67 BPM | DIASTOLIC BLOOD PRESSURE: 71 MMHG | SYSTOLIC BLOOD PRESSURE: 106 MMHG

## 2025-07-11 DIAGNOSIS — K72.10 END STAGE LIVER DISEASE (MULTI): ICD-10-CM

## 2025-07-11 DIAGNOSIS — Z76.82 PRE-LIVER TRANSPLANT, LISTED: ICD-10-CM

## 2025-07-11 DIAGNOSIS — K70.31 ALCOHOLIC CIRRHOSIS OF LIVER WITH ASCITES (MULTI): Primary | ICD-10-CM

## 2025-07-11 PROCEDURE — P9047 ALBUMIN (HUMAN), 25%, 50ML: HCPCS | Performed by: NURSE PRACTITIONER

## 2025-07-11 PROCEDURE — 2500000004 HC RX 250 GENERAL PHARMACY W/ HCPCS (ALT 636 FOR OP/ED): Performed by: NURSE PRACTITIONER

## 2025-07-11 PROCEDURE — 49083 ABD PARACENTESIS W/IMAGING: CPT

## 2025-07-11 RX ORDER — ALBUMIN HUMAN 250 G/1000ML
50 SOLUTION INTRAVENOUS ONCE
Status: COMPLETED | OUTPATIENT
Start: 2025-07-11 | End: 2025-07-11

## 2025-07-11 RX ADMIN — ALBUMIN HUMAN 50 G: 0.25 SOLUTION INTRAVENOUS at 13:19

## 2025-07-11 NOTE — POST-PROCEDURE NOTE
INTERVENTIONAL RADIOLOGY ADVANCED PRACTICE PROCEDURE  [facility]    A time out was performed and Left Hemiabdomen was examined with US and appropriate entry point was confirmed and marked.   The patient was prepped and draped in a sterile manner, 1% lidocaine was used to anesthesize the skin and subcutaneous tissue.   A 5F Centesis needle was then introduced through the skin into the peritoneal space, the centesis catheter was then threaded without difficulty.   7800 ml of yellow fluid was removed without difficulty. The catheter was then removed.   No immediate complications were noted during and immediately following the procedure.

## 2025-07-16 ENCOUNTER — PATIENT OUTREACH (OUTPATIENT)
Dept: CARE COORDINATION | Facility: CLINIC | Age: 60
End: 2025-07-16
Payer: COMMERCIAL

## 2025-07-17 ENCOUNTER — HOSPITAL ENCOUNTER (OUTPATIENT)
Dept: RADIOLOGY | Facility: HOSPITAL | Age: 60
Discharge: HOME | End: 2025-07-17
Payer: COMMERCIAL

## 2025-07-17 DIAGNOSIS — K70.31 ALCOHOLIC CIRRHOSIS OF LIVER WITH ASCITES (MULTI): ICD-10-CM

## 2025-07-17 DIAGNOSIS — Z76.82 PRE-LIVER TRANSPLANT, LISTED: ICD-10-CM

## 2025-07-17 NOTE — POST-PROCEDURE NOTE
INTERVENTIONAL RADIOLOGY ADVANCED PRACTICE PROCEDURE  [facility]    A time out was performed and Left Hemiabdomen was examined with US and appropriate entry point was confirmed and marked.   The patient was prepped and draped in a sterile manner, 1% lidocaine was used to anesthesize the skin and subcutaneous tissue.   A 5F Centesis needle was then introduced through the skin into the peritoneal space, the centesis catheter was then threaded without difficulty.   6200 ml of yellow fluid was removed without difficulty. The catheter was then removed.   No immediate complications were noted during and immediately following the procedure.

## 2025-07-18 ENCOUNTER — APPOINTMENT (OUTPATIENT)
Dept: HOME HEALTH SERVICES | Facility: HOME HEALTH | Age: 60
End: 2025-07-18
Payer: COMMERCIAL

## 2025-07-21 ENCOUNTER — TELEPHONE (OUTPATIENT)
Facility: HOSPITAL | Age: 60
End: 2025-07-21
Payer: COMMERCIAL

## 2025-07-21 ENCOUNTER — LAB (OUTPATIENT)
Dept: LAB | Facility: HOSPITAL | Age: 60
End: 2025-07-21
Payer: COMMERCIAL

## 2025-07-21 DIAGNOSIS — K74.60 UNSPECIFIED CIRRHOSIS OF LIVER (MULTI): ICD-10-CM

## 2025-07-21 DIAGNOSIS — K74.60 CIRRHOSIS OF LIVER WITH ASCITES, UNSPECIFIED HEPATIC CIRRHOSIS TYPE (MULTI): ICD-10-CM

## 2025-07-21 DIAGNOSIS — Z76.82 AWAITING ORGAN TRANSPLANT STATUS: Primary | ICD-10-CM

## 2025-07-21 DIAGNOSIS — R18.8 CIRRHOSIS OF LIVER WITH ASCITES, UNSPECIFIED HEPATIC CIRRHOSIS TYPE (MULTI): ICD-10-CM

## 2025-07-21 DIAGNOSIS — Z76.82 PRE-LIVER TRANSPLANT, LISTED: ICD-10-CM

## 2025-07-21 DIAGNOSIS — R18.8 OTHER ASCITES: ICD-10-CM

## 2025-07-21 LAB
ALBUMIN SERPL BCP-MCNC: 4.4 G/DL (ref 3.4–5)
ALP SERPL-CCNC: 96 U/L (ref 33–136)
ALT SERPL W P-5'-P-CCNC: 45 U/L (ref 10–52)
ANION GAP SERPL CALC-SCNC: 15 MMOL/L (ref 10–20)
AST SERPL W P-5'-P-CCNC: 35 U/L (ref 9–39)
BILIRUB SERPL-MCNC: 1 MG/DL (ref 0–1.2)
BUN SERPL-MCNC: 20 MG/DL (ref 6–23)
CALCIUM SERPL-MCNC: 9.2 MG/DL (ref 8.6–10.3)
CHLORIDE SERPL-SCNC: 106 MMOL/L (ref 98–107)
CO2 SERPL-SCNC: 18 MMOL/L (ref 21–32)
CREAT SERPL-MCNC: 0.98 MG/DL (ref 0.5–1.3)
EGFRCR SERPLBLD CKD-EPI 2021: 88 ML/MIN/1.73M*2
ERYTHROCYTE [DISTWIDTH] IN BLOOD BY AUTOMATED COUNT: 15.4 % (ref 11.5–14.5)
GLUCOSE SERPL-MCNC: 106 MG/DL (ref 74–99)
HCT VFR BLD AUTO: 38.9 % (ref 41–52)
HGB BLD-MCNC: 13 G/DL (ref 13.5–17.5)
INR PPP: 1.1 (ref 0.9–1.1)
MCH RBC QN AUTO: 29.5 PG (ref 26–34)
MCHC RBC AUTO-ENTMCNC: 33.4 G/DL (ref 32–36)
MCV RBC AUTO: 88 FL (ref 80–100)
NRBC BLD-RTO: 0 /100 WBCS (ref 0–0)
PLATELET # BLD AUTO: 63 X10*3/UL (ref 150–450)
POTASSIUM SERPL-SCNC: 3.6 MMOL/L (ref 3.5–5.3)
PROT SERPL-MCNC: 7.5 G/DL (ref 6.4–8.2)
PROTHROMBIN TIME: 12.6 SECONDS (ref 9.8–12.4)
RBC # BLD AUTO: 4.4 X10*6/UL (ref 4.5–5.9)
SODIUM SERPL-SCNC: 135 MMOL/L (ref 136–145)
WBC # BLD AUTO: 4.8 X10*3/UL (ref 4.4–11.3)

## 2025-07-21 PROCEDURE — 80053 COMPREHEN METABOLIC PANEL: CPT

## 2025-07-21 PROCEDURE — 85027 COMPLETE CBC AUTOMATED: CPT

## 2025-07-21 PROCEDURE — 85610 PROTHROMBIN TIME: CPT

## 2025-07-22 ENCOUNTER — TELEPHONE (OUTPATIENT)
Facility: HOSPITAL | Age: 60
End: 2025-07-22
Payer: COMMERCIAL

## 2025-07-22 ENCOUNTER — OFFICE VISIT (OUTPATIENT)
Facility: HOSPITAL | Age: 60
End: 2025-07-22
Payer: COMMERCIAL

## 2025-07-22 VITALS
DIASTOLIC BLOOD PRESSURE: 94 MMHG | HEART RATE: 79 BPM | TEMPERATURE: 96.3 F | BODY MASS INDEX: 22.89 KG/M2 | OXYGEN SATURATION: 93 % | SYSTOLIC BLOOD PRESSURE: 148 MMHG | WEIGHT: 193 LBS

## 2025-07-22 DIAGNOSIS — I85.00 PORTAL HYPERTENSION WITH ESOPHAGEAL VARICES (MULTI): ICD-10-CM

## 2025-07-22 DIAGNOSIS — K76.6 PORTAL HYPERTENSION WITH ESOPHAGEAL VARICES (MULTI): ICD-10-CM

## 2025-07-22 DIAGNOSIS — Z76.89 ENCOUNTER TO ESTABLISH CARE WITH NEW DOCTOR: ICD-10-CM

## 2025-07-22 DIAGNOSIS — Z76.82 PRE-LIVER TRANSPLANT, LISTED: Primary | ICD-10-CM

## 2025-07-22 DIAGNOSIS — R18.8 CIRRHOSIS OF LIVER WITH ASCITES, UNSPECIFIED HEPATIC CIRRHOSIS TYPE (MULTI): ICD-10-CM

## 2025-07-22 DIAGNOSIS — K74.60 CIRRHOSIS OF LIVER WITH ASCITES, UNSPECIFIED HEPATIC CIRRHOSIS TYPE (MULTI): ICD-10-CM

## 2025-07-22 PROCEDURE — 99215 OFFICE O/P EST HI 40 MIN: CPT | Performed by: INTERNAL MEDICINE

## 2025-07-22 PROCEDURE — 3080F DIAST BP >= 90 MM HG: CPT | Performed by: INTERNAL MEDICINE

## 2025-07-22 PROCEDURE — 3077F SYST BP >= 140 MM HG: CPT | Performed by: INTERNAL MEDICINE

## 2025-07-22 ASSESSMENT — PATIENT HEALTH QUESTIONNAIRE - PHQ9
5. POOR APPETITE OR OVEREATING: NOT AT ALL
6. FEELING BAD ABOUT YOURSELF - OR THAT YOU ARE A FAILURE OR HAVE LET YOURSELF OR YOUR FAMILY DOWN: NOT AT ALL
2. FEELING DOWN, DEPRESSED OR HOPELESS: NOT AT ALL
SUM OF ALL RESPONSES TO PHQ9 QUESTIONS 1 & 2: 0
1. LITTLE INTEREST OR PLEASURE IN DOING THINGS: NOT AT ALL
SUM OF ALL RESPONSES TO PHQ QUESTIONS 1-9: 0
8. MOVING OR SPEAKING SO SLOWLY THAT OTHER PEOPLE COULD HAVE NOTICED. OR THE OPPOSITE, BEING SO FIGETY OR RESTLESS THAT YOU HAVE BEEN MOVING AROUND A LOT MORE THAN USUAL: NOT AT ALL
7. TROUBLE CONCENTRATING ON THINGS, SUCH AS READING THE NEWSPAPER OR WATCHING TELEVISION: NOT AT ALL
4. FEELING TIRED OR HAVING LITTLE ENERGY: NOT AT ALL
9. THOUGHTS THAT YOU WOULD BE BETTER OFF DEAD, OR OF HURTING YOURSELF: NOT AT ALL
3. TROUBLE FALLING OR STAYING ASLEEP: NOT AT ALL

## 2025-07-22 ASSESSMENT — ENCOUNTER SYMPTOMS
LOSS OF SENSATION IN FEET: 0
DEPRESSION: 0
OCCASIONAL FEELINGS OF UNSTEADINESS: 0

## 2025-07-22 NOTE — PATIENT INSTRUCTIONS
No medication changes at this time  Will schedule EGD & ECHO for routine follow up  Labs one month  RTC: one month

## 2025-07-22 NOTE — TELEPHONE ENCOUNTER
Called Aleena MERINO to call us to schedule echo, DELMAR [email or web address] sent for them to contact and schedule EGD w/Sclair   test

## 2025-07-22 NOTE — PROGRESS NOTES
"[facility] Transplant [place] - Transplant Hepatology Clinic    .Subjective     Follow up while on the liver transplant list.     History of Present Illness:   Vance Silva \"Karyn\" is a 59 y.o. male who presents to Transplant Hepatology Clinic for follow up while on the Liver Transplant wait list.     Active on the LT wait list with a MELD score of 15.     Had a hernia repair on 5/22/25 and then a hematoma evaucation/washout/repair procedure for an on 5/29/25. Following these surgeries, he experienced a significant decompensation which led to a recent hospitalization from 6/11/25 - 6/16/25 with multiple issues: dehdyration and volume depletion, diarrhea (positive for C diff), melena with GI bleeding, and severe (symptomatic anemia (requiring blood transfusion) among other issues.  During the hospitalization - he underwent EGD on 6/12/25 which was notable for large high risk varices, which required banded. His MELD score was elevated to 15. His MELD score was re-certified and his active MELD score is 15.     He has been symptomatic with ascites since these decompensating events. His diuretic dosing has been increased. He now (again) requires regular large volume paracentesis (weekly), ranging from 5-8L. His last paracentesis procedures had less fluid removal: 6.2L. He has another one scheduled this week.     Completed a course of oral vancomycin for treatment of C. Diff colitis.   Has been taking oral iron: Hb has improved significantly with iron supplementation ( Hb now 13.0 g/dL).    Overall, he feels much better since last month's events. We had a long discussion regarding liver transplant, today in the office. He now understands and accepts, that this appears to be the best course for his. He is accompanied by his mother at today's office visit.     Review of Systems  Review of Systems  As above.     Past Medical History   has a past medical history of Acute kidney injury, Alcohol related seizure " "(Multi), Anemia, Ascites, Awareness under anesthesia, Chronic kidney disease, Cirrhosis (Multi), H/O multiple concussions, Hepatitis C, Liver disease, Parkinson's disease, Peripheral neuropathy, Portal hypertension with esophageal varices (Multi), Secondary esophageal varices without bleeding (Multi), Seizure (Multi), Sleep apnea, Splenomegaly, Thrombocytopenia, Traumatic brain injury (Multi), and UGIB (upper gastrointestinal bleed).     Social History   reports that he has never smoked. He has never used smokeless tobacco. He reports that he does not currently use alcohol. He reports current drug use. Drug: Marijuana.     Family History  family history is not on file.     Allergies  RX Allergies[1]    Medications  Current Outpatient Medications   Medication Instructions    carvedilol (COREG) 6.25 mg, oral, 2 times daily    ferrous sulfate 325 mg, oral, 2 times daily    furosemide (LASIX) 40 mg, oral, Daily    pantoprazole (PROTONIX) 40 mg, oral, Daily before breakfast, DO NOT CRUSH CHEW OR SPLIT    spironolactone (ALDACTONE) 100 mg, oral, Daily        Objective   Visit Vitals  BP (!) 148/94 (BP Location: Right arm, Patient Position: Sitting, BP Cuff Size: Small adult) Comment: Physician notified   Pulse 79   Temp 35.7 °C (96.3 °F) (Temporal)          7/2/2025     9:40 AM 7/2/2025    10:14 AM 7/2/2025    10:31 AM 7/3/2025     1:08 PM 7/11/2025    11:00 AM 7/22/2025    10:03 AM 7/24/2025     9:00 AM   Vitals   Systolic 105 110 98 124 106 148 109   Diastolic 75 79 61 70 71 94 75   BP Location    Left arm  Right arm Left arm   Heart Rate 50 56 65 72 67 79 56   Temp    36.7 °C (98.1 °F)  35.7 °C (96.3 °F)    Resp 18 17 16 16      Height   1.956 m (6' 5\")       Weight (lb)   188   193    BMI   22.29 kg/m2   22.89 kg/m2    BSA (m2)   2.15 m2   2.18 m2    Visit Report Report Report Report   Report      Physical Exam  Vitals and nursing note reviewed.   Constitutional:       Appearance: He is ill-appearing.      Comments: " Moderate muscle mass loss.    HENT:      Head: Normocephalic and atraumatic.     Eyes:      General: No scleral icterus.     Pupils: Pupils are equal, round, and reactive to light.       Cardiovascular:      Rate and Rhythm: Normal rate and regular rhythm.      Heart sounds: Normal heart sounds.   Pulmonary:      Effort: Pulmonary effort is normal.      Breath sounds: Normal breath sounds.   Abdominal:      General: There is distension.      Palpations: There is no mass.      Tenderness: There is no abdominal tenderness. There is no guarding or rebound.      Comments: Moderate fluid wave consistent with ascites.    Genitourinary:     Comments: His hematoma from his hernia surgeries has more or less healed.     Skin:     General: Skin is warm and dry.      Coloration: Skin is not jaundiced.      Findings: No bruising.     Neurological:      Mental Status: Mental status is at baseline.     Psychiatric:         Mood and Affect: Mood normal.         Behavior: Behavior normal.         Labs    WBC   Date/Time Value Ref Range Status   07/21/2025 10:44 AM 4.8 4.4 - 11.3 x10*3/uL Final     Hemoglobin   Date/Time Value Ref Range Status   07/21/2025 10:44 AM 13.0 (L) 13.5 - 17.5 g/dL Final     Hematocrit   Date/Time Value Ref Range Status   07/21/2025 10:44 AM 38.9 (L) 41.0 - 52.0 % Final     MCV   Date/Time Value Ref Range Status   07/21/2025 10:44 AM 88 80 - 100 fL Final     Platelets   Date/Time Value Ref Range Status   07/21/2025 10:44 AM 63 (L) 150 - 450 x10*3/uL Final     Comment:     Platelet count verified by smear review        Total Protein   Date/Time Value Ref Range Status   07/21/2025 10:44 AM 7.5 6.4 - 8.2 g/dL Final     Albumin   Date/Time Value Ref Range Status   07/21/2025 10:44 AM 4.4 3.4 - 5.0 g/dL Final     AST   Date/Time Value Ref Range Status   07/21/2025 10:44 AM 35 9 - 39 U/L Final     ALT   Date/Time Value Ref Range Status   07/21/2025 10:44 AM 45 10 - 52 U/L Final     Comment:     Patients treated  with Sulfasalazine may generate falsely decreased results for ALT.     Alkaline Phosphatase   Date/Time Value Ref Range Status   07/21/2025 10:44 AM 96 33 - 136 U/L Final     Bilirubin, Total   Date/Time Value Ref Range Status   07/21/2025 10:44 AM 1.0 0.0 - 1.2 mg/dL Final     Bilirubin, Direct   Date/Time Value Ref Range Status   06/20/2025 12:33 PM 0.3 0.0 - 0.3 mg/dL Final        Vitamin D, 25-Hydroxy, Total   Date/Time Value Ref Range Status   04/21/2025 11:13 AM 69 30 - 100 ng/mL Final     Vitamin E (Alpha-Tocopherol)   Date/Time Value Ref Range Status   05/06/2024 11:25 AM 11.9 5.5 - 18.0 mg/L Final     Comment:       This test was developed and its performance characteristics   determined by [facility]. It has not been cleared or   approved by the US Food and Drug Administration. This test was   performed in a CLIA certified laboratory and is intended for   clinical purposes.     Vitamin E (Gamma-Tocopherol)   Date/Time Value Ref Range Status   05/06/2024 11:25 AM 1.6 0.0 - 6.0 mg/L Final     Comment:     Performed By: [facility]  [address] [place] [address]  : Abisai Edwards MD, PhD  CLIA Number: [id number]        AST   Date/Time Value Ref Range Status   07/21/2025 10:44 AM 35 9 - 39 U/L Final     ALT   Date/Time Value Ref Range Status   07/21/2025 10:44 AM 45 10 - 52 U/L Final     Comment:     Patients treated with Sulfasalazine may generate falsely decreased results for ALT.     Alkaline Phosphatase   Date/Time Value Ref Range Status   07/21/2025 10:44 AM 96 33 - 136 U/L Final     Bilirubin, Total   Date/Time Value Ref Range Status   07/21/2025 10:44 AM 1.0 0.0 - 1.2 mg/dL Final     Bilirubin, Direct   Date/Time Value Ref Range Status   06/20/2025 12:33 PM 0.3 0.0 - 0.3 mg/dL Final     Albumin   Date/Time Value Ref Range Status   07/21/2025 10:44 AM 4.4 3.4 - 5.0 g/dL Final     Total Protein   Date/Time Value Ref Range Status   07/21/2025 10:44 AM 7.5  "6.4 - 8.2 g/dL Final        Protime   Date/Time Value Ref Range Status   2025 10:44 AM 12.6 (H) 9.8 - 12.4 seconds Final     INR   Date/Time Value Ref Range Status   2025 10:44 AM 1.1 0.9 - 1.1 Final     MELD 3.0: 9 at 2025 10:44 AM  MELD-Na: 7 at 2025 10:44 AM  Calculated from:  Serum Creatinine: 0.98 mg/dL (Using min of 1 mg/dL) at 2025 10:44 AM  Serum Sodium: 135 mmol/L at 2025 10:44 AM  Total Bilirubin: 1 mg/dL at 2025 10:44 AM  Serum Albumin: 4.4 g/dL (Using max of 3.5 g/dL) at 2025 10:44 AM  INR(ratio): 1.1 at 2025 10:44 AM  Age at listin years  Sex: Male at 2025 10:44 AM      Assessment/Plan   Vance Silva \"Karyn\" is a 59 y.o. male who presents to Liver Transplant Clinic for follow up while on the Liver Transplant wait list. He was previously seeing Dr. Mu Norris in the Transplant Clinic, and he is here to establish with me. I did see him during his hospitalization last month, and he has improved significantly.    Overall, he has decompensated since his hernia surgeries.  He continues to be symptomatic from ascites. He will continue to require LVP and diuretic therapy. Today, I will not make any further adjustments on his furosemide and spironolactone.    His C diff has been treated. His diarrhea has resolved.    He has not had any further signs or symptoms of GI bleeding. I would like him to schedule a follow endoscopy for further band ligation and eradication of varices.    For his liver transplant testing, he will be due for follow up cardiovascular testing, and we will arrange for an ECHO.    His MELD score has *improved since last month, but he will remain active with a MELD 15 thorugh the beginning of September. I believe this would be an opportune time for Liver Transplant. I believe Karyn would be ready and I would expect that he would have an excellent recovery.    He will follow up in 1 month.     Hesham Jacobson, " MD      Instructions      Hesham Jacobson MD              [1] No Known Allergies

## 2025-07-23 ENCOUNTER — TELEPHONE (OUTPATIENT)
Facility: HOSPITAL | Age: 60
End: 2025-07-23
Payer: COMMERCIAL

## 2025-07-23 NOTE — TELEPHONE ENCOUNTER
Called patient, LVM to contact to schedule echo, tried Barbara could not leave message voicemail full

## 2025-07-24 ENCOUNTER — HOSPITAL ENCOUNTER (OUTPATIENT)
Dept: RADIOLOGY | Facility: HOSPITAL | Age: 60
Discharge: HOME | End: 2025-07-24
Payer: COMMERCIAL

## 2025-07-24 ENCOUNTER — TELEPHONE (OUTPATIENT)
Facility: HOSPITAL | Age: 60
End: 2025-07-24
Payer: COMMERCIAL

## 2025-07-24 ENCOUNTER — PREP FOR PROCEDURE (OUTPATIENT)
Dept: RADIOLOGY | Facility: HOSPITAL | Age: 60
End: 2025-07-24

## 2025-07-24 ENCOUNTER — HOME CARE VISIT (OUTPATIENT)
Dept: HOME HEALTH SERVICES | Facility: HOME HEALTH | Age: 60
End: 2025-07-24
Payer: COMMERCIAL

## 2025-07-24 VITALS — DIASTOLIC BLOOD PRESSURE: 75 MMHG | HEART RATE: 56 BPM | SYSTOLIC BLOOD PRESSURE: 109 MMHG | OXYGEN SATURATION: 99 %

## 2025-07-24 DIAGNOSIS — K74.60 CIRRHOSIS OF LIVER WITH ASCITES, UNSPECIFIED HEPATIC CIRRHOSIS TYPE (MULTI): ICD-10-CM

## 2025-07-24 DIAGNOSIS — Z76.82 PRE-LIVER TRANSPLANT, LISTED: ICD-10-CM

## 2025-07-24 DIAGNOSIS — R18.8 CIRRHOSIS OF LIVER WITH ASCITES, UNSPECIFIED HEPATIC CIRRHOSIS TYPE (MULTI): ICD-10-CM

## 2025-07-24 DIAGNOSIS — K70.31 ALCOHOLIC CIRRHOSIS OF LIVER WITH ASCITES (MULTI): ICD-10-CM

## 2025-07-24 DIAGNOSIS — K70.31 ALCOHOLIC CIRRHOSIS OF LIVER WITH ASCITES (MULTI): Primary | ICD-10-CM

## 2025-07-24 PROCEDURE — 49083 ABD PARACENTESIS W/IMAGING: CPT

## 2025-07-24 NOTE — TELEPHONE ENCOUNTER
Spoke with Pt, needed to cancel ECHO for tomorrow and reschedule, requesting Tuesday am at time time if possible.  Will cancel appointment and call him tomorrow with new date and time of test.  Pt verbally communicated understanding.

## 2025-07-24 NOTE — POST-PROCEDURE NOTE
INTERVENTIONAL RADIOLOGY ADVANCED PRACTICE PROCEDURE  [facility], [place]    A time out was performed and Right Hemiabdomen was examined with US and appropriate entry point was confirmed and marked.   The patient was prepped and draped in a sterile manner, 1% lidocaine was used to anesthesize the skin and subcutaneous tissue.   A 5F Centesis needle was then introduced through the skin into the peritoneal space, the centesis catheter was then threaded without difficulty.   5000 ml of yellow fluid was removed without difficulty. The catheter was then removed.   No immediate complications were noted during and immediately following the procedure.

## 2025-07-25 ENCOUNTER — APPOINTMENT (OUTPATIENT)
Dept: CARDIOLOGY | Facility: CLINIC | Age: 60
End: 2025-07-25
Payer: COMMERCIAL

## 2025-07-29 ENCOUNTER — HOSPITAL ENCOUNTER (OUTPATIENT)
Dept: CARDIOLOGY | Facility: HOSPITAL | Age: 60
Discharge: HOME | End: 2025-07-29
Payer: COMMERCIAL

## 2025-07-29 DIAGNOSIS — Z76.82 PRE-LIVER TRANSPLANT, LISTED: ICD-10-CM

## 2025-07-29 DIAGNOSIS — K74.60 CIRRHOSIS OF LIVER WITH ASCITES, UNSPECIFIED HEPATIC CIRRHOSIS TYPE (MULTI): ICD-10-CM

## 2025-07-29 DIAGNOSIS — R18.8 CIRRHOSIS OF LIVER WITH ASCITES, UNSPECIFIED HEPATIC CIRRHOSIS TYPE (MULTI): ICD-10-CM

## 2025-07-29 DIAGNOSIS — Z01.810 ENCOUNTER FOR PREPROCEDURAL CARDIOVASCULAR EXAMINATION: ICD-10-CM

## 2025-07-29 LAB
AORTIC VALVE PEAK VELOCITY: 1.27 M/S
AV PEAK GRADIENT: 6 MMHG
AVA (PEAK VEL): 2.92 CM2
EJECTION FRACTION APICAL 4 CHAMBER: 49.6
EJECTION FRACTION: 52 %
LEFT ATRIUM VOLUME AREA LENGTH INDEX BSA: 27.8 ML/M2
LEFT VENTRICLE INTERNAL DIMENSION DIASTOLE: 4.25 CM (ref 3.5–6)
LEFT VENTRICULAR OUTFLOW TRACT DIAMETER: 2.15 CM
MITRAL VALVE E/A RATIO: 1.87
RIGHT VENTRICLE PEAK SYSTOLIC PRESSURE: 22 MMHG
TRICUSPID ANNULAR PLANE SYSTOLIC EXCURSION: 2 CM

## 2025-07-29 PROCEDURE — 93306 TTE W/DOPPLER COMPLETE: CPT | Performed by: STUDENT IN AN ORGANIZED HEALTH CARE EDUCATION/TRAINING PROGRAM

## 2025-07-29 PROCEDURE — 93306 TTE W/DOPPLER COMPLETE: CPT

## 2025-07-31 ENCOUNTER — APPOINTMENT (OUTPATIENT)
Dept: RADIOLOGY | Facility: HOSPITAL | Age: 60
End: 2025-07-31
Payer: COMMERCIAL

## 2025-07-31 ENCOUNTER — APPOINTMENT (OUTPATIENT)
Dept: HOME HEALTH SERVICES | Facility: HOME HEALTH | Age: 60
End: 2025-07-31
Payer: COMMERCIAL

## 2025-07-31 DIAGNOSIS — Z01.818 ENCOUNTER FOR PRE-TRANSPLANT EVALUATION FOR LIVER TRANSPLANT: ICD-10-CM

## 2025-07-31 DIAGNOSIS — Z76.82 PRE-LIVER TRANSPLANT, LISTED: ICD-10-CM

## 2025-08-01 DIAGNOSIS — K74.60 CIRRHOSIS OF LIVER WITH ASCITES, UNSPECIFIED HEPATIC CIRRHOSIS TYPE (MULTI): ICD-10-CM

## 2025-08-01 DIAGNOSIS — R18.8 CIRRHOSIS OF LIVER WITH ASCITES, UNSPECIFIED HEPATIC CIRRHOSIS TYPE (MULTI): ICD-10-CM

## 2025-08-01 DIAGNOSIS — Z76.82 PRE-LIVER TRANSPLANT, LISTED: ICD-10-CM

## 2025-08-04 ENCOUNTER — APPOINTMENT (OUTPATIENT)
Dept: RADIOLOGY | Facility: HOSPITAL | Age: 60
End: 2025-08-04
Payer: COMMERCIAL

## 2025-08-06 ENCOUNTER — HOSPITAL ENCOUNTER (OUTPATIENT)
Dept: RADIOLOGY | Facility: HOSPITAL | Age: 60
Discharge: HOME | End: 2025-08-06
Payer: COMMERCIAL

## 2025-08-06 VITALS — SYSTOLIC BLOOD PRESSURE: 104 MMHG | DIASTOLIC BLOOD PRESSURE: 71 MMHG | OXYGEN SATURATION: 97 % | HEART RATE: 59 BPM

## 2025-08-06 DIAGNOSIS — R18.8 CIRRHOSIS OF LIVER WITH ASCITES, UNSPECIFIED HEPATIC CIRRHOSIS TYPE (MULTI): ICD-10-CM

## 2025-08-06 DIAGNOSIS — K74.60 CIRRHOSIS OF LIVER WITH ASCITES, UNSPECIFIED HEPATIC CIRRHOSIS TYPE (MULTI): ICD-10-CM

## 2025-08-06 DIAGNOSIS — Z76.82 PRE-LIVER TRANSPLANT, LISTED: ICD-10-CM

## 2025-08-06 DIAGNOSIS — K70.31 ALCOHOLIC CIRRHOSIS OF LIVER WITH ASCITES (MULTI): ICD-10-CM

## 2025-08-06 PROCEDURE — 2500000004 HC RX 250 GENERAL PHARMACY W/ HCPCS (ALT 636 FOR OP/ED): Performed by: NURSE PRACTITIONER

## 2025-08-06 PROCEDURE — 49083 ABD PARACENTESIS W/IMAGING: CPT

## 2025-08-06 PROCEDURE — P9047 ALBUMIN (HUMAN), 25%, 50ML: HCPCS | Performed by: NURSE PRACTITIONER

## 2025-08-06 RX ORDER — ALBUMIN HUMAN 250 G/1000ML
50 SOLUTION INTRAVENOUS ONCE
Status: COMPLETED | OUTPATIENT
Start: 2025-08-06 | End: 2025-08-06

## 2025-08-06 RX ADMIN — ALBUMIN HUMAN 50 G: 0.25 SOLUTION INTRAVENOUS at 11:20

## 2025-08-06 NOTE — POST-PROCEDURE NOTE
INTERVENTIONAL RADIOLOGY ADVANCED PRACTICE PROCEDURE  [facility]    A time out was performed and Left Hemithorax was examined with US and appropriate entry point was confirmed and marked.   The patient was prepped and draped in a sterile manner, 1% lidocaine was used to anesthesize the skin and subcutaneous tissue.   A 5F Centesis needle was then introduced through the skin into the peritoneal space, the centesis catheter was then threaded without difficulty.   8100 ml of yellow fluid was removed without difficulty. The catheter was then removed.   No immediate complications were noted during and immediately following the procedure.

## 2025-08-07 ENCOUNTER — HOME CARE VISIT (OUTPATIENT)
Dept: HOME HEALTH SERVICES | Facility: HOME HEALTH | Age: 60
End: 2025-08-07
Payer: COMMERCIAL

## 2025-08-08 ENCOUNTER — PATIENT MESSAGE (OUTPATIENT)
Facility: HOSPITAL | Age: 60
End: 2025-08-08
Payer: COMMERCIAL

## 2025-08-11 ENCOUNTER — ANESTHESIA EVENT (OUTPATIENT)
Dept: GASTROENTEROLOGY | Facility: HOSPITAL | Age: 60
End: 2025-08-11
Payer: COMMERCIAL

## 2025-08-12 ENCOUNTER — HOSPITAL ENCOUNTER (OUTPATIENT)
Dept: GASTROENTEROLOGY | Facility: HOSPITAL | Age: 60
Discharge: HOME | End: 2025-08-12
Payer: COMMERCIAL

## 2025-08-12 ENCOUNTER — APPOINTMENT (OUTPATIENT)
Facility: HOSPITAL | Age: 60
End: 2025-08-12
Payer: COMMERCIAL

## 2025-08-12 ENCOUNTER — ANESTHESIA (OUTPATIENT)
Dept: GASTROENTEROLOGY | Facility: HOSPITAL | Age: 60
End: 2025-08-12
Payer: COMMERCIAL

## 2025-08-12 VITALS
HEART RATE: 60 BPM | WEIGHT: 210 LBS | BODY MASS INDEX: 24.79 KG/M2 | RESPIRATION RATE: 16 BRPM | TEMPERATURE: 97.5 F | SYSTOLIC BLOOD PRESSURE: 145 MMHG | OXYGEN SATURATION: 100 % | DIASTOLIC BLOOD PRESSURE: 102 MMHG | HEIGHT: 77 IN

## 2025-08-12 DIAGNOSIS — K70.31 ALCOHOLIC CIRRHOSIS OF LIVER WITH ASCITES (MULTI): Primary | ICD-10-CM

## 2025-08-12 DIAGNOSIS — Z76.82 PRE-LIVER TRANSPLANT, LISTED: ICD-10-CM

## 2025-08-12 DIAGNOSIS — K74.60 CIRRHOSIS OF LIVER WITH ASCITES, UNSPECIFIED HEPATIC CIRRHOSIS TYPE (MULTI): ICD-10-CM

## 2025-08-12 DIAGNOSIS — R18.8 CIRRHOSIS OF LIVER WITH ASCITES, UNSPECIFIED HEPATIC CIRRHOSIS TYPE (MULTI): ICD-10-CM

## 2025-08-12 PROCEDURE — 3700000001 HC GENERAL ANESTHESIA TIME - INITIAL BASE CHARGE

## 2025-08-12 PROCEDURE — 7100000010 HC PHASE TWO TIME - EACH INCREMENTAL 1 MINUTE

## 2025-08-12 PROCEDURE — 7100000009 HC PHASE TWO TIME - INITIAL BASE CHARGE

## 2025-08-12 PROCEDURE — 3700000002 HC GENERAL ANESTHESIA TIME - EACH INCREMENTAL 1 MINUTE

## 2025-08-12 PROCEDURE — A43244 PR EDG BAND LIGATION ESOPHGEAL/GASTRIC VARICES: Performed by: ANESTHESIOLOGY

## 2025-08-12 PROCEDURE — A43244 PR EDG BAND LIGATION ESOPHGEAL/GASTRIC VARICES

## 2025-08-12 PROCEDURE — 43244 EGD VARICES LIGATION: CPT | Performed by: INTERNAL MEDICINE

## 2025-08-12 PROCEDURE — 2500000004 HC RX 250 GENERAL PHARMACY W/ HCPCS (ALT 636 FOR OP/ED)

## 2025-08-12 PROCEDURE — 2720000007 HC OR 272 NO HCPCS

## 2025-08-12 RX ORDER — FENTANYL CITRATE 50 UG/ML
INJECTION, SOLUTION INTRAMUSCULAR; INTRAVENOUS CONTINUOUS PRN
Status: DISCONTINUED | OUTPATIENT
Start: 2025-08-12 | End: 2025-08-12

## 2025-08-12 RX ORDER — PHENYLEPHRINE HCL IN 0.9% NACL 1 MG/10 ML
SYRINGE (ML) INTRAVENOUS AS NEEDED
Status: DISCONTINUED | OUTPATIENT
Start: 2025-08-12 | End: 2025-08-12

## 2025-08-12 RX ORDER — LIDOCAINE HCL/PF 100 MG/5ML
SYRINGE (ML) INTRAVENOUS AS NEEDED
Status: DISCONTINUED | OUTPATIENT
Start: 2025-08-12 | End: 2025-08-12

## 2025-08-12 RX ORDER — PROPOFOL 10 MG/ML
INJECTION, EMULSION INTRAVENOUS AS NEEDED
Status: DISCONTINUED | OUTPATIENT
Start: 2025-08-12 | End: 2025-08-12

## 2025-08-12 RX ADMIN — SODIUM CHLORIDE, SODIUM LACTATE, POTASSIUM CHLORIDE, AND CALCIUM CHLORIDE: 600; 310; 30; 20 INJECTION, SOLUTION INTRAVENOUS at 10:31

## 2025-08-12 RX ADMIN — PROPOFOL 20 MG: 10 INJECTION, EMULSION INTRAVENOUS at 10:38

## 2025-08-12 RX ADMIN — LIDOCAINE HYDROCHLORIDE 100 MG: 20 INJECTION, SOLUTION INTRAVENOUS at 10:36

## 2025-08-12 RX ADMIN — PROPOFOL 20 MG: 10 INJECTION, EMULSION INTRAVENOUS at 10:40

## 2025-08-12 RX ADMIN — PROPOFOL 60 MG: 10 INJECTION, EMULSION INTRAVENOUS at 10:36

## 2025-08-12 RX ADMIN — PROPOFOL 40 MG: 10 INJECTION, EMULSION INTRAVENOUS at 10:42

## 2025-08-12 RX ADMIN — PROPOFOL 20 MG: 10 INJECTION, EMULSION INTRAVENOUS at 10:41

## 2025-08-12 RX ADMIN — PROPOFOL 20 MG: 10 INJECTION, EMULSION INTRAVENOUS at 10:39

## 2025-08-12 RX ADMIN — PROPOFOL 20 MG: 10 INJECTION, EMULSION INTRAVENOUS at 10:43

## 2025-08-12 RX ADMIN — Medication 160 MCG: at 10:43

## 2025-08-12 RX ADMIN — PROPOFOL 100 MCG/KG/MIN: 10 INJECTION, EMULSION INTRAVENOUS at 10:37

## 2025-08-12 ASSESSMENT — PAIN - FUNCTIONAL ASSESSMENT
PAIN_FUNCTIONAL_ASSESSMENT: 0-10

## 2025-08-12 ASSESSMENT — COLUMBIA-SUICIDE SEVERITY RATING SCALE - C-SSRS
1. IN THE PAST MONTH, HAVE YOU WISHED YOU WERE DEAD OR WISHED YOU COULD GO TO SLEEP AND NOT WAKE UP?: NO
2. HAVE YOU ACTUALLY HAD ANY THOUGHTS OF KILLING YOURSELF?: NO
6. HAVE YOU EVER DONE ANYTHING, STARTED TO DO ANYTHING, OR PREPARED TO DO ANYTHING TO END YOUR LIFE?: NO

## 2025-08-12 ASSESSMENT — PAIN SCALES - GENERAL
PAINLEVEL_OUTOF10: 0 - NO PAIN

## 2025-08-13 ENCOUNTER — APPOINTMENT (OUTPATIENT)
Dept: RADIOLOGY | Facility: HOSPITAL | Age: 60
End: 2025-08-13
Payer: COMMERCIAL

## 2025-08-15 ENCOUNTER — HOME CARE VISIT (OUTPATIENT)
Dept: HOME HEALTH SERVICES | Facility: HOME HEALTH | Age: 60
End: 2025-08-15
Payer: COMMERCIAL

## 2025-08-15 ENCOUNTER — HOSPITAL ENCOUNTER (OUTPATIENT)
Dept: RADIOLOGY | Facility: HOSPITAL | Age: 60
Discharge: HOME | End: 2025-08-15
Payer: COMMERCIAL

## 2025-08-15 ENCOUNTER — LAB (OUTPATIENT)
Dept: LAB | Facility: HOSPITAL | Age: 60
End: 2025-08-15
Payer: COMMERCIAL

## 2025-08-15 VITALS
SYSTOLIC BLOOD PRESSURE: 122 MMHG | OXYGEN SATURATION: 98 % | DIASTOLIC BLOOD PRESSURE: 95 MMHG | HEART RATE: 66 BPM | RESPIRATION RATE: 18 BRPM

## 2025-08-15 DIAGNOSIS — R18.8 CIRRHOSIS OF LIVER WITH ASCITES, UNSPECIFIED HEPATIC CIRRHOSIS TYPE (MULTI): ICD-10-CM

## 2025-08-15 DIAGNOSIS — R18.8 OTHER ASCITES: ICD-10-CM

## 2025-08-15 DIAGNOSIS — K74.60 CIRRHOSIS OF LIVER WITH ASCITES, UNSPECIFIED HEPATIC CIRRHOSIS TYPE (MULTI): ICD-10-CM

## 2025-08-15 DIAGNOSIS — Z76.82 AWAITING ORGAN TRANSPLANT STATUS: Primary | ICD-10-CM

## 2025-08-15 DIAGNOSIS — Z76.82 PRE-LIVER TRANSPLANT, LISTED: ICD-10-CM

## 2025-08-15 DIAGNOSIS — K70.31 ALCOHOLIC CIRRHOSIS OF LIVER WITH ASCITES (MULTI): ICD-10-CM

## 2025-08-15 DIAGNOSIS — R18.8 REFRACTORY ASCITES: ICD-10-CM

## 2025-08-15 DIAGNOSIS — K74.60 UNSPECIFIED CIRRHOSIS OF LIVER (MULTI): ICD-10-CM

## 2025-08-15 LAB
ALBUMIN SERPL BCP-MCNC: 4.5 G/DL (ref 3.4–5)
ALP SERPL-CCNC: 83 U/L (ref 33–136)
ALT SERPL W P-5'-P-CCNC: 28 U/L (ref 10–52)
ANION GAP SERPL CALC-SCNC: 15 MMOL/L (ref 10–20)
AST SERPL W P-5'-P-CCNC: 30 U/L (ref 9–39)
BILIRUB SERPL-MCNC: 0.8 MG/DL (ref 0–1.2)
BUN SERPL-MCNC: 16 MG/DL (ref 6–23)
CALCIUM SERPL-MCNC: 9 MG/DL (ref 8.6–10.3)
CHLORIDE SERPL-SCNC: 108 MMOL/L (ref 98–107)
CO2 SERPL-SCNC: 19 MMOL/L (ref 21–32)
CREAT SERPL-MCNC: 1.01 MG/DL (ref 0.5–1.3)
EGFRCR SERPLBLD CKD-EPI 2021: 85 ML/MIN/1.73M*2
ERYTHROCYTE [DISTWIDTH] IN BLOOD BY AUTOMATED COUNT: 15 % (ref 11.5–14.5)
GLUCOSE SERPL-MCNC: 102 MG/DL (ref 74–99)
HCT VFR BLD AUTO: 38.9 % (ref 41–52)
HGB BLD-MCNC: 12.5 G/DL (ref 13.5–17.5)
INR PPP: 1.1 (ref 0.9–1.1)
MCH RBC QN AUTO: 29.1 PG (ref 26–34)
MCHC RBC AUTO-ENTMCNC: 32.1 G/DL (ref 32–36)
MCV RBC AUTO: 91 FL (ref 80–100)
NRBC BLD-RTO: 0 /100 WBCS (ref 0–0)
PLATELET # BLD AUTO: ABNORMAL 10*3/UL
POTASSIUM SERPL-SCNC: 4.2 MMOL/L (ref 3.5–5.3)
PROT SERPL-MCNC: 7 G/DL (ref 6.4–8.2)
PROTHROMBIN TIME: 12.5 SECONDS (ref 9.8–12.4)
RBC # BLD AUTO: 4.29 X10*6/UL (ref 4.5–5.9)
SODIUM SERPL-SCNC: 138 MMOL/L (ref 136–145)
WBC # BLD AUTO: 3.2 X10*3/UL (ref 4.4–11.3)

## 2025-08-15 PROCEDURE — C1729 CATH, DRAINAGE: HCPCS

## 2025-08-15 PROCEDURE — 85610 PROTHROMBIN TIME: CPT

## 2025-08-15 PROCEDURE — 36415 COLL VENOUS BLD VENIPUNCTURE: CPT

## 2025-08-15 PROCEDURE — 80053 COMPREHEN METABOLIC PANEL: CPT

## 2025-08-15 PROCEDURE — C1769 GUIDE WIRE: HCPCS

## 2025-08-15 PROCEDURE — P9047 ALBUMIN (HUMAN), 25%, 50ML: HCPCS

## 2025-08-15 PROCEDURE — 2500000004 HC RX 250 GENERAL PHARMACY W/ HCPCS (ALT 636 FOR OP/ED)

## 2025-08-15 PROCEDURE — 2720000007 HC OR 272 NO HCPCS

## 2025-08-15 PROCEDURE — 49083 ABD PARACENTESIS W/IMAGING: CPT

## 2025-08-15 PROCEDURE — 85027 COMPLETE CBC AUTOMATED: CPT

## 2025-08-15 RX ORDER — ALBUMIN HUMAN 250 G/1000ML
SOLUTION INTRAVENOUS CONTINUOUS PRN
Status: COMPLETED | OUTPATIENT
Start: 2025-08-15 | End: 2025-08-15

## 2025-08-15 RX ORDER — LIDOCAINE HYDROCHLORIDE 10 MG/ML
INJECTION, SOLUTION EPIDURAL; INFILTRATION; INTRACAUDAL; PERINEURAL
Status: COMPLETED | OUTPATIENT
Start: 2025-08-15 | End: 2025-08-15

## 2025-08-15 RX ADMIN — ALBUMIN HUMAN 25 G: 0.25 SOLUTION INTRAVENOUS at 10:11

## 2025-08-15 RX ADMIN — ALBUMIN HUMAN 25 G: 0.25 SOLUTION INTRAVENOUS at 10:23

## 2025-08-15 RX ADMIN — LIDOCAINE HYDROCHLORIDE 10 ML: 10 INJECTION, SOLUTION EPIDURAL; INFILTRATION; INTRACAUDAL; PERINEURAL at 10:12

## 2025-08-15 ASSESSMENT — PAIN SCALES - GENERAL
PAINLEVEL_OUTOF10: 0 - NO PAIN

## 2025-08-15 ASSESSMENT — ACTIVITIES OF DAILY LIVING (ADL)
OASIS_M1830: 01
HOME_HEALTH_OASIS: 00

## 2025-08-19 ENCOUNTER — OFFICE VISIT (OUTPATIENT)
Facility: HOSPITAL | Age: 60
End: 2025-08-19
Payer: COMMERCIAL

## 2025-08-19 VITALS
HEART RATE: 74 BPM | WEIGHT: 196.7 LBS | DIASTOLIC BLOOD PRESSURE: 79 MMHG | BODY MASS INDEX: 23.33 KG/M2 | TEMPERATURE: 97.1 F | SYSTOLIC BLOOD PRESSURE: 123 MMHG | OXYGEN SATURATION: 98 %

## 2025-08-19 DIAGNOSIS — R18.8 CIRRHOSIS OF LIVER WITH ASCITES, UNSPECIFIED HEPATIC CIRRHOSIS TYPE (MULTI): ICD-10-CM

## 2025-08-19 DIAGNOSIS — I85.00 PORTAL HYPERTENSION WITH ESOPHAGEAL VARICES (MULTI): ICD-10-CM

## 2025-08-19 DIAGNOSIS — K74.60 CIRRHOSIS OF LIVER WITH ASCITES, UNSPECIFIED HEPATIC CIRRHOSIS TYPE (MULTI): ICD-10-CM

## 2025-08-19 DIAGNOSIS — K76.6 PORTAL HYPERTENSION WITH ESOPHAGEAL VARICES (MULTI): ICD-10-CM

## 2025-08-19 DIAGNOSIS — Z76.82 PRE-LIVER TRANSPLANT, LISTED: Primary | ICD-10-CM

## 2025-08-19 PROCEDURE — 99214 OFFICE O/P EST MOD 30 MIN: CPT | Performed by: INTERNAL MEDICINE

## 2025-08-19 ASSESSMENT — PAIN SCALES - GENERAL: PAINLEVEL_OUTOF10: 0-NO PAIN

## 2025-08-25 ENCOUNTER — PREP FOR PROCEDURE (OUTPATIENT)
Dept: RADIOLOGY | Facility: HOSPITAL | Age: 60
End: 2025-08-25

## 2025-08-25 ENCOUNTER — HOSPITAL ENCOUNTER (OUTPATIENT)
Dept: RADIOLOGY | Facility: HOSPITAL | Age: 60
Discharge: HOME | End: 2025-08-25
Payer: COMMERCIAL

## 2025-08-25 VITALS — SYSTOLIC BLOOD PRESSURE: 121 MMHG | HEART RATE: 66 BPM | OXYGEN SATURATION: 98 % | DIASTOLIC BLOOD PRESSURE: 83 MMHG

## 2025-08-25 DIAGNOSIS — K70.31 ALCOHOLIC CIRRHOSIS OF LIVER WITH ASCITES (MULTI): Primary | ICD-10-CM

## 2025-08-25 DIAGNOSIS — K70.31 ALCOHOLIC CIRRHOSIS OF LIVER WITH ASCITES (MULTI): ICD-10-CM

## 2025-08-25 PROCEDURE — 49083 ABD PARACENTESIS W/IMAGING: CPT

## 2025-08-29 DIAGNOSIS — Z76.82 PRE-LIVER TRANSPLANT, LISTED: ICD-10-CM

## 2025-08-29 DIAGNOSIS — R18.8 CIRRHOSIS OF LIVER WITH ASCITES, UNSPECIFIED HEPATIC CIRRHOSIS TYPE (MULTI): ICD-10-CM

## 2025-08-29 DIAGNOSIS — K74.60 CIRRHOSIS OF LIVER WITH ASCITES, UNSPECIFIED HEPATIC CIRRHOSIS TYPE (MULTI): ICD-10-CM

## 2025-09-02 ENCOUNTER — TELEPHONE (OUTPATIENT)
Facility: HOSPITAL | Age: 60
End: 2025-09-02
Payer: COMMERCIAL

## 2025-09-02 DIAGNOSIS — R18.8 REFRACTORY ASCITES: ICD-10-CM

## 2025-09-02 DIAGNOSIS — Z76.82 PRE-LIVER TRANSPLANT, LISTED: ICD-10-CM

## 2025-09-03 ENCOUNTER — HOSPITAL ENCOUNTER (OUTPATIENT)
Dept: RADIOLOGY | Facility: HOSPITAL | Age: 60
Discharge: HOME | End: 2025-09-03
Payer: COMMERCIAL

## 2025-09-03 VITALS — HEART RATE: 62 BPM | DIASTOLIC BLOOD PRESSURE: 91 MMHG | SYSTOLIC BLOOD PRESSURE: 129 MMHG | OXYGEN SATURATION: 98 %

## 2025-09-03 DIAGNOSIS — R18.8 CIRRHOSIS OF LIVER WITH ASCITES, UNSPECIFIED HEPATIC CIRRHOSIS TYPE (MULTI): ICD-10-CM

## 2025-09-03 DIAGNOSIS — Z76.82 PRE-LIVER TRANSPLANT, LISTED: ICD-10-CM

## 2025-09-03 DIAGNOSIS — R18.8 REFRACTORY ASCITES: ICD-10-CM

## 2025-09-03 DIAGNOSIS — K74.60 CIRRHOSIS OF LIVER WITH ASCITES, UNSPECIFIED HEPATIC CIRRHOSIS TYPE (MULTI): ICD-10-CM

## 2025-09-03 LAB
ALBUMIN SERPL BCP-MCNC: 3.5 G/DL (ref 3.4–5)
ALP SERPL-CCNC: 90 U/L (ref 33–136)
ALT SERPL W P-5'-P-CCNC: 26 U/L (ref 10–52)
ANION GAP SERPL CALC-SCNC: 11 MMOL/L (ref 10–20)
AST SERPL W P-5'-P-CCNC: 25 U/L (ref 9–39)
BILIRUB SERPL-MCNC: 0.7 MG/DL (ref 0–1.2)
BUN SERPL-MCNC: 19 MG/DL (ref 6–23)
CALCIUM SERPL-MCNC: 8.7 MG/DL (ref 8.6–10.6)
CHLORIDE SERPL-SCNC: 104 MMOL/L (ref 98–107)
CLARITY FLD: CLEAR
CO2 SERPL-SCNC: 24 MMOL/L (ref 21–32)
COLOR FLD: YELLOW
CREAT SERPL-MCNC: 1.02 MG/DL (ref 0.5–1.3)
EGFRCR SERPLBLD CKD-EPI 2021: 84 ML/MIN/1.73M*2
EOSINOPHIL NFR FLD MANUAL: NORMAL %
ERYTHROCYTE [DISTWIDTH] IN BLOOD BY AUTOMATED COUNT: 14.9 % (ref 11.5–14.5)
GLUCOSE SERPL-MCNC: 91 MG/DL (ref 74–99)
HCT VFR BLD AUTO: 35.9 % (ref 41–52)
HGB BLD-MCNC: 12.1 G/DL (ref 13.5–17.5)
INR PPP: 1.1 (ref 0.9–1.1)
LYMPHOCYTES NFR FLD MANUAL: 11 %
MCH RBC QN AUTO: 28.3 PG (ref 26–34)
MCHC RBC AUTO-ENTMCNC: 33.7 G/DL (ref 32–36)
MCV RBC AUTO: 84 FL (ref 80–100)
MONOS+MACROS NFR FLD MANUAL: 85 %
NEUTROPHILS NFR FLD MANUAL: 4 %
NRBC BLD-RTO: 0 /100 WBCS (ref 0–0)
PLATELET # BLD AUTO: 57 X10*3/UL (ref 150–450)
POTASSIUM SERPL-SCNC: 4.1 MMOL/L (ref 3.5–5.3)
PROT SERPL-MCNC: 6.4 G/DL (ref 6.4–8.2)
PROTHROMBIN TIME: 12.4 SECONDS (ref 9.8–12.4)
RBC # BLD AUTO: 4.27 X10*6/UL (ref 4.5–5.9)
RBC # FLD AUTO: <2000 /UL
SODIUM SERPL-SCNC: 135 MMOL/L (ref 136–145)
TOTAL CELLS COUNTED FLD: 100
WBC # BLD AUTO: 3.2 X10*3/UL (ref 4.4–11.3)
WBC # FLD AUTO: 104 /UL

## 2025-09-03 PROCEDURE — 49083 ABD PARACENTESIS W/IMAGING: CPT

## 2025-09-03 PROCEDURE — 87070 CULTURE OTHR SPECIMN AEROBIC: CPT | Performed by: INTERNAL MEDICINE

## 2025-09-03 PROCEDURE — 85027 COMPLETE CBC AUTOMATED: CPT | Performed by: INTERNAL MEDICINE

## 2025-09-03 PROCEDURE — 80053 COMPREHEN METABOLIC PANEL: CPT | Performed by: INTERNAL MEDICINE

## 2025-09-03 PROCEDURE — 85610 PROTHROMBIN TIME: CPT | Performed by: INTERNAL MEDICINE

## 2025-09-03 PROCEDURE — P9047 ALBUMIN (HUMAN), 25%, 50ML: HCPCS | Performed by: NURSE PRACTITIONER

## 2025-09-03 PROCEDURE — 2500000004 HC RX 250 GENERAL PHARMACY W/ HCPCS (ALT 636 FOR OP/ED): Performed by: NURSE PRACTITIONER

## 2025-09-03 PROCEDURE — 89051 BODY FLUID CELL COUNT: CPT | Performed by: INTERNAL MEDICINE

## 2025-09-03 PROCEDURE — 36415 COLL VENOUS BLD VENIPUNCTURE: CPT

## 2025-09-03 RX ORDER — ALBUMIN HUMAN 250 G/1000ML
37.5 SOLUTION INTRAVENOUS ONCE
Status: COMPLETED | OUTPATIENT
Start: 2025-09-03 | End: 2025-09-03

## 2025-09-03 RX ADMIN — ALBUMIN HUMAN 37.5 G: 0.25 SOLUTION INTRAVENOUS at 11:10

## 2025-09-04 ENCOUNTER — DOCUMENTATION (OUTPATIENT)
Dept: TRANSPLANT | Facility: HOSPITAL | Age: 60
End: 2025-09-04
Payer: COMMERCIAL

## 2025-09-06 LAB
BACTERIA FLD CULT: NORMAL
GRAM STN SPEC: NORMAL
GRAM STN SPEC: NORMAL

## (undated) DEVICE — Device

## (undated) DEVICE — DRESSING, ISLAND, TELFA, 4 X 5 IN

## (undated) DEVICE — SUTURE, SILK, 4-0, 18 IN, LABYRINTH, BLACK

## (undated) DEVICE — SPONGE, HEMOSTATIC, CELLULOSE, SURGICEL, 2 X 14 IN

## (undated) DEVICE — SUTURE, PROLENE, 1, 30 IN, CT-1, BLUE

## (undated) DEVICE — SUTURE, VICRYL, 3-0,18 IN, SH, UNDYED

## (undated) DEVICE — SUTURE, VICRYL, 3-0, 27 IN, SH

## (undated) DEVICE — TOWEL, OR, XRAY DETECT 5 PK, WHITE, 17X26, W/DMT TAG, ST

## (undated) DEVICE — TISSUE ADHESIVE, EXOFIN, 1ML

## (undated) DEVICE — DRAPE, INCISE, ANTIMICROBIAL, IOBAN 2, STERI DRAPE, 23 X 33 IN, DISPOSABLE, STERILE

## (undated) DEVICE — DRAIN, WOUND, FLAT, HUBLESS, FULL LENGTH PERFORATION, 7 MM X 20 CM

## (undated) DEVICE — DRAPE, SHEET, LAPAROTOMY

## (undated) DEVICE — CLIP, LIGATING, W/ADHESIVE PAD, MEDIUM, TITANIUM

## (undated) DEVICE — TIP, SUCTION, YANKAUER, FLEXIBLE

## (undated) DEVICE — CATHETER TRAY, SURESTEP, 16FR, URINE METER W/STATLOCK

## (undated) DEVICE — DRAPE, INSTRUMENT, W/POUCH, STERI DRAPE, 7 X 11 IN, DISPOSABLE, STERILE

## (undated) DEVICE — PAD, GROUNDING, ELECTROSURGICAL, W/9 FT CABLE, POLYHESIVE II, ADULT, LF

## (undated) DEVICE — DRAIN, CHANNEL, HUBLESS, ROUND, FULL FLUTE, 19FR

## (undated) DEVICE — SUTURE, PROLENE, 3-0, 36 IN, RB1, DA, BLUE

## (undated) DEVICE — APPLICATOR, CHLORAPREP, W/ORANGE TINT, 26ML

## (undated) DEVICE — COVER, TABLE, 44 X 75 IN, DISPOSABLE, LF, STERILE

## (undated) DEVICE — SUTURE, PROLENE, 0, 30 IN, CT, BLUE

## (undated) DEVICE — COVER, CART, 45 X 27 X 48 IN, CLEAR

## (undated) DEVICE — SUTURE, VICRYL, 2-0, 18 IN, UNDYED

## (undated) DEVICE — DRAIN, PENROSE, 0.5 X 12 IN, LATEX, STERILE

## (undated) DEVICE — SYRINGE, 20 CC, LUER LOCK, MONOJECT, W/O CAP, LF

## (undated) DEVICE — HOLSTER, ELECTROSURGERY ACCESSORY, STERILE

## (undated) DEVICE — MANIFOLD, 4 PORT NEPTUNE STANDARD

## (undated) DEVICE — SUTURE, VICRYL, 3-0, 54 IN, TIE, VIOLET

## (undated) DEVICE — SUTURE, SILK, 3-0, 18 IN, MULTIPACK, BLACK

## (undated) DEVICE — SUTURE, SILK, 2-0, 18 IN, BLACK

## (undated) DEVICE — DRAPE, INCISE, ANTIMICROBIAL, IOBAN 2, LARGE, 17 X 23 IN, DISPOSABLE, STERILE

## (undated) DEVICE — NEEDLE, HYPODERMIC, REGULAR WALL, REGULAR BEVEL, 22 G X 1 IN

## (undated) DEVICE — SUTURE, PDS II, 3-0, 27 IN, SH, VIL, MONO, LF

## (undated) DEVICE — SUTURE, PDS II, 1, 36 IN, CT-1, VIOLET

## (undated) DEVICE — COVER, TABLE, UHC

## (undated) DEVICE — DRAPE, TIBURON W/ADHESIVE, 19 X 30

## (undated) DEVICE — DRESSING, ADHESIVE, ISLAND, TELFA, 4 X 14 IN

## (undated) DEVICE — SUTURE, ETHILON, 4-0, BLK, MONO, PS-2 18

## (undated) DEVICE — TIP, SUCTION, YANKAUER, BULB, ADULT

## (undated) DEVICE — CLIPPER, SURGICAL BLADE ASSEMBLY, GENERAL PURPOSE, SINGLE USE

## (undated) DEVICE — SUTURE, PROLENE, 1 X 60IN TP-1, BLUE